# Patient Record
Sex: MALE | Race: BLACK OR AFRICAN AMERICAN | NOT HISPANIC OR LATINO | Employment: FULL TIME | URBAN - METROPOLITAN AREA
[De-identification: names, ages, dates, MRNs, and addresses within clinical notes are randomized per-mention and may not be internally consistent; named-entity substitution may affect disease eponyms.]

---

## 2018-08-29 ENCOUNTER — APPOINTMENT (OUTPATIENT)
Dept: LAB | Facility: HOSPITAL | Age: 53
End: 2018-08-29

## 2018-08-29 ENCOUNTER — OFFICE VISIT (OUTPATIENT)
Dept: INTERNAL MEDICINE | Facility: CLINIC | Age: 53
End: 2018-08-29

## 2018-08-29 VITALS
HEIGHT: 70 IN | BODY MASS INDEX: 38.45 KG/M2 | RESPIRATION RATE: 16 BRPM | WEIGHT: 268.6 LBS | SYSTOLIC BLOOD PRESSURE: 146 MMHG | OXYGEN SATURATION: 96 % | HEART RATE: 68 BPM | TEMPERATURE: 97.7 F | DIASTOLIC BLOOD PRESSURE: 92 MMHG

## 2018-08-29 DIAGNOSIS — E11.9 TYPE 2 DIABETES MELLITUS WITHOUT COMPLICATION, WITHOUT LONG-TERM CURRENT USE OF INSULIN (HCC): ICD-10-CM

## 2018-08-29 DIAGNOSIS — I10 ESSENTIAL HYPERTENSION: Primary | ICD-10-CM

## 2018-08-29 DIAGNOSIS — E78.5 HYPERLIPIDEMIA, UNSPECIFIED HYPERLIPIDEMIA TYPE: ICD-10-CM

## 2018-08-29 LAB
ALBUMIN SERPL-MCNC: 4.2 G/DL (ref 3.5–5.2)
ALBUMIN/GLOB SERPL: 1.3 G/DL
ALP SERPL-CCNC: 68 U/L (ref 39–117)
ALT SERPL W P-5'-P-CCNC: 31 U/L (ref 1–41)
ANION GAP SERPL CALCULATED.3IONS-SCNC: 10.7 MMOL/L
AST SERPL-CCNC: 30 U/L (ref 1–40)
BILIRUB SERPL-MCNC: 0.4 MG/DL (ref 0.1–1.2)
BUN BLD-MCNC: 11 MG/DL (ref 6–20)
BUN/CREAT SERPL: 10.7 (ref 7–25)
CALCIUM SPEC-SCNC: 9.3 MG/DL (ref 8.6–10.5)
CHLORIDE SERPL-SCNC: 102 MMOL/L (ref 98–107)
CHOLEST SERPL-MCNC: 145 MG/DL (ref 0–200)
CO2 SERPL-SCNC: 27.3 MMOL/L (ref 22–29)
CREAT BLD-MCNC: 1.03 MG/DL (ref 0.76–1.27)
GFR SERPL CREATININE-BSD FRML MDRD: 91 ML/MIN/1.73
GLOBULIN UR ELPH-MCNC: 3.3 GM/DL
GLUCOSE BLD-MCNC: 140 MG/DL (ref 65–99)
HBA1C MFR BLD: 7.58 % (ref 4.8–5.6)
HDLC SERPL-MCNC: 46 MG/DL (ref 40–60)
LDLC SERPL CALC-MCNC: 75 MG/DL (ref 0–100)
LDLC/HDLC SERPL: 1.64 {RATIO}
POTASSIUM BLD-SCNC: 4 MMOL/L (ref 3.5–5.2)
PROT SERPL-MCNC: 7.5 G/DL (ref 6–8.5)
SODIUM BLD-SCNC: 140 MMOL/L (ref 136–145)
TRIGL SERPL-MCNC: 118 MG/DL (ref 0–150)
VLDLC SERPL-MCNC: 23.6 MG/DL (ref 5–40)

## 2018-08-29 PROCEDURE — 99204 OFFICE O/P NEW MOD 45 MIN: CPT | Performed by: FAMILY MEDICINE

## 2018-08-29 PROCEDURE — 80053 COMPREHEN METABOLIC PANEL: CPT | Performed by: FAMILY MEDICINE

## 2018-08-29 PROCEDURE — 36415 COLL VENOUS BLD VENIPUNCTURE: CPT | Performed by: FAMILY MEDICINE

## 2018-08-29 PROCEDURE — 83036 HEMOGLOBIN GLYCOSYLATED A1C: CPT | Performed by: FAMILY MEDICINE

## 2018-08-29 PROCEDURE — 80061 LIPID PANEL: CPT | Performed by: FAMILY MEDICINE

## 2018-08-29 RX ORDER — IRBESARTAN 300 MG/1
300 TABLET ORAL NIGHTLY
Qty: 30 TABLET | Refills: 6 | Status: SHIPPED | OUTPATIENT
Start: 2018-08-29 | End: 2019-01-24 | Stop reason: SDUPTHER

## 2018-08-29 RX ORDER — AMLODIPINE BESYLATE 10 MG/1
10 TABLET ORAL DAILY
Refills: 0 | COMMUNITY
Start: 2018-06-10 | End: 2018-08-29 | Stop reason: SDUPTHER

## 2018-08-29 RX ORDER — HYDROCHLOROTHIAZIDE 12.5 MG/1
12.5 TABLET ORAL DAILY
Qty: 30 TABLET | Refills: 6 | Status: SHIPPED | OUTPATIENT
Start: 2018-08-29 | End: 2019-03-16 | Stop reason: SDUPTHER

## 2018-08-29 RX ORDER — METFORMIN HYDROCHLORIDE EXTENDED-RELEASE TABLETS 500 MG/1
1000 TABLET, FILM COATED, EXTENDED RELEASE ORAL NIGHTLY
Qty: 60 TABLET | Refills: 6 | Status: SHIPPED | OUTPATIENT
Start: 2018-08-29 | End: 2018-08-31 | Stop reason: RX

## 2018-08-29 RX ORDER — ESOMEPRAZOLE MAGNESIUM 40 MG/1
40 CAPSULE, DELAYED RELEASE ORAL AS NEEDED
COMMUNITY
End: 2020-05-05 | Stop reason: SDUPTHER

## 2018-08-29 RX ORDER — AMLODIPINE BESYLATE 10 MG/1
10 TABLET ORAL DAILY
Qty: 30 TABLET | Refills: 6 | Status: SHIPPED | OUTPATIENT
Start: 2018-08-29 | End: 2018-12-13 | Stop reason: SDUPTHER

## 2018-08-29 RX ORDER — ATORVASTATIN CALCIUM 20 MG/1
20 TABLET, FILM COATED ORAL DAILY
COMMUNITY
End: 2018-08-29 | Stop reason: SDUPTHER

## 2018-08-29 RX ORDER — METFORMIN HYDROCHLORIDE EXTENDED-RELEASE TABLETS 500 MG/1
500 TABLET, FILM COATED, EXTENDED RELEASE ORAL 2 TIMES DAILY WITH MEALS
COMMUNITY
End: 2018-08-29 | Stop reason: SDUPTHER

## 2018-08-29 RX ORDER — ATORVASTATIN CALCIUM 20 MG/1
20 TABLET, FILM COATED ORAL DAILY
Qty: 30 TABLET | Refills: 6 | Status: SHIPPED | OUTPATIENT
Start: 2018-08-29 | End: 2019-05-26 | Stop reason: SDUPTHER

## 2018-08-29 RX ORDER — VALSARTAN AND HYDROCHLOROTHIAZIDE 320; 12.5 MG/1; MG/1
1 TABLET, FILM COATED ORAL DAILY
Refills: 1 | COMMUNITY
Start: 2018-06-10 | End: 2018-08-29

## 2018-08-31 ENCOUNTER — TELEPHONE (OUTPATIENT)
Dept: INTERNAL MEDICINE | Facility: CLINIC | Age: 53
End: 2018-08-31

## 2018-12-13 DIAGNOSIS — I10 ESSENTIAL HYPERTENSION: ICD-10-CM

## 2018-12-13 RX ORDER — AMLODIPINE BESYLATE 10 MG/1
10 TABLET ORAL DAILY
Qty: 90 TABLET | Refills: 1 | Status: SHIPPED | OUTPATIENT
Start: 2018-12-13 | End: 2019-07-03 | Stop reason: SDUPTHER

## 2018-12-13 NOTE — TELEPHONE ENCOUNTER
[facility] faxed our office requesting a prescription for a 90 day supply of amlodipine. Prescription sent to pharmacy.

## 2018-12-31 ENCOUNTER — OFFICE VISIT (OUTPATIENT)
Dept: INTERNAL MEDICINE | Facility: CLINIC | Age: 53
End: 2018-12-31

## 2018-12-31 ENCOUNTER — APPOINTMENT (OUTPATIENT)
Dept: LAB | Facility: HOSPITAL | Age: 53
End: 2018-12-31

## 2018-12-31 VITALS
BODY MASS INDEX: 36.58 KG/M2 | HEIGHT: 70 IN | WEIGHT: 255.5 LBS | HEART RATE: 81 BPM | OXYGEN SATURATION: 97 % | DIASTOLIC BLOOD PRESSURE: 82 MMHG | SYSTOLIC BLOOD PRESSURE: 130 MMHG

## 2018-12-31 DIAGNOSIS — E78.5 HYPERLIPIDEMIA, UNSPECIFIED HYPERLIPIDEMIA TYPE: ICD-10-CM

## 2018-12-31 DIAGNOSIS — E11.9 TYPE 2 DIABETES MELLITUS WITHOUT COMPLICATION, WITHOUT LONG-TERM CURRENT USE OF INSULIN (HCC): Primary | ICD-10-CM

## 2018-12-31 DIAGNOSIS — R68.82 LOW LIBIDO: ICD-10-CM

## 2018-12-31 LAB
ALBUMIN SERPL-MCNC: 4.3 G/DL (ref 3.5–5.2)
ALBUMIN UR-MCNC: 15.3 MG/L
ALBUMIN/GLOB SERPL: 1.3 G/DL
ALP SERPL-CCNC: 57 U/L (ref 39–117)
ALT SERPL W P-5'-P-CCNC: 29 U/L (ref 1–41)
ANION GAP SERPL CALCULATED.3IONS-SCNC: 12.1 MMOL/L
AST SERPL-CCNC: 30 U/L (ref 1–40)
BILIRUB SERPL-MCNC: 0.4 MG/DL (ref 0.1–1.2)
BUN BLD-MCNC: 15 MG/DL (ref 6–20)
BUN/CREAT SERPL: 13.3 (ref 7–25)
CALCIUM SPEC-SCNC: 9.4 MG/DL (ref 8.6–10.5)
CHLORIDE SERPL-SCNC: 100 MMOL/L (ref 98–107)
CHOLEST SERPL-MCNC: 127 MG/DL (ref 0–200)
CO2 SERPL-SCNC: 26.9 MMOL/L (ref 22–29)
CREAT BLD-MCNC: 1.13 MG/DL (ref 0.76–1.27)
CREAT UR-MCNC: 128.7 MG/DL
GFR SERPL CREATININE-BSD FRML MDRD: 82 ML/MIN/1.73
GLOBULIN UR ELPH-MCNC: 3.3 GM/DL
GLUCOSE BLD-MCNC: 101 MG/DL (ref 65–99)
HBA1C MFR BLD: 5.9 % (ref 4.8–5.6)
HDLC SERPL-MCNC: 61 MG/DL (ref 40–60)
LDLC SERPL CALC-MCNC: 50 MG/DL (ref 0–100)
LDLC/HDLC SERPL: 0.81 {RATIO}
MICROALBUMIN/CREAT UR: 118.9 MG/G
POTASSIUM BLD-SCNC: 4 MMOL/L (ref 3.5–5.2)
PROT SERPL-MCNC: 7.6 G/DL (ref 6–8.5)
SODIUM BLD-SCNC: 139 MMOL/L (ref 136–145)
TRIGL SERPL-MCNC: 82 MG/DL (ref 0–150)
VLDLC SERPL-MCNC: 16.4 MG/DL (ref 5–40)

## 2018-12-31 PROCEDURE — 83036 HEMOGLOBIN GLYCOSYLATED A1C: CPT | Performed by: FAMILY MEDICINE

## 2018-12-31 PROCEDURE — 82043 UR ALBUMIN QUANTITATIVE: CPT | Performed by: FAMILY MEDICINE

## 2018-12-31 PROCEDURE — 99214 OFFICE O/P EST MOD 30 MIN: CPT | Performed by: FAMILY MEDICINE

## 2018-12-31 PROCEDURE — 80061 LIPID PANEL: CPT | Performed by: FAMILY MEDICINE

## 2018-12-31 PROCEDURE — 82570 ASSAY OF URINE CREATININE: CPT | Performed by: FAMILY MEDICINE

## 2018-12-31 PROCEDURE — 36415 COLL VENOUS BLD VENIPUNCTURE: CPT | Performed by: FAMILY MEDICINE

## 2018-12-31 PROCEDURE — 84403 ASSAY OF TOTAL TESTOSTERONE: CPT | Performed by: FAMILY MEDICINE

## 2018-12-31 PROCEDURE — 84402 ASSAY OF FREE TESTOSTERONE: CPT | Performed by: FAMILY MEDICINE

## 2018-12-31 PROCEDURE — 80053 COMPREHEN METABOLIC PANEL: CPT | Performed by: FAMILY MEDICINE

## 2019-01-03 LAB
TESTOST FREE SERPL-MCNC: 12.3 PG/ML (ref 7.2–24)
TESTOST SERPL-MCNC: 336 NG/DL (ref 264–916)

## 2019-01-07 ENCOUNTER — TELEPHONE (OUTPATIENT)
Dept: INTERNAL MEDICINE | Facility: CLINIC | Age: 54
End: 2019-01-07

## 2019-01-07 NOTE — TELEPHONE ENCOUNTER
----- Message from Oneil Decker MD sent at 1/4/2019 12:57 PM EST -----  The labs were reviewed. Please inform patient that labs were normal.

## 2019-01-23 ENCOUNTER — TELEPHONE (OUTPATIENT)
Dept: INTERNAL MEDICINE | Facility: CLINIC | Age: 54
End: 2019-01-23

## 2019-01-23 DIAGNOSIS — Z12.11 SCREENING FOR COLON CANCER: Primary | ICD-10-CM

## 2019-01-23 DIAGNOSIS — G47.33 OBSTRUCTIVE SLEEP APNEA SYNDROME: ICD-10-CM

## 2019-01-23 NOTE — TELEPHONE ENCOUNTER
Patient's wife called stating that patient's previous physician in [place] called the patient to let him know that he is due for a colonoscopy. Patient's wife is requesting an order for a screening colonoscopy for patient.     Patient's wife also stated that the patient would like to get up updated sleep study done because his last sleep study for his sleep apnea was done over 10 years ago. Please advise.

## 2019-01-24 DIAGNOSIS — I10 ESSENTIAL HYPERTENSION: ICD-10-CM

## 2019-01-24 RX ORDER — IRBESARTAN 300 MG/1
TABLET ORAL
Qty: 90 TABLET | Refills: 1 | Status: SHIPPED | OUTPATIENT
Start: 2019-01-24 | End: 2019-08-05 | Stop reason: SDUPTHER

## 2019-03-16 DIAGNOSIS — I10 ESSENTIAL HYPERTENSION: ICD-10-CM

## 2019-03-18 RX ORDER — HYDROCHLOROTHIAZIDE 12.5 MG/1
TABLET ORAL
Qty: 90 TABLET | Refills: 1 | Status: SHIPPED | OUTPATIENT
Start: 2019-03-18 | End: 2019-10-03 | Stop reason: SDUPTHER

## 2019-05-08 ENCOUNTER — TELEPHONE (OUTPATIENT)
Dept: GASTROENTEROLOGY | Facility: CLINIC | Age: 54
End: 2019-05-08

## 2019-05-08 NOTE — TELEPHONE ENCOUNTER
Spoke with patient and he said that his last colonoscopy was done by Dr Sneed of [facility] [place].  [phone]

## 2019-05-08 NOTE — TELEPHONE ENCOUNTER
Left voice message for patient to call office regarding where his last colonoscopy was performed so that the office can obtain a copy of that report.

## 2019-05-10 ENCOUNTER — PREP FOR SURGERY (OUTPATIENT)
Dept: OTHER | Facility: HOSPITAL | Age: 54
End: 2019-05-10

## 2019-05-10 DIAGNOSIS — D12.3 ADENOMATOUS POLYP OF TRANSVERSE COLON: Primary | ICD-10-CM

## 2019-05-14 ENCOUNTER — APPOINTMENT (OUTPATIENT)
Dept: LAB | Facility: HOSPITAL | Age: 54
End: 2019-05-14

## 2019-05-14 ENCOUNTER — OFFICE VISIT (OUTPATIENT)
Dept: INTERNAL MEDICINE | Facility: CLINIC | Age: 54
End: 2019-05-14

## 2019-05-14 VITALS
SYSTOLIC BLOOD PRESSURE: 124 MMHG | HEIGHT: 70 IN | OXYGEN SATURATION: 97 % | BODY MASS INDEX: 36.51 KG/M2 | WEIGHT: 255 LBS | DIASTOLIC BLOOD PRESSURE: 80 MMHG | HEART RATE: 96 BPM

## 2019-05-14 DIAGNOSIS — E78.5 HYPERLIPIDEMIA, UNSPECIFIED HYPERLIPIDEMIA TYPE: Primary | ICD-10-CM

## 2019-05-14 DIAGNOSIS — E11.9 TYPE 2 DIABETES MELLITUS WITHOUT COMPLICATION, WITHOUT LONG-TERM CURRENT USE OF INSULIN (HCC): ICD-10-CM

## 2019-05-14 DIAGNOSIS — I10 ESSENTIAL HYPERTENSION: ICD-10-CM

## 2019-05-14 LAB
ALBUMIN SERPL-MCNC: 4.5 G/DL (ref 3.5–5.2)
ALBUMIN/GLOB SERPL: 1.6 G/DL
ALP SERPL-CCNC: 54 U/L (ref 39–117)
ALT SERPL W P-5'-P-CCNC: 27 U/L (ref 1–41)
ANION GAP SERPL CALCULATED.3IONS-SCNC: 11.1 MMOL/L
AST SERPL-CCNC: 31 U/L (ref 1–40)
BILIRUB SERPL-MCNC: 0.7 MG/DL (ref 0.2–1.2)
BUN BLD-MCNC: 9 MG/DL (ref 6–20)
BUN/CREAT SERPL: 7.6 (ref 7–25)
CALCIUM SPEC-SCNC: 9 MG/DL (ref 8.6–10.5)
CHLORIDE SERPL-SCNC: 103 MMOL/L (ref 98–107)
CHOLEST SERPL-MCNC: 131 MG/DL (ref 0–200)
CO2 SERPL-SCNC: 25.9 MMOL/L (ref 22–29)
CREAT BLD-MCNC: 1.19 MG/DL (ref 0.76–1.27)
GFR SERPL CREATININE-BSD FRML MDRD: 77 ML/MIN/1.73
GLOBULIN UR ELPH-MCNC: 2.9 GM/DL
GLUCOSE BLD-MCNC: 93 MG/DL (ref 65–99)
HBA1C MFR BLD: 6.21 % (ref 4.8–5.6)
HDLC SERPL-MCNC: 62 MG/DL (ref 40–60)
LDLC SERPL CALC-MCNC: 50 MG/DL (ref 0–100)
LDLC/HDLC SERPL: 0.81 {RATIO}
POTASSIUM BLD-SCNC: 4.1 MMOL/L (ref 3.5–5.2)
PROT SERPL-MCNC: 7.4 G/DL (ref 6–8.5)
SODIUM BLD-SCNC: 140 MMOL/L (ref 136–145)
TRIGL SERPL-MCNC: 95 MG/DL (ref 0–150)
VLDLC SERPL-MCNC: 19 MG/DL (ref 5–40)

## 2019-05-14 PROCEDURE — 36415 COLL VENOUS BLD VENIPUNCTURE: CPT | Performed by: FAMILY MEDICINE

## 2019-05-14 PROCEDURE — 99214 OFFICE O/P EST MOD 30 MIN: CPT | Performed by: FAMILY MEDICINE

## 2019-05-14 PROCEDURE — 80053 COMPREHEN METABOLIC PANEL: CPT | Performed by: FAMILY MEDICINE

## 2019-05-14 PROCEDURE — 83036 HEMOGLOBIN GLYCOSYLATED A1C: CPT | Performed by: FAMILY MEDICINE

## 2019-05-14 PROCEDURE — 80061 LIPID PANEL: CPT | Performed by: FAMILY MEDICINE

## 2019-05-14 NOTE — PROGRESS NOTES
Brandi Munoz is a 54 y.o. male.     Chief Complaint   Patient presents with   • Diabetes   • Hypertension   • Hyperlipidemia         History of Present Illness       Patient is a history of essential hypertension.  Patient blood pressure today's office visit 124/80.  He is currently taking hydrochlorothiazide 12.5 mg daily, irbesartan 300 mg daily, and amlodipine 10 mg daily.  Patient denies any side effects of the medication.    Has hyperlipidemia.  Patient is currently taking Lipitor 20 mg daily.  Patient denies any side effects of the medication.    Patient also has type 2 diabetes.  Patient states that his fasting blood glucose levels are in average of 96.  Patient is currently taking Janumet extended release  mg daily and metformin 1000 mg daily.  Patient denies any side effects of the medications.    The following portions of the patient's history were reviewed and updated as appropriate: allergies, current medications, past family history, past medical history, past social history, past surgical history and problem list.    Review of Systems   Constitutional: Negative for chills and fever.   HENT: Negative for congestion, rhinorrhea, sinus pain and sore throat.    Eyes: Negative for photophobia and visual disturbance.   Respiratory: Negative for cough, chest tightness and shortness of breath.    Cardiovascular: Negative for chest pain and palpitations.   Gastrointestinal: Negative for diarrhea, nausea and vomiting.   Genitourinary: Negative for dysuria, frequency and urgency.   Skin: Negative for rash and wound.   Neurological: Negative for dizziness and syncope.   Psychiatric/Behavioral: Negative for behavioral problems and confusion.       Objective   Physical Exam   Constitutional: He is oriented to person, place, and time. He appears well-developed and well-nourished.   HENT:   Head: Normocephalic and atraumatic.   Right Ear: External ear normal.   Left Ear: External ear normal.    Eyes: EOM are normal.   Neck: Normal range of motion. Neck supple.   Cardiovascular: Normal rate, regular rhythm and normal heart sounds.   Pulmonary/Chest: Effort normal and breath sounds normal. No respiratory distress.   Musculoskeletal: Normal range of motion.   Lymphadenopathy:     He has no cervical adenopathy.   Neurological: He is alert and oriented to person, place, and time.   Skin: Skin is warm.   Psychiatric: He has a normal mood and affect. His behavior is normal.   Nursing note and vitals reviewed.      Assessment/Plan   Jhon was seen today for diabetes, hypertension and hyperlipidemia.    Diagnoses and all orders for this visit:    Hyperlipidemia, unspecified hyperlipidemia type  -     Lipid Panel With LDL / HDL Ratio  -     Continue janumet XR.    Essential hypertension  -     Comprehensive Metabolic Panel  -     Continue current medication.    Type 2 diabetes mellitus without complication, without long-term current use of insulin (CMS/[facility])  -     Hemoglobin A1c  -     Continue janumet xr and metformin.          No Follow-up on file.    Dictated utilizing Dragon Voice Recognition Software

## 2019-05-26 DIAGNOSIS — E78.5 HYPERLIPIDEMIA, UNSPECIFIED HYPERLIPIDEMIA TYPE: ICD-10-CM

## 2019-05-26 NOTE — PROGRESS NOTES
Please inform the patient of the following abnormal results.  hba1c is slowly rising, but will keep patient on current medication.

## 2019-05-28 RX ORDER — ATORVASTATIN CALCIUM 20 MG/1
TABLET, FILM COATED ORAL
Qty: 90 TABLET | Refills: 1 | Status: SHIPPED | OUTPATIENT
Start: 2019-05-28 | End: 2020-01-08

## 2019-06-03 ENCOUNTER — TELEPHONE (OUTPATIENT)
Dept: INTERNAL MEDICINE | Facility: CLINIC | Age: 54
End: 2019-06-03

## 2019-06-03 NOTE — TELEPHONE ENCOUNTER
----- Message from Oneil Decker MD sent at 5/26/2019  8:55 AM EDT -----  Please inform the patient of the following abnormal results.  hba1c is slowly rising, but will keep patient on current medication.

## 2019-06-19 ENCOUNTER — OUTSIDE FACILITY SERVICE (OUTPATIENT)
Dept: GASTROENTEROLOGY | Facility: CLINIC | Age: 54
End: 2019-06-19

## 2019-06-19 PROCEDURE — 45380 COLONOSCOPY AND BIOPSY: CPT | Performed by: INTERNAL MEDICINE

## 2019-06-19 PROCEDURE — 45385 COLONOSCOPY W/LESION REMOVAL: CPT | Performed by: INTERNAL MEDICINE

## 2019-06-20 ENCOUNTER — TELEPHONE (OUTPATIENT)
Dept: INTERNAL MEDICINE | Facility: CLINIC | Age: 54
End: 2019-06-20

## 2019-06-20 NOTE — TELEPHONE ENCOUNTER
Pt advised of results and to call GI for pathology results.  Advised to call our office if he does not hear anything.  Pt expressed understanding and will repeat colonoscopy in three years.

## 2019-06-20 NOTE — TELEPHONE ENCOUNTER
----- Message from Oneil Decker MD sent at 6/20/2019 11:24 AM EDT -----  Please inform the patient of the following abnormal results.  Polyps were found on colonoscopy.  Patient is to call his GI to find out the pathology reports for this.  He will need a follow-up colonoscopy in 3 years.

## 2019-06-27 ENCOUNTER — TELEPHONE (OUTPATIENT)
Dept: INTERNAL MEDICINE | Facility: CLINIC | Age: 54
End: 2019-06-27

## 2019-06-27 ENCOUNTER — TELEPHONE (OUTPATIENT)
Dept: GASTROENTEROLOGY | Facility: CLINIC | Age: 54
End: 2019-06-27

## 2019-06-27 NOTE — TELEPHONE ENCOUNTER
Robert Evans MD  [facility] Clinical 2 Pool             Tubular adenoma colon polyp, colonoscopy recall 3 yrs

## 2019-06-27 NOTE — TELEPHONE ENCOUNTER
----- Message from Meghann Catalan sent at 6/27/2019  1:44 PM EDT -----  Regarding: Question  Contact: [phone]  Pt wants to speak with a nurse regarding a message he has in [employer]

## 2019-07-03 DIAGNOSIS — I10 ESSENTIAL HYPERTENSION: ICD-10-CM

## 2019-07-05 RX ORDER — AMLODIPINE BESYLATE 10 MG/1
TABLET ORAL
Qty: 90 TABLET | Refills: 1 | Status: SHIPPED | OUTPATIENT
Start: 2019-07-05 | End: 2020-01-08

## 2019-08-05 DIAGNOSIS — I10 ESSENTIAL HYPERTENSION: ICD-10-CM

## 2019-08-05 RX ORDER — IRBESARTAN 300 MG/1
TABLET ORAL
Qty: 90 TABLET | Refills: 1 | Status: SHIPPED | OUTPATIENT
Start: 2019-08-05 | End: 2019-08-08 | Stop reason: ALTCHOICE

## 2019-08-08 ENCOUNTER — TELEPHONE (OUTPATIENT)
Dept: INTERNAL MEDICINE | Facility: CLINIC | Age: 54
End: 2019-08-08

## 2019-08-08 DIAGNOSIS — I10 ESSENTIAL HYPERTENSION: Primary | ICD-10-CM

## 2019-08-08 RX ORDER — TELMISARTAN 80 MG/1
80 TABLET ORAL DAILY
Qty: 30 TABLET | Refills: 2 | Status: SHIPPED | OUTPATIENT
Start: 2019-08-08 | End: 2019-11-01 | Stop reason: SDUPTHER

## 2019-08-08 NOTE — TELEPHONE ENCOUNTER
Called pt and let him know       ----- Message from Oneil Decker MD sent at 8/8/2019  7:52 AM EDT -----  Switch to telmesartan 80mg daily.     ----- Message -----  From: Jeovanny Soriano MA  Sent: 8/7/2019   4:39 PM  To: Oneil Decker MD    Pt stated that irbesartan is on back order and wants you to substitute the medication please advise thank you

## 2019-09-10 ENCOUNTER — APPOINTMENT (OUTPATIENT)
Dept: LAB | Facility: HOSPITAL | Age: 54
End: 2019-09-10

## 2019-09-10 ENCOUNTER — OFFICE VISIT (OUTPATIENT)
Dept: INTERNAL MEDICINE | Facility: CLINIC | Age: 54
End: 2019-09-10

## 2019-09-10 VITALS
HEIGHT: 70 IN | WEIGHT: 252 LBS | RESPIRATION RATE: 16 BRPM | HEART RATE: 76 BPM | TEMPERATURE: 98.8 F | BODY MASS INDEX: 36.08 KG/M2 | SYSTOLIC BLOOD PRESSURE: 124 MMHG | DIASTOLIC BLOOD PRESSURE: 64 MMHG | OXYGEN SATURATION: 98 %

## 2019-09-10 DIAGNOSIS — Z00.00 VISIT FOR WELL MAN HEALTH CHECK: Primary | ICD-10-CM

## 2019-09-10 DIAGNOSIS — Z11.59 ENCOUNTER FOR HEPATITIS C SCREENING TEST FOR LOW RISK PATIENT: ICD-10-CM

## 2019-09-10 DIAGNOSIS — Z13.29 SCREENING FOR THYROID DISORDER: ICD-10-CM

## 2019-09-10 DIAGNOSIS — Z13.220 SCREENING FOR LIPID DISORDERS: ICD-10-CM

## 2019-09-10 DIAGNOSIS — I10 ESSENTIAL HYPERTENSION: ICD-10-CM

## 2019-09-10 DIAGNOSIS — E78.5 HYPERLIPIDEMIA, UNSPECIFIED HYPERLIPIDEMIA TYPE: ICD-10-CM

## 2019-09-10 DIAGNOSIS — Z13.1 SCREENING FOR DIABETES MELLITUS: ICD-10-CM

## 2019-09-10 DIAGNOSIS — Z23 NEED FOR PNEUMOCOCCAL VACCINATION: ICD-10-CM

## 2019-09-10 DIAGNOSIS — Z23 NEED FOR TDAP VACCINATION: ICD-10-CM

## 2019-09-10 DIAGNOSIS — E11.9 TYPE 2 DIABETES MELLITUS WITHOUT COMPLICATION, WITHOUT LONG-TERM CURRENT USE OF INSULIN (HCC): ICD-10-CM

## 2019-09-10 LAB
ALBUMIN SERPL-MCNC: 4.4 G/DL (ref 3.5–5.2)
ALBUMIN/GLOB SERPL: 1.5 G/DL
ALP SERPL-CCNC: 55 U/L (ref 39–117)
ALT SERPL W P-5'-P-CCNC: 23 U/L (ref 1–41)
ANION GAP SERPL CALCULATED.3IONS-SCNC: 15 MMOL/L (ref 5–15)
AST SERPL-CCNC: 24 U/L (ref 1–40)
BASOPHILS # BLD AUTO: 0.02 10*3/MM3 (ref 0–0.2)
BASOPHILS NFR BLD AUTO: 0.4 % (ref 0–1.5)
BILIRUB SERPL-MCNC: 0.6 MG/DL (ref 0.2–1.2)
BUN BLD-MCNC: 11 MG/DL (ref 6–20)
BUN/CREAT SERPL: 10.9 (ref 7–25)
CALCIUM SPEC-SCNC: 9.7 MG/DL (ref 8.6–10.5)
CHLORIDE SERPL-SCNC: 103 MMOL/L (ref 98–107)
CHOLEST SERPL-MCNC: 149 MG/DL (ref 0–200)
CO2 SERPL-SCNC: 24 MMOL/L (ref 22–29)
CREAT BLD-MCNC: 1.01 MG/DL (ref 0.76–1.27)
DEPRECATED RDW RBC AUTO: 47.8 FL (ref 37–54)
EOSINOPHIL # BLD AUTO: 0.2 10*3/MM3 (ref 0–0.4)
EOSINOPHIL NFR BLD AUTO: 3.9 % (ref 0.3–6.2)
ERYTHROCYTE [DISTWIDTH] IN BLOOD BY AUTOMATED COUNT: 13.9 % (ref 12.3–15.4)
GFR SERPL CREATININE-BSD FRML MDRD: 93 ML/MIN/1.73
GLOBULIN UR ELPH-MCNC: 2.9 GM/DL
GLUCOSE BLD-MCNC: 83 MG/DL (ref 65–99)
HBA1C MFR BLD: 6.31 % (ref 4.8–5.6)
HCT VFR BLD AUTO: 44.7 % (ref 37.5–51)
HCV AB SER DONR QL: NORMAL
HDLC SERPL-MCNC: 59 MG/DL (ref 40–60)
HGB BLD-MCNC: 13.6 G/DL (ref 13–17.7)
IMM GRANULOCYTES # BLD AUTO: 0.03 10*3/MM3 (ref 0–0.05)
IMM GRANULOCYTES NFR BLD AUTO: 0.6 % (ref 0–0.5)
LDLC SERPL CALC-MCNC: 72 MG/DL (ref 0–100)
LDLC/HDLC SERPL: 1.21 {RATIO}
LYMPHOCYTES # BLD AUTO: 1.87 10*3/MM3 (ref 0.7–3.1)
LYMPHOCYTES NFR BLD AUTO: 36 % (ref 19.6–45.3)
MCH RBC QN AUTO: 28.8 PG (ref 26.6–33)
MCHC RBC AUTO-ENTMCNC: 30.4 G/DL (ref 31.5–35.7)
MCV RBC AUTO: 94.5 FL (ref 79–97)
MONOCYTES # BLD AUTO: 0.54 10*3/MM3 (ref 0.1–0.9)
MONOCYTES NFR BLD AUTO: 10.4 % (ref 5–12)
NEUTROPHILS # BLD AUTO: 2.53 10*3/MM3 (ref 1.7–7)
NEUTROPHILS NFR BLD AUTO: 48.7 % (ref 42.7–76)
NRBC BLD AUTO-RTO: 0 /100 WBC (ref 0–0.2)
PLATELET # BLD AUTO: 215 10*3/MM3 (ref 140–450)
PMV BLD AUTO: 12 FL (ref 6–12)
POTASSIUM BLD-SCNC: 4.4 MMOL/L (ref 3.5–5.2)
PROT SERPL-MCNC: 7.3 G/DL (ref 6–8.5)
RBC # BLD AUTO: 4.73 10*6/MM3 (ref 4.14–5.8)
SODIUM BLD-SCNC: 142 MMOL/L (ref 136–145)
T-UPTAKE NFR SERPL: 1.03 TBI (ref 0.8–1.3)
T4 SERPL-MCNC: 4.93 MCG/DL (ref 4.5–11.7)
TRIGL SERPL-MCNC: 92 MG/DL (ref 0–150)
TSH SERPL DL<=0.05 MIU/L-ACNC: 1.87 UIU/ML (ref 0.27–4.2)
VLDLC SERPL-MCNC: 18.4 MG/DL (ref 5–40)
WBC NRBC COR # BLD: 5.19 10*3/MM3 (ref 3.4–10.8)

## 2019-09-10 PROCEDURE — 90471 IMMUNIZATION ADMIN: CPT | Performed by: FAMILY MEDICINE

## 2019-09-10 PROCEDURE — 86803 HEPATITIS C AB TEST: CPT | Performed by: FAMILY MEDICINE

## 2019-09-10 PROCEDURE — 84479 ASSAY OF THYROID (T3 OR T4): CPT | Performed by: FAMILY MEDICINE

## 2019-09-10 PROCEDURE — 90732 PPSV23 VACC 2 YRS+ SUBQ/IM: CPT | Performed by: FAMILY MEDICINE

## 2019-09-10 PROCEDURE — 84443 ASSAY THYROID STIM HORMONE: CPT | Performed by: FAMILY MEDICINE

## 2019-09-10 PROCEDURE — 84436 ASSAY OF TOTAL THYROXINE: CPT | Performed by: FAMILY MEDICINE

## 2019-09-10 PROCEDURE — 80053 COMPREHEN METABOLIC PANEL: CPT | Performed by: FAMILY MEDICINE

## 2019-09-10 PROCEDURE — 36415 COLL VENOUS BLD VENIPUNCTURE: CPT | Performed by: FAMILY MEDICINE

## 2019-09-10 PROCEDURE — 99214 OFFICE O/P EST MOD 30 MIN: CPT | Performed by: FAMILY MEDICINE

## 2019-09-10 PROCEDURE — 85025 COMPLETE CBC W/AUTO DIFF WBC: CPT | Performed by: FAMILY MEDICINE

## 2019-09-10 PROCEDURE — 99396 PREV VISIT EST AGE 40-64: CPT | Performed by: FAMILY MEDICINE

## 2019-09-10 PROCEDURE — 80061 LIPID PANEL: CPT | Performed by: FAMILY MEDICINE

## 2019-09-10 PROCEDURE — 90472 IMMUNIZATION ADMIN EACH ADD: CPT | Performed by: FAMILY MEDICINE

## 2019-09-10 PROCEDURE — 90715 TDAP VACCINE 7 YRS/> IM: CPT | Performed by: FAMILY MEDICINE

## 2019-09-10 PROCEDURE — 83036 HEMOGLOBIN GLYCOSYLATED A1C: CPT | Performed by: FAMILY MEDICINE

## 2019-09-10 NOTE — PROGRESS NOTES
Brandi Munoz is a 54 y.o. male and is here for a comprehensive physical exam. The patient reports no problems.    At today's office visit patient has a past medical history of hyperlipidemia.  Is currently taking atorvastatin 20 mg daily.  He denies any side effects of the medication.  He is also try to monitor his cholesterol with diet and exercise.    Patient has a past medical history of type 2 diabetes.  He is currently doing 1000 mg of metformin at nighttime, and 1000-50 of Janumet extended release in the morning. He states that he checks his fasting blood glucose levels at home, and he notes that his sugars are ranging between 100-105.  Overall patient states that he is doing well on the medications, denies any side effects of the medicines    Patient has a past medical history for essential hypertension.  His blood pressure today's office visit 124/64.  Patient is currently taking amlodipine 10 mg daily, hydrochlorothiazide 12.5 mg daily, and telmisartan 80 mg daily.  He denies any side effects of these medications.    Social History:   Social History     Socioeconomic History   • Marital status:      Spouse name: Not on file   • Number of children: Not on file   • Years of education: Not on file   • Highest education level: Not on file   Tobacco Use   • Smoking status: Never Smoker   • Smokeless tobacco: Never Used   Substance and Sexual Activity   • Alcohol use: Yes     Comment: 3 per week        Family History: No family history on file.    Past Medical History:   Past Medical History:   Diagnosis Date   • Colon polyp    • Diabetes mellitus (CMS/HCC)     type 2   • Hyperlipidemia    • Hypertension    • Sleep apnea        The following portions of the patient's history were reviewed and updated as appropriate: allergies, current medications, past family history, past medical history, past social history, past surgical history and problem list.    Review of Systems    Review of Systems    Constitutional: Negative for chills and fever.   HENT: Negative for congestion, rhinorrhea, sinus pain and sore throat.    Eyes: Negative for photophobia and visual disturbance.   Respiratory: Negative for cough, chest tightness and shortness of breath.    Cardiovascular: Negative for chest pain and palpitations.   Gastrointestinal: Negative for diarrhea, nausea and vomiting.   Genitourinary: Negative for dysuria, frequency and urgency.   Skin: Negative for rash and wound.   Neurological: Negative for dizziness and syncope.   Psychiatric/Behavioral: Negative for behavioral problems and confusion.       Objective   Physical Exam   Constitutional: He is oriented to person, place, and time. He appears well-developed and well-nourished.   HENT:   Head: Normocephalic and atraumatic.   Right Ear: External ear normal.   Left Ear: External ear normal.   Mouth/Throat: Oropharynx is clear and moist.   Eyes: EOM are normal.   Neck: Normal range of motion. Neck supple.   Cardiovascular: Normal rate, regular rhythm and normal heart sounds.   Pulmonary/Chest: Effort normal and breath sounds normal. No respiratory distress.   Abdominal: Soft. There is no tenderness. There is no guarding.   Musculoskeletal: Normal range of motion.   Lymphadenopathy:     He has no cervical adenopathy.   Neurological: He is alert and oriented to person, place, and time.   Skin: Skin is warm.   Psychiatric: He has a normal mood and affect. His behavior is normal.   Nursing note and vitals reviewed.      Medications:   Current Outpatient Medications:   •  amLODIPine (NORVASC) 10 MG tablet, TAKE 1 TABLET BY MOUTH EVERY DAY, Disp: 90 tablet, Rfl: 1  •  atorvastatin (LIPITOR) 20 MG tablet, TAKE 1 TABLET BY MOUTH EVERY DAY, Disp: 90 tablet, Rfl: 1  •  esomeprazole (nexIUM) 40 MG capsule, Take 40 mg by mouth As Needed., Disp: , Rfl:   •  hydrochlorothiazide (HYDRODIURIL) 12.5 MG tablet, TAKE 1 TABLET BY MOUTH EVERY DAY, Disp: 90 tablet, Rfl: 1  •   metFORMIN (GLUCOPHAGE) 500 MG tablet, Take 2 tablets by mouth every night at bedtime., Disp: 180 tablet, Rfl: 1  •  SITagliptin-MetFORMIN HCl ER (JANUMET XR)  MG tablet, Take 1 tablet by mouth Daily., Disp: 30 tablet, Rfl: 6  •  telmisartan (MICARDIS) 80 MG tablet, Take 1 tablet by mouth Daily., Disp: 30 tablet, Rfl: 2       Assessment/Plan   Healthy male exam.      1. Healthcare Maintenance:  2. Patient Counseling:  --Nutrition: Stressed importance of moderation in sodium/caffeine intake, saturated fat and cholesterol, caloric balance, sufficient intake of fresh fruits, vegetables, fiber, calcium and vit D  --Exercise: Recommended 30 minutes of exercise daily.   --Immunizations reviewed.      Diagnoses and all orders for this visit:    Visit for [facility] health check  -     CBC & Differential  -     Comprehensive Metabolic Panel  -     CBC & Differential; Future  -     Comprehensive Metabolic Panel; Future    Screening for diabetes mellitus  -     Hemoglobin A1c  -     Hemoglobin A1c; Future    Screening for thyroid disorder  -     Thyroid Panel With TSH  -     Thyroid Panel With TSH; Future    Screening for lipid disorders  -     Lipid Panel With LDL / HDL Ratio  -     Lipid Panel With LDL / HDL Ratio; Future    Type 2 diabetes mellitus without complication, without long-term current use of insulin (CMS/[facility])  -     Hemoglobin A1c; Future  -     Comprehensive Metabolic Panel; Future  -     Stable, continue current Janumet extended release  mg and metformin 1000 mg daily.    Essential hypertension  -     Comprehensive Metabolic Panel; Future  -     Stable, continue amlodipine 10 mg daily, HIDA chlorothiazide 12.5 mg daily, and Micardis 80 mg daily.    Hyperlipidemia, unspecified hyperlipidemia type  -     Comprehensive Metabolic Panel; Future  -     Lipid Panel With LDL / HDL Ratio; Future  -     Continue Lipitor 10 mg daily.    Encounter for hepatitis C screening test for low risk patient  -      Hepatitis C Antibody    Need for pneumococcal vaccination  -     Pneumococcal Polysaccharide Vaccine 23-Valent Greater Than or Equal To 1yo Subcutaneous / IM    Need for Tdap vaccination  -     Tdap Vaccine Greater Than or Equal To 6yo IM        No Follow-up on file.           Dictated utilizing Dragon Voice Recognition Software

## 2019-09-11 ENCOUNTER — RESULTS ENCOUNTER (OUTPATIENT)
Dept: INTERNAL MEDICINE | Facility: CLINIC | Age: 54
End: 2019-09-11

## 2019-09-11 DIAGNOSIS — E11.9 TYPE 2 DIABETES MELLITUS WITHOUT COMPLICATION, WITHOUT LONG-TERM CURRENT USE OF INSULIN (HCC): ICD-10-CM

## 2019-09-11 DIAGNOSIS — I10 ESSENTIAL HYPERTENSION: ICD-10-CM

## 2019-09-11 DIAGNOSIS — E78.5 HYPERLIPIDEMIA, UNSPECIFIED HYPERLIPIDEMIA TYPE: ICD-10-CM

## 2019-09-11 NOTE — PROGRESS NOTES
Please inform the patient of the following abnormal results.  Hemoglobin A1c is steadily rising to 6.31.  He needs to continue the medications that he is on in moderate to better with diet and exercise.

## 2019-10-03 DIAGNOSIS — I10 ESSENTIAL HYPERTENSION: ICD-10-CM

## 2019-10-03 RX ORDER — HYDROCHLOROTHIAZIDE 12.5 MG/1
TABLET ORAL
Qty: 90 TABLET | Refills: 1 | Status: SHIPPED | OUTPATIENT
Start: 2019-10-03 | End: 2020-03-17 | Stop reason: SDUPTHER

## 2019-11-01 DIAGNOSIS — I10 ESSENTIAL HYPERTENSION: ICD-10-CM

## 2019-11-05 RX ORDER — TELMISARTAN 80 MG/1
TABLET ORAL
Qty: 90 TABLET | Refills: 0 | Status: SHIPPED | OUTPATIENT
Start: 2019-11-05 | End: 2019-12-30

## 2019-12-30 DIAGNOSIS — I10 ESSENTIAL HYPERTENSION: ICD-10-CM

## 2019-12-30 RX ORDER — TELMISARTAN 80 MG/1
TABLET ORAL
Qty: 90 TABLET | Refills: 0 | Status: SHIPPED | OUTPATIENT
Start: 2019-12-30 | End: 2020-03-17 | Stop reason: SDUPTHER

## 2020-01-08 DIAGNOSIS — I10 ESSENTIAL HYPERTENSION: ICD-10-CM

## 2020-01-08 DIAGNOSIS — E78.5 HYPERLIPIDEMIA, UNSPECIFIED HYPERLIPIDEMIA TYPE: ICD-10-CM

## 2020-01-08 RX ORDER — ATORVASTATIN CALCIUM 20 MG/1
TABLET, FILM COATED ORAL
Qty: 90 TABLET | Refills: 1 | Status: SHIPPED | OUTPATIENT
Start: 2020-01-08 | End: 2020-03-17 | Stop reason: SDUPTHER

## 2020-01-08 RX ORDER — AMLODIPINE BESYLATE 10 MG/1
TABLET ORAL
Qty: 90 TABLET | Refills: 1 | Status: SHIPPED | OUTPATIENT
Start: 2020-01-08 | End: 2020-03-17 | Stop reason: SDUPTHER

## 2020-01-09 ENCOUNTER — OFFICE VISIT (OUTPATIENT)
Dept: INTERNAL MEDICINE | Facility: CLINIC | Age: 55
End: 2020-01-09

## 2020-01-09 VITALS
OXYGEN SATURATION: 99 % | HEIGHT: 70 IN | DIASTOLIC BLOOD PRESSURE: 74 MMHG | WEIGHT: 257 LBS | RESPIRATION RATE: 16 BRPM | BODY MASS INDEX: 36.79 KG/M2 | HEART RATE: 71 BPM | SYSTOLIC BLOOD PRESSURE: 124 MMHG

## 2020-01-09 DIAGNOSIS — M70.62 TROCHANTERIC BURSITIS OF LEFT HIP: Primary | ICD-10-CM

## 2020-01-09 PROCEDURE — 99213 OFFICE O/P EST LOW 20 MIN: CPT | Performed by: FAMILY MEDICINE

## 2020-01-09 RX ORDER — IBUPROFEN AND FAMOTIDINE 26.6; 8 MG/1; MG/1
1 TABLET, FILM COATED ORAL 3 TIMES DAILY PRN
Qty: 90 TABLET | Refills: 2 | Status: SHIPPED | OUTPATIENT
Start: 2020-01-09 | End: 2020-01-09 | Stop reason: SDUPTHER

## 2020-01-09 RX ORDER — IBUPROFEN AND FAMOTIDINE 26.6; 8 MG/1; MG/1
1 TABLET, FILM COATED ORAL 3 TIMES DAILY PRN
Qty: 90 TABLET | Refills: 2 | Status: SHIPPED | OUTPATIENT
Start: 2020-01-09 | End: 2020-01-14 | Stop reason: SDUPTHER

## 2020-01-09 NOTE — PROGRESS NOTES
Subjective   Jhon Munoz is a 54 y.o. male.     Chief Complaint   Patient presents with   • Hip Pain         History of Present Illness     Patient notes that for the last couple weeks, he notes he has pain in the left hip.  He states that it is on the lateral side.  Patient states it is worse with movement.  Patient states that when he tries to bend down to lift anything seems to hurt more.  He states that is sharp.  Patient states that when he takes ibuprofen he does experience some alleviation of the pain.  He denies any trauma to the area.  Patient states that a few years ago, he started doing physical therapy which seem to have helped him with his pain.    The following portions of the patient's history were reviewed and updated as appropriate: allergies, current medications, past family history, past medical history, past social history, past surgical history and problem list.    Review of Systems   Constitutional: Negative.    HENT: Negative.    Respiratory: Negative.    Cardiovascular: Negative.    Gastrointestinal: Negative.    Musculoskeletal: Positive for joint swelling.   Hematological: Negative.        Objective   Physical Exam   Constitutional: He is oriented to person, place, and time. He appears well-developed and well-nourished.   HENT:   Head: Normocephalic and atraumatic.   Eyes: EOM are normal.   Neck: Normal range of motion. Neck supple.   Musculoskeletal: He exhibits tenderness.        Left hip: He exhibits tenderness. He exhibits normal range of motion and no swelling.        Legs:  Neurological: He is alert and oriented to person, place, and time.   Psychiatric: He has a normal mood and affect. His behavior is normal.   Nursing note and vitals reviewed.      Vitals:    01/09/20 0807   BP: 124/74   Pulse: 71   Resp: 16   SpO2: 99%     Body mass index is 36.88 kg/m².      Assessment/Plan   Jhon was seen today for hip pain.    Diagnoses and all orders for this visit:    Trochanteric bursitis  of left hip  -     Ambulatory Referral to Sports Medicine  -     Ambulatory Referral to Physical Therapy Evaluate and treat  -     I have discussed with the patient he may benefit from physical therapy.  We will also start him on Duexis for his pain.  Will refer him to sports medicine.  -     Ibuprofen-Famotidine (DUEXIS) 800-26.6 MG tablet; Take 1 tablet by mouth 3 (Three) Times a Day As Needed (PAIN/SWELLING).          No follow-ups on file.    Dictated utilizing Dragon Voice Recognition Software

## 2020-01-15 ENCOUNTER — OFFICE VISIT (OUTPATIENT)
Dept: SPORTS MEDICINE | Facility: CLINIC | Age: 55
End: 2020-01-15

## 2020-01-15 VITALS
HEIGHT: 70 IN | BODY MASS INDEX: 35.5 KG/M2 | SYSTOLIC BLOOD PRESSURE: 132 MMHG | OXYGEN SATURATION: 98 % | HEART RATE: 82 BPM | WEIGHT: 248 LBS | DIASTOLIC BLOOD PRESSURE: 72 MMHG

## 2020-01-15 DIAGNOSIS — M25.552 PAIN OF LEFT HIP JOINT: Primary | ICD-10-CM

## 2020-01-15 DIAGNOSIS — M67.952 TENDINOPATHY OF LEFT GLUTEUS MEDIUS: ICD-10-CM

## 2020-01-15 DIAGNOSIS — M25.552 GREATER TROCHANTERIC PAIN SYNDROME OF LEFT LOWER EXTREMITY: ICD-10-CM

## 2020-01-15 PROCEDURE — 73502 X-RAY EXAM HIP UNI 2-3 VIEWS: CPT | Performed by: FAMILY MEDICINE

## 2020-01-15 PROCEDURE — 99244 OFF/OP CNSLTJ NEW/EST MOD 40: CPT | Performed by: FAMILY MEDICINE

## 2020-01-15 PROCEDURE — 20610 DRAIN/INJ JOINT/BURSA W/O US: CPT | Performed by: FAMILY MEDICINE

## 2020-01-15 RX ORDER — TRIAMCINOLONE ACETONIDE 40 MG/ML
40 INJECTION, SUSPENSION INTRA-ARTICULAR; INTRAMUSCULAR ONCE
Status: COMPLETED | OUTPATIENT
Start: 2020-01-15 | End: 2020-01-15

## 2020-01-15 RX ADMIN — TRIAMCINOLONE ACETONIDE 40 MG: 40 INJECTION, SUSPENSION INTRA-ARTICULAR; INTRAMUSCULAR at 17:13

## 2020-01-15 NOTE — PROGRESS NOTES
"Chief Complaint   Patient presents with   • Left Hip - Pain, Bursitis   • Hip Pain     LT hip pain recently seen by PCP - DX with trochanteric bursitis - no injury had occurred - pain worse with walking and movement - pain with ROM - limp in gait    • Bursitis     LT trochanteric bursitis          History of Present Illness  Referred here by Dr. Decker regarding his left lateral hip pain.  No known injury.  Acute on chronic.  Had problems with this same location approximately 3 years ago when he was living in [place] and went to a few visits with chiropractor.  His symptoms abated relatively up until 3 months ago when they recurred.  He associates pain when lifting heavy objects or when getting up from seated position.  Denies radiating pain down the leg.  Occasionally has taken over-the-counter and prescription NSAID which help.  Is walking with a limp.  Requests injection.    I have reviewed the patient's medical, family, and social history in detail and updated the computerized patient record.    Review of Systems  Constitutional: Negative for fever.   Musculoskeletal:        Per HPI   Skin: Negative for rash.   Neurological: Negative for weakness and numbness.   Psychiatric/Behavioral: Negative for sleep disturbance.   All other systems reviewed and are negative.    /72 (BP Location: Left arm, Patient Position: Sitting, Cuff Size: Adult)   Pulse 82   Ht 177.8 cm (70\")   Wt 112 kg (248 lb)   SpO2 98%   BMI 35.58 kg/m²       Physical Exam    Vital signs reviewed.   General: No acute distress.  Eyes: conjunctiva clear; pupils equally round and reactive  ENT: external ears and nose atraumatic; oropharynx clear  CV: no peripheral edema, 2+ distal pulses  Resp: normal respiratory effort, no use of accessory muscles  Skin: no rashes or wounds; normal turgor  Psych: mood and affect appropriate; recent and remote memory intact  Neuro: sensation to light touch intact    MSK Exam:  Left hip demonstrates full " "range of motion.  Negative logroll.  Negative Juliet.  Negative Stinchfield.  There is tenderness along the greater trochanter, gluteus medius tendon.    Left Hip X-Ray  Indication: Pain  AP and Frog Leg views    Findings:  No fracture  No bony lesion  Normal soft tissues  Normal joint spaces    No prior studies were available for comparison.    Trochanteric Bursa Injection Procedure Note    Left trochanteric bursa/gluteal tendon insertion injection was discussed with the patient in detail, including indication, risks, benefits, and alternatives. Verbal consent was given for the procedure. Injection was performed by physician.  Injection site was identified by physical examination and cleaned with Betadine and alcohol swabs. Prior to needle insertion, ethyl chloride spray was used for surface anesthesia. Sterile technique was used.  A 25-gauge, 1.5\" needle was directed to the bursa space by direct approach. Injectate was passed without difficulty. The needle was removed and a simple bandage was applied. The procedure was tolerated well without difficulty.    Injection mixture:  1% lidocaine without epinephrine: 1 mL  40 mg/mL triamcinolone acetonide: 1 mL    Jhon was seen today for hip pain, bursitis, pain and bursitis.    Diagnoses and all orders for this visit:    Pain of left hip joint  -     XR Hip With or Without Pelvis 2 - 3 View Left    Greater trochanteric pain syndrome of left lower extremity  -     triamcinolone acetonide (KENALOG-40) injection 40 mg    Tendinopathy of left gluteus medius  -     triamcinolone acetonide (KENALOG-40) injection 40 mg        Symptoms more consistent with gluteus medius tendinopathy, greater trochanter pain syndrome.  Injection done today to help with acute pain.  Has pending physical therapy next week.  Follow-up in 6 weeks if persist.  Consider PRP or advanced imaging.    EMR Dragon/Transcription disclaimer:    Much of this encounter note is an electronic " transcription/translation of spoken language to printed text.  The electronic translation of spoken language may permit erroneous, or at times, nonsensical words or phrases to be inadvertently transcribed.  Although I have reviewed the note for such errors some may still exist.

## 2020-01-21 ENCOUNTER — TREATMENT (OUTPATIENT)
Dept: PHYSICAL THERAPY | Facility: CLINIC | Age: 55
End: 2020-01-21

## 2020-01-21 DIAGNOSIS — M25.552 GREATER TROCHANTERIC PAIN SYNDROME OF LEFT LOWER EXTREMITY: ICD-10-CM

## 2020-01-21 DIAGNOSIS — M67.952 TENDINOPATHY OF LEFT GLUTEUS MEDIUS: ICD-10-CM

## 2020-01-21 DIAGNOSIS — M25.552 PAIN OF LEFT HIP JOINT: Primary | ICD-10-CM

## 2020-01-21 PROCEDURE — 97161 PT EVAL LOW COMPLEX 20 MIN: CPT | Performed by: PHYSICAL THERAPIST

## 2020-01-21 PROCEDURE — 97110 THERAPEUTIC EXERCISES: CPT | Performed by: PHYSICAL THERAPIST

## 2020-01-21 NOTE — PROGRESS NOTES
Physical Therapy Initial Evaluation and Plan of Care    Patient: Jhon Munoz   : 1965  Diagnosis/ICD-10 Code:  Pain of left hip joint [M25.552]  Referring practitioner: Oneil Decker MD    Subjective Evaluation    History of Present Illness  Date of onset: 2019  Mechanism of injury: Pt reports L hip pain since about early 2019 of unknown etiology. Pt diagnosed with L hip gluteus medius tendinopathy and greater trochanteric pain . Received an injection and reports pain is a little better since. Reports pain with lifting objects and pushing his mother in a wheel chair whereas had no pain before. Reports he hasn't been able to exercise due to the hip pain. Reports difficulty walking after prolonged sitting.     PMH Reviewd this date: pt has history of R knee meniscus surgery       Patient Occupation: [employer]  Quality of life: excellent    Pain  Current pain ratin  At best pain ratin  At worst pain rating: 3  Location: L hip   Quality: burning, dull ache and discomfort  Relieving factors: change in position, rest and medications  Aggravating factors: sleeping, prolonged positioning and stairs  Progression: improved    Social Support  Lives in: multiple-level home  Lives with: spouse    Hand dominance: right    Treatments  Previous treatment: injection treatment  Current treatment: medication and physical therapy  Patient Goals  Patient goals for therapy: decreased pain, increased strength and return to sport/leisure activities             Objective       Palpation   Left   Tenderness of the gluteus ashley, gluteus medius, lumbar paraspinals and piriformis.     Tenderness     Left Hip   Tenderness in the PSIS, greater trochanter and sacroiliac joint.     Active Range of Motion   Left Hip   Flexion: 110 degrees   External rotation (90/90): 35 degrees   Internal rotation (90/90): 25 degrees     Right Hip   Flexion: 115 degrees   External rotation (90/90): 40 degrees   Internal rotation  (90/90): 30 degrees     Strength/Myotome Testing     Left Hip   Planes of Motion   Flexion: 5  Abduction: 5  External rotation: 5  Internal rotation: 5    Right Hip   Planes of Motion   Flexion: 5  Abduction: 5  External rotation: 5  Internal rotation: 5    Left Knee   Flexion: 5  Extension: 5    Right Knee   Flexion: 5  Extension: 5    Tests     Left Hip   Positive TRINIDAD and piriformis.     Right Hip   Positive TRINIDAD.     Ambulation     Observational Gait   Gait: antalgic     Functional Assessment     Comments  LEFS: 68/80         Assessment & Plan     Assessment  Impairments: abnormal gait, abnormal or restricted ROM, activity intolerance, lacks appropriate home exercise program, pain with function and weight-bearing intolerance  Assessment details: Pt presents to PT with symptoms consistent with L hip pain, decreased flexibility, decreased ROM.  Pt would benefit from skilled PT intervention to address the deficits noted.   Prognosis: good  Prognosis details:       Functional Limitations: carrying objects, lifting, sleeping, walking, uncomfortable because of pain, sitting, standing, stooping and unable to perform repetitive tasks  Goals  Plan Goals: SHORT TERM GOALS: Time for Goal Achievement: 4 visits    1.  Patient to be compliant with HEP.   2.  Pt able to ascend/descend steps, lift objects and sit  with less hip  pain < 3/10  3.  Increased hip joint mobility to allow for decreased stress on hip    LONG TERM GOALS: Time for Goal Achievement: 12 visits  1.  Pt to score 70 or greater on LEFS  2.  Patient able to ascend/descend steps and prolonged standing & sitting with pain < 2/10  3.  Pt to exhibit full knee AROM to allow for kneeling, bending squatting as is necessary for ADL's, IADL's and household activities.   4.  Pt to demonstrate increased stability of the hip to balance on foam as needed to traverse uneven terrain .          Plan  Therapy options: will be seen for skilled physical therapy  services  Planned modality interventions: ultrasound, electrical stimulation/Russian stimulation, thermotherapy (hydrocollator packs) and cryotherapy  Other planned modality interventions: Dry Needling  Planned therapy interventions: balance/weight-bearing training, body mechanics training, functional ROM exercises, flexibility, home exercise program, joint mobilization, stretching, strengthening, soft tissue mobilization, neuromuscular re-education, manual therapy and therapeutic activities  Frequency: 2x week  Duration in visits: 12  Treatment plan discussed with: patient        Manual Therapy:          mins  82303;  Therapeutic Exercise:      8    mins  57606;     Neuromuscular Roberto:         mins  27527;    Therapeutic Activity:           mins  74939;     Gait Training:            mins  03844;     Ultrasound:           mins  50416;    Electrical Stimulation:          mins  88180 ( );  Dry Needling           mins self-pay  Traction           mins 97611  Canalith Repositioning         mins 18492      Timed Treatment:   8   mins   Total Treatment:   45     mins    PT SIGNATURE: Iza Patel PT   [place] Lic #[id number]    DATE TREATMENT INITIATED: 1/21/2020    Initial Certification  Certification Period: 4/20/2020  I certify that the therapy services are furnished while this patient is under my care.  The services outlined above are required by this patient, and will be reviewed every 90 days.     PHYSICIAN: Oneil Decker MD      DATE:     Please sign and return via fax to [phone].. Thank you, [facility].

## 2020-01-21 NOTE — PATIENT INSTRUCTIONS
Access Code: [email or web address]   URL: [email or web address]   Date: 01/21/2020   Prepared by: Iza Carmen Figure 4 Piriformis Stretch - 5 reps - 3 sets - 20 hold - 1x daily - 7x weekly   Seated Piriformis Stretch - 5 reps - 3 sets - 20 hold - 1x daily - 7x weekly   Seated Hamstring Stretch - 5 reps - 3 sets - 20 hold - 1x daily - 7x weekly     Pt was educated on findings of evaluation, purpose of treatment and goals for therapy. Treatment options discussed and questions answered. Pt was educated on exercises, self treatment and pain relief techniques.

## 2020-02-03 ENCOUNTER — TELEPHONE (OUTPATIENT)
Dept: INTERNAL MEDICINE | Facility: CLINIC | Age: 55
End: 2020-02-03

## 2020-02-04 ENCOUNTER — TREATMENT (OUTPATIENT)
Dept: PHYSICAL THERAPY | Facility: CLINIC | Age: 55
End: 2020-02-04

## 2020-02-04 DIAGNOSIS — M25.552 PAIN OF LEFT HIP JOINT: Primary | ICD-10-CM

## 2020-02-04 DIAGNOSIS — M25.552 GREATER TROCHANTERIC PAIN SYNDROME OF LEFT LOWER EXTREMITY: ICD-10-CM

## 2020-02-04 DIAGNOSIS — M67.952 TENDINOPATHY OF LEFT GLUTEUS MEDIUS: ICD-10-CM

## 2020-02-04 PROCEDURE — 97140 MANUAL THERAPY 1/> REGIONS: CPT | Performed by: PHYSICAL THERAPIST

## 2020-02-04 PROCEDURE — 97110 THERAPEUTIC EXERCISES: CPT | Performed by: PHYSICAL THERAPIST

## 2020-02-04 NOTE — PROGRESS NOTES
Physical Therapy Daily Progress Note  Visit: 2    Subjective Jhon Alexander reports: his hip is feeling a little better. Reports compliance with HEP.     Objective   See Exercise, Manual, and Modality Logs for complete treatment.     Assessment/Plan: good tolerance to new exercises. Compliant/cooperative with current rehab efforts.  Benefits from verbal/tactile cues to ensure correct exercise performance/technique, hold time and position. Plan details: Progress ROM / strengthening / stabilization / functional activity as tolerated     Manual Therapy:     15     mins  93398;  Therapeutic Exercise:     23    mins  73259;     Neuromuscular Roberto:         mins  08747;    Therapeutic Activity:           mins  46261;     Gait Training:            mins  90678;     Ultrasound:           mins  13000;    Electrical Stimulation:          mins  43643 ( );  Dry Needling           mins self-pay  Traction           mins 62387  Canalith Repositioning         mins 73039      Timed Treatment:   38  mins   Total Treatment:    38   mins    RUBÉN Valdez License #: [id number]    Physical Therapist

## 2020-02-11 ENCOUNTER — TREATMENT (OUTPATIENT)
Dept: PHYSICAL THERAPY | Facility: CLINIC | Age: 55
End: 2020-02-11

## 2020-02-11 DIAGNOSIS — M25.552 GREATER TROCHANTERIC PAIN SYNDROME OF LEFT LOWER EXTREMITY: ICD-10-CM

## 2020-02-11 DIAGNOSIS — M67.952 TENDINOPATHY OF LEFT GLUTEUS MEDIUS: ICD-10-CM

## 2020-02-11 DIAGNOSIS — M25.552 PAIN OF LEFT HIP JOINT: Primary | ICD-10-CM

## 2020-02-11 PROCEDURE — 97110 THERAPEUTIC EXERCISES: CPT | Performed by: PHYSICAL THERAPIST

## 2020-02-11 PROCEDURE — 97140 MANUAL THERAPY 1/> REGIONS: CPT | Performed by: PHYSICAL THERAPIST

## 2020-02-11 NOTE — PROGRESS NOTES
Physical Therapy Daily Progress Note  Visit: 3    Subjective Jhon Alexander reports: his hip is doing a little better     Objective   See Exercise, Manual, and Modality Logs for complete treatment. Provided instruction in exercises and proper technique and purpose of exercises.       Assessment/Plan: Compliant/cooperative with current rehab efforts.  Benefits from verbal/tactile cues to ensure correct exercise performance/technique, hold time and position. Plan details: Progress ROM / strengthening / stabilization / functional activity as tolerated     Manual Therapy:    15      mins  62416;  Therapeutic Exercise:     30     mins  67155;     Neuromuscular Roberto:         mins  60090;    Therapeutic Activity:           mins  05760;     Gait Training:            mins  70817;     Ultrasound:           mins  64858;    Electrical Stimulation:          mins  15220 ( );  Dry Needling           mins self-pay  Traction           mins 69903  Canalith Repositioning         mins 77074      Timed Treatment:   45   mins   Total Treatment:     45   mins    RUBÉN Valdez License #: [id number]    Physical Therapist

## 2020-02-13 ENCOUNTER — TREATMENT (OUTPATIENT)
Dept: PHYSICAL THERAPY | Facility: CLINIC | Age: 55
End: 2020-02-13

## 2020-02-13 DIAGNOSIS — M25.552 PAIN OF LEFT HIP JOINT: Primary | ICD-10-CM

## 2020-02-13 DIAGNOSIS — M25.552 GREATER TROCHANTERIC PAIN SYNDROME OF LEFT LOWER EXTREMITY: ICD-10-CM

## 2020-02-13 DIAGNOSIS — M67.952 TENDINOPATHY OF LEFT GLUTEUS MEDIUS: ICD-10-CM

## 2020-02-13 PROCEDURE — 97140 MANUAL THERAPY 1/> REGIONS: CPT | Performed by: PHYSICAL THERAPIST

## 2020-02-13 PROCEDURE — 97110 THERAPEUTIC EXERCISES: CPT | Performed by: PHYSICAL THERAPIST

## 2020-03-10 ENCOUNTER — RESULTS ENCOUNTER (OUTPATIENT)
Dept: INTERNAL MEDICINE | Facility: CLINIC | Age: 55
End: 2020-03-10

## 2020-03-10 ENCOUNTER — APPOINTMENT (OUTPATIENT)
Dept: LAB | Facility: HOSPITAL | Age: 55
End: 2020-03-10

## 2020-03-10 DIAGNOSIS — Z13.1 SCREENING FOR DIABETES MELLITUS: ICD-10-CM

## 2020-03-10 DIAGNOSIS — Z00.00 VISIT FOR WELL MAN HEALTH CHECK: ICD-10-CM

## 2020-03-10 DIAGNOSIS — Z13.220 SCREENING FOR LIPID DISORDERS: ICD-10-CM

## 2020-03-10 DIAGNOSIS — Z13.29 SCREENING FOR THYROID DISORDER: ICD-10-CM

## 2020-03-10 LAB
ALBUMIN SERPL-MCNC: 4 G/DL (ref 3.5–5.2)
ALBUMIN/GLOB SERPL: 1.5 G/DL
ALP SERPL-CCNC: 54 U/L (ref 39–117)
ALT SERPL W P-5'-P-CCNC: 26 U/L (ref 1–41)
ANION GAP SERPL CALCULATED.3IONS-SCNC: 13 MMOL/L (ref 5–15)
AST SERPL-CCNC: 26 U/L (ref 1–40)
BASOPHILS # BLD AUTO: 0.03 10*3/MM3 (ref 0–0.2)
BASOPHILS NFR BLD AUTO: 0.5 % (ref 0–1.5)
BILIRUB SERPL-MCNC: 0.5 MG/DL (ref 0.2–1.2)
BUN BLD-MCNC: 14 MG/DL (ref 6–20)
BUN/CREAT SERPL: 11.8 (ref 7–25)
CALCIUM SPEC-SCNC: 9 MG/DL (ref 8.6–10.5)
CHLORIDE SERPL-SCNC: 103 MMOL/L (ref 98–107)
CHOLEST SERPL-MCNC: 138 MG/DL (ref 0–200)
CO2 SERPL-SCNC: 26 MMOL/L (ref 22–29)
CREAT BLD-MCNC: 1.19 MG/DL (ref 0.76–1.27)
DEPRECATED RDW RBC AUTO: 42.7 FL (ref 37–54)
EOSINOPHIL # BLD AUTO: 0.18 10*3/MM3 (ref 0–0.4)
EOSINOPHIL NFR BLD AUTO: 2.9 % (ref 0.3–6.2)
ERYTHROCYTE [DISTWIDTH] IN BLOOD BY AUTOMATED COUNT: 13.3 % (ref 12.3–15.4)
GFR SERPL CREATININE-BSD FRML MDRD: 76 ML/MIN/1.73
GLOBULIN UR ELPH-MCNC: 2.7 GM/DL
GLUCOSE BLD-MCNC: 88 MG/DL (ref 65–99)
HBA1C MFR BLD: 6.1 % (ref 4.8–5.6)
HCT VFR BLD AUTO: 38.1 % (ref 37.5–51)
HDLC SERPL-MCNC: 63 MG/DL (ref 40–60)
HGB BLD-MCNC: 12.8 G/DL (ref 13–17.7)
IMM GRANULOCYTES # BLD AUTO: 0.02 10*3/MM3 (ref 0–0.05)
IMM GRANULOCYTES NFR BLD AUTO: 0.3 % (ref 0–0.5)
LDLC SERPL CALC-MCNC: 58 MG/DL (ref 0–100)
LDLC/HDLC SERPL: 0.92 {RATIO}
LYMPHOCYTES # BLD AUTO: 2.47 10*3/MM3 (ref 0.7–3.1)
LYMPHOCYTES NFR BLD AUTO: 39.8 % (ref 19.6–45.3)
MCH RBC QN AUTO: 29.8 PG (ref 26.6–33)
MCHC RBC AUTO-ENTMCNC: 33.6 G/DL (ref 31.5–35.7)
MCV RBC AUTO: 88.8 FL (ref 79–97)
MONOCYTES # BLD AUTO: 0.58 10*3/MM3 (ref 0.1–0.9)
MONOCYTES NFR BLD AUTO: 9.4 % (ref 5–12)
NEUTROPHILS # BLD AUTO: 2.92 10*3/MM3 (ref 1.7–7)
NEUTROPHILS NFR BLD AUTO: 47.1 % (ref 42.7–76)
NRBC BLD AUTO-RTO: 0 /100 WBC (ref 0–0.2)
PLATELET # BLD AUTO: 169 10*3/MM3 (ref 140–450)
PMV BLD AUTO: 10.9 FL (ref 6–12)
POTASSIUM BLD-SCNC: 4.4 MMOL/L (ref 3.5–5.2)
PROT SERPL-MCNC: 6.7 G/DL (ref 6–8.5)
RBC # BLD AUTO: 4.29 10*6/MM3 (ref 4.14–5.8)
SODIUM BLD-SCNC: 142 MMOL/L (ref 136–145)
T-UPTAKE NFR SERPL: 0.97 TBI (ref 0.8–1.3)
T4 SERPL-MCNC: 4.66 MCG/DL (ref 4.5–11.7)
TRIGL SERPL-MCNC: 85 MG/DL (ref 0–150)
TSH SERPL DL<=0.05 MIU/L-ACNC: 3.18 UIU/ML (ref 0.27–4.2)
VLDLC SERPL-MCNC: 17 MG/DL (ref 5–40)
WBC NRBC COR # BLD: 6.2 10*3/MM3 (ref 3.4–10.8)

## 2020-03-10 PROCEDURE — 80053 COMPREHEN METABOLIC PANEL: CPT | Performed by: FAMILY MEDICINE

## 2020-03-10 PROCEDURE — 84436 ASSAY OF TOTAL THYROXINE: CPT | Performed by: FAMILY MEDICINE

## 2020-03-10 PROCEDURE — 84443 ASSAY THYROID STIM HORMONE: CPT | Performed by: FAMILY MEDICINE

## 2020-03-10 PROCEDURE — 80061 LIPID PANEL: CPT | Performed by: FAMILY MEDICINE

## 2020-03-10 PROCEDURE — 85025 COMPLETE CBC W/AUTO DIFF WBC: CPT | Performed by: FAMILY MEDICINE

## 2020-03-10 PROCEDURE — 36415 COLL VENOUS BLD VENIPUNCTURE: CPT | Performed by: FAMILY MEDICINE

## 2020-03-10 PROCEDURE — 84479 ASSAY OF THYROID (T3 OR T4): CPT | Performed by: FAMILY MEDICINE

## 2020-03-10 PROCEDURE — 83036 HEMOGLOBIN GLYCOSYLATED A1C: CPT | Performed by: FAMILY MEDICINE

## 2020-03-17 ENCOUNTER — OFFICE VISIT (OUTPATIENT)
Dept: INTERNAL MEDICINE | Facility: CLINIC | Age: 55
End: 2020-03-17

## 2020-03-17 VITALS
HEART RATE: 86 BPM | DIASTOLIC BLOOD PRESSURE: 78 MMHG | WEIGHT: 256.4 LBS | OXYGEN SATURATION: 98 % | SYSTOLIC BLOOD PRESSURE: 130 MMHG | HEIGHT: 70 IN | BODY MASS INDEX: 36.71 KG/M2

## 2020-03-17 DIAGNOSIS — I10 ESSENTIAL HYPERTENSION: ICD-10-CM

## 2020-03-17 DIAGNOSIS — E11.9 TYPE 2 DIABETES MELLITUS WITHOUT COMPLICATION, WITHOUT LONG-TERM CURRENT USE OF INSULIN (HCC): ICD-10-CM

## 2020-03-17 DIAGNOSIS — R68.82 LOW LIBIDO: ICD-10-CM

## 2020-03-17 DIAGNOSIS — E78.5 HYPERLIPIDEMIA, UNSPECIFIED HYPERLIPIDEMIA TYPE: Primary | ICD-10-CM

## 2020-03-17 DIAGNOSIS — Z00.00 HEALTHCARE MAINTENANCE: ICD-10-CM

## 2020-03-17 PROCEDURE — 99214 OFFICE O/P EST MOD 30 MIN: CPT | Performed by: FAMILY MEDICINE

## 2020-03-17 RX ORDER — VARDENAFIL HYDROCHLORIDE 10 MG/1
10 TABLET ORAL AS NEEDED
Qty: 7 TABLET | Refills: 3 | Status: SHIPPED | OUTPATIENT
Start: 2020-03-17 | End: 2020-03-17 | Stop reason: SDUPTHER

## 2020-03-17 RX ORDER — VARDENAFIL HYDROCHLORIDE 10 MG/1
10 TABLET ORAL AS NEEDED
Qty: 7 TABLET | Refills: 3 | Status: SHIPPED | OUTPATIENT
Start: 2020-03-17 | End: 2020-08-31

## 2020-03-17 RX ORDER — TELMISARTAN 80 MG/1
80 TABLET ORAL DAILY
Qty: 90 TABLET | Refills: 3 | Status: SHIPPED | OUTPATIENT
Start: 2020-03-17 | End: 2020-07-27

## 2020-03-17 RX ORDER — HYDROCHLOROTHIAZIDE 12.5 MG/1
12.5 TABLET ORAL DAILY
Qty: 90 TABLET | Refills: 3 | Status: SHIPPED | OUTPATIENT
Start: 2020-03-17 | End: 2020-08-10 | Stop reason: SDUPTHER

## 2020-03-17 RX ORDER — AMLODIPINE BESYLATE 10 MG/1
10 TABLET ORAL DAILY
Qty: 90 TABLET | Refills: 3 | Status: SHIPPED | OUTPATIENT
Start: 2020-03-17 | End: 2020-07-16

## 2020-03-17 RX ORDER — ATORVASTATIN CALCIUM 20 MG/1
20 TABLET, FILM COATED ORAL DAILY
Qty: 90 TABLET | Refills: 3 | Status: SHIPPED | OUTPATIENT
Start: 2020-03-17 | End: 2020-09-11 | Stop reason: SDUPTHER

## 2020-03-17 NOTE — PROGRESS NOTES
Brandi Munoz is a 55 y.o. male.     Chief Complaint   Patient presents with   • 6 Month Follow Up         History of Present Illness     Patient presents at today's office visit with past medical history for hyperlipidemia.  Is currently on Lipitor 20 mg daily.  His most recent lipid panel done last week was within normal limits.  Denies any side effects of the medication.    Patient also has essential hypertension.  His blood pressure at today's office is 130/78.  He is currently amlodipine 10 mg daily, hydrochlorothiazide 12.5 mg daily, and Micardis 80 mg daily.  Patient denies any side effects of the medication.    Patient also has type 2 diabetes.  His hemoglobin A1c is down to 6.1.  He is currently on Janumet extended release  mg once a day, metformin 1000 mg at nighttime.  Patient also notes that he is eating healthy and exercising.    Patient also notes that he continues to have low libido.  Patient states that he would like to try something to help him with his erectile dysfunction.         The following portions of the patient's history were reviewed and updated as appropriate: allergies, current medications, past family history, past medical history, past social history, past surgical history and problem list.    Review of Systems   Constitutional: Negative for chills and fever.   HENT: Negative for congestion, rhinorrhea, sinus pain and sore throat.    Eyes: Negative for photophobia and visual disturbance.   Respiratory: Negative for cough, chest tightness and shortness of breath.    Cardiovascular: Negative for chest pain and palpitations.   Gastrointestinal: Negative for diarrhea, nausea and vomiting.   Genitourinary: Negative for dysuria, frequency and urgency.   Skin: Negative for rash and wound.   Neurological: Negative for dizziness and syncope.   Psychiatric/Behavioral: Negative for behavioral problems and confusion.       Objective   Physical Exam   Constitutional: He is  oriented to person, place, and time. He appears well-developed and well-nourished.   HENT:   Head: Normocephalic and atraumatic.   Right Ear: External ear normal.   Left Ear: External ear normal.   Eyes: EOM are normal.   Neck: Normal range of motion. Neck supple.   Cardiovascular: Normal rate, regular rhythm and normal heart sounds.   Pulmonary/Chest: Effort normal and breath sounds normal. No respiratory distress.   Musculoskeletal: Normal range of motion.   Lymphadenopathy:     He has no cervical adenopathy.   Neurological: He is alert and oriented to person, place, and time.   Skin: Skin is warm.   Psychiatric: He has a normal mood and affect. His behavior is normal.   Nursing note and vitals reviewed.      Vitals:    03/17/20 0819   BP: 130/78   Pulse: 86   SpO2: 98%     Body mass index is 36.79 kg/m².      Assessment/Plan   Jhon was seen today for 6 month follow up.    Diagnoses and all orders for this visit:    Hyperlipidemia, unspecified hyperlipidemia type  -     atorvastatin (LIPITOR) 20 MG tablet; Take 1 tablet by mouth Daily.  -     Lipid Panel With LDL / HDL Ratio; Future  -     Stable, continue Lipitor 20 mg daily.    Essential hypertension  -     amLODIPine (NORVASC) 10 MG tablet; Take 1 tablet by mouth Daily.  -     hydroCHLOROthiazide (HYDRODIURIL) 12.5 MG tablet; Take 1 tablet by mouth Daily.  -     telmisartan (MICARDIS) 80 MG tablet; Take 1 tablet by mouth Daily.  -     Comprehensive Metabolic Panel; Future  -     Stable, continue current medications.    Type 2 diabetes mellitus without complication, without long-term current use of insulin (CMS/[facility])  -     SITagliptin-metFORMIN HCl ER (JANUMET XR)  MG tablet; Take 1 tablet by mouth Daily.  -     metFORMIN (GLUCOPHAGE) 500 MG tablet; Take 2 tablets by mouth every night at bedtime.  -     Comprehensive Metabolic Panel; Future  -     Hemoglobin A1c; Future  -     We will continue Janumet extended release as well as metformin.    Low  libido  -     Most likely has erectile dysfunction.  Will start patient on Levitra.  -     vardenafil (Levitra) 10 MG tablet; Take 1 tablet by mouth As Needed for Erectile Dysfunction.          No follow-ups on file.    Dictated utilizing Dragon Voice Recognition Software

## 2020-03-18 ENCOUNTER — RESULTS ENCOUNTER (OUTPATIENT)
Dept: INTERNAL MEDICINE | Facility: CLINIC | Age: 55
End: 2020-03-18

## 2020-03-18 DIAGNOSIS — I10 ESSENTIAL HYPERTENSION: ICD-10-CM

## 2020-03-18 DIAGNOSIS — E11.9 TYPE 2 DIABETES MELLITUS WITHOUT COMPLICATION, WITHOUT LONG-TERM CURRENT USE OF INSULIN (HCC): ICD-10-CM

## 2020-03-18 DIAGNOSIS — E78.5 HYPERLIPIDEMIA, UNSPECIFIED HYPERLIPIDEMIA TYPE: ICD-10-CM

## 2020-03-26 ENCOUNTER — DOCUMENTATION (OUTPATIENT)
Dept: PHYSICAL THERAPY | Facility: CLINIC | Age: 55
End: 2020-03-26

## 2020-03-26 NOTE — PROGRESS NOTES
Discharge Summary  Discharge Summary from Physical Therapy Report      Dates  PT visit: 1/21/20  Number of Visits: 4     Discharge Status of Patient: See MD Note dated 2/13/20    Goals: Not Met    Discharge Plan: Continue with current home exercise program as instructed    Comments pt had to cancel last scheduled visits due to work duties and did not reschedule  Date of Discharge         Iza Patel, PT  Physical Therapist

## 2020-05-05 RX ORDER — ESOMEPRAZOLE MAGNESIUM 40 MG/1
40 CAPSULE, DELAYED RELEASE ORAL
Qty: 30 CAPSULE | Refills: 6 | Status: SHIPPED | OUTPATIENT
Start: 2020-05-05 | End: 2020-11-03

## 2020-05-05 NOTE — TELEPHONE ENCOUNTER
Patient called requesting refills for esomeprazole (nexIUM) 40 MG capsule    Patient callback [phone]      Pharmacy confirmed

## 2020-05-18 ENCOUNTER — TELEPHONE (OUTPATIENT)
Dept: INTERNAL MEDICINE | Facility: CLINIC | Age: 55
End: 2020-05-18

## 2020-05-18 NOTE — TELEPHONE ENCOUNTER
Patient's wife states that patient is really constipated.  She states he has used, prune juice, green vegetables, suppository and laxative.  She states he had a tiny bm this morning.  She is wanting to know what Dr. Decker suggests.      [facility] [id number] [facility] confirmed    Patient call back [phone]

## 2020-05-18 NOTE — TELEPHONE ENCOUNTER
The laxative should be senna S.  Patient can also try doing MiraLAX.  If this does not work, he may then need to try doing an enema for himself.  He is unable to do that then will need to go to the emergency room with a complete appointment.  I do feel that the MiraLAX would work.

## 2020-05-19 ENCOUNTER — OFFICE VISIT (OUTPATIENT)
Dept: INTERNAL MEDICINE | Facility: CLINIC | Age: 55
End: 2020-05-19

## 2020-05-19 DIAGNOSIS — K59.00 CONSTIPATION, UNSPECIFIED CONSTIPATION TYPE: Primary | ICD-10-CM

## 2020-05-19 PROCEDURE — 99443 PR PHYS/QHP TELEPHONE EVALUATION 21-30 MIN: CPT | Performed by: FAMILY MEDICINE

## 2020-05-19 RX ORDER — LUBIPROSTONE 24 UG/1
24 CAPSULE ORAL 2 TIMES DAILY WITH MEALS
Qty: 60 CAPSULE | Refills: 5 | Status: ON HOLD | OUTPATIENT
Start: 2020-05-19 | End: 2020-07-24

## 2020-05-19 NOTE — PROGRESS NOTES
Brandi Munoz is a 55 y.o. male.     No chief complaint on file.  Chief complaint constipation    This visit has been rescheduled as a phone visit to comply with patient safety concerns in accordance with CDC recommendations. Total time of discussion was 25 minutes.    You have chosen to receive care through a telephone visit. Do you consent to use a telephone visit for your medical care today? Yes        History of Present Illness     Patient notes to have constipation.  Patient states that he has been constipated the last few days.  He is tried multiple different stool softeners, including even trying MiraLAX.  Patient states that he has not feeling any better.  Patient states that he took the enema, and notes that he is not still having bowel movement.  Occasionally he feels some pain in right lower quadrant.  He states that the pain is intermittent, but not sharp.  Just more of a discomfort.  He does state that his bowel movements are just pellets if he does have some.  Patient has tried even other natural remedies, which have not been successful.    The following portions of the patient's history were reviewed and updated as appropriate: allergies, current medications, past family history, past medical history, past social history, past surgical history and problem list.    Review of Systems   Constitutional: Negative for chills and fever.   HENT: Negative for congestion, rhinorrhea, sinus pain and sore throat.    Eyes: Negative for photophobia and visual disturbance.   Respiratory: Negative for cough, chest tightness and shortness of breath.    Cardiovascular: Negative for chest pain and palpitations.   Gastrointestinal: Positive for constipation. Negative for diarrhea, nausea and vomiting.   Genitourinary: Negative for dysuria, frequency and urgency.   Skin: Negative for rash and wound.   Neurological: Negative for dizziness and syncope.   Psychiatric/Behavioral: Negative for behavioral problems  and confusion.       Objective   Physical Exam   Constitutional: He is oriented to person, place, and time. He appears well-developed and well-nourished.   HENT:   Head: Normocephalic and atraumatic.   Abdominal: There is tenderness.   Per patient tender on the right lower quadrant very mildly.   Neurological: He is alert and oriented to person, place, and time.   Psychiatric: He has a normal mood and affect. His behavior is normal.   Nursing note and vitals reviewed.      There were no vitals filed for this visit.  There is no height or weight on file to calculate BMI.      Assessment/Plan   Diagnoses and all orders for this visit:    Constipation, unspecified constipation type  -     lubiprostone (Amitiza) 24 MCG capsule; Take 1 capsule by mouth 2 (Two) Times a Day With Meals.  -      I discussed with patient at today's office visit everything he has tried multiple different laxatives, plus MiraLAX, and an enema that if he still unable to have a proper bowel movement he may need to go to the emergency room where he can receive perhaps a smog enema.  I will prescribe him Amitiza to see if this helps.  I have also told patient that if he continues to have sharp pain or worsening sharp pain, or any sees any blood in the stool, he should go to the emergency room.      I spent greater than 25 minutes with the patient today's office visit with more than half the time spent in counseling patient about disease pathology as well as a pharmacology used to treat the disease.    No follow-ups on file.    Dictated utilizing Dragon Voice Recognition Software

## 2020-07-15 ENCOUNTER — OFFICE VISIT (OUTPATIENT)
Dept: INTERNAL MEDICINE | Facility: CLINIC | Age: 55
End: 2020-07-15

## 2020-07-15 VITALS
SYSTOLIC BLOOD PRESSURE: 154 MMHG | BODY MASS INDEX: 36.65 KG/M2 | HEART RATE: 93 BPM | HEIGHT: 70 IN | DIASTOLIC BLOOD PRESSURE: 98 MMHG | RESPIRATION RATE: 16 BRPM | TEMPERATURE: 97.8 F | OXYGEN SATURATION: 99 % | WEIGHT: 256 LBS

## 2020-07-15 DIAGNOSIS — I10 ESSENTIAL HYPERTENSION: ICD-10-CM

## 2020-07-15 DIAGNOSIS — R10.32 LEFT LOWER QUADRANT ABDOMINAL PAIN: Primary | ICD-10-CM

## 2020-07-15 PROCEDURE — 99214 OFFICE O/P EST MOD 30 MIN: CPT | Performed by: FAMILY MEDICINE

## 2020-07-15 RX ORDER — CIPROFLOXACIN 500 MG/1
500 TABLET, FILM COATED ORAL 2 TIMES DAILY
Qty: 14 TABLET | Refills: 0 | Status: SHIPPED | OUTPATIENT
Start: 2020-07-15 | End: 2020-07-17

## 2020-07-15 RX ORDER — METRONIDAZOLE 500 MG/1
500 TABLET ORAL 3 TIMES DAILY
Qty: 21 TABLET | Refills: 0 | Status: SHIPPED | OUTPATIENT
Start: 2020-07-15 | End: 2020-07-17

## 2020-07-15 NOTE — PROGRESS NOTES
Brandi Munoz is a 55 y.o. male.     Chief Complaint   Patient presents with   • Abdominal Pain     LLQ pain began last night around bedtime and has not stopped         History of Present Illness     Patient presented today's office visit for lower left quadrant pain.  Patient states that the pain started around bedtime and has not stopped.  Discussed with patient that these can be signs symptoms of diverticulitis.  However patient does not have any bleeding from the stool at the current time.  Looking back on previous colonoscopy is done about a year ago, shows that patient did have several diverticulosis located in his sigmoid colon.  Patient does note on last few days he did eat quite a bit of popcorn.    Also notes that he is been expensing swelling in both of his feet.  However he notes that he has been eating quite a bit of salty foods here lately.  Notes his mother passed away a little bit over a month ago, and has not really been eating much of the home-cooked food that he typically accustomed to.  Patient does have essential hypertension and is currently being treated.  However his blood pressure today's office is elevated 154/98.  He states this is abnormal for him.  Patient does take morphine 10 mg daily as well as hydrochlorothiazide 12.5 mg daily.    The following portions of the patient's history were reviewed and updated as appropriate: allergies, current medications, past family history, past medical history, past social history, past surgical history and problem list.    Review of Systems   Constitutional: Negative for chills and fever.   HENT: Negative for congestion, rhinorrhea, sinus pain and sore throat.    Eyes: Negative for photophobia and visual disturbance.   Respiratory: Negative for cough, chest tightness and shortness of breath.    Cardiovascular: Positive for leg swelling. Negative for chest pain and palpitations.   Gastrointestinal: Positive for abdominal pain. Negative for  diarrhea, nausea and vomiting.   Genitourinary: Negative for dysuria, frequency and urgency.   Skin: Negative for rash and wound.   Neurological: Negative for dizziness and syncope.   Psychiatric/Behavioral: Negative for behavioral problems and confusion.       Objective   Physical Exam   Constitutional: He is oriented to person, place, and time. He appears well-developed and well-nourished.   HENT:   Head: Normocephalic and atraumatic.   Right Ear: External ear normal.   Left Ear: External ear normal.   Eyes: EOM are normal.   Neck: Normal range of motion. Neck supple.   Cardiovascular: Normal rate, regular rhythm and normal heart sounds.   Pulmonary/Chest: Effort normal and breath sounds normal. No respiratory distress.   Abdominal: Soft. There is tenderness. There is no guarding.   Left lower quadrant tenderness.   Musculoskeletal: Normal range of motion.   Lymphadenopathy:     He has no cervical adenopathy.   Neurological: He is alert and oriented to person, place, and time.   Skin: Skin is warm.   Psychiatric: He has a normal mood and affect. His behavior is normal.   Nursing note and vitals reviewed.      Vitals:    07/15/20 1443   BP: 154/98   Pulse: 93   Resp: 16   Temp: 97.8 °F (36.6 °C)   SpO2: 99%     Body mass index is 36.73 kg/m².      Assessment/Plan   Jhon was seen today for abdominal pain.    Diagnoses and all orders for this visit:    Left lower quadrant abdominal pain  -     CT Abdomen Pelvis With Contrast  -     ciprofloxacin (Cipro) 500 MG tablet; Take 1 tablet by mouth 2 (Two) Times a Day for 7 days.  -     metroNIDAZOLE (Flagyl) 500 MG tablet; Take 1 tablet by mouth 3 (Three) Times a Day for 7 days.  -     Discussed the patient with his family concerning for diverticulitis.  We will treat him with ciprofloxacin and Flagyl.  I will also CT scan of the abdomen.    Essential hypertension        -     We will hold off on any current medication changes now.  I discussed with him that I would like  him to monitor and limit salt intake.      I spent greater than 25 minutes with the patient today's office visit, with more than half the time spent counseling patient about disease pathology as well as the pharmacology used to treat the disease.    No follow-ups on file.    Dictated utilizing Dragon Voice Recognition Software

## 2020-07-16 ENCOUNTER — TELEPHONE (OUTPATIENT)
Dept: INTERNAL MEDICINE | Facility: CLINIC | Age: 55
End: 2020-07-16

## 2020-07-16 ENCOUNTER — HOSPITAL ENCOUNTER (OUTPATIENT)
Dept: CT IMAGING | Facility: HOSPITAL | Age: 55
Discharge: HOME | End: 2020-07-16
Admitting: FAMILY MEDICINE

## 2020-07-16 ENCOUNTER — HOSPITAL ENCOUNTER (OUTPATIENT)
Dept: CARDIOLOGY | Facility: HOSPITAL | Age: 55
Discharge: HOME | End: 2020-07-16

## 2020-07-16 ENCOUNTER — LAB (OUTPATIENT)
Dept: LAB | Facility: HOSPITAL | Age: 55
End: 2020-07-16

## 2020-07-16 ENCOUNTER — TELEPHONE (OUTPATIENT)
Dept: CARDIOLOGY | Facility: CLINIC | Age: 55
End: 2020-07-16

## 2020-07-16 VITALS
WEIGHT: 256 LBS | HEIGHT: 70 IN | BODY MASS INDEX: 36.65 KG/M2 | DIASTOLIC BLOOD PRESSURE: 98 MMHG | HEART RATE: 68 BPM | SYSTOLIC BLOOD PRESSURE: 154 MMHG

## 2020-07-16 DIAGNOSIS — R10.32 LEFT LOWER QUADRANT ABDOMINAL PAIN: Primary | ICD-10-CM

## 2020-07-16 DIAGNOSIS — I10 ESSENTIAL HYPERTENSION: ICD-10-CM

## 2020-07-16 DIAGNOSIS — R10.32 LEFT LOWER QUADRANT ABDOMINAL PAIN: ICD-10-CM

## 2020-07-16 DIAGNOSIS — N28.0 KIDNEY INFARCTION (HCC): ICD-10-CM

## 2020-07-16 LAB
ALBUMIN SERPL-MCNC: 4.3 G/DL (ref 3.5–5.2)
ALBUMIN/GLOB SERPL: 1.4 G/DL
ALP SERPL-CCNC: 62 U/L (ref 39–117)
ALT SERPL W P-5'-P-CCNC: 76 U/L (ref 1–41)
ANION GAP SERPL CALCULATED.3IONS-SCNC: 13.2 MMOL/L (ref 5–15)
APTT PPP: 31.9 SECONDS (ref 22.7–35.4)
AST SERPL-CCNC: 100 U/L (ref 1–40)
BASOPHILS # BLD AUTO: 0.04 10*3/MM3 (ref 0–0.2)
BASOPHILS NFR BLD AUTO: 0.3 % (ref 0–1.5)
BILIRUB SERPL-MCNC: 0.9 MG/DL (ref 0–1.2)
BUN SERPL-MCNC: 14 MG/DL (ref 6–20)
BUN/CREAT SERPL: 9.4 (ref 7–25)
CALCIUM SPEC-SCNC: 9.4 MG/DL (ref 8.6–10.5)
CHLORIDE SERPL-SCNC: 100 MMOL/L (ref 98–107)
CO2 SERPL-SCNC: 24.8 MMOL/L (ref 22–29)
CREAT BLDA-MCNC: 1.5 MG/DL (ref 0.6–1.3)
CREAT SERPL-MCNC: 1.49 MG/DL (ref 0.76–1.27)
CRP SERPL-MCNC: 4.83 MG/DL (ref 0–0.5)
D DIMER PPP FEU-MCNC: 1.54 MCGFEU/ML (ref 0–0.49)
DEPRECATED RDW RBC AUTO: 42.7 FL (ref 37–54)
EOSINOPHIL # BLD AUTO: 0.07 10*3/MM3 (ref 0–0.4)
EOSINOPHIL NFR BLD AUTO: 0.6 % (ref 0.3–6.2)
ERYTHROCYTE [DISTWIDTH] IN BLOOD BY AUTOMATED COUNT: 13.2 % (ref 12.3–15.4)
ERYTHROCYTE [SEDIMENTATION RATE] IN BLOOD: 22 MM/HR (ref 0–20)
GFR SERPL CREATININE-BSD FRML MDRD: 59 ML/MIN/1.73
GLOBULIN UR ELPH-MCNC: 3.1 GM/DL
GLUCOSE SERPL-MCNC: 113 MG/DL (ref 65–99)
HCT VFR BLD AUTO: 42.3 % (ref 37.5–51)
HGB BLD-MCNC: 14.2 G/DL (ref 13–17.7)
IMM GRANULOCYTES # BLD AUTO: 0.06 10*3/MM3 (ref 0–0.05)
IMM GRANULOCYTES NFR BLD AUTO: 0.5 % (ref 0–0.5)
INR PPP: 1.04 (ref 0.9–1.1)
LYMPHOCYTES # BLD AUTO: 1.81 10*3/MM3 (ref 0.7–3.1)
LYMPHOCYTES NFR BLD AUTO: 15.4 % (ref 19.6–45.3)
MAGNESIUM SERPL-MCNC: 1.5 MG/DL (ref 1.6–2.6)
MCH RBC QN AUTO: 29.6 PG (ref 26.6–33)
MCHC RBC AUTO-ENTMCNC: 33.6 G/DL (ref 31.5–35.7)
MCV RBC AUTO: 88.1 FL (ref 79–97)
MONOCYTES # BLD AUTO: 1.11 10*3/MM3 (ref 0.1–0.9)
MONOCYTES NFR BLD AUTO: 9.4 % (ref 5–12)
NEUTROPHILS NFR BLD AUTO: 73.8 % (ref 42.7–76)
NEUTROPHILS NFR BLD AUTO: 8.67 10*3/MM3 (ref 1.7–7)
NRBC BLD AUTO-RTO: 0 /100 WBC (ref 0–0.2)
PLATELET # BLD AUTO: 171 10*3/MM3 (ref 140–450)
PMV BLD AUTO: 12.1 FL (ref 6–12)
POTASSIUM SERPL-SCNC: 4.2 MMOL/L (ref 3.5–5.2)
PROT SERPL-MCNC: 7.4 G/DL (ref 6–8.5)
PROTHROMBIN TIME: 13.5 SECONDS (ref 11.7–14.2)
RBC # BLD AUTO: 4.8 10*6/MM3 (ref 4.14–5.8)
SODIUM SERPL-SCNC: 138 MMOL/L (ref 136–145)
WBC # BLD AUTO: 11.76 10*3/MM3 (ref 3.4–10.8)

## 2020-07-16 PROCEDURE — 85610 PROTHROMBIN TIME: CPT | Performed by: FAMILY MEDICINE

## 2020-07-16 PROCEDURE — 0 DIATRIZOATE MEGLUMINE & SODIUM PER 1 ML: Performed by: FAMILY MEDICINE

## 2020-07-16 PROCEDURE — 36415 COLL VENOUS BLD VENIPUNCTURE: CPT

## 2020-07-16 PROCEDURE — 93356 MYOCRD STRAIN IMG SPCKL TRCK: CPT

## 2020-07-16 PROCEDURE — 82565 ASSAY OF CREATININE: CPT

## 2020-07-16 PROCEDURE — 74177 CT ABD & PELVIS W/CONTRAST: CPT

## 2020-07-16 PROCEDURE — 85025 COMPLETE CBC W/AUTO DIFF WBC: CPT | Performed by: FAMILY MEDICINE

## 2020-07-16 PROCEDURE — 93306 TTE W/DOPPLER COMPLETE: CPT

## 2020-07-16 PROCEDURE — 85730 THROMBOPLASTIN TIME PARTIAL: CPT | Performed by: FAMILY MEDICINE

## 2020-07-16 PROCEDURE — 25010000002 PERFLUTREN (DEFINITY) 8.476 MG IN SODIUM CHLORIDE 0.9 % 10 ML INJECTION: Performed by: FAMILY MEDICINE

## 2020-07-16 PROCEDURE — 85652 RBC SED RATE AUTOMATED: CPT | Performed by: FAMILY MEDICINE

## 2020-07-16 PROCEDURE — 93306 TTE W/DOPPLER COMPLETE: CPT | Performed by: INTERNAL MEDICINE

## 2020-07-16 PROCEDURE — 93356 MYOCRD STRAIN IMG SPCKL TRCK: CPT | Performed by: INTERNAL MEDICINE

## 2020-07-16 PROCEDURE — 85379 FIBRIN DEGRADATION QUANT: CPT | Performed by: FAMILY MEDICINE

## 2020-07-16 PROCEDURE — 25010000002 IOPAMIDOL 61 % SOLUTION: Performed by: FAMILY MEDICINE

## 2020-07-16 PROCEDURE — 81241 F5 GENE: CPT | Performed by: FAMILY MEDICINE

## 2020-07-16 PROCEDURE — 83735 ASSAY OF MAGNESIUM: CPT | Performed by: FAMILY MEDICINE

## 2020-07-16 PROCEDURE — 86140 C-REACTIVE PROTEIN: CPT | Performed by: FAMILY MEDICINE

## 2020-07-16 PROCEDURE — 80053 COMPREHEN METABOLIC PANEL: CPT | Performed by: FAMILY MEDICINE

## 2020-07-16 RX ORDER — AMLODIPINE BESYLATE 10 MG/1
5 TABLET ORAL DAILY
Qty: 90 TABLET | Refills: 3 | Status: SHIPPED | OUTPATIENT
Start: 2020-07-16 | End: 2020-07-21

## 2020-07-16 RX ORDER — CARVEDILOL 6.25 MG/1
6.25 TABLET ORAL 2 TIMES DAILY
Qty: 180 TABLET | Refills: 3 | Status: SHIPPED | OUTPATIENT
Start: 2020-07-16 | End: 2020-07-21

## 2020-07-16 RX ORDER — HYDROCODONE BITARTRATE AND ACETAMINOPHEN 5; 325 MG/1; MG/1
1 TABLET ORAL EVERY 6 HOURS PRN
Qty: 20 TABLET | Refills: 0 | Status: SHIPPED | OUTPATIENT
Start: 2020-07-16 | End: 2020-09-11

## 2020-07-16 RX ADMIN — DIATRIZOATE MEGLUMINE AND DIATRIZOATE SODIUM 30 ML: 600; 100 SOLUTION ORAL; RECTAL at 12:51

## 2020-07-16 RX ADMIN — PERFLUTREN 2 ML: 6.52 INJECTION, SUSPENSION INTRAVENOUS at 16:00

## 2020-07-16 RX ADMIN — IOPAMIDOL 85 ML: 612 INJECTION, SOLUTION INTRAVENOUS at 13:36

## 2020-07-16 NOTE — NURSING NOTE
Dr. Deng called and stated the patient may go home and the office will call him on his cell this afternoon.  Patient left via ambulation to the main entrance.

## 2020-07-16 NOTE — TELEPHONE ENCOUNTER
[facility] CALLED WANTING CLARIFICATION IF DR REY SIGNED OFF ON THE OK FOR PT TO RECEIVE THE CT SCAN TOMORROW     PLEASE ADVISE     INSURANCE IS REQUESTING  TO CALL BACK THE PT     PT CALL BACK   [phone]    CALL BACK FOR SPARKLE   [phone]

## 2020-07-16 NOTE — TELEPHONE ENCOUNTER
I have absolutely no idea what is going on.  The pt and his wife contacted the insurance co themselves.  I cannot reach an actual person at the ins co.  I do not know what info is required, but pt is having the CT scan today.

## 2020-07-16 NOTE — TELEPHONE ENCOUNTER
This pt and his wife are not happy with the process of why they have to wait for the CT scan.  I told them it had to be prior authorized by their insurance and will be scheduled and he also wants to know why you didn't give him anything for pain.

## 2020-07-16 NOTE — TELEPHONE ENCOUNTER
All orders placed per Dr. Decker     STAT referral to urology, schedule at [facility] 7/20/20 11am Dr. Carreon at the [place] office.  Records faxed.    Routine nephrology order put in per Dr. Decker.      2D echo ordered and scheduled today [address] [facility] [address]

## 2020-07-16 NOTE — TELEPHONE ENCOUNTER
----- Message from Bernadine Graff MD sent at 7/16/2020  4:47 PM EDT -----    Cut amlodipine to 5mg daily - 1/2 of 10 and start coreg 6.25mg bid.     pls let him know.     RM

## 2020-07-16 NOTE — TELEPHONE ENCOUNTER
Mary Annna called back at the request of Jyoti Munoz, pt's wife.  This has not been authorized because it deviated from the protocol since pt did not wait for scheduling to obtain authorization and scheduled it themselves.  Mary Annna states they have not authorized the CT scan and he may end up with out of pocket charges and that she would explain to pt's wife.

## 2020-07-16 NOTE — TELEPHONE ENCOUNTER
PATIENT FERNANDORAPHAEL PEREZ WAS SEEN YESTERDAY AND DR. REY WANTED THE PATIENT TO HAVE AN ULTRASOUND DONE.    PATIENT WIFE TARA PEREZ IS CALLING TODAY WANTING TO KNOW WHO AND WERE THE ULTRASOUND IS GOING TO BE DONE.       PLEASE CONTACT PATIENT  @[phone]

## 2020-07-16 NOTE — TELEPHONE ENCOUNTER
I am not sure why the process is taking longer than usual. We can ask Stacy to take a look into this. If for some reason is pain is too much, and unbearable, he can always go to to the ER for further evaluation and treatment and the CT scan can be done through the ER. I believe the insurance is holding up the CT at this time.   He was started on antibiotics and he didn't seem to be in severe pain, however I did send in some norco for his pain to his pharmacy.

## 2020-07-17 ENCOUNTER — TELEPHONE (OUTPATIENT)
Dept: INTERNAL MEDICINE | Facility: CLINIC | Age: 55
End: 2020-07-17

## 2020-07-17 DIAGNOSIS — R79.89 ELEVATED D-DIMER: ICD-10-CM

## 2020-07-17 DIAGNOSIS — R74.8 ELEVATED LIVER ENZYMES: Primary | ICD-10-CM

## 2020-07-17 DIAGNOSIS — N28.0 KIDNEY INFARCTION (HCC): Primary | ICD-10-CM

## 2020-07-17 LAB
BH CV ECHO MEAS - ACS: 2.2 CM
BH CV ECHO MEAS - AO MAX PG (FULL): 6.1 MMHG
BH CV ECHO MEAS - AO MAX PG: 8.8 MMHG
BH CV ECHO MEAS - AO MEAN PG (FULL): 2.6 MMHG
BH CV ECHO MEAS - AO MEAN PG: 4.1 MMHG
BH CV ECHO MEAS - AO V2 MAX: 148.7 CM/SEC
BH CV ECHO MEAS - AO V2 MEAN: 89.6 CM/SEC
BH CV ECHO MEAS - AO V2 VTI: 27.8 CM
BH CV ECHO MEAS - AVA(I,A): 1.9 CM^2
BH CV ECHO MEAS - AVA(I,D): 1.9 CM^2
BH CV ECHO MEAS - AVA(V,A): 1.8 CM^2
BH CV ECHO MEAS - AVA(V,D): 1.8 CM^2
BH CV ECHO MEAS - BSA(HAYCOCK): 2.4 M^2
BH CV ECHO MEAS - BSA: 2.3 M^2
BH CV ECHO MEAS - BZI_BMI: 36.7 KILOGRAMS/M^2
BH CV ECHO MEAS - BZI_METRIC_HEIGHT: 177.8 CM
BH CV ECHO MEAS - BZI_METRIC_WEIGHT: 116.1 KG
BH CV ECHO MEAS - EDV(CUBED): 318.7 ML
BH CV ECHO MEAS - EDV(MOD-SP2): 186 ML
BH CV ECHO MEAS - EDV(MOD-SP4): 184 ML
BH CV ECHO MEAS - EDV(TEICH): 241.7 ML
BH CV ECHO MEAS - EF(CUBED): 28 %
BH CV ECHO MEAS - EF(MOD-BP): 27.5 %
BH CV ECHO MEAS - EF(MOD-SP2): 28 %
BH CV ECHO MEAS - EF(MOD-SP4): 24.5 %
BH CV ECHO MEAS - EF(TEICH): 22 %
BH CV ECHO MEAS - ESV(MOD-SP2): 134 ML
BH CV ECHO MEAS - ESV(MOD-SP4): 139 ML
BH CV ECHO MEAS - ESV(TEICH): 188.5 ML
BH CV ECHO MEAS - FS: 10.4 %
BH CV ECHO MEAS - IVS/LVPW: 1.1
BH CV ECHO MEAS - IVSD: 0.96 CM
BH CV ECHO MEAS - LAT PEAK E' VEL: 10.2 CM/SEC
BH CV ECHO MEAS - LV DIASTOLIC VOL/BSA (35-75): 79.4 ML/M^2
BH CV ECHO MEAS - LV MASS(C)D: 281.9 GRAMS
BH CV ECHO MEAS - LV MASS(C)DI: 121.6 GRAMS/M^2
BH CV ECHO MEAS - LV MAX PG: 2.8 MMHG
BH CV ECHO MEAS - LV MEAN PG: 1.5 MMHG
BH CV ECHO MEAS - LV SYSTOLIC VOL/BSA (12-30): 60 ML/M^2
BH CV ECHO MEAS - LV V1 MAX: 83.4 CM/SEC
BH CV ECHO MEAS - LV V1 MEAN: 56.6 CM/SEC
BH CV ECHO MEAS - LV V1 VTI: 16.7 CM
BH CV ECHO MEAS - LVIDD: 6.8 CM
BH CV ECHO MEAS - LVIDS: 6.1 CM
BH CV ECHO MEAS - LVLD AP2: 9.1 CM
BH CV ECHO MEAS - LVLD AP4: 8.7 CM
BH CV ECHO MEAS - LVLS AP2: 8.2 CM
BH CV ECHO MEAS - LVLS AP4: 8.4 CM
BH CV ECHO MEAS - LVOT AREA (M): 3.1 CM^2
BH CV ECHO MEAS - LVOT AREA: 3.2 CM^2
BH CV ECHO MEAS - LVOT DIAM: 2 CM
BH CV ECHO MEAS - LVPWD: 0.91 CM
BH CV ECHO MEAS - MED PEAK E' VEL: 6.2 CM/SEC
BH CV ECHO MEAS - MR MAX PG: 51.1 MMHG
BH CV ECHO MEAS - MR MAX VEL: 357.5 CM/SEC
BH CV ECHO MEAS - MV A DUR: 0.11 SEC
BH CV ECHO MEAS - MV A MAX VEL: 83.2 CM/SEC
BH CV ECHO MEAS - MV DEC SLOPE: 264.1 CM/SEC^2
BH CV ECHO MEAS - MV DEC TIME: 0.19 SEC
BH CV ECHO MEAS - MV E MAX VEL: 43.5 CM/SEC
BH CV ECHO MEAS - MV E/A: 0.52
BH CV ECHO MEAS - MV MAX PG: 3.5 MMHG
BH CV ECHO MEAS - MV MEAN PG: 1.4 MMHG
BH CV ECHO MEAS - MV P1/2T MAX VEL: 47.7 CM/SEC
BH CV ECHO MEAS - MV P1/2T: 52.9 MSEC
BH CV ECHO MEAS - MV V2 MAX: 93.7 CM/SEC
BH CV ECHO MEAS - MV V2 MEAN: 55 CM/SEC
BH CV ECHO MEAS - MV V2 VTI: 15.7 CM
BH CV ECHO MEAS - MVA P1/2T LCG: 4.6 CM^2
BH CV ECHO MEAS - MVA(P1/2T): 4.2 CM^2
BH CV ECHO MEAS - MVA(VTI): 3.4 CM^2
BH CV ECHO MEAS - PA MAX PG (FULL): 0.87 MMHG
BH CV ECHO MEAS - PA MAX PG: 4.7 MMHG
BH CV ECHO MEAS - PA V2 MAX: 108.6 CM/SEC
BH CV ECHO MEAS - PULM A REVS DUR: 0.11 SEC
BH CV ECHO MEAS - PULM A REVS VEL: 45.2 CM/SEC
BH CV ECHO MEAS - PULM DIAS VEL: 33.2 CM/SEC
BH CV ECHO MEAS - PULM S/D: 1.7
BH CV ECHO MEAS - PULM SYS VEL: 57.2 CM/SEC
BH CV ECHO MEAS - PVA(V,A): 3.6 CM^2
BH CV ECHO MEAS - PVA(V,D): 3.6 CM^2
BH CV ECHO MEAS - QP/QS: 1.3
BH CV ECHO MEAS - RV MAX PG: 3.8 MMHG
BH CV ECHO MEAS - RV MEAN PG: 2.4 MMHG
BH CV ECHO MEAS - RV V1 MAX: 98.1 CM/SEC
BH CV ECHO MEAS - RV V1 MEAN: 73.3 CM/SEC
BH CV ECHO MEAS - RV V1 VTI: 17 CM
BH CV ECHO MEAS - RVOT AREA: 4 CM^2
BH CV ECHO MEAS - RVOT DIAM: 2.3 CM
BH CV ECHO MEAS - SI(CUBED): 38.5 ML/M^2
BH CV ECHO MEAS - SI(LVOT): 23.1 ML/M^2
BH CV ECHO MEAS - SI(MOD-SP2): 22.4 ML/M^2
BH CV ECHO MEAS - SI(MOD-SP4): 19.4 ML/M^2
BH CV ECHO MEAS - SI(TEICH): 22.9 ML/M^2
BH CV ECHO MEAS - SV(CUBED): 89.2 ML
BH CV ECHO MEAS - SV(LVOT): 53.6 ML
BH CV ECHO MEAS - SV(MOD-SP2): 52 ML
BH CV ECHO MEAS - SV(MOD-SP4): 45 ML
BH CV ECHO MEAS - SV(RVOT): 68.7 ML
BH CV ECHO MEAS - SV(TEICH): 53.2 ML
BH CV ECHO MEAS - TAPSE (>1.6): 2.9 CM2
BH CV ECHO MEASUREMENTS AVERAGE E/E' RATIO: 5.3
BH CV XLRA - RV BASE: 3.2 CM
BH CV XLRA - RV LENGTH: 7.9 CM
BH CV XLRA - RV MID: 2.1 CM
BH CV XLRA - TDI S': 17 CM/SEC
F5 GENE MUT ANL BLD/T: NORMAL
LEFT ATRIUM VOLUME INDEX: 19 ML/M2
MAXIMAL PREDICTED HEART RATE: 163 BPM
SINUS: 3.6 CM
STJ: 3.2 CM
STRESS TARGET HR: 139 BPM

## 2020-07-17 NOTE — PROGRESS NOTES
Please inform the patient of the following abnormal results.  Discussed the findings with the patient regarding the renal infarct.  Since there is no mention of any diverticulitis, and his small bowels and large intestines appear to be within normal limits, patient can stop the Flagyl and ciprofloxacin..  Please let patient know to stop his antibiotics, and start baby aspirin.

## 2020-07-17 NOTE — TELEPHONE ENCOUNTER
Pt scheduled for US liver, renal doppler, and venous doppler 7/23/20 at 7am, pt instructed to be NPO 8hrs prior to liver US.  He verbalized understanding.

## 2020-07-17 NOTE — PROGRESS NOTES
Please inform the patient of the following abnormal results.  Patient's white blood cell count is elevated, patient sed rate and CRP is elevated indicating an inflammation or perhaps even an infection.  Patient was started on prophylactic antibiotics for diverticulitis.  His d-dimer came back elevated, and I would like to have him get a bilateral lower extremity Doppler.  I would also like patient to take baby aspirin daily.  Patient's liver enzymes are also elevated, and it is unclear for the cause.  As he is not taking any medications to cause this, nor does he drink any significant alcohol.  He also has new onset elevated serum creatinine levels, which may be from the renal infarct that was seen on his CT scan.  Nephrology referral has been placed.  We will also order an ultrasound of patient's liver.

## 2020-07-17 NOTE — PROGRESS NOTES
Please inform the patient of the following abnormal results.  Echo shows left ventricular cavity being dilated, and a decrease in ejection fraction.  I have discussed the results with the cardiologist.  Patient does have an appointment with her in a few days.

## 2020-07-20 ENCOUNTER — TRANSCRIBE ORDERS (OUTPATIENT)
Dept: ADMINISTRATIVE | Facility: HOSPITAL | Age: 55
End: 2020-07-20

## 2020-07-20 ENCOUNTER — APPOINTMENT (OUTPATIENT)
Dept: CARDIOLOGY | Facility: HOSPITAL | Age: 55
End: 2020-07-20

## 2020-07-20 DIAGNOSIS — N28.0 THROMBOEMBOLISM OF RENAL ARTERIES (HCC): Primary | ICD-10-CM

## 2020-07-20 NOTE — TELEPHONE ENCOUNTER
I called pt and his wife.  She states he saw the urologist this morning and addressed most of her concerns.

## 2020-07-21 ENCOUNTER — OFFICE VISIT (OUTPATIENT)
Dept: CARDIOLOGY | Facility: CLINIC | Age: 55
End: 2020-07-21

## 2020-07-21 ENCOUNTER — TRANSCRIBE ORDERS (OUTPATIENT)
Dept: SLEEP MEDICINE | Facility: HOSPITAL | Age: 55
End: 2020-07-21

## 2020-07-21 VITALS
HEIGHT: 70 IN | SYSTOLIC BLOOD PRESSURE: 128 MMHG | BODY MASS INDEX: 37.22 KG/M2 | WEIGHT: 260 LBS | DIASTOLIC BLOOD PRESSURE: 80 MMHG | HEART RATE: 72 BPM

## 2020-07-21 DIAGNOSIS — E78.2 MIXED HYPERLIPIDEMIA: ICD-10-CM

## 2020-07-21 DIAGNOSIS — I10 ESSENTIAL HYPERTENSION: ICD-10-CM

## 2020-07-21 DIAGNOSIS — E11.9 TYPE 2 DIABETES MELLITUS WITHOUT COMPLICATION, WITHOUT LONG-TERM CURRENT USE OF INSULIN (HCC): ICD-10-CM

## 2020-07-21 DIAGNOSIS — Z01.818 OTHER SPECIFIED PRE-OPERATIVE EXAMINATION: Primary | ICD-10-CM

## 2020-07-21 DIAGNOSIS — I42.9 CARDIOMYOPATHY, UNSPECIFIED TYPE (HCC): Primary | ICD-10-CM

## 2020-07-21 PROCEDURE — 99205 OFFICE O/P NEW HI 60 MIN: CPT | Performed by: INTERNAL MEDICINE

## 2020-07-21 PROCEDURE — 93000 ELECTROCARDIOGRAM COMPLETE: CPT | Performed by: INTERNAL MEDICINE

## 2020-07-21 RX ORDER — AMLODIPINE BESYLATE 5 MG/1
5 TABLET ORAL DAILY
Qty: 90 TABLET | Refills: 3 | Status: SHIPPED | OUTPATIENT
Start: 2020-07-21 | End: 2020-08-10 | Stop reason: SDUPTHER

## 2020-07-21 RX ORDER — CARVEDILOL 12.5 MG/1
12.5 TABLET ORAL 2 TIMES DAILY
Qty: 180 TABLET | Refills: 3 | Status: SHIPPED | OUTPATIENT
Start: 2020-07-21 | End: 2020-08-31 | Stop reason: SDUPTHER

## 2020-07-21 NOTE — PROGRESS NOTES
Date of Office Visit: 2020  Encounter Provider: Bernadine Graff MD  Place of Service: [facility]  Patient Name: Jhon Munoz  :1965      Patient ID:  Jhon Munoz is a 55 y.o. male is here for cardiomyopathy.           History of Present Illness    He is here for new cardiomyopathy.  He has a history of sleep apnea using CPAP, hyperlipidemia, hypertension, diabetes mellitus type 2 on oral hypoglycemics, GERD.      He was having left lower quadrant pain.  He saw Dr. Decker and he had labs done 2020 show glucose 113, creatinine 1.49, magnesium 1.5, C-reactive protein 4.83, normal CBC.  CT abdomen pelvis done 2024 left lower quadrant pain to her left renal infarct involving the lower pole and midpole of the anterior cortex and a nonobstructing left renal calculus.  He saw Dr. Carreon from urology yesterday who does not think that a further work-up really needs to be done at this time.    Echocardiogram done 2020 showed grade 1 diastolic dysfunction with moderate left ventricular dilation and ejection fraction of 27.5%.  Global longitudinal strain was -12.8%.  There was no significant valve disease.    He is , has 3 children works for [employer].  Uses no cigarettes and has alcohol, about 6-10 beers per week as well as 1 vodka.  His 1 cup of coffee per day.  He has not been regularly exercising.  His mother had hypertension  may 2020.    He has no orthopnea or PND but has noticed mild exertional dyspnea.  He really has no exertional chest tightness or pressure but about a year ago he had a significant episode of right-sided chest pressure.  This is never recurred.  He does not feel his heart racing in a sustained fashion.  He has occasional palpitations but nothing sustained.  He is had no syncope or dizziness.  He has noticed some increase in mild lower extremity edema but really no abdominal distention.  He has never had myocardial  infarction, stroke, blood clots.  He has never had cancer or aneurysm.    Past Medical History:   Diagnosis Date   • Colon polyp    • Diabetes mellitus (CMS/HCC)     type 2   • Hyperlipidemia    • Hypertension    • Sleep apnea          Past Surgical History:   Procedure Laterality Date   • KNEE SURGERY      meniscus        Current Outpatient Medications on File Prior to Visit   Medication Sig Dispense Refill   • atorvastatin (LIPITOR) 20 MG tablet Take 1 tablet by mouth Daily. 90 tablet 3   • esomeprazole (nexIUM) 40 MG capsule Take 1 capsule by mouth Every Morning Before Breakfast. (Patient taking differently: Take 40 mg by mouth As Needed.) 30 capsule 6   • hydroCHLOROthiazide (HYDRODIURIL) 12.5 MG tablet Take 1 tablet by mouth Daily. 90 tablet 3   • Ibuprofen-Famotidine (DUEXIS) 800-26.6 MG tablet Take 1 tablet by mouth 3 (Three) Times a Day As Needed (PAIN/SWELLING). 90 tablet 2   • metFORMIN (GLUCOPHAGE) 500 MG tablet Take 2 tablets by mouth every night at bedtime. 180 tablet 1   • SITagliptin-metFORMIN HCl ER (JANUMET XR)  MG tablet Take 1 tablet by mouth Daily. 30 tablet 6   • telmisartan (MICARDIS) 80 MG tablet Take 1 tablet by mouth Daily. 90 tablet 3   • vardenafil (Levitra) 10 MG tablet Take 1 tablet by mouth As Needed for Erectile Dysfunction. 7 tablet 3   • [DISCONTINUED] amLODIPine (NORVASC) 10 MG tablet Take 0.5 tablets by mouth Daily. 90 tablet 3   • [DISCONTINUED] carvedilol (COREG) 6.25 MG tablet Take 1 tablet by mouth 2 (Two) Times a Day. 180 tablet 3   • HYDROcodone-acetaminophen (Norco) 5-325 MG per tablet Take 1 tablet by mouth Every 6 (Six) Hours As Needed for Moderate Pain  or Severe Pain . 20 tablet 0   • lubiprostone (Amitiza) 24 MCG capsule Take 1 capsule by mouth 2 (Two) Times a Day With Meals. 60 capsule 5     No current facility-administered medications on file prior to visit.        Social History     Socioeconomic History   • Marital status:      Spouse name: Not on file  "  • Number of children: Not on file   • Years of education: Not on file   • Highest education level: Not on file   Tobacco Use   • Smoking status: Never Smoker   • Smokeless tobacco: Never Used   Substance and Sexual Activity   • Alcohol use: Yes     Comment: 3 per week    • Drug use: Never   • Sexual activity: Defer           Review of Systems   Constitution: Negative.   HENT: Negative for congestion.    Eyes: Negative for vision loss in left eye and vision loss in right eye.   Cardiovascular: Positive for leg swelling.   Respiratory: Negative.  Negative for cough, hemoptysis, shortness of breath, sleep disturbances due to breathing, snoring, sputum production and wheezing.    Endocrine: Negative.    Hematologic/Lymphatic: Negative.    Skin: Negative for poor wound healing and rash.   Musculoskeletal: Positive for joint pain. Negative for falls, gout, muscle cramps and myalgias.   Gastrointestinal: Positive for abdominal pain. Negative for diarrhea, dysphagia, hematemesis, melena, nausea and vomiting.   Neurological: Negative for excessive daytime sleepiness, dizziness, headaches, light-headedness, loss of balance, seizures and vertigo.   Psychiatric/Behavioral: Negative for depression and substance abuse. The patient is not nervous/anxious.        Procedures    ECG 12 Lead  Date/Time: 7/21/2020 7:40 AM  Performed by: Bernadine Graff MD  Authorized by: Bernadine Graff MD   Comparison: not compared with previous ECG   Previous ECG: no previous ECG available  Rhythm: sinus rhythm  T inversion: I, aVL, V4, V5 and V6    Clinical impression: abnormal EKG                Objective:      Vitals:    07/21/20 0731 07/21/20 0732   BP: 128/78 128/80   BP Location: Right arm Left arm   Patient Position: Sitting Sitting   Pulse: 72    Weight: 118 kg (260 lb)    Height: 177.8 cm (70\")      Body mass index is 37.31 kg/m².    Physical Exam   Constitutional: He is oriented to person, place, and time. He appears " well-developed and well-nourished. No distress.   HENT:   Head: Normocephalic and atraumatic.   Eyes: Conjunctivae are normal. No scleral icterus.   Neck: Neck supple. No JVD present. Carotid bruit is not present. No thyromegaly present.   Cardiovascular: Normal rate, regular rhythm, S1 normal, S2 normal and intact distal pulses.  No extrasystoles are present. PMI is not displaced. Exam reveals no gallop.   No murmur heard.  Pulses:       Carotid pulses are 2+ on the right side, and 2+ on the left side.       Radial pulses are 2+ on the right side, and 2+ on the left side.        Dorsalis pedis pulses are 2+ on the right side, and 2+ on the left side.        Posterior tibial pulses are 2+ on the right side, and 2+ on the left side.   Pulmonary/Chest: Effort normal and breath sounds normal. No respiratory distress. He has no wheezes. He has no rhonchi. He has no rales. He exhibits no tenderness.   Abdominal: Soft. Bowel sounds are normal. He exhibits no distension, no abdominal bruit and no mass. There is no tenderness.   Musculoskeletal: He exhibits no edema or deformity.   Lymphadenopathy:     He has no cervical adenopathy.   Neurological: He is alert and oriented to person, place, and time. No cranial nerve deficit.   Skin: Skin is warm and dry. No rash noted. He is not diaphoretic. No cyanosis. No pallor. Nails show no clubbing.   Psychiatric: He has a normal mood and affect. Judgment normal.   Vitals reviewed.      Lab Review:       Assessment:      Diagnosis Plan   1. Cardiomyopathy, unspecified type (CMS/HCC)  Case Request Cath Lab: Coronary angiography   2. Essential hypertension     3. Mixed hyperlipidemia     4. Type 2 diabetes mellitus without complication, without long-term current use of insulin (CMS/HCC)  Case Request Cath Lab: Coronary angiography     1. Cardiomyopathy, etiology unknown.  Set cardiac catheterization.  Would like to eventually get him on Entresto and discontinue  amlodipine.  2. Hypertension, goal less than 110/80.  Increase carvedilol to 12.5 twice daily.  3. Hyperlipidemia, on atorvastatin  4. Diabetes mellitus type 2  5. Obesity  6. Left renal infarct, etiology unknown.  7. Renal insufficiency, likely due to left renal infarct.     Plan:       See Gena in 6 weeks, increase carvedilol, set cardiac catheterization.  Risks and benefits discussed with patient and his wife Franklin.

## 2020-07-21 NOTE — H&P (VIEW-ONLY)
Date of Office Visit: 2020  Encounter Provider: Bernadine Graff MD  Place of Service: [facility]  Patient Name: Jhon Munoz  :1965      Patient ID:  Jhon Munoz is a 55 y.o. male is here for cardiomyopathy.           History of Present Illness    He is here for new cardiomyopathy.  He has a history of sleep apnea using CPAP, hyperlipidemia, hypertension, diabetes mellitus type 2 on oral hypoglycemics, GERD.      He was having left lower quadrant pain.  He saw Dr. Decker and he had labs done 2020 show glucose 113, creatinine 1.49, magnesium 1.5, C-reactive protein 4.83, normal CBC.  CT abdomen pelvis done 2024 left lower quadrant pain to her left renal infarct involving the lower pole and midpole of the anterior cortex and a nonobstructing left renal calculus.  He saw Dr. Carreon from urology yesterday who does not think that a further work-up really needs to be done at this time.    Echocardiogram done 2020 showed grade 1 diastolic dysfunction with moderate left ventricular dilation and ejection fraction of 27.5%.  Global longitudinal strain was -12.8%.  There was no significant valve disease.    He is , has 3 children works for [employer].  Uses no cigarettes and has alcohol, about 6-10 beers per week as well as 1 vodka.  His 1 cup of coffee per day.  He has not been regularly exercising.  His mother had hypertension  may 2020.    He has no orthopnea or PND but has noticed mild exertional dyspnea.  He really has no exertional chest tightness or pressure but about a year ago he had a significant episode of right-sided chest pressure.  This is never recurred.  He does not feel his heart racing in a sustained fashion.  He has occasional palpitations but nothing sustained.  He is had no syncope or dizziness.  He has noticed some increase in mild lower extremity edema but really no abdominal distention.  He has never had myocardial  infarction, stroke, blood clots.  He has never had cancer or aneurysm.    Past Medical History:   Diagnosis Date   • Colon polyp    • Diabetes mellitus (CMS/HCC)     type 2   • Hyperlipidemia    • Hypertension    • Sleep apnea          Past Surgical History:   Procedure Laterality Date   • KNEE SURGERY      meniscus        Current Outpatient Medications on File Prior to Visit   Medication Sig Dispense Refill   • atorvastatin (LIPITOR) 20 MG tablet Take 1 tablet by mouth Daily. 90 tablet 3   • esomeprazole (nexIUM) 40 MG capsule Take 1 capsule by mouth Every Morning Before Breakfast. (Patient taking differently: Take 40 mg by mouth As Needed.) 30 capsule 6   • hydroCHLOROthiazide (HYDRODIURIL) 12.5 MG tablet Take 1 tablet by mouth Daily. 90 tablet 3   • Ibuprofen-Famotidine (DUEXIS) 800-26.6 MG tablet Take 1 tablet by mouth 3 (Three) Times a Day As Needed (PAIN/SWELLING). 90 tablet 2   • metFORMIN (GLUCOPHAGE) 500 MG tablet Take 2 tablets by mouth every night at bedtime. 180 tablet 1   • SITagliptin-metFORMIN HCl ER (JANUMET XR)  MG tablet Take 1 tablet by mouth Daily. 30 tablet 6   • telmisartan (MICARDIS) 80 MG tablet Take 1 tablet by mouth Daily. 90 tablet 3   • vardenafil (Levitra) 10 MG tablet Take 1 tablet by mouth As Needed for Erectile Dysfunction. 7 tablet 3   • [DISCONTINUED] amLODIPine (NORVASC) 10 MG tablet Take 0.5 tablets by mouth Daily. 90 tablet 3   • [DISCONTINUED] carvedilol (COREG) 6.25 MG tablet Take 1 tablet by mouth 2 (Two) Times a Day. 180 tablet 3   • HYDROcodone-acetaminophen (Norco) 5-325 MG per tablet Take 1 tablet by mouth Every 6 (Six) Hours As Needed for Moderate Pain  or Severe Pain . 20 tablet 0   • lubiprostone (Amitiza) 24 MCG capsule Take 1 capsule by mouth 2 (Two) Times a Day With Meals. 60 capsule 5     No current facility-administered medications on file prior to visit.        Social History     Socioeconomic History   • Marital status:      Spouse name: Not on file  "  • Number of children: Not on file   • Years of education: Not on file   • Highest education level: Not on file   Tobacco Use   • Smoking status: Never Smoker   • Smokeless tobacco: Never Used   Substance and Sexual Activity   • Alcohol use: Yes     Comment: 3 per week    • Drug use: Never   • Sexual activity: Defer           Review of Systems   Constitution: Negative.   HENT: Negative for congestion.    Eyes: Negative for vision loss in left eye and vision loss in right eye.   Cardiovascular: Positive for leg swelling.   Respiratory: Negative.  Negative for cough, hemoptysis, shortness of breath, sleep disturbances due to breathing, snoring, sputum production and wheezing.    Endocrine: Negative.    Hematologic/Lymphatic: Negative.    Skin: Negative for poor wound healing and rash.   Musculoskeletal: Positive for joint pain. Negative for falls, gout, muscle cramps and myalgias.   Gastrointestinal: Positive for abdominal pain. Negative for diarrhea, dysphagia, hematemesis, melena, nausea and vomiting.   Neurological: Negative for excessive daytime sleepiness, dizziness, headaches, light-headedness, loss of balance, seizures and vertigo.   Psychiatric/Behavioral: Negative for depression and substance abuse. The patient is not nervous/anxious.        Procedures    ECG 12 Lead  Date/Time: 7/21/2020 7:40 AM  Performed by: Bernadine Graff MD  Authorized by: Bernadine Graff MD   Comparison: not compared with previous ECG   Previous ECG: no previous ECG available  Rhythm: sinus rhythm  T inversion: I, aVL, V4, V5 and V6    Clinical impression: abnormal EKG                Objective:      Vitals:    07/21/20 0731 07/21/20 0732   BP: 128/78 128/80   BP Location: Right arm Left arm   Patient Position: Sitting Sitting   Pulse: 72    Weight: 118 kg (260 lb)    Height: 177.8 cm (70\")      Body mass index is 37.31 kg/m².    Physical Exam   Constitutional: He is oriented to person, place, and time. He appears " well-developed and well-nourished. No distress.   HENT:   Head: Normocephalic and atraumatic.   Eyes: Conjunctivae are normal. No scleral icterus.   Neck: Neck supple. No JVD present. Carotid bruit is not present. No thyromegaly present.   Cardiovascular: Normal rate, regular rhythm, S1 normal, S2 normal and intact distal pulses.  No extrasystoles are present. PMI is not displaced. Exam reveals no gallop.   No murmur heard.  Pulses:       Carotid pulses are 2+ on the right side, and 2+ on the left side.       Radial pulses are 2+ on the right side, and 2+ on the left side.        Dorsalis pedis pulses are 2+ on the right side, and 2+ on the left side.        Posterior tibial pulses are 2+ on the right side, and 2+ on the left side.   Pulmonary/Chest: Effort normal and breath sounds normal. No respiratory distress. He has no wheezes. He has no rhonchi. He has no rales. He exhibits no tenderness.   Abdominal: Soft. Bowel sounds are normal. He exhibits no distension, no abdominal bruit and no mass. There is no tenderness.   Musculoskeletal: He exhibits no edema or deformity.   Lymphadenopathy:     He has no cervical adenopathy.   Neurological: He is alert and oriented to person, place, and time. No cranial nerve deficit.   Skin: Skin is warm and dry. No rash noted. He is not diaphoretic. No cyanosis. No pallor. Nails show no clubbing.   Psychiatric: He has a normal mood and affect. Judgment normal.   Vitals reviewed.      Lab Review:       Assessment:      Diagnosis Plan   1. Cardiomyopathy, unspecified type (CMS/HCC)  Case Request Cath Lab: Coronary angiography   2. Essential hypertension     3. Mixed hyperlipidemia     4. Type 2 diabetes mellitus without complication, without long-term current use of insulin (CMS/HCC)  Case Request Cath Lab: Coronary angiography     1. Cardiomyopathy, etiology unknown.  Set cardiac catheterization.  Would like to eventually get him on Entresto and discontinue  amlodipine.  2. Hypertension, goal less than 110/80.  Increase carvedilol to 12.5 twice daily.  3. Hyperlipidemia, on atorvastatin  4. Diabetes mellitus type 2  5. Obesity  6. Left renal infarct, etiology unknown.  7. Renal insufficiency, likely due to left renal infarct.     Plan:       See Gena in 6 weeks, increase carvedilol, set cardiac catheterization.  Risks and benefits discussed with patient and his wife Franklin.

## 2020-07-22 ENCOUNTER — LAB (OUTPATIENT)
Dept: LAB | Facility: HOSPITAL | Age: 55
End: 2020-07-22

## 2020-07-22 ENCOUNTER — APPOINTMENT (OUTPATIENT)
Dept: CT IMAGING | Facility: HOSPITAL | Age: 55
End: 2020-07-22

## 2020-07-22 DIAGNOSIS — Z01.818 OTHER SPECIFIED PRE-OPERATIVE EXAMINATION: ICD-10-CM

## 2020-07-22 PROCEDURE — U0004 COV-19 TEST NON-CDC HGH THRU: HCPCS

## 2020-07-22 PROCEDURE — C9803 HOPD COVID-19 SPEC COLLECT: HCPCS

## 2020-07-23 ENCOUNTER — HOSPITAL ENCOUNTER (OUTPATIENT)
Dept: ULTRASOUND IMAGING | Facility: HOSPITAL | Age: 55
Discharge: HOME | End: 2020-07-23
Admitting: FAMILY MEDICINE

## 2020-07-23 ENCOUNTER — HOSPITAL ENCOUNTER (OUTPATIENT)
Dept: CARDIOLOGY | Facility: HOSPITAL | Age: 55
Discharge: HOME | End: 2020-07-23

## 2020-07-23 DIAGNOSIS — N28.0 KIDNEY INFARCTION (HCC): ICD-10-CM

## 2020-07-23 LAB
BH CV ECHO MEAS - DIST REN A EDV LEFT: 17.4 CM/SEC
BH CV ECHO MEAS - DIST REN A PSV LEFT: 54.6 CM/SEC
BH CV ECHO MEAS - DIST REN A RI LEFT: 0.68
BH CV ECHO MEAS - MID REN A EDV LEFT: 24 CM/SEC
BH CV ECHO MEAS - MID REN A PSV LEFT: 82.6 CM/SEC
BH CV ECHO MEAS - MID REN A RI LEFT: 0.71
BH CV ECHO MEAS - PROX REN A EDV LEFT: 20.7 CM/SEC
BH CV ECHO MEAS - PROX REN A PSV LEFT: 75.8 CM/SEC
BH CV ECHO MEAS - PROX REN A RI LEFT: 0.73
BH CV LOWER VASCULAR LEFT COMMON FEMORAL AUGMENT: NORMAL
BH CV LOWER VASCULAR LEFT COMMON FEMORAL COMPETENT: NORMAL
BH CV LOWER VASCULAR LEFT COMMON FEMORAL COMPRESS: NORMAL
BH CV LOWER VASCULAR LEFT COMMON FEMORAL PHASIC: NORMAL
BH CV LOWER VASCULAR LEFT COMMON FEMORAL SPONT: NORMAL
BH CV LOWER VASCULAR LEFT DISTAL FEMORAL COMPRESS: NORMAL
BH CV LOWER VASCULAR LEFT GASTRONEMIUS COMPRESS: NORMAL
BH CV LOWER VASCULAR LEFT GREATER SAPH AK COMPRESS: NORMAL
BH CV LOWER VASCULAR LEFT GREATER SAPH BK COMPRESS: NORMAL
BH CV LOWER VASCULAR LEFT MID FEMORAL AUGMENT: NORMAL
BH CV LOWER VASCULAR LEFT MID FEMORAL COMPETENT: NORMAL
BH CV LOWER VASCULAR LEFT MID FEMORAL COMPRESS: NORMAL
BH CV LOWER VASCULAR LEFT MID FEMORAL PHASIC: NORMAL
BH CV LOWER VASCULAR LEFT MID FEMORAL SPONT: NORMAL
BH CV LOWER VASCULAR LEFT PERONEAL COMPRESS: NORMAL
BH CV LOWER VASCULAR LEFT POPLITEAL AUGMENT: NORMAL
BH CV LOWER VASCULAR LEFT POPLITEAL COMPETENT: NORMAL
BH CV LOWER VASCULAR LEFT POPLITEAL COMPRESS: NORMAL
BH CV LOWER VASCULAR LEFT POPLITEAL PHASIC: NORMAL
BH CV LOWER VASCULAR LEFT POPLITEAL SPONT: NORMAL
BH CV LOWER VASCULAR LEFT POSTERIOR TIBIAL COMPRESS: NORMAL
BH CV LOWER VASCULAR LEFT PROFUNDA FEMORAL COMPRESS: NORMAL
BH CV LOWER VASCULAR LEFT PROXIMAL FEMORAL COMPRESS: NORMAL
BH CV LOWER VASCULAR LEFT SAPHENOFEMORAL JUNCTION COMPRESS: NORMAL
BH CV LOWER VASCULAR RIGHT COMMON FEMORAL AUGMENT: NORMAL
BH CV LOWER VASCULAR RIGHT COMMON FEMORAL COMPETENT: NORMAL
BH CV LOWER VASCULAR RIGHT COMMON FEMORAL COMPRESS: NORMAL
BH CV LOWER VASCULAR RIGHT COMMON FEMORAL PHASIC: NORMAL
BH CV LOWER VASCULAR RIGHT COMMON FEMORAL SPONT: NORMAL
BH CV LOWER VASCULAR RIGHT DISTAL FEMORAL COMPRESS: NORMAL
BH CV LOWER VASCULAR RIGHT GASTRONEMIUS COMPRESS: NORMAL
BH CV LOWER VASCULAR RIGHT GREATER SAPH AK COMPRESS: NORMAL
BH CV LOWER VASCULAR RIGHT GREATER SAPH BK COMPRESS: NORMAL
BH CV LOWER VASCULAR RIGHT MID FEMORAL AUGMENT: NORMAL
BH CV LOWER VASCULAR RIGHT MID FEMORAL COMPETENT: NORMAL
BH CV LOWER VASCULAR RIGHT MID FEMORAL COMPRESS: NORMAL
BH CV LOWER VASCULAR RIGHT MID FEMORAL PHASIC: NORMAL
BH CV LOWER VASCULAR RIGHT MID FEMORAL SPONT: NORMAL
BH CV LOWER VASCULAR RIGHT PERONEAL COMPRESS: NORMAL
BH CV LOWER VASCULAR RIGHT POPLITEAL AUGMENT: NORMAL
BH CV LOWER VASCULAR RIGHT POPLITEAL COMPETENT: NORMAL
BH CV LOWER VASCULAR RIGHT POPLITEAL COMPRESS: NORMAL
BH CV LOWER VASCULAR RIGHT POPLITEAL PHASIC: NORMAL
BH CV LOWER VASCULAR RIGHT POPLITEAL SPONT: NORMAL
BH CV LOWER VASCULAR RIGHT POSTERIOR TIBIAL COMPRESS: NORMAL
BH CV LOWER VASCULAR RIGHT PROFUNDA FEMORAL COMPRESS: NORMAL
BH CV LOWER VASCULAR RIGHT PROXIMAL FEMORAL COMPRESS: NORMAL
BH CV LOWER VASCULAR RIGHT SAPHENOFEMORAL JUNCTION COMPRESS: NORMAL
BH CV VAS BP LEFT ARM: NORMAL MMHG
BH CV VAS BP RIGHT ARM: NORMAL MMHG
BH CV VAS KIDNEY HEIGHT LEFT: 6.8 CM
BH CV VAS RENAL AORTIC MID PSV: 72.5 CM/S
BH CV VAS RENAL HILUM LEFT EDV: 14.7 CM/S
BH CV VAS RENAL HILUM LEFT PSV: 41.8 CM/S
BH CV VAS RENAL HILUM RIGHT EDV: 9.03 CM/S
BH CV VAS RENAL HILUM RIGHT PSV: 33.5 CM/S
BH CV XLRA MEAS - KID L LEFT: 12.1 CM
BH CV XLRA MEAS DIST REN A EDV RIGHT: 12.7 CM/SEC
BH CV XLRA MEAS DIST REN A PSV RIGHT: 45.3 CM/SEC
BH CV XLRA MEAS DIST REN A RI RIGHT: 0.72
BH CV XLRA MEAS KID H RIGHT: 7 CM
BH CV XLRA MEAS KID L RIGHT: 12.8 CM
BH CV XLRA MEAS MID REN A EDV RIGHT: 26.9 CM/SEC
BH CV XLRA MEAS MID REN A PSV RIGHT: 84.8 CM/SEC
BH CV XLRA MEAS MID REN A RI RIGHT: 0.68
BH CV XLRA MEAS PROX REN A EDV RIGHT: 30.3 CM/SEC
BH CV XLRA MEAS PROX REN A PSV RIGHT: 103.2 CM/SEC
BH CV XLRA MEAS PROX REN A RI RIGHT: 0.71
BH CV XLRA MEAS RAR LEFT: 1.14
BH CV XLRA MEAS RAR RIGHT: 1.42
BH CV XLRA MEAS RENAL A ORG EDV LEFT: 19.5 CM/S
BH CV XLRA MEAS RENAL A ORG EDV RIGHT: 20.3 CM/S
BH CV XLRA MEAS RENAL A ORG PSV LEFT: 71.5 CM/S
BH CV XLRA MEAS RENAL A ORG PSV RIGHT: 82.5 CM/S
LEFT RENAL UPPER PARENCHYMA MAX: 20 CM/S
LEFT RENAL UPPER PARENCHYMA MIN: 5.39 CM/S
LEFT RENAL UPPER PARENCHYMA RI: 0.73
REF LAB TEST METHOD: NORMAL
RIGHT RENAL UPPER PARENCHYMA MAX: 12.9 CM/S
RIGHT RENAL UPPER PARENCHYMA MIN: 5.84 CM/S
RIGHT RENAL UPPER PARENCHYMA RI: 0.55
SARS-COV-2 RNA RESP QL NAA+PROBE: NOT DETECTED

## 2020-07-23 PROCEDURE — 93970 EXTREMITY STUDY: CPT

## 2020-07-23 PROCEDURE — 93976 VASCULAR STUDY: CPT

## 2020-07-23 PROCEDURE — 76705 ECHO EXAM OF ABDOMEN: CPT

## 2020-07-24 ENCOUNTER — HOSPITAL ENCOUNTER (OUTPATIENT)
Facility: HOSPITAL | Age: 55
Setting detail: HOSPITAL OUTPATIENT SURGERY
Discharge: HOME | End: 2020-07-24
Attending: INTERNAL MEDICINE | Admitting: INTERNAL MEDICINE

## 2020-07-24 VITALS
DIASTOLIC BLOOD PRESSURE: 61 MMHG | HEIGHT: 70 IN | HEART RATE: 64 BPM | TEMPERATURE: 96.8 F | RESPIRATION RATE: 18 BRPM | SYSTOLIC BLOOD PRESSURE: 97 MMHG | BODY MASS INDEX: 36.36 KG/M2 | WEIGHT: 254 LBS | OXYGEN SATURATION: 99 %

## 2020-07-24 DIAGNOSIS — I42.9 CARDIOMYOPATHY, UNSPECIFIED TYPE (HCC): ICD-10-CM

## 2020-07-24 DIAGNOSIS — E11.9 TYPE 2 DIABETES MELLITUS WITHOUT COMPLICATION, WITHOUT LONG-TERM CURRENT USE OF INSULIN (HCC): ICD-10-CM

## 2020-07-24 LAB
GLUCOSE BLDC GLUCOMTR-MCNC: 97 MG/DL (ref 70–130)
GLUCOSE BLDC GLUCOMTR-MCNC: 97 MG/DL (ref 70–130)

## 2020-07-24 PROCEDURE — 93458 L HRT ARTERY/VENTRICLE ANGIO: CPT | Performed by: INTERNAL MEDICINE

## 2020-07-24 PROCEDURE — C1769 GUIDE WIRE: HCPCS | Performed by: INTERNAL MEDICINE

## 2020-07-24 PROCEDURE — 25010000002 MIDAZOLAM PER 1 MG: Performed by: INTERNAL MEDICINE

## 2020-07-24 PROCEDURE — 25010000002 FENTANYL CITRATE (PF) 100 MCG/2ML SOLUTION: Performed by: INTERNAL MEDICINE

## 2020-07-24 PROCEDURE — 25010000002 HEPARIN (PORCINE) PER 1000 UNITS: Performed by: INTERNAL MEDICINE

## 2020-07-24 PROCEDURE — 0 IOPAMIDOL PER 1 ML: Performed by: INTERNAL MEDICINE

## 2020-07-24 PROCEDURE — C1894 INTRO/SHEATH, NON-LASER: HCPCS | Performed by: INTERNAL MEDICINE

## 2020-07-24 PROCEDURE — 82962 GLUCOSE BLOOD TEST: CPT

## 2020-07-24 RX ORDER — SODIUM CHLORIDE 9 MG/ML
100 INJECTION, SOLUTION INTRAVENOUS CONTINUOUS
Status: ACTIVE | OUTPATIENT
Start: 2020-07-24 | End: 2020-07-24

## 2020-07-24 RX ORDER — SODIUM CHLORIDE 0.9 % (FLUSH) 0.9 %
3 SYRINGE (ML) INJECTION EVERY 12 HOURS SCHEDULED
Status: DISCONTINUED | OUTPATIENT
Start: 2020-07-24 | End: 2020-07-24 | Stop reason: HOSPADM

## 2020-07-24 RX ORDER — FENTANYL CITRATE 50 UG/ML
INJECTION, SOLUTION INTRAMUSCULAR; INTRAVENOUS AS NEEDED
Status: DISCONTINUED | OUTPATIENT
Start: 2020-07-24 | End: 2020-07-24 | Stop reason: HOSPADM

## 2020-07-24 RX ORDER — LIDOCAINE HYDROCHLORIDE 20 MG/ML
INJECTION, SOLUTION INFILTRATION; PERINEURAL AS NEEDED
Status: DISCONTINUED | OUTPATIENT
Start: 2020-07-24 | End: 2020-07-24 | Stop reason: HOSPADM

## 2020-07-24 RX ORDER — SODIUM CHLORIDE 0.9 % (FLUSH) 0.9 %
10 SYRINGE (ML) INJECTION AS NEEDED
Status: DISCONTINUED | OUTPATIENT
Start: 2020-07-24 | End: 2020-07-24 | Stop reason: HOSPADM

## 2020-07-24 RX ORDER — MIDAZOLAM HYDROCHLORIDE 1 MG/ML
INJECTION INTRAMUSCULAR; INTRAVENOUS AS NEEDED
Status: DISCONTINUED | OUTPATIENT
Start: 2020-07-24 | End: 2020-07-24 | Stop reason: HOSPADM

## 2020-07-24 RX ORDER — SODIUM CHLORIDE 9 MG/ML
75 INJECTION, SOLUTION INTRAVENOUS CONTINUOUS
Status: DISCONTINUED | OUTPATIENT
Start: 2020-07-24 | End: 2020-07-24 | Stop reason: HOSPADM

## 2020-07-24 RX ORDER — LIDOCAINE HYDROCHLORIDE 10 MG/ML
0.1 INJECTION, SOLUTION EPIDURAL; INFILTRATION; INTRACAUDAL; PERINEURAL ONCE AS NEEDED
Status: DISCONTINUED | OUTPATIENT
Start: 2020-07-24 | End: 2020-07-24 | Stop reason: HOSPADM

## 2020-07-24 RX ORDER — ACETAMINOPHEN 325 MG/1
650 TABLET ORAL EVERY 4 HOURS PRN
Status: DISCONTINUED | OUTPATIENT
Start: 2020-07-24 | End: 2020-07-24 | Stop reason: HOSPADM

## 2020-07-24 RX ADMIN — SODIUM CHLORIDE 75 ML/HR: 9 INJECTION, SOLUTION INTRAVENOUS at 08:25

## 2020-07-24 NOTE — DISCHARGE INSTRUCTIONS
Resume your Metformin and Janumet in 48 hours    [facility]  [address] [place] [address]    Coronary Angiogram (Radial/Ulnar Approach) After Care    Refer to this sheet in the next few weeks. These instructions provide you with information on caring for yourself after your procedure. Your caregiver may also give you more specific instructions. Your treatment has been planned according to current medical practices, but problems sometimes occur. Call your caregiver if you have any problems or questions after your procedure.    Home Care Instructions:  · You may shower the day after the procedure. Remove the bandage (dressing) and gently wash the site with plain soap and water. Gently pat the site dry. You may apply a band aid daily for 2 days if desired.    · Do not apply powder or lotion to the site.  · Do not submerge the affected site in water for 3 to 5 days or until the site is completely healed.   · Do not lift, push or pull anything over 5 pounds for 5 days after your procedure. As a reference, a gallon of milk weighs 8 pounds.   · Inspect the site at least twice daily. You may notice some bruising at the site and it may be tender for 1 to 2 weeks.     · Increase your fluid intake for the next 2 days.    · Keep arm elevated for 24 hours. For the remainder of the day, keep your arm in “Pledge of Allegiance” position when up and about.     · You may drive 24 hours after the procedure unless otherwise instructed by your caregiver.  · Do not operate machinery or power tools for 24 hours.  · A responsible adult should be with you for the first 24 hours after you arrive home. Do not make any important legal decisions or sign legal papers for 24 hours.  Do not drink alcohol for 24 hours.    · Metformin or any medications containing Metformin should not be taken for 48 hours after your procedure.      Call Your Doctor if:   · You have unusual pain at the radial/ulnar (wrist) site.  · You  have redness, warmth, swelling, or pain at the radial/ulnar (wrist) site.  · You have drainage (other than a small amount of blood on the dressing).  · You have chills or a fever > 101.  · Your arm becomes pale or dark, cool, tingly, or numb.  · You have heavy bleeding from the site, hold pressure on the site for 20 minutes.  If the bleeding stops, apply a fresh bandage and call your cardiologist.  However, if you continue to have bleeding, call 911.

## 2020-07-27 ENCOUNTER — TELEPHONE (OUTPATIENT)
Dept: CARDIOLOGY | Facility: CLINIC | Age: 55
End: 2020-07-27

## 2020-07-27 ENCOUNTER — TELEPHONE (OUTPATIENT)
Dept: INTERNAL MEDICINE | Facility: CLINIC | Age: 55
End: 2020-07-27

## 2020-07-27 DIAGNOSIS — I42.9 CARDIOMYOPATHY, UNSPECIFIED TYPE (HCC): Primary | ICD-10-CM

## 2020-07-27 NOTE — TELEPHONE ENCOUNTER
PATIENTS WIFE IS CALLING IN TO LEAVE MESSAGE FOR DR. FIGUEREDO NURSE.      THEY NEED TO KNOW WHAT THE NEXT STEP IS FOR THEM. THEY HAVE NOT HEARD ANYTHING FROM ANYONE SINCE HE GOT BACK ALL HIS TEST RESULTS.    WOULD LIKE A CALLBACK TO ADVISE.    CALLBACK NUMBER IS:  [phone]

## 2020-07-27 NOTE — TELEPHONE ENCOUNTER
----- Message from Bernadine Graff MD sent at 7/27/2020 10:13 AM EDT -----  Contact: [phone]    Stop micardis - start entresto 49/51 bid.  pls provide samples - keep scheduled f/u.      I am ordering a cardiac mri - let them know.         ----- Message -----  From: Zulema Magaña RegSched Rep  Sent: 7/27/2020  10:02 AM EDT  To: MD Dr. Prerna Pires,   Pt's wife called. She is wondering what the next step after the cath (7/24/20) is. I informed her of the 6 week follow-up (8/31/20). She is curious if this date is still appropriate or if the pt needs to be seen sooner.   She is also needing someone to contact her in regards to meds and follow up directions from the cath.   Please advise.   Thanks,  Brady

## 2020-07-29 NOTE — PROGRESS NOTES
Right Kidney Findings:  Imaging of the right kidney was performed. No right renal cysts were noted. The right renal vein is patent.     Left Kidney Findings:  Imaging of the left kidney was performed. No left renal cysts were noted. The left renal vein is patent.    Normal right renal artery.  Normal left renal artery.

## 2020-07-29 NOTE — TELEPHONE ENCOUNTER
I believe he had a cardiac cath.    Can I see him next week before I go on leave? Just to follow up with him. It can be virtual if needed.

## 2020-07-29 NOTE — PROGRESS NOTES
Please inform the patient of the following abnormal results.    The ultrasound of the liver did show that there was fatty liver.  We will continue to monitor at this time.  However it also showed that there was a 1.4 cm right renal cyst.  However he did get an ultrasound of his kidneys, and there were no renal cysts that were noted, per report.

## 2020-08-03 ENCOUNTER — OFFICE VISIT (OUTPATIENT)
Dept: INTERNAL MEDICINE | Facility: CLINIC | Age: 55
End: 2020-08-03

## 2020-08-03 ENCOUNTER — LAB (OUTPATIENT)
Dept: LAB | Facility: HOSPITAL | Age: 55
End: 2020-08-03

## 2020-08-03 VITALS
WEIGHT: 261 LBS | TEMPERATURE: 98.3 F | RESPIRATION RATE: 16 BRPM | SYSTOLIC BLOOD PRESSURE: 124 MMHG | BODY MASS INDEX: 37.37 KG/M2 | HEIGHT: 70 IN | HEART RATE: 76 BPM | OXYGEN SATURATION: 99 % | DIASTOLIC BLOOD PRESSURE: 82 MMHG

## 2020-08-03 DIAGNOSIS — N28.0 RENAL INFARCT (HCC): Primary | ICD-10-CM

## 2020-08-03 DIAGNOSIS — I10 ESSENTIAL HYPERTENSION: ICD-10-CM

## 2020-08-03 DIAGNOSIS — I42.9 CARDIOMYOPATHY, UNSPECIFIED TYPE (HCC): ICD-10-CM

## 2020-08-03 LAB
ALBUMIN SERPL-MCNC: 4.3 G/DL (ref 3.5–5.2)
ALBUMIN/GLOB SERPL: 1.4 G/DL
ALP SERPL-CCNC: 58 U/L (ref 39–117)
ALT SERPL W P-5'-P-CCNC: 25 U/L (ref 1–41)
ANION GAP SERPL CALCULATED.3IONS-SCNC: 12.5 MMOL/L (ref 5–15)
AST SERPL-CCNC: 23 U/L (ref 1–40)
BILIRUB SERPL-MCNC: 0.3 MG/DL (ref 0–1.2)
BUN SERPL-MCNC: 16 MG/DL (ref 6–20)
BUN/CREAT SERPL: 13.4 (ref 7–25)
CALCIUM SPEC-SCNC: 9.6 MG/DL (ref 8.6–10.5)
CHLORIDE SERPL-SCNC: 100 MMOL/L (ref 98–107)
CO2 SERPL-SCNC: 24.5 MMOL/L (ref 22–29)
CREAT SERPL-MCNC: 1.19 MG/DL (ref 0.76–1.27)
GFR SERPL CREATININE-BSD FRML MDRD: 76 ML/MIN/1.73
GLOBULIN UR ELPH-MCNC: 3.1 GM/DL
GLUCOSE SERPL-MCNC: 93 MG/DL (ref 65–99)
POTASSIUM SERPL-SCNC: 4.3 MMOL/L (ref 3.5–5.2)
PROT SERPL-MCNC: 7.4 G/DL (ref 6–8.5)
SODIUM SERPL-SCNC: 137 MMOL/L (ref 136–145)

## 2020-08-03 PROCEDURE — 99214 OFFICE O/P EST MOD 30 MIN: CPT | Performed by: FAMILY MEDICINE

## 2020-08-03 PROCEDURE — 80053 COMPREHEN METABOLIC PANEL: CPT | Performed by: FAMILY MEDICINE

## 2020-08-03 PROCEDURE — 36415 COLL VENOUS BLD VENIPUNCTURE: CPT | Performed by: FAMILY MEDICINE

## 2020-08-05 NOTE — PROGRESS NOTES
The labs were reviewed. Please inform patient that labs were normal.  Serum creatnine level has come back down to normal. We will continue to monitor this.

## 2020-08-09 NOTE — PROGRESS NOTES
Brandi   Jhon Munoz is a 55 y.o. male.     Chief Complaint   Patient presents with   • Follow-up     cardiac cath         History of Present Illness     Patient was recently found of to have a left renal infarct. Patient had followed up with cardiology and had an echo done, as well as a cardiac catherization. Cardiac catherization does show an EF of 23%. Patient had been put on entresto. Patient notes that some of his other bp medications has also changed including amlodipine cut back down to norvasc 5mg daily, and taking coreg 12.5mg bid as well has hctz 12.5mg daily. Patient recently had dopplers done of his renal artery as well as lower extremety which showed it to be normal. Patient is scheduled to see nephrology as he did under go ERUM with an elevated serum creatnine level. Patient did see urology, and was told to follow back up after further tests. He is still taking the baby aspirin as I have instructed him to take.     Currently patient does note that since starting the entresto he does feel as he can breathe bit better. He does note that the kidney pain has improved as well.     The following portions of the patient's history were reviewed and updated as appropriate: allergies, current medications, past family history, past medical history, past social history, past surgical history and problem list.    Review of Systems   Constitutional: Negative for chills, fatigue and fever.   HENT: Negative for congestion, rhinorrhea, sinus pain and sore throat.    Eyes: Negative for photophobia and visual disturbance.   Respiratory: Negative for cough, chest tightness and shortness of breath.    Cardiovascular: Negative for chest pain, palpitations and leg swelling.   Gastrointestinal: Negative for diarrhea, nausea and vomiting.   Genitourinary: Negative for dysuria, frequency and urgency.   Skin: Negative for rash and wound.   Neurological: Negative for dizziness and syncope.   Psychiatric/Behavioral:  Negative for behavioral problems and confusion.       Objective   Physical Exam   Constitutional: He is oriented to person, place, and time. He appears well-developed and well-nourished.   HENT:   Head: Normocephalic and atraumatic.   Right Ear: External ear normal.   Left Ear: External ear normal.   Eyes: EOM are normal.   Neck: Normal range of motion. Neck supple.   Cardiovascular: Normal rate, regular rhythm and normal heart sounds.   Pulmonary/Chest: Effort normal and breath sounds normal. No respiratory distress.   Musculoskeletal: Normal range of motion.   Lymphadenopathy:     He has no cervical adenopathy.   Neurological: He is alert and oriented to person, place, and time.   Skin: Skin is warm.   Psychiatric: He has a normal mood and affect. His behavior is normal.   Nursing note and vitals reviewed.      Vitals:    08/03/20 1454   BP: 124/82   Pulse: 76   Resp: 16   Temp: 98.3 °F (36.8 °C)   SpO2: 99%     Body mass index is 37.45 kg/m².      Assessment/Plan   Jhon was seen today for follow-up.    Diagnoses and all orders for this visit:    Renal infarct (CMS/HCC)  -     Comprehensive Metabolic Panel    Essential hypertension    Cardiomyopathy, unspecified type (CMS/HCC)      I have discussed with patient that the renal infarct that he had did cause his serum creatnine levels to be elevated and that we should continue to follow up on that as well as see nephrology. I have also discussed with him to avoid all nsaids. I have discussed with patient at todays visit, that its unclear why he has the cardiac myopathy the way he does. He did not prior have any risk factors, as he does not engage in drugs or excessive alcohol usuage. And no significant family hx either. Questionable if viral etiology, but I have discussed with patient that cardiology may have more answers for him. However the medication he is on now is providing him some relief and he should continue with that, including the change in his bp  medications which have helped with swelling and keeping his bp normal. I have discussed with patient that I would like him to continue to take baby aspirin. And that he may need other anticoagulation in the future, which he should discuss with cardiology.    No follow-ups on file.    Dictated utilizing Dragon Voice Recognition Software

## 2020-08-10 ENCOUNTER — HOSPITAL ENCOUNTER (OUTPATIENT)
Dept: MRI IMAGING | Facility: HOSPITAL | Age: 55
Discharge: HOME | End: 2020-08-10
Admitting: INTERNAL MEDICINE

## 2020-08-10 DIAGNOSIS — I42.9 CARDIOMYOPATHY, UNSPECIFIED TYPE (HCC): ICD-10-CM

## 2020-08-10 DIAGNOSIS — R10.32 LEFT LOWER QUADRANT ABDOMINAL PAIN: ICD-10-CM

## 2020-08-10 DIAGNOSIS — I10 ESSENTIAL HYPERTENSION: ICD-10-CM

## 2020-08-10 LAB — CREAT BLDA-MCNC: 1.6 MG/DL (ref 0.6–1.3)

## 2020-08-10 PROCEDURE — 82565 ASSAY OF CREATININE: CPT

## 2020-08-10 PROCEDURE — 75561 CARDIAC MRI FOR MORPH W/DYE: CPT

## 2020-08-10 PROCEDURE — 75561 CARDIAC MRI FOR MORPH W/DYE: CPT | Performed by: INTERNAL MEDICINE

## 2020-08-10 PROCEDURE — A9575 INJ GADOTERATE MEGLUMI 0.1ML: HCPCS | Performed by: INTERNAL MEDICINE

## 2020-08-10 PROCEDURE — 25010000002 GADOTERATE MEGLUMINE 10 MMOL/20ML SOLUTION: Performed by: INTERNAL MEDICINE

## 2020-08-10 RX ORDER — GADOTERATE MEGLUMINE 376.9 MG/ML
20 INJECTION INTRAVENOUS
Status: COMPLETED | OUTPATIENT
Start: 2020-08-10 | End: 2020-08-10

## 2020-08-10 RX ORDER — HYDROCHLOROTHIAZIDE 12.5 MG/1
12.5 TABLET ORAL DAILY
Qty: 90 TABLET | Refills: 3 | OUTPATIENT
Start: 2020-08-10 | End: 2020-08-31

## 2020-08-10 RX ORDER — AMLODIPINE BESYLATE 5 MG/1
5 TABLET ORAL DAILY
Qty: 90 TABLET | Refills: 3 | OUTPATIENT
Start: 2020-08-10 | End: 2021-10-04 | Stop reason: SDUPTHER

## 2020-08-10 RX ADMIN — GADOTERATE MEGLUMINE 20 ML: 376.9 INJECTION, SOLUTION INTRAVENOUS at 11:00

## 2020-08-11 ENCOUNTER — TELEPHONE (OUTPATIENT)
Dept: CARDIOLOGY | Facility: CLINIC | Age: 55
End: 2020-08-11

## 2020-08-11 DIAGNOSIS — I10 ESSENTIAL HYPERTENSION: Primary | ICD-10-CM

## 2020-08-11 NOTE — TELEPHONE ENCOUNTER
Patient notified of results and recomendations and verbalized understanding  Josephine Arguelles RN  Triage nurse

## 2020-08-13 ENCOUNTER — TELEPHONE (OUTPATIENT)
Dept: CARDIOLOGY | Facility: CLINIC | Age: 55
End: 2020-08-13

## 2020-08-13 NOTE — TELEPHONE ENCOUNTER
Called and gave MRI results - advised to decrease atorvastatin to 10mg daily due to muscle aching.

## 2020-08-17 ENCOUNTER — APPOINTMENT (OUTPATIENT)
Dept: MRI IMAGING | Facility: HOSPITAL | Age: 55
End: 2020-08-17

## 2020-08-17 ENCOUNTER — HOSPITAL ENCOUNTER (OUTPATIENT)
Dept: MRI IMAGING | Facility: HOSPITAL | Age: 55
Discharge: HOME | End: 2020-08-17
Admitting: UROLOGY

## 2020-08-17 ENCOUNTER — HOSPITAL ENCOUNTER (OUTPATIENT)
Dept: MRI IMAGING | Facility: HOSPITAL | Age: 55
End: 2020-08-17

## 2020-08-17 DIAGNOSIS — N28.0 THROMBOEMBOLISM OF RENAL ARTERIES (HCC): ICD-10-CM

## 2020-08-17 LAB — CREAT BLDA-MCNC: 1.1 MG/DL (ref 0.6–1.3)

## 2020-08-17 PROCEDURE — 25010000002 GADOTERATE MEGLUMINE 10 MMOL/20ML SOLUTION: Performed by: UROLOGY

## 2020-08-17 PROCEDURE — 74183 MRI ABD W/O CNTR FLWD CNTR: CPT

## 2020-08-17 PROCEDURE — 82565 ASSAY OF CREATININE: CPT

## 2020-08-17 PROCEDURE — A9575 INJ GADOTERATE MEGLUMI 0.1ML: HCPCS | Performed by: UROLOGY

## 2020-08-17 RX ORDER — GADOTERATE MEGLUMINE 376.9 MG/ML
20 INJECTION INTRAVENOUS
Status: COMPLETED | OUTPATIENT
Start: 2020-08-17 | End: 2020-08-17

## 2020-08-17 RX ADMIN — GADOTERATE MEGLUMINE 20 ML: 376.9 INJECTION, SOLUTION INTRAVENOUS at 10:22

## 2020-08-19 ENCOUNTER — RESULTS ENCOUNTER (OUTPATIENT)
Dept: INTERNAL MEDICINE | Facility: CLINIC | Age: 55
End: 2020-08-19

## 2020-08-19 DIAGNOSIS — Z00.00 HEALTHCARE MAINTENANCE: ICD-10-CM

## 2020-08-31 ENCOUNTER — OFFICE VISIT (OUTPATIENT)
Dept: CARDIOLOGY | Facility: CLINIC | Age: 55
End: 2020-08-31

## 2020-08-31 VITALS
DIASTOLIC BLOOD PRESSURE: 86 MMHG | HEART RATE: 60 BPM | BODY MASS INDEX: 37.8 KG/M2 | WEIGHT: 264 LBS | HEIGHT: 70 IN | OXYGEN SATURATION: 99 % | SYSTOLIC BLOOD PRESSURE: 130 MMHG

## 2020-08-31 DIAGNOSIS — N28.0 RENAL INFARCT (HCC): ICD-10-CM

## 2020-08-31 DIAGNOSIS — I10 ESSENTIAL HYPERTENSION: ICD-10-CM

## 2020-08-31 DIAGNOSIS — E66.01 CLASS 2 SEVERE OBESITY WITH SERIOUS COMORBIDITY AND BODY MASS INDEX (BMI) OF 37.0 TO 37.9 IN ADULT, UNSPECIFIED OBESITY TYPE (HCC): ICD-10-CM

## 2020-08-31 DIAGNOSIS — E11.9 TYPE 2 DIABETES MELLITUS WITHOUT COMPLICATION, WITHOUT LONG-TERM CURRENT USE OF INSULIN (HCC): ICD-10-CM

## 2020-08-31 DIAGNOSIS — G47.33 OBSTRUCTIVE SLEEP APNEA: ICD-10-CM

## 2020-08-31 DIAGNOSIS — R94.31 ABNORMAL EKG: ICD-10-CM

## 2020-08-31 DIAGNOSIS — I42.8 NONISCHEMIC CARDIOMYOPATHY (HCC): Primary | ICD-10-CM

## 2020-08-31 DIAGNOSIS — E78.2 MIXED HYPERLIPIDEMIA: ICD-10-CM

## 2020-08-31 PROBLEM — E66.812 CLASS 2 SEVERE OBESITY WITH SERIOUS COMORBIDITY AND BODY MASS INDEX (BMI) OF 37.0 TO 37.9 IN ADULT: Status: ACTIVE | Noted: 2020-08-31

## 2020-08-31 PROCEDURE — 93000 ELECTROCARDIOGRAM COMPLETE: CPT | Performed by: NURSE PRACTITIONER

## 2020-08-31 PROCEDURE — 99214 OFFICE O/P EST MOD 30 MIN: CPT | Performed by: NURSE PRACTITIONER

## 2020-08-31 RX ORDER — ASPIRIN 81 MG/1
81 TABLET, CHEWABLE ORAL DAILY
COMMUNITY

## 2020-08-31 RX ORDER — CARVEDILOL 25 MG/1
25 TABLET ORAL 2 TIMES DAILY
Qty: 180 TABLET | Refills: 1 | Status: SHIPPED | OUTPATIENT
Start: 2020-08-31 | End: 2020-10-07 | Stop reason: SDUPTHER

## 2020-08-31 NOTE — PROGRESS NOTES
Date of Office Visit: 2020  Encounter Provider: TERRI Ndiaye  Place of Service: [facility] CARDIOLOGY  Patient Name: Jhon Munoz  :1965  Primary Cardiologist: Dr. Bernadine Graff     Chief Complaint   Patient presents with   • Cardiomyopathy     6 week follow up   :     Dear Dr. Decker,     HPI: Jhon Munoz is a pleasant 55 y.o. male who presents today for cardiac follow up. He is a new patient to me and his previous records have been reviewed.    He has a known history of obstructive sleep apnea (compliant with CPAP), hypertension, hyperlipidemia, and type 2 diabetes mellitus.    He was having right upper quadrant discomfort and his PCP ordered an echocardiogram.  On 2020, an echocardiogram showed EF of 27.5%, moderate left ventricular dilation, global longitudinal strain -12.8%, grade 1 diastolic dysfunction, and no significant valve disease.  He followed up with Dr. Graff in the office and reported some mild dyspnea.  Dr. Graff recommended a cardiac catheterization.    On 2020 a cardiac catheterization was completed which showed the LAD, first diagonal, circumflex with luminal irregularities and RCA had 30% mid segment stenosis.  He was felt to have nonischemic cardiomyopathy and medical management was recommended.    On 2020, Dr. Graff stopped his Micardis and started him on Entresto 49/51 mg 1 tablet twice per day.  She ordered a cardiac MRI which showed left ventricle dilated, global hypokinesis, mild right ventricular hypokinesis, normal perfusion of the myocardium, a focal area of mid myocardial delayed enhancement of the inferior septum, and EF calculated at 30%.    On 2020, Dr. Graff recommended that he stop his hydrochlorothiazide because his creatinine was elevated.  She wanted him to have a repeat BMP 2 weeks later.    On 2020, Dr. Graff discussed with him the MRI results and advised him to  decrease atorvastatin to 10 mg daily due to muscle aching.    He presents today for follow-up and review of his medications and his wife is accompanying him.  He says his muscles feel stiff and it really has not changed with a decrease of the atorvastatin to 10 mg a day.  He recently saw his urologist, Dr. Carreon and had a BMP drawn last Friday.  We have requested those results.  He denies chest pain, shortness of breath, PND, orthopnea, edema, palpitations, dizziness, syncope, or bleeding.  He is concerned that he gets bruises and does not even know how.  He has sleep apnea and is compliant with his CPAP machine.  He has not had his settings checked on his machine for some time.  The diastolic portion of his blood pressure is elevated today.    ADDENDUM 9/1/2020:  · I received his note from [facility]-Dr. Chris Carreon dated 8/3/2020.  · In July 2020 patient had a CT scan that was concerning for left renal infarct involving the lower pole and mid pole anterior cortex.  · It was noted that a third of his left kidney is likely dead and not functioning.  He was recommended to follow-up with his PCP/cardiology for potential sources of emboli.  · He was also noted to have a left renal stone that was stable, but may need intervention in the future.  · Dr. Carreon did not comment on any blood work completed.    Past Medical History:   Diagnosis Date   • Cardiomyopathy (CMS/HCC)    • Colon polyp    • Diabetes mellitus (CMS/HCC)     type 2   • Hyperlipidemia    • Hypertension    • Renal calculi     Followed by Dr. Chris Carreon urologist   • Renal infarct (CMS/HCC) 07/2020    Left; noted on CT scan-followed by Dr. Chris Carreon urology   • Sleep apnea        Past Surgical History:   Procedure Laterality Date   • CARDIAC CATHETERIZATION N/A 7/24/2020    Procedure: Coronary angiography;  Surgeon: Robert Deluna MD;  Location: [facility] CATH INVASIVE LOCATION;  Service: Cardiovascular;  Laterality: N/A;   • CARDIAC  CATHETERIZATION N/A 7/24/2020    Procedure: Left Heart Cath;  Surgeon: Robert Deluna MD;  Location: [facility] CATH INVASIVE LOCATION;  Service: Cardiovascular;  Laterality: N/A;   • COLONOSCOPY     • KNEE SURGERY      meniscus        Social History     Socioeconomic History   • Marital status:      Spouse name: Not on file   • Number of children: Not on file   • Years of education: Not on file   • Highest education level: Not on file   Tobacco Use   • Smoking status: Never Smoker   • Smokeless tobacco: Never Used   Substance and Sexual Activity   • Alcohol use: Yes     Comment: occas.   • Drug use: Never   • Sexual activity: Defer       History reviewed. No pertinent family history.    The following portion of the patient's history were reviewed and updated as appropriate: past medical history, past surgical history, past social history, past family history, allergies, current medications, and problem list.    Review of Systems   Constitution: Negative for chills, diaphoresis, fever, malaise/fatigue, night sweats, weight gain and weight loss.   HENT: Negative for hearing loss, nosebleeds, sore throat and tinnitus.    Eyes: Negative for blurred vision, double vision, pain and visual disturbance.   Cardiovascular: Negative for chest pain, claudication, cyanosis, dyspnea on exertion, irregular heartbeat, leg swelling, near-syncope, orthopnea, palpitations, paroxysmal nocturnal dyspnea and syncope.   Respiratory: Negative for cough, hemoptysis, shortness of breath, snoring and wheezing.    Endocrine: Negative for cold intolerance, heat intolerance and polyuria.   Hematologic/Lymphatic: Negative for bleeding problem. Bruises/bleeds easily.   Skin: Negative for color change, dry skin, flushing and itching.   Musculoskeletal: Positive for stiffness. Negative for falls, joint pain, joint swelling, muscle cramps, muscle weakness and myalgias.   Gastrointestinal: Negative for abdominal pain, constipation, heartburn,  "melena, nausea and vomiting.   Genitourinary: Negative for dysuria and hematuria.   Neurological: Negative for excessive daytime sleepiness, dizziness, light-headedness, loss of balance, numbness, paresthesias, seizures and vertigo.   Psychiatric/Behavioral: Negative for altered mental status, depression, memory loss and substance abuse. The patient does not have insomnia and is not nervous/anxious.    Allergic/Immunologic: Negative for environmental allergies.       No Known Allergies      Current Outpatient Medications:   •  amLODIPine (NORVASC) 5 MG tablet, Take 1 tablet by mouth Daily., Disp: 90 tablet, Rfl: 3  •  aspirin 81 MG chewable tablet, Chew 81 mg Daily., Disp: , Rfl:   •  atorvastatin (LIPITOR) 20 MG tablet, Take 1 tablet by mouth Daily., Disp: 90 tablet, Rfl: 3  •  carvedilol (COREG) 25 MG tablet, Take 1 tablet by mouth 2 (Two) Times a Day., Disp: 180 tablet, Rfl: 1  •  esomeprazole (nexIUM) 40 MG capsule, Take 1 capsule by mouth Every Morning Before Breakfast. (Patient taking differently: Take 40 mg by mouth As Needed.), Disp: 30 capsule, Rfl: 6  •  HYDROcodone-acetaminophen (Norco) 5-325 MG per tablet, Take 1 tablet by mouth Every 6 (Six) Hours As Needed for Moderate Pain  or Severe Pain ., Disp: 20 tablet, Rfl: 0  •  sacubitril-valsartan (Entresto) 49-51 MG tablet, Take 1 tablet by mouth 2 (Two) Times a Day., Disp: 60 tablet, Rfl: 6  •  SITagliptin-metFORMIN HCl ER (JANUMET XR)  MG tablet, Take 1 tablet by mouth Daily., Disp: 30 tablet, Rfl: 6  •  metFORMIN (GLUCOPHAGE) 500 MG tablet, TAKE 2 TABLETS BY MOUTH EVERY NIGHT AT BEDTIME, Disp: 180 tablet, Rfl: 1        Objective:     Vitals:    08/31/20 0817   BP: 130/86   BP Location: Right arm   Pulse: 60   SpO2: 99%   Weight: 120 kg (264 lb)   Height: 177.8 cm (70\")     Body mass index is 37.88 kg/m².    PHYSICAL EXAM:    Vitals Reviewed.   General Appearance: No acute distress, well developed and well nourished. Obese.   Eyes: Conjunctiva and " lids: No erythema, swelling, or discharge. Sclera non-icteric.   HENT: Atraumatic, normocephalic. External eyes, ears, and nose normal. No hearing loss noted. Mucous membranes normal. Lips not cyanotic. Neck supple with no tenderness.  Respiratory: No signs of respiratory distress. Respiration rhythm and depth normal.   Clear to auscultation. No rales, crackles, rhonchi, or wheezing auscultated.   Cardiovascular:  Jugular Venous Pressure: Normal  Heart Rate and Rhythm: Normal, Heart Sounds: Normal S1 and S2. No S3 or S4 noted.  Murmurs: No murmurs noted. No rubs, thrills, or gallops.   Lower Extremities: No edema noted.  Gastrointestinal:  Abdomen soft, non-distended, non-tender. Normal bowel sounds.    Musculoskeletal: Normal movement of extremities  Skin and Nails: General appearance normal. No pallor, cyanosis, diaphoresis. Skin temperature normal. No clubbing of fingernails.   Psychiatric: Patient alert and oriented to person, place, and time. Speech and behavior appropriate. Normal mood and affect.       ECG 12 Lead  Date/Time: 8/31/2020 8:17 AM  Performed by: Gena Molina APRN  Authorized by: Gena Molina APRN   Comparison: compared with previous ECG from 7/21/2020  Similar to previous ECG  Rhythm: sinus rhythm  Rate: normal  BPM: 60  Conduction: conduction normal  ST Segments: ST segments normal  T inversion: I, aVL, V4, V5 and V6  QRS axis: normal  Other findings: non-specific ST-T wave changes and poor R wave progression    Clinical impression: abnormal EKG              Assessment:       Diagnosis Plan   1. Nonischemic cardiomyopathy (CMS/HCC)  ECG 12 Lead    Ambulatory Referral to Sleep Medicine   2. Abnormal EKG  ECG 12 Lead    Ambulatory Referral to Sleep Medicine   3. Essential hypertension     4. Mixed hyperlipidemia     5. Class 2 severe obesity with serious comorbidity and body mass index (BMI) of 37.0 to 37.9 in adult, unspecified obesity type (CMS/HCC)     6. Obstructive sleep apnea   Ambulatory Referral to Sleep Medicine   7. Renal infarct (CMS/[facility])            Plan:       1.  Nonischemic Cardiomyopathy: EF 27% per echocardiogram and 30% per cardiac MRI.  Had a long discussion with him regarding his diagnosis and treatment plan.  At this time we will continue with his current dose of sacubitril/valsartan and I will further titrate his carvedilol to 25 mg twice per day.  I will follow-up with him in a couple of weeks for reassessment of his blood pressure and heart rates at home.  At that time I plan to initiate spironolactone which will help with his heart failure, lower extremity edema, and blood pressure.  I may end up discontinuing his amlodipine.  We will plan to repeat an echocardiogram at the end of October.    2.  Abnormal EKG: T wave inversions noted.  EKG unchanged.    3.  Hypertension: Diastolic portion of his blood pressure is elevated today.  We will continue to monitor.    4.  Hyperlipidemia: Remains on atorvastatin 10 mg because of muscle stiffness.    5.  Obesity: BMI is 37.9.  He would benefit from exercise, weight loss, and heart healthy, low-sodium diet.    6.  Obstructive Sleep Apnea: His settings on his CPAP machine have not been checked in some time and have placed a referral to [facility].    7.  He recently saw his urologist, Dr. Carreon and we have requested his last office note and BMP results.    8.  I will follow-up with him via telephone in 2 weeks and he will see me again in the office in 4 weeks.    ADDENDUM:  · He needs another BMP and I will arrange this.  · I will also discuss with Dr. Graff that he had a renal infarction noted on CT scan and see if he needs any further cardiac testing.      As always, it has been a pleasure to participate in your patient's care. Thank you.       Sincerely,       TERRI Haji  [facility] Cardiology      · COVID-19 Precautions - Patient was compliant in wearing a mask. When I saw the  patient, I used appropriate personal protective equipment (PPE) including mask (standard procedure).  Additionally, I used gown, gloves, and eye shield if indicated.  Hand hygiene was completed before and after seeing the patient.  · Dictated utilizing Dragon Dictation

## 2020-09-01 PROBLEM — N28.0 RENAL INFARCT (HCC): Status: ACTIVE | Noted: 2020-09-01

## 2020-09-07 ENCOUNTER — TELEPHONE (OUTPATIENT)
Dept: CARDIOLOGY | Facility: CLINIC | Age: 55
End: 2020-09-07

## 2020-09-07 DIAGNOSIS — I42.8 NONISCHEMIC CARDIOMYOPATHY (HCC): ICD-10-CM

## 2020-09-07 DIAGNOSIS — N28.0 RENAL INFARCT (HCC): Primary | ICD-10-CM

## 2020-09-08 NOTE — TELEPHONE ENCOUNTER
I discussed with Dr. Graff patient's recent urology appointment.  Patient was noted to have a renal infarct.    Dr. Graff has recommended a 14-day Holter monitor to assess for atrial fibrillation.  If noted, will stop his aspirin and start him on anticoagulation with apixaban.      I left a message for patient to return my call.    She wanted to know his most recent lipid panel and it was drawn today at his PCP office.

## 2020-09-11 ENCOUNTER — LAB (OUTPATIENT)
Dept: LAB | Facility: HOSPITAL | Age: 55
End: 2020-09-11

## 2020-09-11 ENCOUNTER — OFFICE VISIT (OUTPATIENT)
Dept: INTERNAL MEDICINE | Facility: CLINIC | Age: 55
End: 2020-09-11

## 2020-09-11 VITALS
HEIGHT: 70 IN | OXYGEN SATURATION: 100 % | BODY MASS INDEX: 36.51 KG/M2 | HEART RATE: 57 BPM | SYSTOLIC BLOOD PRESSURE: 120 MMHG | WEIGHT: 255 LBS | DIASTOLIC BLOOD PRESSURE: 84 MMHG

## 2020-09-11 DIAGNOSIS — Z00.00 VISIT FOR WELL MAN HEALTH CHECK: Primary | ICD-10-CM

## 2020-09-11 DIAGNOSIS — Z00.00 VISIT FOR WELL MAN HEALTH CHECK: ICD-10-CM

## 2020-09-11 DIAGNOSIS — E78.5 HYPERLIPIDEMIA, UNSPECIFIED HYPERLIPIDEMIA TYPE: ICD-10-CM

## 2020-09-11 DIAGNOSIS — N28.0 RENAL INFARCT (HCC): ICD-10-CM

## 2020-09-11 DIAGNOSIS — Z13.220 SCREENING FOR LIPID DISORDERS: ICD-10-CM

## 2020-09-11 DIAGNOSIS — Z13.1 SCREENING FOR DIABETES MELLITUS: ICD-10-CM

## 2020-09-11 DIAGNOSIS — Z13.29 SCREENING FOR THYROID DISORDER: ICD-10-CM

## 2020-09-11 LAB
ALBUMIN SERPL-MCNC: 4 G/DL (ref 3.5–5.2)
ALBUMIN UR-MCNC: 24 MG/DL
ALBUMIN/GLOB SERPL: 1.3 G/DL
ALP SERPL-CCNC: 54 U/L (ref 39–117)
ALT SERPL W P-5'-P-CCNC: 18 U/L (ref 1–41)
ANION GAP SERPL CALCULATED.3IONS-SCNC: 9.5 MMOL/L (ref 5–15)
AST SERPL-CCNC: 20 U/L (ref 1–40)
BASOPHILS # BLD AUTO: 0.03 10*3/MM3 (ref 0–0.2)
BASOPHILS NFR BLD AUTO: 0.6 % (ref 0–1.5)
BILIRUB SERPL-MCNC: 0.5 MG/DL (ref 0–1.2)
BUN SERPL-MCNC: 14 MG/DL (ref 6–20)
BUN/CREAT SERPL: 11.1 (ref 7–25)
CALCIUM SPEC-SCNC: 9.3 MG/DL (ref 8.6–10.5)
CHLORIDE SERPL-SCNC: 105 MMOL/L (ref 98–107)
CHOLEST SERPL-MCNC: 126 MG/DL (ref 0–200)
CO2 SERPL-SCNC: 24.5 MMOL/L (ref 22–29)
CREAT SERPL-MCNC: 1.26 MG/DL (ref 0.76–1.27)
CREAT UR-MCNC: 252.6 MG/DL
DEPRECATED RDW RBC AUTO: 44 FL (ref 37–54)
EOSINOPHIL # BLD AUTO: 0.15 10*3/MM3 (ref 0–0.4)
EOSINOPHIL NFR BLD AUTO: 3.1 % (ref 0.3–6.2)
ERYTHROCYTE [DISTWIDTH] IN BLOOD BY AUTOMATED COUNT: 13.8 % (ref 12.3–15.4)
GFR SERPL CREATININE-BSD FRML MDRD: 71 ML/MIN/1.73
GLOBULIN UR ELPH-MCNC: 3 GM/DL
GLUCOSE SERPL-MCNC: 88 MG/DL (ref 65–99)
HBA1C MFR BLD: 6.22 % (ref 4.8–5.6)
HCT VFR BLD AUTO: 39.6 % (ref 37.5–51)
HDLC SERPL-MCNC: 61 MG/DL (ref 40–60)
HGB BLD-MCNC: 13.1 G/DL (ref 13–17.7)
IMM GRANULOCYTES # BLD AUTO: 0.02 10*3/MM3 (ref 0–0.05)
IMM GRANULOCYTES NFR BLD AUTO: 0.4 % (ref 0–0.5)
LDLC SERPL CALC-MCNC: 50 MG/DL (ref 0–100)
LDLC/HDLC SERPL: 0.82 {RATIO}
LYMPHOCYTES # BLD AUTO: 2.06 10*3/MM3 (ref 0.7–3.1)
LYMPHOCYTES NFR BLD AUTO: 43.2 % (ref 19.6–45.3)
MCH RBC QN AUTO: 29.2 PG (ref 26.6–33)
MCHC RBC AUTO-ENTMCNC: 33.1 G/DL (ref 31.5–35.7)
MCV RBC AUTO: 88.2 FL (ref 79–97)
MICROALBUMIN/CREAT UR: 95 MG/G
MONOCYTES # BLD AUTO: 0.51 10*3/MM3 (ref 0.1–0.9)
MONOCYTES NFR BLD AUTO: 10.7 % (ref 5–12)
NEUTROPHILS NFR BLD AUTO: 2 10*3/MM3 (ref 1.7–7)
NEUTROPHILS NFR BLD AUTO: 42 % (ref 42.7–76)
NRBC BLD AUTO-RTO: 0 /100 WBC (ref 0–0.2)
PLATELET # BLD AUTO: 197 10*3/MM3 (ref 140–450)
PMV BLD AUTO: 12.3 FL (ref 6–12)
POTASSIUM SERPL-SCNC: 4.1 MMOL/L (ref 3.5–5.2)
PROT SERPL-MCNC: 7 G/DL (ref 6–8.5)
RBC # BLD AUTO: 4.49 10*6/MM3 (ref 4.14–5.8)
SODIUM SERPL-SCNC: 139 MMOL/L (ref 136–145)
T-UPTAKE NFR SERPL: 0.99 TBI (ref 0.8–1.3)
T4 SERPL-MCNC: 5.4 MCG/DL (ref 4.5–11.7)
TRIGL SERPL-MCNC: 76 MG/DL (ref 0–150)
TSH SERPL DL<=0.05 MIU/L-ACNC: 2.25 UIU/ML (ref 0.27–4.2)
VLDLC SERPL-MCNC: 15.2 MG/DL (ref 5–40)
WBC # BLD AUTO: 4.77 10*3/MM3 (ref 3.4–10.8)

## 2020-09-11 PROCEDURE — 80053 COMPREHEN METABOLIC PANEL: CPT | Performed by: FAMILY MEDICINE

## 2020-09-11 PROCEDURE — 99396 PREV VISIT EST AGE 40-64: CPT | Performed by: FAMILY MEDICINE

## 2020-09-11 PROCEDURE — 84443 ASSAY THYROID STIM HORMONE: CPT | Performed by: FAMILY MEDICINE

## 2020-09-11 PROCEDURE — 84479 ASSAY OF THYROID (T3 OR T4): CPT | Performed by: FAMILY MEDICINE

## 2020-09-11 PROCEDURE — 36415 COLL VENOUS BLD VENIPUNCTURE: CPT | Performed by: FAMILY MEDICINE

## 2020-09-11 PROCEDURE — 82570 ASSAY OF URINE CREATININE: CPT | Performed by: FAMILY MEDICINE

## 2020-09-11 PROCEDURE — 80061 LIPID PANEL: CPT | Performed by: FAMILY MEDICINE

## 2020-09-11 PROCEDURE — 83036 HEMOGLOBIN GLYCOSYLATED A1C: CPT | Performed by: FAMILY MEDICINE

## 2020-09-11 PROCEDURE — 82043 UR ALBUMIN QUANTITATIVE: CPT | Performed by: FAMILY MEDICINE

## 2020-09-11 PROCEDURE — 84436 ASSAY OF TOTAL THYROXINE: CPT | Performed by: FAMILY MEDICINE

## 2020-09-11 PROCEDURE — 85025 COMPLETE CBC W/AUTO DIFF WBC: CPT | Performed by: FAMILY MEDICINE

## 2020-09-11 RX ORDER — ATORVASTATIN CALCIUM 20 MG/1
10 TABLET, FILM COATED ORAL DAILY
Qty: 90 TABLET | Refills: 3 | Status: SHIPPED | OUTPATIENT
Start: 2020-09-11 | End: 2021-09-24

## 2020-09-11 RX ORDER — SPIRONOLACTONE 25 MG/1
25 TABLET ORAL DAILY
Qty: 30 TABLET | Refills: 2 | Status: SHIPPED | OUTPATIENT
Start: 2020-09-11 | End: 2020-10-07 | Stop reason: SDUPTHER

## 2020-09-11 NOTE — TELEPHONE ENCOUNTER
9/11 -- [facility], this has been scheduled.    Thank you  Brittany ORNELAS

## 2020-09-11 NOTE — TELEPHONE ENCOUNTER
I spoke with patient and he would like to scheduled a 14-day Holter monitor.    He is tolerating the Entresto and carvedilol.  Blood pressure today 120/70.  I will initiate spironolactone 25 mg 1 tablet daily.  He will follow-up at the end of September and we will repeat a BMP at that time.    Brady-please schedule 14-day Holter monitor.  Thank you

## 2020-09-11 NOTE — PROGRESS NOTES
Brandi Munoz is a 55 y.o. male and is here for a comprehensive physical exam. The patient reports no problems.            Social History:   Social History     Socioeconomic History   • Marital status:      Spouse name: Not on file   • Number of children: Not on file   • Years of education: Not on file   • Highest education level: Not on file   Tobacco Use   • Smoking status: Never Smoker   • Smokeless tobacco: Never Used   Substance and Sexual Activity   • Alcohol use: Yes     Comment: occas.   • Drug use: Never   • Sexual activity: Defer       Family History: No family history on file.    Past Medical History:   Past Medical History:   Diagnosis Date   • Cardiomyopathy (CMS/HCC)    • Colon polyp    • Diabetes mellitus (CMS/HCC)     type 2   • Hyperlipidemia    • Hypertension    • Renal calculi     Followed by Dr. Chris Carreon urologist   • Renal infarct (CMS/HCC) 07/2020    Left; noted on CT scan-followed by Dr. Chris Carreon urology   • Sleep apnea        The following portions of the patient's history were reviewed and updated as appropriate: allergies, current medications, past family history, past medical history, past social history, past surgical history and problem list.    Review of Systems    Review of Systems   Constitutional: Negative for chills and fever.   HENT: Negative for congestion, rhinorrhea, sinus pain and sore throat.    Eyes: Negative for photophobia and visual disturbance.   Respiratory: Negative for cough, chest tightness and shortness of breath.    Cardiovascular: Negative for chest pain and palpitations.   Gastrointestinal: Negative for diarrhea, nausea and vomiting.   Genitourinary: Negative for dysuria, frequency and urgency.   Skin: Negative for rash and wound.   Neurological: Negative for dizziness and syncope.   Psychiatric/Behavioral: Negative for behavioral problems and confusion.       Objective   Physical Exam   Constitutional: He is oriented to person, place,  and time. He appears well-developed and well-nourished.   HENT:   Head: Normocephalic and atraumatic.   Right Ear: External ear normal.   Left Ear: External ear normal.   Eyes: EOM are normal.   Neck: Normal range of motion. Neck supple.   Cardiovascular: Normal rate, regular rhythm and normal heart sounds.   Pulmonary/Chest: Effort normal and breath sounds normal. No respiratory distress.   Abdominal: Soft. There is no tenderness. There is no guarding.   Musculoskeletal: Normal range of motion.   Lymphadenopathy:     He has no cervical adenopathy.   Neurological: He is alert and oriented to person, place, and time.   Skin: Skin is warm.   Psychiatric: He has a normal mood and affect. His behavior is normal.   Nursing note and vitals reviewed.      Vitals:    09/11/20 0758   BP: 120/84   Pulse: 57   SpO2: 100%     Body mass index is 36.59 kg/m².    Medications:   Current Outpatient Medications:   •  amLODIPine (NORVASC) 5 MG tablet, Take 1 tablet by mouth Daily., Disp: 90 tablet, Rfl: 3  •  aspirin 81 MG chewable tablet, Chew 81 mg Daily., Disp: , Rfl:   •  atorvastatin (LIPITOR) 20 MG tablet, Take 0.5 tablets by mouth Daily., Disp: 90 tablet, Rfl: 3  •  carvedilol (COREG) 25 MG tablet, Take 1 tablet by mouth 2 (Two) Times a Day., Disp: 180 tablet, Rfl: 1  •  esomeprazole (nexIUM) 40 MG capsule, Take 1 capsule by mouth Every Morning Before Breakfast. (Patient taking differently: Take 40 mg by mouth As Needed.), Disp: 30 capsule, Rfl: 6  •  metFORMIN (GLUCOPHAGE) 500 MG tablet, TAKE 2 TABLETS BY MOUTH EVERY NIGHT AT BEDTIME, Disp: 180 tablet, Rfl: 1  •  sacubitril-valsartan (Entresto) 49-51 MG tablet, Take 1 tablet by mouth 2 (Two) Times a Day., Disp: 60 tablet, Rfl: 6  •  SITagliptin-metFORMIN HCl ER (JANUMET XR)  MG tablet, Take 1 tablet by mouth Daily., Disp: 30 tablet, Rfl: 6       Assessment/Plan   Healthy male exam.      1. Healthcare Maintenance:  2. Patient Counseling:  --Nutrition: Stressed importance  of moderation in sodium/caffeine intake, saturated fat and cholesterol, caloric balance, sufficient intake of fresh fruits, vegetables, fiber, calcium and vit D  --Exercise: Recommended 30 minutes of exercise daily.   --Immunizations reviewed.  --Due to patient's recent renal infarct and his cardiomyopathy I will continue to encourage patient to exercise regularly.  He is also to eat a healthy diet.  He is also to continue taking baby aspirin daily.    Diagnoses and all orders for this visit:    Visit for well man health check  -     CBC & Differential; Future  -     Comprehensive Metabolic Panel; Future  -     CBC & Differential  -     Comprehensive Metabolic Panel  -     CBC Auto Differential    Screening for lipid disorders  -     Lipid Panel With LDL / HDL Ratio; Future  -     Lipid Panel With LDL / HDL Ratio    Screening for thyroid disorder  -     Thyroid Panel With TSH; Future  -     Thyroid Panel With TSH    Screening for diabetes mellitus  -     Hemoglobin A1c; Future  -     Hemoglobin A1c  -     Microalbumin / Creatinine Urine Ratio - Urine, Clean Catch    Renal infarct (CMS/HCC)  -     Ambulatory Referral to Nephrology    Hyperlipidemia, unspecified hyperlipidemia type  -     atorvastatin (LIPITOR) 20 MG tablet; Take 0.5 tablets by mouth Daily.        No follow-ups on file.           Dictated utilizing Dragon Voice Recognition Software

## 2020-09-17 ENCOUNTER — TELEPHONE (OUTPATIENT)
Dept: INTERNAL MEDICINE | Facility: CLINIC | Age: 55
End: 2020-09-17

## 2020-09-17 ENCOUNTER — HOSPITAL ENCOUNTER (OUTPATIENT)
Dept: CT IMAGING | Facility: HOSPITAL | Age: 55
Discharge: HOME | End: 2020-09-17
Admitting: FAMILY MEDICINE

## 2020-09-17 DIAGNOSIS — N28.0 INFARCTION OF KIDNEY (HCC): ICD-10-CM

## 2020-09-17 DIAGNOSIS — R10.11 RUQ PAIN: ICD-10-CM

## 2020-09-17 DIAGNOSIS — R10.11 RUQ PAIN: Primary | ICD-10-CM

## 2020-09-17 PROCEDURE — 82565 ASSAY OF CREATININE: CPT

## 2020-09-17 PROCEDURE — 74178 CT ABD&PLV WO CNTR FLWD CNTR: CPT

## 2020-09-17 PROCEDURE — 25010000002 IOPAMIDOL 61 % SOLUTION: Performed by: FAMILY MEDICINE

## 2020-09-17 RX ADMIN — IOPAMIDOL 85 ML: 612 INJECTION, SOLUTION INTRAVENOUS at 14:59

## 2020-09-17 NOTE — TELEPHONE ENCOUNTER
PATIENTS WIFE CALLED IN,  STATED THAT PATIENT IS HAVING PAIN IN THE MIDDLE PART OF STOMACH. HE SAYS IT FEELS LIKE A MUSCLE PULL.     PLEASE ADVISE     [phone]

## 2020-09-17 NOTE — TELEPHONE ENCOUNTER
He reports the pain is in the RUQ, positional, burning type of pain, intermittent.  He does have hx of kidney infarct.  He states he does not feel this is urgent, but I told him I am messaging Dr. Decker for his advice today.

## 2020-09-18 ENCOUNTER — TELEPHONE (OUTPATIENT)
Dept: INTERNAL MEDICINE | Facility: CLINIC | Age: 55
End: 2020-09-18

## 2020-09-18 NOTE — PROGRESS NOTES
Please inform the patient of the following abnormal results.  CT scan does show that he has bilateral nephrolithiasis (kidney stone).  However there is no ureteral stones or hydronephrosis bilaterally.  And there were no stones within the bladder.  It also notes that the left renal infarct is resolving.  No other acute findings that were seen.  His pain may be related to the kidney stone.  Patient may benefit from contacting the urologist.

## 2020-09-21 LAB — CREAT BLDA-MCNC: 1.4 MG/DL (ref 0.6–1.3)

## 2020-09-23 ENCOUNTER — APPOINTMENT (OUTPATIENT)
Dept: SLEEP MEDICINE | Facility: HOSPITAL | Age: 55
End: 2020-09-23

## 2020-10-01 ENCOUNTER — TELEPHONE (OUTPATIENT)
Dept: INTERNAL MEDICINE | Facility: CLINIC | Age: 55
End: 2020-10-01

## 2020-10-01 DIAGNOSIS — R10.31 RIGHT LOWER QUADRANT ABDOMINAL PAIN: Primary | ICD-10-CM

## 2020-10-01 NOTE — TELEPHONE ENCOUNTER
Jhon calling back today stating he went to see the urologist and let him know about the kidney stones from last CT.  The urologist does not think the pain in the abd on the right side is from kidney stones.  Per pt it is intermittent pain.  Is there anything else you can suggest to do?

## 2020-10-01 NOTE — TELEPHONE ENCOUNTER
CT scan did not show any active processes in his abdomen.  We can refer patient to gastroenterology.  Please refer to Dr. Raudel Osorio.

## 2020-10-07 ENCOUNTER — OFFICE VISIT (OUTPATIENT)
Dept: CARDIOLOGY | Facility: CLINIC | Age: 55
End: 2020-10-07

## 2020-10-07 VITALS
SYSTOLIC BLOOD PRESSURE: 130 MMHG | HEART RATE: 59 BPM | WEIGHT: 256.2 LBS | DIASTOLIC BLOOD PRESSURE: 70 MMHG | HEIGHT: 70 IN | BODY MASS INDEX: 36.68 KG/M2

## 2020-10-07 DIAGNOSIS — I10 ESSENTIAL HYPERTENSION: ICD-10-CM

## 2020-10-07 DIAGNOSIS — I42.8 NONISCHEMIC CARDIOMYOPATHY (HCC): Primary | ICD-10-CM

## 2020-10-07 DIAGNOSIS — N20.0 RENAL CALCULI: ICD-10-CM

## 2020-10-07 DIAGNOSIS — E66.01 CLASS 2 SEVERE OBESITY DUE TO EXCESS CALORIES WITH SERIOUS COMORBIDITY AND BODY MASS INDEX (BMI) OF 36.0 TO 36.9 IN ADULT (HCC): ICD-10-CM

## 2020-10-07 DIAGNOSIS — N28.0 RENAL INFARCT (HCC): ICD-10-CM

## 2020-10-07 DIAGNOSIS — E78.2 MIXED HYPERLIPIDEMIA: ICD-10-CM

## 2020-10-07 PROCEDURE — 99214 OFFICE O/P EST MOD 30 MIN: CPT | Performed by: NURSE PRACTITIONER

## 2020-10-07 PROCEDURE — 93000 ELECTROCARDIOGRAM COMPLETE: CPT | Performed by: NURSE PRACTITIONER

## 2020-10-07 RX ORDER — SPIRONOLACTONE 25 MG/1
25 TABLET ORAL DAILY
Qty: 90 TABLET | Refills: 3 | Status: SHIPPED | OUTPATIENT
Start: 2020-10-07 | End: 2021-08-24

## 2020-10-07 RX ORDER — CARVEDILOL 25 MG/1
25 TABLET ORAL 2 TIMES DAILY
Qty: 180 TABLET | Refills: 3 | Status: SHIPPED | OUTPATIENT
Start: 2020-10-07 | End: 2021-10-04 | Stop reason: SDUPTHER

## 2020-10-07 RX ORDER — SACUBITRIL AND VALSARTAN 49; 51 MG/1; MG/1
1 TABLET, FILM COATED ORAL 2 TIMES DAILY
Qty: 60 TABLET | Refills: 11 | Status: SHIPPED | OUTPATIENT
Start: 2020-10-07 | End: 2020-12-10 | Stop reason: SDUPTHER

## 2020-10-07 NOTE — PROGRESS NOTES
Date of Office Visit: 10/07/2020  Encounter Provider: TERRI Ndiaye  Place of Service: [facility] CARDIOLOGY  Patient Name: Jhon Munoz  :1965  Primary Cardiologist: Dr. Bernadine Graff     Chief Complaint   Patient presents with   • Cardiomyopathy   • Follow-up   :     Dear Dr. Decker,     HPI: Jhon Munoz is a pleasant 55 y.o. male who presents today for cardiac follow up. He has a known history of obstructive sleep apnea (compliant with CPAP), hypertension, hyperlipidemia, and type 2 diabetes mellitus.    He was having right upper quadrant discomfort and his PCP ordered an echocardiogram.  On 2020, an echocardiogram showed EF of 27.5%, moderate left ventricular dilation, global longitudinal strain -12.8%, grade 1 diastolic dysfunction, and no significant valve disease.  He followed up with Dr. Graff in the office and reported some mild dyspnea.  Dr. Graff recommended a cardiac catheterization. On 2020 a cardiac catheterization was completed which showed the LAD, first diagonal, circumflex with luminal irregularities and RCA had 30% mid segment stenosis.  He was felt to have nonischemic cardiomyopathy and medical management was recommended.    On 2020, Dr. Graff stopped his Micardis and started him on Entresto 49/51 mg 1 tablet twice per day.  She ordered a cardiac MRI which showed left ventricle dilated, global hypokinesis, mild right ventricular hypokinesis, normal perfusion of the myocardium, a focal area of mid myocardial delayed enhancement of the inferior septum, and EF calculated at 30%.    On 2020, Dr. Graff recommended that he stop his hydrochlorothiazide because his creatinine was elevated.  She wanted him to have a repeat BMP 2 weeks later. On 2020, Dr. Graff discussed with him the MRI results and advised him to decrease atorvastatin to 10 mg daily due to muscle aching.    On 2020, he followed up  with me in the office.  He reported muscle stiffness that had not improved with decreasing the atorvastatin.  I reviewed Dr. Chris Carreon urology note dated 8/3/2020:  In July 2020, patient had a CT scan that was concerning for left renal infarct involving the lower pole and mid pole anterior cortex. It was noted that a third of his left kidney is likely dead and not functioning.  He was recommended to follow-up with his PCP/cardiology for potential sources of emboli. He was also noted to have a left renal stone that was stable, but may need intervention in the future. Dr. Carreon did not comment on any blood work completed. Due to the renal infarct, Dr. Graff recommended a 14-day Holter monitor to assess for atrial fibrillation.  I also started him on spironolactone 25 mg daily     He presents today for follow-up visit.  He recently wore the ZIO patch monitor and the results are pending.  He reports right lower quadrant abdominal pain.  He saw both urology (Dr. Carreon) and nephrology (Dr. Frandy Castillo).  The patient says neither physician thought his pain was from the kidney stones.  He denies chest pain, shortness of air, palpitations, edema, dizziness, syncope, or bleeding.  His blood pressure and heart rate are both normal today.  He is tolerating his medications just fine.  He says his PCP may refer him to hematology because of the renal infarct.    Past Medical History:   Diagnosis Date   • Cardiomyopathy (CMS/HCC)    • Colon polyp    • Diabetes mellitus (CMS/HCC)     type 2   • Hyperlipidemia    • Hypertension    • Renal calculi     Followed by Dr. Chris Carreon urologist   • Renal infarct (CMS/HCC) 07/2020    Left; noted on CT scan-followed by Dr. Chris Carreon urology   • Sleep apnea     CPAP nightly       Past Surgical History:   Procedure Laterality Date   • CARDIAC CATHETERIZATION N/A 7/24/2020    Procedure: Coronary angiography;  Surgeon: Robert Deluna MD;  Location: [facility] INVASIVE LOCATION;   Service: Cardiovascular;  Laterality: N/A;   • CARDIAC CATHETERIZATION N/A 7/24/2020    Procedure: Left Heart Cath;  Surgeon: Robert Deluna MD;  Location: [facility] INVASIVE LOCATION;  Service: Cardiovascular;  Laterality: N/A;   • COLONOSCOPY     • KNEE SURGERY      meniscus        Social History     Socioeconomic History   • Marital status:      Spouse name: Not on file   • Number of children: Not on file   • Years of education: Not on file   • Highest education level: Not on file   Tobacco Use   • Smoking status: Never Smoker   • Smokeless tobacco: Never Used   Substance and Sexual Activity   • Alcohol use: Yes     Alcohol/week: 2.0 standard drinks     Types: 1 Cans of beer, 1 Shots of liquor per week     Comment: Caffeine use: 1 cup daily   • Drug use: Never   • Sexual activity: Defer       History reviewed. No pertinent family history.    The following portion of the patient's history were reviewed and updated as appropriate: past medical history, past surgical history, past social history, past family history, allergies, current medications, and problem list.    Review of Systems   Constitution: Negative for chills, diaphoresis, fever, malaise/fatigue, night sweats, weight gain and weight loss.   HENT: Negative for hearing loss, nosebleeds, sore throat and tinnitus.    Eyes: Negative for blurred vision, double vision, pain and visual disturbance.   Cardiovascular: Negative for chest pain, claudication, cyanosis, dyspnea on exertion, irregular heartbeat, leg swelling, near-syncope, orthopnea, palpitations, paroxysmal nocturnal dyspnea and syncope.   Respiratory: Negative for cough, hemoptysis, shortness of breath, snoring and wheezing.    Endocrine: Negative for cold intolerance, heat intolerance and polyuria.   Hematologic/Lymphatic: Negative for bleeding problem. Does not bruise/bleed easily.   Skin: Negative for color change, dry skin, flushing and itching.   Musculoskeletal: Negative for  "falls, joint pain, joint swelling, muscle cramps, muscle weakness, myalgias and stiffness.   Gastrointestinal: Negative for abdominal pain, constipation, heartburn, melena, nausea and vomiting.   Genitourinary: Negative for dysuria and hematuria.   Neurological: Negative for excessive daytime sleepiness, dizziness, light-headedness, loss of balance, numbness, paresthesias, seizures and vertigo.   Psychiatric/Behavioral: Negative for altered mental status, depression, memory loss and substance abuse. The patient does not have insomnia and is not nervous/anxious.    Allergic/Immunologic: Negative for environmental allergies.       No Known Allergies      Current Outpatient Medications:   •  amLODIPine (NORVASC) 5 MG tablet, Take 1 tablet by mouth Daily., Disp: 90 tablet, Rfl: 3  •  aspirin 81 MG chewable tablet, Chew 81 mg Daily., Disp: , Rfl:   •  atorvastatin (LIPITOR) 20 MG tablet, Take 0.5 tablets by mouth Daily., Disp: 90 tablet, Rfl: 3  •  carvedilol (COREG) 25 MG tablet, Take 1 tablet by mouth 2 (Two) Times a Day., Disp: 180 tablet, Rfl: 3  •  esomeprazole (nexIUM) 40 MG capsule, Take 1 capsule by mouth Every Morning Before Breakfast. (Patient taking differently: Take 40 mg by mouth As Needed.), Disp: 30 capsule, Rfl: 6  •  metFORMIN (GLUCOPHAGE) 500 MG tablet, TAKE 2 TABLETS BY MOUTH EVERY NIGHT AT BEDTIME, Disp: 180 tablet, Rfl: 1  •  sacubitril-valsartan (Entresto) 49-51 MG tablet, Take 1 tablet by mouth 2 (Two) Times a Day., Disp: 60 tablet, Rfl: 11  •  SITagliptin-metFORMIN HCl ER (JANUMET XR)  MG tablet, Take 1 tablet by mouth Daily., Disp: 30 tablet, Rfl: 6  •  spironolactone (ALDACTONE) 25 MG tablet, Take 1 tablet by mouth Daily., Disp: 90 tablet, Rfl: 3        Objective:     Vitals:    10/07/20 0814 10/07/20 0816   BP: 124/70 130/70   BP Location: Left arm Right arm   Pulse: 59    Weight: 116 kg (256 lb 3.2 oz)    Height: 177.8 cm (70\")      Body mass index is 36.76 kg/m².    PHYSICAL " EXAM:    Vitals Reviewed.   General Appearance: No acute distress, well developed and well nourished. Obese.   Eyes: Conjunctiva and lids: No erythema, swelling, or discharge. Sclera non-icteric.  Wears glasses.  HENT: Atraumatic, normocephalic. External eyes, ears, and nose normal. No hearing loss noted. Mucous membranes normal. Lips not cyanotic. Neck supple with no tenderness.  Respiratory: No signs of respiratory distress. Respiration rhythm and depth normal.   Clear to auscultation. No rales, crackles, rhonchi, or wheezing auscultated.   Cardiovascular:  Jugular Venous Pressure: Normal  Heart Rate and Rhythm: Normal, Heart Sounds: Normal S1 and S2. No S3 or S4 noted.  Murmurs: No murmurs noted. No rubs, thrills, or gallops.   Lower Extremities: No edema noted.  Gastrointestinal:  Abdomen soft, non-distended, non-tender. Normal bowel sounds.    Musculoskeletal: Normal movement of extremities  Skin and Nails: General appearance normal. No pallor, cyanosis, diaphoresis. Skin temperature normal. No clubbing of fingernails.   Psychiatric: Patient alert and oriented to person, place, and time. Speech and behavior appropriate. Normal mood and affect.       ECG 12 Lead    Date/Time: 10/7/2020 8:09 AM  Performed by: Gena Molina APRN  Authorized by: Gena Molina APRN   Comparison: compared with previous ECG from 8/31/2020  Similar to previous ECG  Rhythm: sinus rhythm  Rate: bradycardic  BPM: 59  Conduction: conduction normal  T inversion: V5 and V6  QRS axis: normal  Other findings: non-specific ST-T wave changes and poor R wave progression    Clinical impression: non-specific ECG              Assessment:       Diagnosis Plan   1. Nonischemic cardiomyopathy (CMS/HCC)  Adult Transthoracic Echo Complete W/ Cont if Necessary Per Protocol   2. Renal infarct (CMS/HCC)     3. Renal calculi     4. Essential hypertension     5. Mixed hyperlipidemia     6. Class 2 severe obesity due to excess calories with serious  comorbidity and body mass index (BMI) of 36.0 to 36.9 in adult (CMS/[facility])            Plan:       1.  Nonischemic Cardiomyopathy: EF 27% per echocardiogram and 30% per cardiac MRI.  He is euvolemic today.  We will continue with goal-directed therapy including carvedilol, sacubitril/valsartan, and spironolactone.  Per Dr. Graff's recommendations, repeat echocardiogram at the end of October.    2/3. Renal Infarct/Renal Calculi: Followed by nephrology and urology.  Etiology of renal infarct unknown.  He recently wore a ZIO patch monitor to look for atrial fibrillation as a possible cause and the results are pending.  He said his PCP may be referring him to hematology.  I will request his recent office note and blood work from Dr. Castillo.    4.  Hypertension: Blood pressure well controlled today.    5.  Hyperlipidemia: Remains on atorvastatin 10 mg because of muscle stiffness.    6.  Obesity: BMI is 36.8.  He would benefit from exercise, weight loss, and heart healthy, low-sodium diet.    7.  Obstructive Sleep Apnea: Compliant with CPAP machine.    8.  Abdominal Pain: I recommended follow-up with his PCP.    9.  We will follow-up with him with the results of the echocardiogram.  He is scheduled to see Dr. Graff in November 2020 and he will keep that appointment.    As always, it has been a pleasure to participate in your patient's care. Thank you.       Sincerely,       TERRI Haji  [facility]      · COVID-19 Precautions - Patient was compliant in wearing a mask. When I saw the patient, I used appropriate personal protective equipment (PPE) including mask (standard procedure).  Additionally, I used gown, gloves, and eye shield if indicated.  Hand hygiene was completed before and after seeing the patient.  · Dictated utilizing Dragon Dictation

## 2020-10-28 ENCOUNTER — HOSPITAL ENCOUNTER (OUTPATIENT)
Dept: CARDIOLOGY | Facility: HOSPITAL | Age: 55
Discharge: HOME | End: 2020-10-28
Admitting: NURSE PRACTITIONER

## 2020-10-28 VITALS
SYSTOLIC BLOOD PRESSURE: 98 MMHG | HEIGHT: 70 IN | HEART RATE: 68 BPM | WEIGHT: 256 LBS | DIASTOLIC BLOOD PRESSURE: 70 MMHG | OXYGEN SATURATION: 99 % | BODY MASS INDEX: 36.65 KG/M2

## 2020-10-28 DIAGNOSIS — I42.8 NONISCHEMIC CARDIOMYOPATHY (HCC): ICD-10-CM

## 2020-10-28 PROCEDURE — 93306 TTE W/DOPPLER COMPLETE: CPT

## 2020-10-28 PROCEDURE — 25010000002 PERFLUTREN (DEFINITY) 8.476 MG IN SODIUM CHLORIDE (PF) 0.9 % 10 ML INJECTION: Performed by: NURSE PRACTITIONER

## 2020-10-28 PROCEDURE — 93356 MYOCRD STRAIN IMG SPCKL TRCK: CPT | Performed by: INTERNAL MEDICINE

## 2020-10-28 PROCEDURE — 93306 TTE W/DOPPLER COMPLETE: CPT | Performed by: INTERNAL MEDICINE

## 2020-10-28 PROCEDURE — 93356 MYOCRD STRAIN IMG SPCKL TRCK: CPT

## 2020-10-28 RX ADMIN — PERFLUTREN 1.5 ML: 6.52 INJECTION, SUSPENSION INTRAVENOUS at 08:12

## 2020-10-29 ENCOUNTER — TELEPHONE (OUTPATIENT)
Dept: CARDIOLOGY | Facility: CLINIC | Age: 55
End: 2020-10-29

## 2020-10-29 LAB
AORTIC ARCH: 3.2 CM
ASCENDING AORTA: 3.3 CM
BH CV ECHO MEAS - ACS: 2 CM
BH CV ECHO MEAS - AO MAX PG (FULL): 6.5 MMHG
BH CV ECHO MEAS - AO MAX PG: 8.4 MMHG
BH CV ECHO MEAS - AO MEAN PG (FULL): 4.2 MMHG
BH CV ECHO MEAS - AO MEAN PG: 5.4 MMHG
BH CV ECHO MEAS - AO ROOT AREA (BSA CORRECTED): 1.5
BH CV ECHO MEAS - AO ROOT AREA: 9.6 CM^2
BH CV ECHO MEAS - AO ROOT DIAM: 3.5 CM
BH CV ECHO MEAS - AO V2 MAX: 145.2 CM/SEC
BH CV ECHO MEAS - AO V2 MEAN: 110.2 CM/SEC
BH CV ECHO MEAS - AO V2 VTI: 27.5 CM
BH CV ECHO MEAS - ASC AORTA: 3.3 CM
BH CV ECHO MEAS - AVA(I,A): 2 CM^2
BH CV ECHO MEAS - AVA(I,D): 2 CM^2
BH CV ECHO MEAS - AVA(V,A): 1.9 CM^2
BH CV ECHO MEAS - AVA(V,D): 1.9 CM^2
BH CV ECHO MEAS - BSA(HAYCOCK): 2.4 M^2
BH CV ECHO MEAS - BSA: 2.3 M^2
BH CV ECHO MEAS - BZI_BMI: 36.7 KILOGRAMS/M^2
BH CV ECHO MEAS - BZI_METRIC_HEIGHT: 177.8 CM
BH CV ECHO MEAS - BZI_METRIC_WEIGHT: 116.1 KG
BH CV ECHO MEAS - EDV(MOD-SP2): 144 ML
BH CV ECHO MEAS - EDV(MOD-SP4): 110 ML
BH CV ECHO MEAS - EDV(TEICH): 181.9 ML
BH CV ECHO MEAS - EF(CUBED): 19.9 %
BH CV ECHO MEAS - EF(MOD-BP): 29 %
BH CV ECHO MEAS - EF(MOD-SP2): 26.4 %
BH CV ECHO MEAS - EF(MOD-SP4): 32.7 %
BH CV ECHO MEAS - EF(TEICH): 15.6 %
BH CV ECHO MEAS - EF_3D-VOL: 33 %
BH CV ECHO MEAS - ESV(MOD-SP2): 106 ML
BH CV ECHO MEAS - ESV(MOD-SP4): 74 ML
BH CV ECHO MEAS - ESV(TEICH): 153.5 ML
BH CV ECHO MEAS - FS: 7.1 %
BH CV ECHO MEAS - IVS/LVPW: 1.1
BH CV ECHO MEAS - IVSD: 1.2 CM
BH CV ECHO MEAS - LAT PEAK E' VEL: 5.9 CM/SEC
BH CV ECHO MEAS - LV DIASTOLIC VOL/BSA (35-75): 47.5 ML/M^2
BH CV ECHO MEAS - LV MASS(C)D: 314.2 GRAMS
BH CV ECHO MEAS - LV MASS(C)DI: 135.5 GRAMS/M^2
BH CV ECHO MEAS - LV MAX PG: 1.9 MMHG
BH CV ECHO MEAS - LV MEAN PG: 1.2 MMHG
BH CV ECHO MEAS - LV SYSTOLIC VOL/BSA (12-30): 31.9 ML/M^2
BH CV ECHO MEAS - LV V1 MAX: 69.4 CM/SEC
BH CV ECHO MEAS - LV V1 MEAN: 51.4 CM/SEC
BH CV ECHO MEAS - LV V1 VTI: 14.3 CM
BH CV ECHO MEAS - LVIDD: 6 CM
BH CV ECHO MEAS - LVIDS: 5.6 CM
BH CV ECHO MEAS - LVLD AP2: 7.7 CM
BH CV ECHO MEAS - LVLD AP4: 7 CM
BH CV ECHO MEAS - LVLS AP2: 7.1 CM
BH CV ECHO MEAS - LVLS AP4: 6.4 CM
BH CV ECHO MEAS - LVOT AREA (M): 3.8 CM^2
BH CV ECHO MEAS - LVOT AREA: 3.9 CM^2
BH CV ECHO MEAS - LVOT DIAM: 2.2 CM
BH CV ECHO MEAS - LVPWD: 1.1 CM
BH CV ECHO MEAS - MED PEAK E' VEL: 6.1 CM/SEC
BH CV ECHO MEAS - MV A DUR: 0.12 SEC
BH CV ECHO MEAS - MV A MAX VEL: 62.6 CM/SEC
BH CV ECHO MEAS - MV DEC SLOPE: 234.3 CM/SEC^2
BH CV ECHO MEAS - MV DEC TIME: 0.19 SEC
BH CV ECHO MEAS - MV E MAX VEL: 43.3 CM/SEC
BH CV ECHO MEAS - MV E/A: 0.69
BH CV ECHO MEAS - MV MAX PG: 1.7 MMHG
BH CV ECHO MEAS - MV MEAN PG: 0.61 MMHG
BH CV ECHO MEAS - MV P1/2T MAX VEL: 52.7 CM/SEC
BH CV ECHO MEAS - MV P1/2T: 65.9 MSEC
BH CV ECHO MEAS - MV V2 MAX: 65.2 CM/SEC
BH CV ECHO MEAS - MV V2 MEAN: 37.1 CM/SEC
BH CV ECHO MEAS - MV V2 VTI: 24.1 CM
BH CV ECHO MEAS - MVA P1/2T LCG: 4.2 CM^2
BH CV ECHO MEAS - MVA(P1/2T): 3.3 CM^2
BH CV ECHO MEAS - MVA(VTI): 2.3 CM^2
BH CV ECHO MEAS - PA ACC TIME: 0.08 SEC
BH CV ECHO MEAS - PA MAX PG (FULL): 1.8 MMHG
BH CV ECHO MEAS - PA MAX PG: 3.9 MMHG
BH CV ECHO MEAS - PA PR(ACCEL): 43.3 MMHG
BH CV ECHO MEAS - PA V2 MAX: 99.1 CM/SEC
BH CV ECHO MEAS - PULM A REVS DUR: 0.09 SEC
BH CV ECHO MEAS - PULM A REVS VEL: 25.7 CM/SEC
BH CV ECHO MEAS - PULM DIAS VEL: 29.9 CM/SEC
BH CV ECHO MEAS - PULM S/D: 1.5
BH CV ECHO MEAS - PULM SYS VEL: 44.9 CM/SEC
BH CV ECHO MEAS - PVA(V,A): 2.1 CM^2
BH CV ECHO MEAS - PVA(V,D): 2.1 CM^2
BH CV ECHO MEAS - QP/QS: 0.86
BH CV ECHO MEAS - RAP SYSTOLE: 3 MMHG
BH CV ECHO MEAS - RV MAX PG: 2.2 MMHG
BH CV ECHO MEAS - RV MEAN PG: 1.1 MMHG
BH CV ECHO MEAS - RV V1 MAX: 73.7 CM/SEC
BH CV ECHO MEAS - RV V1 MEAN: 50.5 CM/SEC
BH CV ECHO MEAS - RV V1 VTI: 17.3 CM
BH CV ECHO MEAS - RVOT AREA: 2.8 CM^2
BH CV ECHO MEAS - RVOT DIAM: 1.9 CM
BH CV ECHO MEAS - RVSP: 17 MMHG
BH CV ECHO MEAS - SI(AO): 114.5 ML/M^2
BH CV ECHO MEAS - SI(CUBED): 18.8 ML/M^2
BH CV ECHO MEAS - SI(LVOT): 24 ML/M^2
BH CV ECHO MEAS - SI(MOD-SP2): 16.4 ML/M^2
BH CV ECHO MEAS - SI(MOD-SP4): 15.5 ML/M^2
BH CV ECHO MEAS - SI(TEICH): 12.2 ML/M^2
BH CV ECHO MEAS - SUP REN AO DIAM: 2.2 CM
BH CV ECHO MEAS - SV(AO): 265.4 ML
BH CV ECHO MEAS - SV(CUBED): 43.6 ML
BH CV ECHO MEAS - SV(LVOT): 55.7 ML
BH CV ECHO MEAS - SV(MOD-SP2): 38 ML
BH CV ECHO MEAS - SV(MOD-SP4): 36 ML
BH CV ECHO MEAS - SV(RVOT): 47.9 ML
BH CV ECHO MEAS - SV(TEICH): 28.4 ML
BH CV ECHO MEAS - TAPSE (>1.6): 2.2 CM
BH CV ECHO MEAS - TR MAX VEL: 184.6 CM/SEC
BH CV ECHO MEASUREMENTS AVERAGE E/E' RATIO: 7.22
BH CV VAS BP RIGHT ARM: NORMAL MMHG
BH CV XLRA - RV BASE: 3.4 CM
BH CV XLRA - RV LENGTH: 8.4 CM
BH CV XLRA - RV MID: 3.4 CM
BH CV XLRA - TDI S': 7 CM/SEC
LEFT ATRIUM VOLUME INDEX: 17 ML/M2
MAXIMAL PREDICTED HEART RATE: 163 BPM
SINUS: 3.2 CM
STJ: 3.2 CM
STRESS TARGET HR: 139 BPM

## 2020-10-29 NOTE — TELEPHONE ENCOUNTER
Echo improved, no changes - pls call.     ----- Message from TERRI Torres sent at 10/28/2020  4:49 PM EDT -----  Can you please call results? Thanks.

## 2020-10-29 NOTE — TELEPHONE ENCOUNTER
Notified patient of results. He verbalized understanding.    Sherrill Farnsworth, RN  [place] [facility]

## 2020-10-30 ENCOUNTER — LAB (OUTPATIENT)
Dept: OTHER | Facility: HOSPITAL | Age: 55
End: 2020-10-30

## 2020-10-30 ENCOUNTER — CONSULT (OUTPATIENT)
Dept: ONCOLOGY | Facility: CLINIC | Age: 55
End: 2020-10-30

## 2020-10-30 VITALS
DIASTOLIC BLOOD PRESSURE: 87 MMHG | TEMPERATURE: 97.3 F | WEIGHT: 253.5 LBS | HEART RATE: 60 BPM | BODY MASS INDEX: 37.55 KG/M2 | RESPIRATION RATE: 16 BRPM | OXYGEN SATURATION: 97 % | HEIGHT: 69 IN | SYSTOLIC BLOOD PRESSURE: 128 MMHG

## 2020-10-30 DIAGNOSIS — N28.0 RENAL INFARCT (HCC): Primary | ICD-10-CM

## 2020-10-30 LAB
BASOPHILS # BLD AUTO: 0.03 10*3/MM3 (ref 0–0.2)
BASOPHILS NFR BLD AUTO: 0.5 % (ref 0–1.5)
DEPRECATED RDW RBC AUTO: 48.7 FL (ref 37–54)
EOSINOPHIL # BLD AUTO: 0.19 10*3/MM3 (ref 0–0.4)
EOSINOPHIL NFR BLD AUTO: 3.3 % (ref 0.3–6.2)
ERYTHROCYTE [DISTWIDTH] IN BLOOD BY AUTOMATED COUNT: 14.6 % (ref 12.3–15.4)
FERRITIN SERPL-MCNC: 183.4 NG/ML (ref 30–400)
FOLATE SERPL-MCNC: 6.78 NG/ML (ref 4.78–24.2)
HCT VFR BLD AUTO: 39.6 % (ref 37.5–51)
HCYS SERPL-MCNC: 14.8 UMOL/L (ref 0–15)
HGB BLD-MCNC: 12.9 G/DL (ref 13–17.7)
HGB RETIC QN AUTO: 35 PG (ref 29.8–36.1)
HOLD SPECIMEN: NORMAL
IMM GRANULOCYTES # BLD AUTO: 0.02 10*3/MM3 (ref 0–0.05)
IMM GRANULOCYTES NFR BLD AUTO: 0.3 % (ref 0–0.5)
IMM RETICS NFR: 13.5 % (ref 3–15.8)
IRON 24H UR-MRATE: 75 MCG/DL (ref 59–158)
IRON SATN MFR SERPL: 20 % (ref 20–50)
LYMPHOCYTES # BLD AUTO: 2.03 10*3/MM3 (ref 0.7–3.1)
LYMPHOCYTES NFR BLD AUTO: 35.4 % (ref 19.6–45.3)
MCH RBC QN AUTO: 29.5 PG (ref 26.6–33)
MCHC RBC AUTO-ENTMCNC: 32.6 G/DL (ref 31.5–35.7)
MCV RBC AUTO: 90.6 FL (ref 79–97)
MONOCYTES # BLD AUTO: 0.44 10*3/MM3 (ref 0.1–0.9)
MONOCYTES NFR BLD AUTO: 7.7 % (ref 5–12)
NEUTROPHILS NFR BLD AUTO: 3.03 10*3/MM3 (ref 1.7–7)
NEUTROPHILS NFR BLD AUTO: 52.8 % (ref 42.7–76)
NRBC BLD AUTO-RTO: 0 /100 WBC (ref 0–0.2)
PLATELET # BLD AUTO: 193 10*3/MM3 (ref 140–450)
PMV BLD AUTO: 11.1 FL (ref 6–12)
RBC # BLD AUTO: 4.37 10*6/MM3 (ref 4.14–5.8)
RETICS # AUTO: 0.06 10*6/MM3 (ref 0.02–0.13)
RETICS/RBC NFR AUTO: 1.43 % (ref 0.7–1.9)
TIBC SERPL-MCNC: 371 MCG/DL (ref 298–536)
TRANSFERRIN SERPL-MCNC: 249 MG/DL (ref 200–360)
VIT B12 BLD-MCNC: 372 PG/ML (ref 211–946)
WBC # BLD AUTO: 5.74 10*3/MM3 (ref 3.4–10.8)
WHOLE BLOOD HOLD SPECIMEN: NORMAL

## 2020-10-30 PROCEDURE — 85303 CLOT INHIBIT PROT C ACTIVITY: CPT | Performed by: INTERNAL MEDICINE

## 2020-10-30 PROCEDURE — 81240 F2 GENE: CPT | Performed by: INTERNAL MEDICINE

## 2020-10-30 PROCEDURE — 82607 VITAMIN B-12: CPT | Performed by: INTERNAL MEDICINE

## 2020-10-30 PROCEDURE — 36415 COLL VENOUS BLD VENIPUNCTURE: CPT

## 2020-10-30 PROCEDURE — 85046 RETICYTE/HGB CONCENTRATE: CPT | Performed by: INTERNAL MEDICINE

## 2020-10-30 PROCEDURE — 82728 ASSAY OF FERRITIN: CPT | Performed by: INTERNAL MEDICINE

## 2020-10-30 PROCEDURE — 99244 OFF/OP CNSLTJ NEW/EST MOD 40: CPT | Performed by: INTERNAL MEDICINE

## 2020-10-30 PROCEDURE — 83540 ASSAY OF IRON: CPT | Performed by: INTERNAL MEDICINE

## 2020-10-30 PROCEDURE — 86146 BETA-2 GLYCOPROTEIN ANTIBODY: CPT | Performed by: INTERNAL MEDICINE

## 2020-10-30 PROCEDURE — 85732 THROMBOPLASTIN TIME PARTIAL: CPT | Performed by: INTERNAL MEDICINE

## 2020-10-30 PROCEDURE — 86147 CARDIOLIPIN ANTIBODY EA IG: CPT | Performed by: INTERNAL MEDICINE

## 2020-10-30 PROCEDURE — 84466 ASSAY OF TRANSFERRIN: CPT | Performed by: INTERNAL MEDICINE

## 2020-10-30 PROCEDURE — 85306 CLOT INHIBIT PROT S FREE: CPT | Performed by: INTERNAL MEDICINE

## 2020-10-30 PROCEDURE — 82746 ASSAY OF FOLIC ACID SERUM: CPT | Performed by: INTERNAL MEDICINE

## 2020-10-30 PROCEDURE — 85300 ANTITHROMBIN III ACTIVITY: CPT | Performed by: INTERNAL MEDICINE

## 2020-10-30 PROCEDURE — 81241 F5 GENE: CPT | Performed by: INTERNAL MEDICINE

## 2020-10-30 PROCEDURE — 85613 RUSSELL VIPER VENOM DILUTED: CPT | Performed by: INTERNAL MEDICINE

## 2020-10-30 PROCEDURE — 85025 COMPLETE CBC W/AUTO DIFF WBC: CPT | Performed by: INTERNAL MEDICINE

## 2020-10-30 PROCEDURE — 83090 ASSAY OF HOMOCYSTEINE: CPT | Performed by: INTERNAL MEDICINE

## 2020-10-30 RX ORDER — TELMISARTAN 80 MG/1
80 TABLET ORAL DAILY
COMMUNITY
Start: 2020-10-10 | End: 2020-11-03

## 2020-10-30 NOTE — PROGRESS NOTES
Subjective     REASON FOR CONSULTATION: Left renal infarct  Provide an opinion on any further workup or treatment                             REQUESTING PHYSICIAN: Frandy Castillo MD    RECORDS OBTAINED:  Records of the patients history including those obtained from the referring provider were reviewed and summarized in detail.    HISTORY OF PRESENT ILLNESS:  The patient is a 55 y.o. year old male who is here for an opinion about the above issue.  He is referred to us from his nephrologist, Dr. Castillo, due to development of a left renal infarct.  He developed the renal infarct in July of this year and as part of the work-up was found also to have an idiopathic cardiomyopathy.  He is now followed by Dr. Graff of cardiology as well and reportedly his ejection fraction is improving.  He is taking aspirin 81 mg daily but is not on any other blood thinner medication currently.    He does not have any prior history of venous thrombosis and does not have any known strong family history of thrombotic disorders.    I explained to the patient today that we will check additional lab studies to look for any inherited or acquired thrombophilic defects that may need to be taken into consideration when determining whether or not he needs to be anticoagulation.    History of Present Illness     Past Medical History:   Diagnosis Date   • Cardiomyopathy (CMS/HCC)    • Colon polyp    • Diabetes mellitus (CMS/HCC)     type 2   • Hyperlipidemia    • Hypertension    • Renal calculi     Followed by Dr. Chris Carreon urologist   • Renal infarct (CMS/HCC) 07/2020    Left; noted on CT scan-followed by Dr. Chris Carreon urology   • Sleep apnea     CPAP nightly        Past Surgical History:   Procedure Laterality Date   • CARDIAC CATHETERIZATION N/A 7/24/2020    Procedure: Coronary angiography;  Surgeon: Robert Deluna MD;  Location: [facility] INVASIVE LOCATION;  Service: Cardiovascular;  Laterality: N/A;   • CARDIAC CATHETERIZATION N/A  7/24/2020    Procedure: Left Heart Cath;  Surgeon: Robert Deluna MD;  Location: [facility] INVASIVE LOCATION;  Service: Cardiovascular;  Laterality: N/A;   • COLONOSCOPY     • KNEE SURGERY      meniscus         Current Outpatient Medications on File Prior to Visit   Medication Sig Dispense Refill   • amLODIPine (NORVASC) 5 MG tablet Take 1 tablet by mouth Daily. 90 tablet 3   • aspirin 81 MG chewable tablet Chew 81 mg Daily.     • atorvastatin (LIPITOR) 20 MG tablet Take 0.5 tablets by mouth Daily. 90 tablet 3   • carvedilol (COREG) 25 MG tablet Take 1 tablet by mouth 2 (Two) Times a Day. 180 tablet 3   • esomeprazole (nexIUM) 40 MG capsule Take 1 capsule by mouth Every Morning Before Breakfast. (Patient taking differently: Take 40 mg by mouth As Needed.) 30 capsule 6   • metFORMIN (GLUCOPHAGE) 500 MG tablet TAKE 2 TABLETS BY MOUTH EVERY NIGHT AT BEDTIME 180 tablet 1   • sacubitril-valsartan (Entresto) 49-51 MG tablet Take 1 tablet by mouth 2 (Two) Times a Day. 60 tablet 11   • SITagliptin-metFORMIN HCl ER (JANUMET XR)  MG tablet Take 1 tablet by mouth Daily. 30 tablet 6   • spironolactone (ALDACTONE) 25 MG tablet Take 1 tablet by mouth Daily. 90 tablet 3   • telmisartan (MICARDIS) 80 MG tablet Take 80 mg by mouth Daily.       No current facility-administered medications on file prior to visit.         ALLERGIES:  No Known Allergies     Social History     Socioeconomic History   • Marital status:      Spouse name: Jyoti   • Number of children: Not on file   • Years of education: Not on file   • Highest education level: Not on file   Occupational History     Employer: [employer]   Tobacco Use   • Smoking status: Never Smoker   • Smokeless tobacco: Never Used   Substance and Sexual Activity   • Alcohol use: Yes     Alcohol/week: 2.0 standard drinks     Types: 1 Cans of beer, 1 Shots of liquor per week     Comment: Caffeine use: 1 cup daily   • Drug use: Never   • Sexual  "activity: Defer        History reviewed. No pertinent family history.     Review of Systems   Constitutional: Negative for activity change, chills, fatigue and fever.   HENT: Negative for mouth sores, trouble swallowing and voice change.    Eyes: Negative for pain and visual disturbance.   Respiratory: Negative for cough, shortness of breath and wheezing.    Cardiovascular: Negative for chest pain and palpitations.   Gastrointestinal: Negative for abdominal pain, constipation, diarrhea, nausea and vomiting.   Genitourinary: Negative for difficulty urinating, frequency and urgency.   Musculoskeletal: Negative for arthralgias and joint swelling.   Skin: Negative for rash.   Neurological: Negative for dizziness, seizures, weakness and headaches.   Hematological: Negative for adenopathy. Does not bruise/bleed easily.   Psychiatric/Behavioral: Negative for behavioral problems and confusion. The patient is not nervous/anxious.         Objective     Vitals:    10/30/20 1040   BP: 128/87   Pulse: 60   Resp: 16   Temp: 97.3 °F (36.3 °C)   TempSrc: Temporal   SpO2: 97%   Weight: 115 kg (253 lb 8 oz)   Height: 175 cm (68.9\")   PainSc: 0-No pain     Current Status 10/30/2020   ECOG score 1       Physical Exam  Constitutional:       General: He is not in acute distress.     Appearance: He is well-developed.   HENT:      Head: Normocephalic.   Eyes:      General: No scleral icterus.     Conjunctiva/sclera: Conjunctivae normal.      Pupils: Pupils are equal, round, and reactive to light.   Neck:      Musculoskeletal: Normal range of motion and neck supple.      Thyroid: No thyromegaly.      Vascular: No JVD.   Cardiovascular:      Rate and Rhythm: Normal rate and regular rhythm.      Heart sounds: No murmur. No friction rub. No gallop.    Pulmonary:      Effort: Pulmonary effort is normal.      Breath sounds: Normal breath sounds. No wheezing or rales.   Abdominal:      General: There is no distension.      Palpations: Abdomen is " soft. There is no mass.      Tenderness: There is no abdominal tenderness.   Musculoskeletal: Normal range of motion.         General: No deformity.   Lymphadenopathy:      Cervical: No cervical adenopathy.   Skin:     General: Skin is warm and dry.      Findings: No erythema or rash.   Neurological:      Mental Status: He is alert and oriented to person, place, and time.      Cranial Nerves: No cranial nerve deficit.      Deep Tendon Reflexes: Reflexes are normal and symmetric.   Psychiatric:         Behavior: Behavior normal.         Judgment: Judgment normal.           RECENT LABS:  Hematology WBC   Date Value Ref Range Status   10/30/2020 5.74 3.40 - 10.80 10*3/mm3 Final     RBC   Date Value Ref Range Status   10/30/2020 4.37 4.14 - 5.80 10*6/mm3 Final     Hemoglobin   Date Value Ref Range Status   10/30/2020 12.9 (L) 13.0 - 17.7 g/dL Final     Hematocrit   Date Value Ref Range Status   10/30/2020 39.6 37.5 - 51.0 % Final     Platelets   Date Value Ref Range Status   10/30/2020 193 140 - 450 10*3/mm3 Final          TT ECHO 10/28/2020  Interpretation Summary    · Left ventricular ejection fraction appears to be 36 - 40%. Left ventricular systolic function is moderately decreased.The left ventricular cavity is mildly dilated.Left ventricular wall thickness is consistent with severe eccentric hypertrophy.  · Abnormal regional wall motion. Please see wall motion score index.  · Left ventricular diastolic function is consistent with (grade I) impaired relaxation.  · Estimated right ventricular systolic pressure from tricuspid regurgitation is normal (<35 mmHg).       MRI ABDOMEN 8/17/2020  IMPRESSION:  1. Evolving renal infarct at the lower pole of the left kidney.  2. Small incidental hypointense lesion within the pancreatic tail  favored to represent a small lipoma.    Assessment/Plan   1.  Left renal infarct.  The patient does not have any prior history of thromboembolic disease but has been found to have a  cardiomyopathy on echocardiogram which would suggest this is likely an embolic phenomenon.  2.  Cardiomyopathy.  Patient is currently followed actively by Dr. Graff cardiology and has had improvement on his echocardiogram.  He is taking Entresto and baby aspirin.  3.  Renal insufficiency followed by Dr. Castillo.  4.  Adult onset diabetes    Recommendations  1.  We will obtain a laboratory hypercoagulable work-up looking for any inherited or acquired thrombophilic defects.  2.  For now we would recommend remaining on 81 mg aspirin daily.  3.  We will schedule follow-up appointment in the office in 2 weeks to review the results of the hypercoagulable work-up and make any further recommendations based on those results.  4.  He also was mildly anemic today we will check additional anemia labs also.    Thanks for allowing us to see this very nice gentleman in consultation.

## 2020-10-31 LAB
CARDIOLIPIN IGG SER IA-ACNC: <9 GPL U/ML (ref 0–14)
CARDIOLIPIN IGM SER IA-ACNC: 15 MPL U/ML (ref 0–12)

## 2020-11-01 LAB
LA 2 SCREEN W REFLEX-IMP: NORMAL
SCREEN APTT: 40.5 SEC (ref 0–51.9)
SCREEN DRVVT: 39 SEC (ref 0–47)

## 2020-11-03 ENCOUNTER — TELEPHONE (OUTPATIENT)
Dept: CARDIOLOGY | Facility: CLINIC | Age: 55
End: 2020-11-03

## 2020-11-03 ENCOUNTER — OFFICE VISIT (OUTPATIENT)
Dept: CARDIOLOGY | Facility: CLINIC | Age: 55
End: 2020-11-03

## 2020-11-03 VITALS
SYSTOLIC BLOOD PRESSURE: 124 MMHG | WEIGHT: 251 LBS | DIASTOLIC BLOOD PRESSURE: 70 MMHG | HEIGHT: 69 IN | HEART RATE: 59 BPM | BODY MASS INDEX: 37.18 KG/M2

## 2020-11-03 DIAGNOSIS — E11.9 TYPE 2 DIABETES MELLITUS WITHOUT COMPLICATION, WITHOUT LONG-TERM CURRENT USE OF INSULIN (HCC): ICD-10-CM

## 2020-11-03 DIAGNOSIS — I42.8 NONISCHEMIC CARDIOMYOPATHY (HCC): Primary | ICD-10-CM

## 2020-11-03 DIAGNOSIS — I10 ESSENTIAL HYPERTENSION: ICD-10-CM

## 2020-11-03 DIAGNOSIS — N28.0 RENAL INFARCT (HCC): ICD-10-CM

## 2020-11-03 DIAGNOSIS — E78.2 MIXED HYPERLIPIDEMIA: ICD-10-CM

## 2020-11-03 DIAGNOSIS — E11.9 TYPE 2 DIABETES MELLITUS WITHOUT COMPLICATION, WITHOUT LONG-TERM CURRENT USE OF INSULIN (HCC): Primary | ICD-10-CM

## 2020-11-03 LAB
AT III PPP CHRO-ACNC: 103 % (ref 90–134)
B2 GLYCOPROT1 IGA SER-ACNC: <9 GPI IGA UNITS (ref 0–25)
B2 GLYCOPROT1 IGG SER-ACNC: <9 GPI IGG UNITS (ref 0–20)
B2 GLYCOPROT1 IGM SER-ACNC: <9 GPI IGM UNITS (ref 0–32)
PROT C ACT/NOR PPP: 119 % (ref 86–163)
PROT S ACT/NOR PPP: 85 % (ref 70–127)
PROT S FREE PPP-ACNC: 98 % (ref 49–138)

## 2020-11-03 PROCEDURE — 93000 ELECTROCARDIOGRAM COMPLETE: CPT | Performed by: INTERNAL MEDICINE

## 2020-11-03 PROCEDURE — 99214 OFFICE O/P EST MOD 30 MIN: CPT | Performed by: INTERNAL MEDICINE

## 2020-11-03 RX ORDER — EMPAGLIFLOZIN 25 MG/1
1 TABLET, FILM COATED ORAL DAILY
Qty: 30 TABLET | Refills: 11 | Status: SHIPPED | OUTPATIENT
Start: 2020-11-03 | End: 2021-08-06 | Stop reason: SDUPTHER

## 2020-11-03 NOTE — PROGRESS NOTES
Date of Office Visit: 2020  Encounter Provider: Bernadine Graff MD  Place of Service: [facility] CARDIOLOGY  Patient Name: Jhon Munoz  :1965      Patient ID:  Jhon Munoz is a 55 y.o. male is here for  followup for cardiomyopathy.         History of Present Illness    He is here for new cardiomyopathy.  He has a history of sleep apnea using CPAP, hyperlipidemia, hypertension, diabetes mellitus type 2 on oral hypoglycemics, GERD.       He was having left lower quadrant pain.  He saw Dr. Decker and he had labs done 2020 show glucose 113, creatinine 1.49, magnesium 1.5, C-reactive protein 4.83, normal CBC.  CT abdomen pelvis done 2024 left lower quadrant pain to her left renal infarct involving the lower pole and midpole of the anterior cortex and a nonobstructing left renal calculus.  He saw Dr. Carreon from urology yesterday who does not think that a further work-up really needs to be done at this time.     Echocardiogram done 2020 showed grade 1 diastolic dysfunction with moderate left ventricular dilation and ejection fraction of 27.5%.  Global longitudinal strain was -12.8%.  There was no significant valve disease.     He is , has 3 children works for [employer].  Uses no cigarettes and has alcohol, about 6-10 beers per week as well as 1 vodka.  His 1 cup of coffee per day.  He has not been regularly exercising.  His mother had hypertension  may 2020.    On 2020 a cardiac catheterization was completed which showed the LAD, first diagonal, circumflex with luminal irregularities and RCA had 30% mid segment stenosis.  He was felt to have nonischemic cardiomyopathy and medical management was recommended.     On 2020, he was started on Entresto 49/51 mg 1 tablet twice per day.  She ordered a cardiac MRI which showed left ventricle dilated, global hypokinesis, mild right ventricular hypokinesis, normal perfusion of the myocardium, a focal  area of mid myocardial delayed enhancement of the inferior septum, and EF calculated at 30%.     His hydrochlorothiazide was stopped because his creatinine was elevated.  his atorvastatin was decreased to 10 mg daily due to muscle aching.     On 8/31/2020, he followed up with Gena in the office.  He reported muscle stiffness that had not improved with decreasing the atorvastatin.  I reviewed Dr. Chris Carreon urology note dated 8/3/2020:  In July 2020, patient had a CT scan that was concerning for left renal infarct involving the lower pole and mid pole anterior cortex. It was noted that a third of his left kidney is likely dead and not functioning.  He was recommended to follow-up with his PCP/cardiology for potential sources of emboli. He was also noted to have a left renal stone that was stable, but may need intervention in the future. Dr. Carreon did not comment on any blood work completed. Due to the renal infarct, he had a monitor.  The 12-day Holter monitor that he had September 2020 showed multiple episodes of nonsustained ventricular tachycardia, the longest was 10 seconds.  Short bursts of SVT were also noted but no sustained atrial fibrillation.     He reports right lower quadrant abdominal pain.  He saw both urology (Dr. Carreon) and nephrology (Dr. Frandy Castillo).  The patient says neither physician thought his pain was from the kidney stones.      Labs on 9/11/2020 show normal CMP, hemoglobin A1c 6.22, normal thyroid panel, HDL 61, LDL 50.  He did end up seeing hematology and labs on 10/20/2020 showed normal iron studies, anticardiolipin IgM elevated but normal anticardiolipin IgG, normal beta-2 glycoproteins.  He is to see Dr. Zaldivar next week.  He has had no orthopnea or PND.  He has no exertional dyspnea.  He is not exercising.  He has had no tachycardia or dizziness.  He does not feel his heart palpitating.  He is a little concerned because when he wore his Zio patch, he had his cardiac MRI and he is not  certain that it is accurate.  He has no chest pain or pressure.  He does have erectile dysfunction and wants to use Levitra.  He has 2 grown children 29 and 35.  His middle son  in  with osteogenic sarcoma.  He is taking his medications as directed without difficulty.    Past Medical History:   Diagnosis Date   • Cardiomyopathy (CMS/HCC)    • Colon polyp    • Diabetes mellitus (CMS/HCC)     type 2   • Hyperlipidemia    • Hypertension    • Renal calculi     Followed by Dr. Chris Carreon urologist   • Renal infarct (CMS/HCC) 2020    Left; noted on CT scan-followed by Dr. Chris Carreon urology   • Sleep apnea     CPAP nightly         Past Surgical History:   Procedure Laterality Date   • CARDIAC CATHETERIZATION N/A 2020    Procedure: Coronary angiography;  Surgeon: Robert Deluna MD;  Location:  [facility] CATH INVASIVE LOCATION;  Service: Cardiovascular;  Laterality: N/A;   • CARDIAC CATHETERIZATION N/A 2020    Procedure: Left Heart Cath;  Surgeon: Robert Deluna MD;  Location:  [facility] CATH INVASIVE LOCATION;  Service: Cardiovascular;  Laterality: N/A;   • COLONOSCOPY     • KNEE SURGERY      meniscus        Current Outpatient Medications on File Prior to Visit   Medication Sig Dispense Refill   • amLODIPine (NORVASC) 5 MG tablet Take 1 tablet by mouth Daily. 90 tablet 3   • aspirin 81 MG chewable tablet Chew 81 mg Daily.     • atorvastatin (LIPITOR) 20 MG tablet Take 0.5 tablets by mouth Daily. 90 tablet 3   • carvedilol (COREG) 25 MG tablet Take 1 tablet by mouth 2 (Two) Times a Day. 180 tablet 3   • metFORMIN (GLUCOPHAGE) 500 MG tablet TAKE 2 TABLETS BY MOUTH EVERY NIGHT AT BEDTIME 180 tablet 1   • sacubitril-valsartan (Entresto) 49-51 MG tablet Take 1 tablet by mouth 2 (Two) Times a Day. 60 tablet 11   • SITagliptin-metFORMIN HCl ER (JANUMET XR)  MG tablet Take 1 tablet by mouth Daily. 30 tablet 6   • spironolactone (ALDACTONE) 25 MG tablet Take 1 tablet by mouth Daily. 90 tablet 3   •  telmisartan (MICARDIS) 80 MG tablet Take 80 mg by mouth Daily.     • [DISCONTINUED] esomeprazole (nexIUM) 40 MG capsule Take 1 capsule by mouth Every Morning Before Breakfast. (Patient taking differently: Take 40 mg by mouth As Needed.) 30 capsule 6     No current facility-administered medications on file prior to visit.        Social History     Socioeconomic History   • Marital status:      Spouse name: Jyoti   • Number of children: Not on file   • Years of education: Not on file   • Highest education level: Not on file   Occupational History     Employer: [employer]   Tobacco Use   • Smoking status: Never Smoker   • Smokeless tobacco: Never Used   Substance and Sexual Activity   • Alcohol use: Yes     Alcohol/week: 2.0 standard drinks     Types: 1 Cans of beer, 1 Shots of liquor per week     Comment: Caffeine use: 1 cup daily   • Drug use: Never   • Sexual activity: Defer           Review of Systems   Constitution: Negative.   HENT: Negative for congestion.    Eyes: Negative for vision loss in left eye and vision loss in right eye.   Respiratory: Negative.  Negative for cough, hemoptysis, shortness of breath, sleep disturbances due to breathing, snoring, sputum production and wheezing.    Endocrine: Negative.    Hematologic/Lymphatic: Negative.    Skin: Negative for poor wound healing and rash.   Musculoskeletal: Negative for falls, gout, muscle cramps and myalgias.   Gastrointestinal: Negative for abdominal pain, diarrhea, dysphagia, hematemesis, melena, nausea and vomiting.   Neurological: Negative for excessive daytime sleepiness, dizziness, headaches, light-headedness, loss of balance, seizures and vertigo.   Psychiatric/Behavioral: Negative for depression and substance abuse. The patient is not nervous/anxious.        Procedures    ECG 12 Lead    Date/Time: 11/3/2020 8:33 AM  Performed by: Bernadine Graff MD  Authorized by: Bernadine Graff MD   Comparison: compared  "with previous ECG   Similar to previous ECG  Rhythm: sinus rhythm  Ectopy: unifocal PVCs  T inversion: I, aVL, V5 and V6    Clinical impression: abnormal EKG                Objective:      Vitals:    11/03/20 0818   BP: 124/70   BP Location: Left arm   Pulse: 59   Weight: 114 kg (251 lb)   Height: 175.3 cm (69\")     Body mass index is 37.07 kg/m².    Vitals signs reviewed.   Constitutional:       General: Not in acute distress.     Appearance: Well-developed. Not diaphoretic.   Eyes:      General: No scleral icterus.     Conjunctiva/sclera: Conjunctivae normal.   HENT:      Head: Normocephalic and atraumatic.   Neck:      Musculoskeletal: Neck supple.      Thyroid: No thyromegaly.      Vascular: No carotid bruit or JVD.      Lymphadenopathy: No cervical adenopathy.   Pulmonary:      Effort: Pulmonary effort is normal. No respiratory distress.      Breath sounds: Normal breath sounds. No wheezing. No rhonchi. No rales.   Chest:      Chest wall: Not tender to palpatation.   Cardiovascular:      Normal rate. Regular rhythm.      Murmurs: There is no murmur.      No gallop.   Pulses:     Intact distal pulses.   Edema:     Peripheral edema absent.   Abdominal:      General: Bowel sounds are normal. There is no distension or abdominal bruit.      Palpations: Abdomen is soft. There is no abdominal mass.      Tenderness: There is no abdominal tenderness.   Musculoskeletal:         General: No deformity.      Extremities: No clubbing present.  Skin:     General: Skin is warm and dry. There is no cyanosis.      Coloration: Skin is not pale.      Findings: No rash.   Neurological:      Mental Status: Alert and oriented to person, place, and time.      Cranial Nerves: No cranial nerve deficit.   Psychiatric:         Judgment: Judgment normal.         Lab Review:       Assessment:      Diagnosis Plan   1. Nonischemic cardiomyopathy (CMS/HCC)     2. Essential hypertension     3. Mixed hyperlipidemia     4. Renal infarct (CMS/HCC) "     5. Type 2 diabetes mellitus without complication, without long-term current use of insulin (CMS/[facility])       1. Cardiomyopathy, etiology unknown.    2. Hypertension, goal less than 110/80.   3. Hyperlipidemia, on atorvastatin  4. Diabetes mellitus type 2  5. Obesity  6. Left renal infarct, etiology unknown.  7. Renal insufficiency, likely due to left renal infarct.  8. PVCs and nonsustained VT on monitor, also nonsustained SVT.  Will get all those rhythm strips.     Plan:       We did talk about possibly an AICD in the future.  We will talk with the EP about this.  Also in a talk with his PCP about possibly switching him to Jardiance or Farxiga.  Provided samples of Entresto.  We will have him see Gena in 3 months.  Also did talk about possible genetic testing at [facility].  He is going to think about this.    Also consider still trying to get him off of amlodipine for his blood pressures well controlled right now and I do not really want a switch that if her thinking about changing to Jardiance or Farxiga.

## 2020-11-03 NOTE — TELEPHONE ENCOUNTER
I spoke with the patient and he has picked up jardiance and will continue the other meds.      It is ok to take levitra.     RM

## 2020-11-04 LAB — F5 GENE MUT ANL BLD/T: NORMAL

## 2020-11-06 LAB — F2 GENE MUT ANL BLD/T: NORMAL

## 2020-11-13 ENCOUNTER — OFFICE VISIT (OUTPATIENT)
Dept: ONCOLOGY | Facility: CLINIC | Age: 55
End: 2020-11-13

## 2020-11-13 ENCOUNTER — LAB (OUTPATIENT)
Dept: OTHER | Facility: HOSPITAL | Age: 55
End: 2020-11-13

## 2020-11-13 VITALS
WEIGHT: 249.8 LBS | RESPIRATION RATE: 16 BRPM | HEIGHT: 69 IN | OXYGEN SATURATION: 98 % | TEMPERATURE: 97.3 F | DIASTOLIC BLOOD PRESSURE: 70 MMHG | BODY MASS INDEX: 37 KG/M2 | HEART RATE: 53 BPM | SYSTOLIC BLOOD PRESSURE: 117 MMHG

## 2020-11-13 DIAGNOSIS — I42.8 NONISCHEMIC CARDIOMYOPATHY (HCC): ICD-10-CM

## 2020-11-13 DIAGNOSIS — I10 ESSENTIAL HYPERTENSION: Primary | ICD-10-CM

## 2020-11-13 DIAGNOSIS — N28.0 RENAL INFARCT (HCC): Primary | ICD-10-CM

## 2020-11-13 LAB
BASOPHILS # BLD AUTO: 0.03 10*3/MM3 (ref 0–0.2)
BASOPHILS NFR BLD AUTO: 0.5 % (ref 0–1.5)
DEPRECATED RDW RBC AUTO: 48.5 FL (ref 37–54)
EOSINOPHIL # BLD AUTO: 0.22 10*3/MM3 (ref 0–0.4)
EOSINOPHIL NFR BLD AUTO: 3.5 % (ref 0.3–6.2)
ERYTHROCYTE [DISTWIDTH] IN BLOOD BY AUTOMATED COUNT: 14.7 % (ref 12.3–15.4)
HCT VFR BLD AUTO: 40.8 % (ref 37.5–51)
HGB BLD-MCNC: 13.5 G/DL (ref 13–17.7)
IMM GRANULOCYTES # BLD AUTO: 0.05 10*3/MM3 (ref 0–0.05)
IMM GRANULOCYTES NFR BLD AUTO: 0.8 % (ref 0–0.5)
LYMPHOCYTES # BLD AUTO: 2.58 10*3/MM3 (ref 0.7–3.1)
LYMPHOCYTES NFR BLD AUTO: 41.2 % (ref 19.6–45.3)
MCH RBC QN AUTO: 29.7 PG (ref 26.6–33)
MCHC RBC AUTO-ENTMCNC: 33.1 G/DL (ref 31.5–35.7)
MCV RBC AUTO: 89.9 FL (ref 79–97)
MONOCYTES # BLD AUTO: 0.54 10*3/MM3 (ref 0.1–0.9)
MONOCYTES NFR BLD AUTO: 8.6 % (ref 5–12)
NEUTROPHILS NFR BLD AUTO: 2.84 10*3/MM3 (ref 1.7–7)
NEUTROPHILS NFR BLD AUTO: 45.4 % (ref 42.7–76)
NRBC BLD AUTO-RTO: 0 /100 WBC (ref 0–0.2)
PLATELET # BLD AUTO: 178 10*3/MM3 (ref 140–450)
PMV BLD AUTO: 11.3 FL (ref 6–12)
RBC # BLD AUTO: 4.54 10*6/MM3 (ref 4.14–5.8)
WBC # BLD AUTO: 6.26 10*3/MM3 (ref 3.4–10.8)

## 2020-11-13 PROCEDURE — 36415 COLL VENOUS BLD VENIPUNCTURE: CPT

## 2020-11-13 PROCEDURE — 85025 COMPLETE CBC W/AUTO DIFF WBC: CPT | Performed by: INTERNAL MEDICINE

## 2020-11-13 PROCEDURE — 99214 OFFICE O/P EST MOD 30 MIN: CPT | Performed by: INTERNAL MEDICINE

## 2020-11-13 NOTE — PROGRESS NOTES
Subjective     REASON FOR FOLLOW UP: Left renal infarct    HISTORY OF PRESENT ILLNESS:  The patient is a 55 y.o. year old male who was referred to us from his nephrologist, Dr. Castillo, due to development of a left renal infarct.  He developed the renal infarct in July of this year and as part of the work-up was found also to have an idiopathic cardiomyopathy.  He is now followed by Dr. Graff of cardiology as well and reportedly his ejection fraction is improving.  He is taking aspirin 81 mg daily but is not on any other blood thinner medication currently.    He does not have any prior history of venous thrombosis and does not have any known strong family history of thrombotic disorders.    I explained to the patient today that we will check additional lab studies to look for any inherited or acquired thrombophilic defects that may need to be taken into consideration when determining whether or not he needs to be anticoagulation.    With his initial consultation visit of 10/30/2020 we herbert complete laboratory hypercoagulable work-up and he returns today to discuss those results and receive any final recommendations. In short, his thrombophilia laboratory evaluation was negative with the exception of a borderline IgM anticardiolipin antibody is very unlikely to be clinically significant. We strongly suspect that his renal infarct was embolic related to his cardiomyopathy rather than any hypercoagulability.    History of Present Illness     Past Medical History:   Diagnosis Date   • Cardiomyopathy (CMS/HCC)    • Colon polyp    • Diabetes mellitus (CMS/HCC)     type 2   • Hyperlipidemia    • Hypertension    • Renal calculi     Followed by Dr. Chris Carreon urologist   • Renal infarct (CMS/HCC) 07/2020    Left; noted on CT scan-followed by Dr. Chris Carreon urology   • Sleep apnea     CPAP nightly        Past Surgical History:   Procedure Laterality Date   • CARDIAC CATHETERIZATION N/A 7/24/2020    Procedure: Coronary  angiography;  Surgeon: Robert Deluna MD;  Location:  [facility] CATH INVASIVE LOCATION;  Service: Cardiovascular;  Laterality: N/A;   • CARDIAC CATHETERIZATION N/A 7/24/2020    Procedure: Left Heart Cath;  Surgeon: Robert Deluna MD;  Location:  [facility] CATH INVASIVE LOCATION;  Service: Cardiovascular;  Laterality: N/A;   • COLONOSCOPY     • KNEE SURGERY      meniscus         Current Outpatient Medications on File Prior to Visit   Medication Sig Dispense Refill   • amLODIPine (NORVASC) 5 MG tablet Take 1 tablet by mouth Daily. 90 tablet 3   • aspirin 81 MG chewable tablet Chew 81 mg Daily.     • atorvastatin (LIPITOR) 20 MG tablet Take 0.5 tablets by mouth Daily. 90 tablet 3   • carvedilol (COREG) 25 MG tablet Take 1 tablet by mouth 2 (Two) Times a Day. 180 tablet 3   • Empagliflozin (Jardiance) 25 MG tablet Take 25 mg by mouth Daily. 30 tablet 11   • sacubitril-valsartan (Entresto) 49-51 MG tablet Take 1 tablet by mouth 2 (Two) Times a Day. 60 tablet 11   • spironolactone (ALDACTONE) 25 MG tablet Take 1 tablet by mouth Daily. 90 tablet 3     No current facility-administered medications on file prior to visit.         ALLERGIES:  No Known Allergies     Social History     Socioeconomic History   • Marital status:      Spouse name: Jyoti   • Number of children: Not on file   • Years of education: Not on file   • Highest education level: Not on file   Occupational History     Employer: [employer]   Tobacco Use   • Smoking status: Never Smoker   • Smokeless tobacco: Never Used   Substance and Sexual Activity   • Alcohol use: Yes     Alcohol/week: 2.0 standard drinks     Types: 1 Cans of beer, 1 Shots of liquor per week     Comment: Caffeine use: 1 cup daily   • Drug use: Never   • Sexual activity: Defer        No family history on file.     Review of Systems   Constitutional: Negative for activity change, chills, fatigue and fever.   HENT: Negative for mouth sores, trouble swallowing and  "voice change.    Eyes: Negative for pain and visual disturbance.   Respiratory: Negative for cough, shortness of breath and wheezing.    Cardiovascular: Negative for chest pain and palpitations.   Gastrointestinal: Negative for abdominal pain, constipation, diarrhea, nausea and vomiting.   Genitourinary: Negative for difficulty urinating, frequency and urgency.   Musculoskeletal: Negative for arthralgias and joint swelling.   Skin: Negative for rash.   Neurological: Negative for dizziness, seizures, weakness and headaches.   Hematological: Negative for adenopathy. Does not bruise/bleed easily.   Psychiatric/Behavioral: Negative for behavioral problems and confusion. The patient is not nervous/anxious.    Unchanged 11/13/2020    Objective     Vitals:    11/13/20 0747   BP: 117/70   Pulse: 53   Resp: 16   Temp: 97.3 °F (36.3 °C)   TempSrc: Skin   SpO2: 98%   Weight: 113 kg (249 lb 12.8 oz)   Height: 175 cm (68.9\")   PainSc: 0-No pain     Current Status 10/30/2020   ECOG score 1       Physical Exam  Constitutional:       General: He is not in acute distress.     Appearance: He is well-developed.   HENT:      Head: Normocephalic.   Eyes:      General: No scleral icterus.     Conjunctiva/sclera: Conjunctivae normal.      Pupils: Pupils are equal, round, and reactive to light.   Neck:      Musculoskeletal: Normal range of motion and neck supple.      Thyroid: No thyromegaly.      Vascular: No JVD.   Cardiovascular:      Rate and Rhythm: Normal rate and regular rhythm.      Heart sounds: No murmur. No friction rub. No gallop.    Pulmonary:      Effort: Pulmonary effort is normal.      Breath sounds: Normal breath sounds. No wheezing or rales.   Abdominal:      General: There is no distension.      Palpations: Abdomen is soft. There is no mass.      Tenderness: There is no abdominal tenderness.   Musculoskeletal: Normal range of motion.         General: No deformity.   Lymphadenopathy:      Cervical: No cervical adenopathy. "   Skin:     General: Skin is warm and dry.      Findings: No erythema or rash.   Neurological:      Mental Status: He is alert and oriented to person, place, and time.      Cranial Nerves: No cranial nerve deficit.      Deep Tendon Reflexes: Reflexes are normal and symmetric.   Psychiatric:         Behavior: Behavior normal.         Judgment: Judgment normal.     Unchanged 11/13/2020    RECENT LABS:  Hematology WBC   Date Value Ref Range Status   11/13/2020 6.26 3.40 - 10.80 10*3/mm3 Final     RBC   Date Value Ref Range Status   11/13/2020 4.54 4.14 - 5.80 10*6/mm3 Final     Hemoglobin   Date Value Ref Range Status   11/13/2020 13.5 13.0 - 17.7 g/dL Final     Hematocrit   Date Value Ref Range Status   11/13/2020 40.8 37.5 - 51.0 % Final     Platelets   Date Value Ref Range Status   11/13/2020 178 140 - 450 10*3/mm3 Final            Lupus Anticoagulant Reflex  Comment:    Comment: No lupus anticoagulant was detected.     Protein S Ag, Free   49.0 - 138.0 % 98.0     Protein S Functional   70 - 127 % 85      Protein C Activity   86 - 163 % 119      Homocysteine, Plasma (Quant)   0.0 - 15.0 umol/L 14.8     Folate   4.78 - 24.20 ng/mL 6.78      Vitamin B-12   211 - 946 pg/mL 372      Antithrombin Activity   90 - 134 % 103      Analysis Comment    Comment: NEGATIVE   No mutation identified.     Factor V Leiden Comment    Comment: Result:  Negative (no mutation found)      Beta-2 Glyco 1 IgG   0 - 20 GPI IgG units <9      Beta-2 Glyco 1 IgM   0 - 32 GPI IgM units <9      Anticardiolipin IgG   0 - 14 GPL U/mL <9      Anticardiolipin IgM   0 - 12 MPL U/mL 15High      Lab Results   Component Value Date    IRON 75 10/30/2020    TIBC 371 10/30/2020    FERRITIN 183.40 10/30/2020     Lab Results   Component Value Date    FCYIBSXF15 372 10/30/2020     Lab Results   Component Value Date    FOLATE 6.78 10/30/2020       TT ECHO 10/28/2020  Interpretation Summary    · Left ventricular ejection fraction appears to be 36 - 40%. Left  ventricular systolic function is moderately decreased.The left ventricular cavity is mildly dilated.Left ventricular wall thickness is consistent with severe eccentric hypertrophy.  · Abnormal regional wall motion. Please see wall motion score index.  · Left ventricular diastolic function is consistent with (grade I) impaired relaxation.  · Estimated right ventricular systolic pressure from tricuspid regurgitation is normal (<35 mmHg).       MRI ABDOMEN 8/17/2020  IMPRESSION:  1. Evolving renal infarct at the lower pole of the left kidney.  2. Small incidental hypointense lesion within the pancreatic tail  favored to represent a small lipoma.    Assessment/Plan   1.  Left renal infarct.  The patient does not have any significant abnormalities on his laboratory thrombophilia work-up but has been found to have a cardiomyopathy on echocardiogram which would suggest this is likely an embolic phenomenon.  2.  Cardiomyopathy.  Patient is currently followed actively by Dr. Graff cardiology and has had improvement on his echocardiogram.  He is taking Entresto and baby aspirin.  3.  Renal insufficiency followed by Dr. Castillo.  4.  Adult onset diabetes  5. Mild anemia. No evidence of deficiency state. Hemoglobin today is normal at 13.5.    Recommendations  1.  We reviewed the results of the hypercoagulable labs with the patient and explained that we don't see any significant thrombophilic defects and still feel that his renal infarct was likely embolic in nature.   2.  For now we would recommend remaining on 81 mg aspirin daily.  3. We have not scheduled any regular follow-up in our office and would defer to cardiology whether or not they ultimately feel he will require anticoagulation.    Thanks for allowing us to see this very nice gentleman in consultation.

## 2020-11-14 ENCOUNTER — HOSPITAL ENCOUNTER (EMERGENCY)
Facility: HOSPITAL | Age: 55
Discharge: HOME | End: 2020-11-14
Attending: EMERGENCY MEDICINE | Admitting: EMERGENCY MEDICINE

## 2020-11-14 ENCOUNTER — APPOINTMENT (OUTPATIENT)
Dept: GENERAL RADIOLOGY | Facility: HOSPITAL | Age: 55
End: 2020-11-14

## 2020-11-14 VITALS
SYSTOLIC BLOOD PRESSURE: 111 MMHG | OXYGEN SATURATION: 99 % | HEART RATE: 50 BPM | HEIGHT: 70 IN | WEIGHT: 251 LBS | TEMPERATURE: 98 F | BODY MASS INDEX: 35.93 KG/M2 | DIASTOLIC BLOOD PRESSURE: 91 MMHG | RESPIRATION RATE: 18 BRPM

## 2020-11-14 DIAGNOSIS — R55 SYNCOPE AND COLLAPSE: Primary | ICD-10-CM

## 2020-11-14 LAB
ALBUMIN SERPL-MCNC: 4 G/DL (ref 3.5–5.2)
ALBUMIN/GLOB SERPL: 1.7 G/DL
ALP SERPL-CCNC: 51 U/L (ref 39–117)
ALT SERPL W P-5'-P-CCNC: 25 U/L (ref 1–41)
ANION GAP SERPL CALCULATED.3IONS-SCNC: 10.2 MMOL/L (ref 5–15)
AST SERPL-CCNC: 23 U/L (ref 1–40)
BASOPHILS # BLD AUTO: 0.04 10*3/MM3 (ref 0–0.2)
BASOPHILS NFR BLD AUTO: 0.7 % (ref 0–1.5)
BILIRUB SERPL-MCNC: 0.4 MG/DL (ref 0–1.2)
BUN SERPL-MCNC: 14 MG/DL (ref 6–20)
BUN/CREAT SERPL: 12.4 (ref 7–25)
CALCIUM SPEC-SCNC: 8.3 MG/DL (ref 8.6–10.5)
CHLORIDE SERPL-SCNC: 106 MMOL/L (ref 98–107)
CO2 SERPL-SCNC: 21.8 MMOL/L (ref 22–29)
CREAT SERPL-MCNC: 1.13 MG/DL (ref 0.76–1.27)
DEPRECATED RDW RBC AUTO: 47.6 FL (ref 37–54)
EOSINOPHIL # BLD AUTO: 0.24 10*3/MM3 (ref 0–0.4)
EOSINOPHIL NFR BLD AUTO: 4.1 % (ref 0.3–6.2)
ERYTHROCYTE [DISTWIDTH] IN BLOOD BY AUTOMATED COUNT: 14.4 % (ref 12.3–15.4)
GFR SERPL CREATININE-BSD FRML MDRD: 81 ML/MIN/1.73
GLOBULIN UR ELPH-MCNC: 2.3 GM/DL
GLUCOSE SERPL-MCNC: 136 MG/DL (ref 65–99)
HCT VFR BLD AUTO: 38.5 % (ref 37.5–51)
HGB BLD-MCNC: 12.4 G/DL (ref 13–17.7)
HOLD SPECIMEN: NORMAL
HOLD SPECIMEN: NORMAL
IMM GRANULOCYTES # BLD AUTO: 0.04 10*3/MM3 (ref 0–0.05)
IMM GRANULOCYTES NFR BLD AUTO: 0.7 % (ref 0–0.5)
LYMPHOCYTES # BLD AUTO: 2.89 10*3/MM3 (ref 0.7–3.1)
LYMPHOCYTES NFR BLD AUTO: 49.6 % (ref 19.6–45.3)
MAGNESIUM SERPL-MCNC: 2.1 MG/DL (ref 1.6–2.6)
MCH RBC QN AUTO: 29.2 PG (ref 26.6–33)
MCHC RBC AUTO-ENTMCNC: 32.2 G/DL (ref 31.5–35.7)
MCV RBC AUTO: 90.8 FL (ref 79–97)
MONOCYTES # BLD AUTO: 0.46 10*3/MM3 (ref 0.1–0.9)
MONOCYTES NFR BLD AUTO: 7.9 % (ref 5–12)
NEUTROPHILS NFR BLD AUTO: 2.16 10*3/MM3 (ref 1.7–7)
NEUTROPHILS NFR BLD AUTO: 37 % (ref 42.7–76)
NRBC BLD AUTO-RTO: 0 /100 WBC (ref 0–0.2)
NT-PROBNP SERPL-MCNC: 421.5 PG/ML (ref 0–900)
PLATELET # BLD AUTO: 191 10*3/MM3 (ref 140–450)
PMV BLD AUTO: 11.4 FL (ref 6–12)
POTASSIUM SERPL-SCNC: 4.1 MMOL/L (ref 3.5–5.2)
PROT SERPL-MCNC: 6.3 G/DL (ref 6–8.5)
QT INTERVAL: 444 MS
QT INTERVAL: 461 MS
RBC # BLD AUTO: 4.24 10*6/MM3 (ref 4.14–5.8)
SODIUM SERPL-SCNC: 138 MMOL/L (ref 136–145)
TROPONIN T SERPL-MCNC: <0.01 NG/ML (ref 0–0.03)
TROPONIN T SERPL-MCNC: <0.01 NG/ML (ref 0–0.03)
TSH SERPL DL<=0.05 MIU/L-ACNC: 3.43 UIU/ML (ref 0.27–4.2)
WBC # BLD AUTO: 5.83 10*3/MM3 (ref 3.4–10.8)
WHOLE BLOOD HOLD SPECIMEN: NORMAL
WHOLE BLOOD HOLD SPECIMEN: NORMAL

## 2020-11-14 PROCEDURE — 99285 EMERGENCY DEPT VISIT HI MDM: CPT

## 2020-11-14 PROCEDURE — 93005 ELECTROCARDIOGRAM TRACING: CPT | Performed by: EMERGENCY MEDICINE

## 2020-11-14 PROCEDURE — 83880 ASSAY OF NATRIURETIC PEPTIDE: CPT | Performed by: EMERGENCY MEDICINE

## 2020-11-14 PROCEDURE — 83735 ASSAY OF MAGNESIUM: CPT | Performed by: EMERGENCY MEDICINE

## 2020-11-14 PROCEDURE — 85025 COMPLETE CBC W/AUTO DIFF WBC: CPT

## 2020-11-14 PROCEDURE — 93010 ELECTROCARDIOGRAM REPORT: CPT | Performed by: INTERNAL MEDICINE

## 2020-11-14 PROCEDURE — 99284 EMERGENCY DEPT VISIT MOD MDM: CPT

## 2020-11-14 PROCEDURE — 93005 ELECTROCARDIOGRAM TRACING: CPT

## 2020-11-14 PROCEDURE — 80053 COMPREHEN METABOLIC PANEL: CPT | Performed by: EMERGENCY MEDICINE

## 2020-11-14 PROCEDURE — 84443 ASSAY THYROID STIM HORMONE: CPT | Performed by: EMERGENCY MEDICINE

## 2020-11-14 PROCEDURE — 84484 ASSAY OF TROPONIN QUANT: CPT | Performed by: EMERGENCY MEDICINE

## 2020-11-14 PROCEDURE — 71045 X-RAY EXAM CHEST 1 VIEW: CPT

## 2020-11-14 PROCEDURE — 36415 COLL VENOUS BLD VENIPUNCTURE: CPT

## 2020-11-14 RX ORDER — SODIUM CHLORIDE 0.9 % (FLUSH) 0.9 %
10 SYRINGE (ML) INJECTION AS NEEDED
Status: DISCONTINUED | OUTPATIENT
Start: 2020-11-14 | End: 2020-11-14 | Stop reason: HOSPADM

## 2020-11-14 RX ADMIN — SODIUM CHLORIDE 250 ML: 9 INJECTION, SOLUTION INTRAVENOUS at 08:55

## 2020-11-14 RX ADMIN — SODIUM CHLORIDE 1000 ML: 9 INJECTION, SOLUTION INTRAVENOUS at 06:46

## 2020-11-14 NOTE — DISCHARGE INSTRUCTIONS
You are advised to follow closely with Dr. Graff in 2-3 days for recheck, final results of lab work and imaging testing, and further testing/treatment as needed.    Drink plenty of fluids.  Low-sodium diet.  , Regular meals with frequent snacks.  Take your medications on a regular schedule with food.    Alcohol in moderation.  Regular sleep patterns-at least 7 hours of sleep daily.      Please return to the emergency department immediately with chest pain different than usual for you, shortness of air, abdominal pain, persistent vomiting/fever, blood in emesis or stool, lightheadedness/fainting, problems with speech, one sided weakness/numbness, new incontinence, problems with vision, altered mental status or for worsening of symptoms or other concerns.

## 2020-11-14 NOTE — ED NOTES
Ambulated pt while monitoring O2 and HR in hallway. HR maintained 80's bpm and O2 was 97> %    Pt had steady gait with no complaints. MD notified of results.      Gloria Castellanos, RN  11/14/20 3230

## 2020-11-14 NOTE — ED TRIAGE NOTES
"Wife witnessed syncopal episode this morning while pt standing in kitchen. Pt denies hitting head, positive for LOC. EMS arrived on scene, pt awake and alert stating \"I feel like I;m gonna pass out again.\" EMS observed  HR in 20's 0602 Atropine 0.5 administered by EMS on scene. HR in 60's en route. Pt HR 60 upon arrival. Pt AOX4, answering appropriately, denies any pain and no s/s of acute distress at this time.  "

## 2020-11-14 NOTE — ED PROVIDER NOTES
EMERGENCY DEPARTMENT ENCOUNTER    CHIEF COMPLAINT  Chief Complaint: Syncope  History given by: Patient and family  History limited by: Nothing  Room Number: 16/16  PMD: Oneil Decker MD  Hematologist: Dr. Zaldivar  Cardiologist: Dr. Bernadine Graff  Urologist: Dr. Carreon    HPI:  Pt is a 55 y.o. male presents with report of syncope at approximately 5:30 AM this morning.  Patient reports he had been asleep approximately 2 and half hours, woke up with his wife prior to her going to work felt lightheaded and his wife reports he slid down the cabinets and was unresponsive for 2 to 3 seconds before returning to consciousness.  EMS reports on arrival he was bradycardic with a heart rate in the 20s and they gave him atropine 0.5.  Patient arrives with a heart rate in the 60s and blood pressure above 100 systolic.  Patient denies palpitations, chest pain, shortness of air, unilateral weakness or numbness, abdominal pain, nausea/vomiting, fever, cough, swelling of extremities.  Patient reports he is taking his medications as directed.  The last time he ate was 1:30 PM yesterday and had 4 drinks last night before going to bed at approximately 3 AM.      Duration: Few seconds  Associated Symptoms: Lightheaded, nauseated  Aggravating Factors: None  Alleviating Factors: Atropine  Treatment before arrival: Atropine    Upon review of chart it is noted patient:  Patient with history of cardiomyopathy, nonischemic on Coreg, left renal infarct PVCs and nonsustained VT and history of nonsustained SVT echo done 1026 8/20 the EF of 35 to 40%  Obstructive sleep apnea    PAST MEDICAL HISTORY  Active Ambulatory Problems     Diagnosis Date Noted   • Essential hypertension 08/29/2018   • Mixed hyperlipidemia 08/29/2018   • Low libido 12/31/2018   • Nonischemic cardiomyopathy (CMS/HCC) 07/21/2020   • Type 2 diabetes mellitus without complication, without long-term current use of insulin (CMS/HCC) 07/21/2020   • Class 2 severe obesity with  serious comorbidity and body mass index (BMI) of 36.0 to 36.9 in adult (CMS/HCC) 08/31/2020   • Renal infarct (CMS/HCC) 09/01/2020   • Renal calculi      Resolved Ambulatory Problems     Diagnosis Date Noted   • Diabetes mellitus (CMS/HCC) 08/29/2018     Past Medical History:   Diagnosis Date   • Cardiomyopathy (CMS/HCC)    • Colon polyp    • Hyperlipidemia    • Hypertension    • Sleep apnea        PAST SURGICAL HISTORY  Past Surgical History:   Procedure Laterality Date   • CARDIAC CATHETERIZATION N/A 7/24/2020    Procedure: Coronary angiography;  Surgeon: Robert Deluna MD;  Location:  [facility];  Service: Cardiovascular;  Laterality: N/A;   • CARDIAC CATHETERIZATION N/A 7/24/2020    Procedure: Left Heart Cath;  Surgeon: Robert Deluna MD;  Location:  [facility];  Service: Cardiovascular;  Laterality: N/A;   • COLONOSCOPY     • KNEE SURGERY      meniscus        FAMILY HISTORY  History reviewed. No pertinent family history.    SOCIAL HISTORY  Social History     Socioeconomic History   • Marital status:      Spouse name: Jyoti   • Number of children: Not on file   • Years of education: Not on file   • Highest education level: Not on file   Occupational History     Employer: [employer] [place]   Tobacco Use   • Smoking status: Never Smoker   • Smokeless tobacco: Never Used   Substance and Sexual Activity   • Alcohol use: Yes     Alcohol/week: 2.0 standard drinks     Types: 1 Cans of beer, 1 Shots of liquor per week     Comment: Caffeine use: 1 cup daily   • Drug use: Never   • Sexual activity: Defer       ALLERGIES  Patient has no known allergies.    REVIEW OF SYSTEMS  Review of Systems   Constitutional: Negative for chills and fever.   HENT: Negative for sore throat and trouble swallowing.    Eyes: Negative for visual disturbance.   Respiratory: Negative for cough and shortness of breath.    Cardiovascular: Negative for chest pain and leg swelling.    Gastrointestinal: Negative for abdominal pain, diarrhea and vomiting.   Endocrine: Negative.    Genitourinary: Negative for decreased urine volume and frequency.   Musculoskeletal: Negative for neck pain.   Skin: Negative for rash.   Allergic/Immunologic: Negative.    Neurological: Positive for syncope and light-headedness. Negative for weakness and numbness.   Hematological: Negative.    Psychiatric/Behavioral: Negative.    All other systems reviewed and are negative.      PHYSICAL EXAM  ED Triage Vitals   Temp Heart Rate Resp BP SpO2   11/14/20 0639 11/14/20 0635 11/14/20 0639 11/14/20 0635 11/14/20 0635   98 °F (36.7 °C) 57 18 94/62 96 %      Temp src Heart Rate Source Patient Position BP Location FiO2 (%)   11/14/20 0639 -- -- -- --   Oral           Physical Exam   Constitutional: He is oriented to person, place, and time. He appears distressed.   HENT:   Head: Normocephalic and atraumatic.   Eyes: EOM are normal.   Neck: Normal range of motion.   Cardiovascular: Normal rate, regular rhythm and normal heart sounds.   No murmur heard.  Pulses:       Posterior tibial pulses are 2+ on the right side and 2+ on the left side.   Pulmonary/Chest: Effort normal and breath sounds normal. No respiratory distress. He has no wheezes.   Abdominal: Soft. Bowel sounds are normal. There is no abdominal tenderness. There is no rebound and no guarding.   Musculoskeletal: Normal range of motion.         General: No edema.   Neurological: He is alert and oriented to person, place, and time.   Skin: Skin is warm and dry.   Psychiatric: Affect normal.   Nursing note and vitals reviewed.      LAB RESULTS  Lab Results (last 24 hours)     Procedure Component Value Units Date/Time    CBC & Differential [[id number]]  (Abnormal) Collected: 11/14/20 0638    Specimen: Blood Updated: 11/14/20 0654    Narrative:      The following orders were created for panel order CBC & Differential.  Procedure                               Abnormality          Status                     ---------                               -----------         ------                     CBC Auto Differential[[id number]]        Abnormal            Final result                 Please view results for these tests on the individual orders.    Comprehensive Metabolic Panel [[id number]]  (Abnormal) Collected: 11/14/20 0638    Specimen: Blood Updated: 11/14/20 0714     Glucose 136 mg/dL      BUN 14 mg/dL      Creatinine 1.13 mg/dL      Sodium 138 mmol/L      Potassium 4.1 mmol/L      Chloride 106 mmol/L      CO2 21.8 mmol/L      Calcium 8.3 mg/dL      Total Protein 6.3 g/dL      Albumin 4.00 g/dL      ALT (SGPT) 25 U/L      AST (SGOT) 23 U/L      Alkaline Phosphatase 51 U/L      Total Bilirubin 0.4 mg/dL      eGFR   Amer 81 mL/min/1.73      Globulin 2.3 gm/dL      A/G Ratio 1.7 g/dL      BUN/Creatinine Ratio 12.4     Anion Gap 10.2 mmol/L     Narrative:      GFR Normal >60  Chronic Kidney Disease <60  Kidney Failure <15      Troponin [[id number]]  (Normal) Collected: 11/14/20 0638    Specimen: Blood Updated: 11/14/20 0713     Troponin T <0.010 ng/mL     Narrative:      Troponin T Reference Range:  <= 0.03 ng/mL-   Negative for AMI  >0.03 ng/mL-     Abnormal for myocardial necrosis.  Clinicians would have to utilize clinical acumen, EKG, Troponin and serial changes to determine if it is an Acute Myocardial Infarction or myocardial injury due to an underlying chronic condition.       Results may be falsely decreased if patient taking Biotin.      CBC Auto Differential [[id number]]  (Abnormal) Collected: 11/14/20 0638    Specimen: Blood Updated: 11/14/20 0654     WBC 5.83 10*3/mm3      RBC 4.24 10*6/mm3      Hemoglobin 12.4 g/dL      Hematocrit 38.5 %      MCV 90.8 fL      MCH 29.2 pg      MCHC 32.2 g/dL      RDW 14.4 %      RDW-SD 47.6 fl      MPV 11.4 fL      Platelets 191 10*3/mm3      Neutrophil % 37.0 %      Lymphocyte % 49.6 %      Monocyte % 7.9 %      Eosinophil % 4.1 %       Basophil % 0.7 %      Immature Grans % 0.7 %      Neutrophils, Absolute 2.16 10*3/mm3      Lymphocytes, Absolute 2.89 10*3/mm3      Monocytes, Absolute 0.46 10*3/mm3      Eosinophils, Absolute 0.24 10*3/mm3      Basophils, Absolute 0.04 10*3/mm3      Immature Grans, Absolute 0.04 10*3/mm3      nRBC 0.0 /100 WBC     BNP [[id number]]  (Normal) Collected: 11/14/20 0638    Specimen: Blood Updated: 11/14/20 0732     proBNP 421.5 pg/mL     Narrative:      Among patients with dyspnea, NT-proBNP is highly sensitive for the detection of acute congestive heart failure. In addition NT-proBNP of <300 pg/ml effectively rules out acute congestive heart failure with 99% negative predictive value.    Results may be falsely decreased if patient taking Biotin.      Magnesium [[id number]]  (Normal) Collected: 11/14/20 0638    Specimen: Blood Updated: 11/14/20 0719     Magnesium 2.1 mg/dL     TSH [[id number]]  (Normal) Collected: 11/14/20 0638    Specimen: Blood Updated: 11/14/20 0732     TSH 3.430 uIU/mL     Troponin [[id number]]  (Normal) Collected: 11/14/20 0848    Specimen: Blood Updated: 11/14/20 0928     Troponin T <0.010 ng/mL     Narrative:      Troponin T Reference Range:  <= 0.03 ng/mL-   Negative for AMI  >0.03 ng/mL-     Abnormal for myocardial necrosis.  Clinicians would have to utilize clinical acumen, EKG, Troponin and serial changes to determine if it is an Acute Myocardial Infarction or myocardial injury due to an underlying chronic condition.       Results may be falsely decreased if patient taking Biotin.            I ordered the above labs and reviewed the results    RADIOLOGY  XR Chest 1 View   Final Result      No acute disease    I ordered the above noted radiological studies. Interpreted by radiologist. Viewed by me in PACS.       PROCEDURES  Procedures      PROGRESS AND CONSULTS  ED Course as of Nov 14 1610   Sat Nov 14, 2020   0843 Discussed with Dr. Bernadine Graff who is aware of patient's presentation, labs,  imaging history he is well-known to her and she is aware of report of low heart rate by EMS without a rhythm strip/objective findings of this.  She does not feel patient needs to be admitted nor his medications adjusted.  Given hold her for 2 weeks in September, she does not feel patient needs Holter today.  She reports she will have a follow closely in the office this week for recheck and recommends taking his meds with food,  regular sleep, and close outpatient follow-up    [TO]   0916 ED tech reports patient ambulates well no with difficulty, lightheadedness or symptomatology.RN reports patient is not orthostatic and occasionally drops momentarily to a heart rate in the 40s as noted on monitor in the ER but largely in the 50s and 60s    [TO]      ED Course User Index  [TO] Gena Rocha MD        EKG          EKG time: 06 33  Rhythm/Rate: Sinus rhythm, rate in the 50s  P waves and IA: Normal P waves, normal DC's  QRS, axis: Unremarkable  ST and T waves: Nonspecific ST/T wave findings    Interpreted Contemporaneously by me, independently viewed  unchanged compared to prior 11/3/2020    EKG          EKG time: 0905  Rhythm/Rate: Sinus rhythm, rate in the 50s  P waves and IA: Normal P waves, normal DC's  QRS, axis: Unremarkable  ST and T waves: Nonspecific ST/T wave findings    Interpreted Contemporaneously by me, independently viewed  unchanged compared to prior today    Patient voices understanding of my discussion with Dr. Delarosa, plan for close outpatient follow-up, no adjustment to medications and need for regular sleep patterns, regular meals, hydration with noncaffeinated beverages and alcohol only in moderation and agrees he is comfortable with outpatient follow-up for further testing, treatment as needed.      MEDICAL DECISION MAKING  Results were reviewed/discussed with the patient and they were also made aware of online access. Pt also made aware that some labs, such as cultures, will not be resulted  during ER visit and follow up with PMD is necessary.       MDM       DIAGNOSIS  Final diagnoses:   Syncope and collapse       DISPOSITION  DISCHARGE    Patient discharged in stable condition.    Reviewed implications of results, diagnosis, meds, responsibility to follow up, warning signs and symptoms of possible worsening, potential complications and reasons to return to ER, including altered mental status, chest pain, recurrent passing out, other concerns.    Patient/Family voiced understanding of above instructions.    Discussed plan for discharge, as there is no emergent indication for admission. Patient referred to primary care provider for BP management due to today's BP. Pt/family is agreeable and understands need for follow up and repeat testing.  Pt is aware that discharge does not mean that nothing is wrong but it indicates no emergency is present that requires admission and they must continue care with follow-up as given below or physician of their choice.     FOLLOW-UP  Bernadine Graff MD  [address] [id number] [phone]    Schedule an appointment as soon as possible for a visit in 3 days  EVEN IF WELL         Medication List      No changes were made to your prescriptions during this visit.           Latest Documented Vital Signs:  As of 16:10 EST  BP- 111/91 HR- 50 Temp- 98 °F (36.7 °C) (Oral) O2 sat- 99%    --  Patient was wearing facemask when I entered the room and throughout our encounter. Full protective equipment was worn throughout this patient encounter including a face mask, eye protection and gloves. Hand hygiene was performed before donning protective equipment and after removal when leaving the room.      Gena Rocha MD  11/14/20 [id number]

## 2020-11-19 ENCOUNTER — OFFICE VISIT (OUTPATIENT)
Dept: INTERNAL MEDICINE | Facility: CLINIC | Age: 55
End: 2020-11-19

## 2020-11-19 DIAGNOSIS — Z12.5 SCREENING FOR PROSTATE CANCER: ICD-10-CM

## 2020-11-19 DIAGNOSIS — I10 ESSENTIAL HYPERTENSION: ICD-10-CM

## 2020-11-19 DIAGNOSIS — E78.2 MIXED HYPERLIPIDEMIA: ICD-10-CM

## 2020-11-19 DIAGNOSIS — E11.9 TYPE 2 DIABETES MELLITUS WITHOUT COMPLICATION, WITHOUT LONG-TERM CURRENT USE OF INSULIN (HCC): Primary | ICD-10-CM

## 2020-11-19 PROCEDURE — 99214 OFFICE O/P EST MOD 30 MIN: CPT | Performed by: FAMILY MEDICINE

## 2020-11-20 ENCOUNTER — LAB (OUTPATIENT)
Dept: LAB | Facility: HOSPITAL | Age: 55
End: 2020-11-20

## 2020-11-20 LAB
ALBUMIN SERPL-MCNC: 4.4 G/DL (ref 3.5–5.2)
ALBUMIN/GLOB SERPL: 1.7 G/DL
ALP SERPL-CCNC: 55 U/L (ref 39–117)
ALT SERPL W P-5'-P-CCNC: 23 U/L (ref 1–41)
ANION GAP SERPL CALCULATED.3IONS-SCNC: 6.5 MMOL/L (ref 5–15)
AST SERPL-CCNC: 20 U/L (ref 1–40)
BASOPHILS # BLD AUTO: 0.03 10*3/MM3 (ref 0–0.2)
BASOPHILS NFR BLD AUTO: 0.6 % (ref 0–1.5)
BILIRUB SERPL-MCNC: 0.3 MG/DL (ref 0–1.2)
BUN SERPL-MCNC: 17 MG/DL (ref 6–20)
BUN/CREAT SERPL: 13 (ref 7–25)
CALCIUM SPEC-SCNC: 9.3 MG/DL (ref 8.6–10.5)
CHLORIDE SERPL-SCNC: 107 MMOL/L (ref 98–107)
CHOLEST SERPL-MCNC: 129 MG/DL (ref 0–200)
CO2 SERPL-SCNC: 27.5 MMOL/L (ref 22–29)
CREAT SERPL-MCNC: 1.31 MG/DL (ref 0.76–1.27)
DEPRECATED RDW RBC AUTO: 46 FL (ref 37–54)
EOSINOPHIL # BLD AUTO: 0.15 10*3/MM3 (ref 0–0.4)
EOSINOPHIL NFR BLD AUTO: 2.8 % (ref 0.3–6.2)
ERYTHROCYTE [DISTWIDTH] IN BLOOD BY AUTOMATED COUNT: 14.1 % (ref 12.3–15.4)
GFR SERPL CREATININE-BSD FRML MDRD: 68 ML/MIN/1.73
GLOBULIN UR ELPH-MCNC: 2.6 GM/DL
GLUCOSE SERPL-MCNC: 163 MG/DL (ref 65–99)
HBA1C MFR BLD: 6.46 % (ref 4.8–5.6)
HCT VFR BLD AUTO: 39.7 % (ref 37.5–51)
HDLC SERPL-MCNC: 52 MG/DL (ref 40–60)
HGB BLD-MCNC: 13.2 G/DL (ref 13–17.7)
IMM GRANULOCYTES # BLD AUTO: 0.02 10*3/MM3 (ref 0–0.05)
IMM GRANULOCYTES NFR BLD AUTO: 0.4 % (ref 0–0.5)
LDLC SERPL CALC-MCNC: 52 MG/DL (ref 0–100)
LDLC/HDLC SERPL: 0.92 {RATIO}
LYMPHOCYTES # BLD AUTO: 2.02 10*3/MM3 (ref 0.7–3.1)
LYMPHOCYTES NFR BLD AUTO: 37.3 % (ref 19.6–45.3)
MCH RBC QN AUTO: 29.9 PG (ref 26.6–33)
MCHC RBC AUTO-ENTMCNC: 33.2 G/DL (ref 31.5–35.7)
MCV RBC AUTO: 89.8 FL (ref 79–97)
MONOCYTES # BLD AUTO: 0.43 10*3/MM3 (ref 0.1–0.9)
MONOCYTES NFR BLD AUTO: 7.9 % (ref 5–12)
NEUTROPHILS NFR BLD AUTO: 2.76 10*3/MM3 (ref 1.7–7)
NEUTROPHILS NFR BLD AUTO: 51 % (ref 42.7–76)
NRBC BLD AUTO-RTO: 0 /100 WBC (ref 0–0.2)
PLATELET # BLD AUTO: 203 10*3/MM3 (ref 140–450)
PMV BLD AUTO: 11.6 FL (ref 6–12)
POTASSIUM SERPL-SCNC: 4.6 MMOL/L (ref 3.5–5.2)
PROT SERPL-MCNC: 7 G/DL (ref 6–8.5)
PSA SERPL-MCNC: 0.47 NG/ML (ref 0–4)
RBC # BLD AUTO: 4.42 10*6/MM3 (ref 4.14–5.8)
SODIUM SERPL-SCNC: 141 MMOL/L (ref 136–145)
TRIGL SERPL-MCNC: 147 MG/DL (ref 0–150)
VLDLC SERPL-MCNC: 25 MG/DL (ref 5–40)
WBC # BLD AUTO: 5.41 10*3/MM3 (ref 3.4–10.8)

## 2020-11-20 PROCEDURE — 80053 COMPREHEN METABOLIC PANEL: CPT | Performed by: FAMILY MEDICINE

## 2020-11-20 PROCEDURE — G0103 PSA SCREENING: HCPCS | Performed by: FAMILY MEDICINE

## 2020-11-20 PROCEDURE — 80061 LIPID PANEL: CPT | Performed by: FAMILY MEDICINE

## 2020-11-20 PROCEDURE — 85025 COMPLETE CBC W/AUTO DIFF WBC: CPT | Performed by: FAMILY MEDICINE

## 2020-11-20 PROCEDURE — 83036 HEMOGLOBIN GLYCOSYLATED A1C: CPT | Performed by: FAMILY MEDICINE

## 2020-11-20 PROCEDURE — 36415 COLL VENOUS BLD VENIPUNCTURE: CPT | Performed by: FAMILY MEDICINE

## 2020-11-22 NOTE — PROGRESS NOTES
Brandi Munoz is a 55 y.o. male.     No chief complaint on file.  cc dm2    This visit has been rescheduled as a phone visit to comply with patient safety concerns in accordance with CDC recommendations. Total time of discussion was 15 minutes.    You have chosen to receive care through a telephone visit. Do you consent to use a telephone visit for your medical care today? Yes        History of Present Illness     Patient has a hx of essential hypertension. Patient is currently taking amlodipine 5mg daily, coreg 25mg bid, spironalactone 25mg daily, and entresto bid.     Patient has a hx of hyperlipidemia. Patient is taking lipitor 20mg daily. Patient denies any side effects of the medication.    Patient has as hx of DM2. Patient has stopped the janumet and has been started on jardiance 25mg daily. Patient denies any side effects of the medication.     The following portions of the patient's history were reviewed and updated as appropriate: allergies, current medications, past family history, past medical history, past social history, past surgical history and problem list.    Review of Systems   Constitutional: Negative for chills and fever.   HENT: Negative for congestion, rhinorrhea, sinus pain and sore throat.    Eyes: Negative for photophobia and visual disturbance.   Respiratory: Negative for cough, chest tightness and shortness of breath.    Cardiovascular: Negative for chest pain and palpitations.   Gastrointestinal: Negative for diarrhea, nausea and vomiting.   Genitourinary: Negative for dysuria, frequency and urgency.   Skin: Negative for rash and wound.   Neurological: Negative for dizziness and syncope.   Psychiatric/Behavioral: Negative for behavioral problems and confusion.       Objective   Physical Exam  Vitals signs and nursing note reviewed.   Constitutional:       Appearance: He is well-developed.   HENT:      Head: Normocephalic and atraumatic.   Neck:      Musculoskeletal: Normal  range of motion and neck supple.   Neurological:      Mental Status: He is alert and oriented to person, place, and time.   Psychiatric:         Behavior: Behavior normal.         There were no vitals filed for this visit.  There is no height or weight on file to calculate BMI.      Assessment/Plan   Diagnoses and all orders for this visit:    1. Type 2 diabetes mellitus without complication, without long-term current use of insulin (CMS/[facility]) (Primary)  -     Comprehensive Metabolic Panel  -     Hemoglobin A1c  -     Will check hba1c, and continue jardiance 25mg daily.     2. Mixed hyperlipidemia  -     Lipid Panel With LDL / HDL Ratio  -     Continue atorvastatin 20mg daily.     3. Essential hypertension  -     CBC & Differential  -     CBC Auto Differential  -     Continue spironolactone 25mg daily, coreg 25 bid, entresto, and amlodipine.     4. Screening for prostate cancer  -     PSA Screen          No follow-ups on file.    Dictated utilizing Dragon Voice Recognition Software

## 2020-11-22 NOTE — PROGRESS NOTES
Please inform the patient of the following abnormal results.  Kidney function is starting to increase, will need to keep monitoring, and follow up with nephrology. Continue taking jardiance, as hba1c is rising, but can monitor diet and exercise.

## 2020-11-23 ENCOUNTER — OFFICE VISIT (OUTPATIENT)
Dept: CARDIOLOGY | Facility: CLINIC | Age: 55
End: 2020-11-23

## 2020-11-23 VITALS
DIASTOLIC BLOOD PRESSURE: 70 MMHG | HEART RATE: 58 BPM | SYSTOLIC BLOOD PRESSURE: 130 MMHG | WEIGHT: 249.8 LBS | HEIGHT: 70 IN | BODY MASS INDEX: 35.76 KG/M2

## 2020-11-23 DIAGNOSIS — I10 ESSENTIAL HYPERTENSION: ICD-10-CM

## 2020-11-23 DIAGNOSIS — I42.8 NONISCHEMIC CARDIOMYOPATHY (HCC): Primary | ICD-10-CM

## 2020-11-23 DIAGNOSIS — E11.9 TYPE 2 DIABETES MELLITUS WITHOUT COMPLICATION, WITHOUT LONG-TERM CURRENT USE OF INSULIN (HCC): ICD-10-CM

## 2020-11-23 DIAGNOSIS — N28.0 RENAL INFARCT (HCC): ICD-10-CM

## 2020-11-23 DIAGNOSIS — E78.2 MIXED HYPERLIPIDEMIA: ICD-10-CM

## 2020-11-23 PROCEDURE — 99213 OFFICE O/P EST LOW 20 MIN: CPT | Performed by: INTERNAL MEDICINE

## 2020-11-23 PROCEDURE — 93000 ELECTROCARDIOGRAM COMPLETE: CPT | Performed by: INTERNAL MEDICINE

## 2020-11-23 NOTE — PROGRESS NOTES
Date of Office Visit: 2020  Encounter Provider: Bernadine Graff MD  Place of Service: [facility] CARDIOLOGY  Patient Name: Jhon Munoz  :1965      Patient ID:  Jhon Munoz is a 55 y.o. male is here for  followup for         History of Present Illness    He is here for new cardiomyopathy.  He has a history of sleep apnea using CPAP, hyperlipidemia, hypertension, diabetes mellitus type 2 on oral hypoglycemics, GERD.       He was having left lower quadrant pain.  He saw Dr. Decker and he had labs done 2020 show glucose 113, creatinine 1.49, magnesium 1.5, C-reactive protein 4.83, normal CBC.  CT abdomen pelvis done 2024 left lower quadrant pain to her left renal infarct involving the lower pole and midpole of the anterior cortex and a nonobstructing left renal calculus.  He saw Dr. Carreon from urology yesterday who does not think that a further work-up really needs to be done at this time.     Echocardiogram done 2020 showed grade 1 diastolic dysfunction with moderate left ventricular dilation and ejection fraction of 27.5%.  Global longitudinal strain was -12.8%.  There was no significant valve disease.     He is , has 3 children works for [employer].  Uses no cigarettes and has alcohol, about 6-10 beers per week as well as 1 vodka.  His 1 cup of coffee per day.  He has not been regularly exercising.  His mother had hypertension  may 2020.     On 2020 a cardiac catheterization was completed which showed the LAD, first diagonal, circumflex with luminal irregularities and RCA had 30% mid segment stenosis.  He was felt to have nonischemic cardiomyopathy and medical management was recommended.     On 2020, he was started on Entresto 49/51 mg 1 tablet twice per day.  She ordered a cardiac MRI which showed left ventricle dilated, global hypokinesis, mild right ventricular hypokinesis, normal perfusion of the myocardium, a focal area of mid  myocardial delayed enhancement of the inferior septum, and EF calculated at 30%.     His hydrochlorothiazide was stopped because his creatinine was elevated.  his atorvastatin was decreased to 10 mg daily due to muscle aching.     On 2020, he followed up with Gena in the office.  He reported muscle stiffness that had not improved with decreasing the atorvastatin.  I reviewed Dr. Chris Carreon urology note dated 8/3/2020:  In 2020, patient had a CT scan that was concerning for left renal infarct involving the lower pole and mid pole anterior cortex. It was noted that a third of his left kidney is likely dead and not functioning.  He was recommended to follow-up with his PCP/cardiology for potential sources of emboli. He was also noted to have a left renal stone that was stable, but may need intervention in the future. Dr. Carreon did not comment on any blood work completed. Due to the renal infarct, he had a monitor.  The 12-day Holter monitor that he had 2020 showed multiple episodes of nonsustained ventricular tachycardia, the longest was 10 seconds.  Short bursts of SVT were also noted but no sustained atrial fibrillation.     He reports right lower quadrant abdominal pain.  He saw both urology (Dr. Carreon) and nephrology (Dr. Frandy Castillo).  The patient says neither physician thought his pain was from the kidney stones.       Labs on 2020 show normal CMP, hemoglobin A1c 6.22, normal thyroid panel, HDL 61, LDL 50.  He did end up seeing hematology and labs on 10/20/2020 showed normal iron studies, anticardiolipin IgM elevated but normal anticardiolipin IgG, normal beta-2 glycoproteins.  He is to see Dr. Zaldivar.  He does have erectile dysfunction and wants to use Levitra.  He has 2 grown children 29 and 35.  His middle son  in  with osteogenic sarcoma.  He is taking his medications as directed without difficulty.     He was in the emergency department on 2020 with an episode of  syncope that occurred at 5:30 AM.  He had been asleep for 2 and half hours and got up to say goodbye to his wife when she left for work.  When he got up, he reports that he was lightheaded and became unresponsive for to 30 seconds.  EMS was called and when they arrived, his heart rate was in the 20s and they given atropine.  His heart rate then came up to the 60s and his blood pressure was 100.  The night before, he had taken his medications as directed.  He had eaten 1:30 PM the day prior and had 4 drinks before going to bed at 3 AM.  His labs showed negative troponin and normal CMP, normal TSH.  He also had lipids done 11/20/2020 which showed HDL 52, LDL 52.  CBC on 11/14/2020 was normal.  He had repeat labs on 11/20/2020 showing creatinine 1.31, glucose 163, normal CBC.    He has had no chest pain, palpitations, tachycardia.  Has had no further dizziness.  He said when this happened, he jumped out of bed and had stated way to light.  He thinks this was 100% related to a multiple variety of factors.    Past Medical History:   Diagnosis Date   • Cardiomyopathy (CMS/HCC)    • Colon polyp    • Diabetes mellitus (CMS/HCC)     type 2   • Hyperlipidemia    • Hypertension    • Renal calculi     Followed by Dr. Chris Carreon urologist   • Renal infarct (CMS/HCC) 07/2020    Left; noted on CT scan-followed by Dr. Chris Carreon urology   • Sleep apnea     CPAP nightly         Past Surgical History:   Procedure Laterality Date   • CARDIAC CATHETERIZATION N/A 7/24/2020    Procedure: Coronary angiography;  Surgeon: Robert Deluna MD;  Location:  [facility] CATH INVASIVE LOCATION;  Service: Cardiovascular;  Laterality: N/A;   • CARDIAC CATHETERIZATION N/A 7/24/2020    Procedure: Left Heart Cath;  Surgeon: Robert Deluna MD;  Location:  [facility] CATH INVASIVE LOCATION;  Service: Cardiovascular;  Laterality: N/A;   • COLONOSCOPY     • KNEE SURGERY      meniscus        Current Outpatient Medications on File Prior to Visit   Medication  Sig Dispense Refill   • amLODIPine (NORVASC) 5 MG tablet Take 1 tablet by mouth Daily. 90 tablet 3   • aspirin 81 MG chewable tablet Chew 81 mg Daily.     • atorvastatin (LIPITOR) 20 MG tablet Take 0.5 tablets by mouth Daily. 90 tablet 3   • carvedilol (COREG) 25 MG tablet Take 1 tablet by mouth 2 (Two) Times a Day. 180 tablet 3   • Empagliflozin (Jardiance) 25 MG tablet Take 25 mg by mouth Daily. 30 tablet 11   • sacubitril-valsartan (Entresto) 49-51 MG tablet Take 1 tablet by mouth 2 (Two) Times a Day. 60 tablet 11   • spironolactone (ALDACTONE) 25 MG tablet Take 1 tablet by mouth Daily. 90 tablet 3     No current facility-administered medications on file prior to visit.        Social History     Socioeconomic History   • Marital status:      Spouse name: Jyoti   • Number of children: Not on file   • Years of education: Not on file   • Highest education level: Not on file   Occupational History     Employer: [employer]   Tobacco Use   • Smoking status: Never Smoker   • Smokeless tobacco: Never Used   Substance and Sexual Activity   • Alcohol use: Yes     Alcohol/week: 2.0 standard drinks     Types: 1 Cans of beer, 1 Shots of liquor per week     Comment: Caffeine use: 1 cup daily   • Drug use: Never   • Sexual activity: Defer           Review of Systems   Constitution: Negative.   HENT: Negative for congestion.    Eyes: Negative for vision loss in left eye and vision loss in right eye.   Respiratory: Negative.  Negative for cough, hemoptysis, shortness of breath, sleep disturbances due to breathing, snoring, sputum production and wheezing.    Endocrine: Negative.    Hematologic/Lymphatic: Negative.    Skin: Negative for poor wound healing and rash.   Musculoskeletal: Negative for falls, gout, muscle cramps and myalgias.   Gastrointestinal: Negative for abdominal pain, diarrhea, dysphagia, hematemesis, melena, nausea and vomiting.   Neurological: Negative for excessive daytime  "sleepiness, dizziness, headaches, light-headedness, loss of balance, seizures and vertigo.   Psychiatric/Behavioral: Negative for depression and substance abuse. The patient is not nervous/anxious.        Procedures    ECG 12 Lead    Date/Time: 11/23/2020 11:23 AM  Performed by: Bernadine Graff MD  Authorized by: Bernadine Graff MD   Comparison: compared with previous ECG   Similar to previous ECG  Rhythm: sinus rhythm  T inversion: I, aVL, V5 and V6    Clinical impression: abnormal EKG                Objective:      Vitals:    11/23/20 1107   BP: 130/70   BP Location: Right arm   Pulse: 58   Weight: 113 kg (249 lb 12.8 oz)   Height: 177.8 cm (70\")     Body mass index is 35.84 kg/m².    Vitals signs reviewed.   Constitutional:       General: Not in acute distress.     Appearance: Well-developed. Not diaphoretic.   Eyes:      General: No scleral icterus.     Conjunctiva/sclera: Conjunctivae normal.   HENT:      Head: Normocephalic and atraumatic.   Neck:      Musculoskeletal: Neck supple.      Thyroid: No thyromegaly.      Vascular: No carotid bruit or JVD.      Lymphadenopathy: No cervical adenopathy.   Pulmonary:      Effort: Pulmonary effort is normal. No respiratory distress.      Breath sounds: Normal breath sounds. No wheezing. No rhonchi. No rales.   Chest:      Chest wall: Not tender to palpatation.   Cardiovascular:      Normal rate. Regular rhythm.      Murmurs: There is no murmur.      No gallop.   Pulses:     Intact distal pulses.   Edema:     Peripheral edema absent.   Abdominal:      General: Bowel sounds are normal. There is no distension or abdominal bruit.      Palpations: Abdomen is soft. There is no abdominal mass.      Tenderness: There is no abdominal tenderness.   Musculoskeletal:         General: No deformity.      Extremities: No clubbing present.  Skin:     General: Skin is warm and dry. There is no cyanosis.      Coloration: Skin is not pale.      Findings: No rash. "   Neurological:      Mental Status: Alert and oriented to person, place, and time.      Cranial Nerves: No cranial nerve deficit.   Psychiatric:         Judgment: Judgment normal.         Lab Review:       Assessment:      Diagnosis Plan   1. Nonischemic cardiomyopathy (CMS/HCC)     2. Essential hypertension     3. Mixed hyperlipidemia     4. Renal infarct (CMS/HCC)     5. Type 2 diabetes mellitus without complication, without long-term current use of insulin (CMS/HCC)       1. Cardiomyopathy, etiology unknown.  Continue current medications.  2. Hypertension, goal less than 110/80.   3. Hyperlipidemia, on atorvastatin  4. Diabetes mellitus type 2  5. Obesity  6. Left renal infarct, etiology unknown.  7. Renal insufficiency, likely due to left renal infarct.  8. PVCs and nonsustained VT on monitor, also nonsustained SVT.  Will get all those rhythm strips.  9. Vasovagal syncope 11/14/2020, no changes needed, needs to be careful jumping up quickly.     Plan:       Keep previously scheduled appointment.  No further testing at this time.

## 2020-12-10 RX ORDER — SACUBITRIL AND VALSARTAN 49; 51 MG/1; MG/1
1 TABLET, FILM COATED ORAL 2 TIMES DAILY
Qty: 180 TABLET | Refills: 3 | Status: SHIPPED | OUTPATIENT
Start: 2020-12-10 | End: 2021-02-09 | Stop reason: SDUPTHER

## 2020-12-18 RX ORDER — ESOMEPRAZOLE MAGNESIUM 40 MG/1
CAPSULE, DELAYED RELEASE ORAL
Qty: 90 CAPSULE | Refills: 2 | Status: SHIPPED | OUTPATIENT
Start: 2020-12-18 | End: 2022-05-27 | Stop reason: SDUPTHER

## 2021-01-14 ENCOUNTER — TELEPHONE (OUTPATIENT)
Dept: INTERNAL MEDICINE | Facility: CLINIC | Age: 56
End: 2021-01-14

## 2021-01-14 NOTE — TELEPHONE ENCOUNTER
Caller: Jyoti Munoz    Relationship: Emergency Contact    Best call back number: [phone]    PATIENT'S WIFE CALLED AND ASKED ABOUT RECEIVING THE COVID-19 VACCINE, SHE WOULD LIKE TO TALK TO DR. REY'S NURSE ABOUT THE VACCINE FOR HER . SHE HAS SOME QUESTIONS    PATIENT'S WIFE WOULD LIKE A CALLBACK

## 2021-02-01 ENCOUNTER — TELEPHONE (OUTPATIENT)
Dept: INTERNAL MEDICINE | Facility: CLINIC | Age: 56
End: 2021-02-01

## 2021-02-09 ENCOUNTER — OFFICE VISIT (OUTPATIENT)
Dept: CARDIOLOGY | Facility: CLINIC | Age: 56
End: 2021-02-09

## 2021-02-09 VITALS
HEIGHT: 70 IN | WEIGHT: 251.8 LBS | DIASTOLIC BLOOD PRESSURE: 86 MMHG | HEART RATE: 55 BPM | BODY MASS INDEX: 36.05 KG/M2 | SYSTOLIC BLOOD PRESSURE: 124 MMHG

## 2021-02-09 DIAGNOSIS — E78.2 MIXED HYPERLIPIDEMIA: ICD-10-CM

## 2021-02-09 DIAGNOSIS — I10 ESSENTIAL HYPERTENSION: ICD-10-CM

## 2021-02-09 DIAGNOSIS — E11.9 TYPE 2 DIABETES MELLITUS WITHOUT COMPLICATION, WITHOUT LONG-TERM CURRENT USE OF INSULIN (HCC): ICD-10-CM

## 2021-02-09 DIAGNOSIS — I42.8 NONISCHEMIC CARDIOMYOPATHY (HCC): Primary | ICD-10-CM

## 2021-02-09 DIAGNOSIS — N28.0 RENAL INFARCT (HCC): ICD-10-CM

## 2021-02-09 PROBLEM — E66.812 CLASS 2 SEVERE OBESITY WITH SERIOUS COMORBIDITY AND BODY MASS INDEX (BMI) OF 36.0 TO 36.9 IN ADULT: Status: RESOLVED | Noted: 2020-08-31 | Resolved: 2021-02-09

## 2021-02-09 PROBLEM — E66.01 CLASS 2 SEVERE OBESITY WITH SERIOUS COMORBIDITY AND BODY MASS INDEX (BMI) OF 36.0 TO 36.9 IN ADULT (HCC): Status: RESOLVED | Noted: 2020-08-31 | Resolved: 2021-02-09

## 2021-02-09 PROCEDURE — 99214 OFFICE O/P EST MOD 30 MIN: CPT | Performed by: NURSE PRACTITIONER

## 2021-02-09 PROCEDURE — 93000 ELECTROCARDIOGRAM COMPLETE: CPT | Performed by: NURSE PRACTITIONER

## 2021-02-09 RX ORDER — SACUBITRIL AND VALSARTAN 49; 51 MG/1; MG/1
1 TABLET, FILM COATED ORAL 2 TIMES DAILY
Qty: 30 TABLET | Refills: 12 | Status: SHIPPED | OUTPATIENT
Start: 2021-02-09 | End: 2021-04-06 | Stop reason: SDUPTHER

## 2021-02-09 NOTE — PROGRESS NOTES
Date of Office Visit: 2021  Encounter Provider: TERRI Ndiaye  Place of Service: [facility] CARDIOLOGY  Patient Name: Jhon Munoz  :1965  Primary Cardiologist: Dr. Bernadine Graff    Chief Complaint   Patient presents with   • Cardiomyopathy   • Follow-up   :     HPI: Jhon Munoz is a pleasant 55 y.o. male who presents today for cardiac follow-up. I have reviewed prior notes/records and there are no changes, except for any new updates described below. I have also reviewed any information entered into the medical record by the patient or by ancillary staff.      He has been diagnosed with obstructive sleep apnea (compliant with CPAP), hypertension, hyperlipidemia, and diabetes.  In 2020, he was diagnosed with a left renal infarct and this is followed by Dr. Carreon (urology) and Dr. Castillo (nephrology).    In 2020, he was having right upper quadrant discomfort.  Echocardiogram revealed an EF of 27.5%.  He was referred to Dr. Graff. Coronary angiography on 2020 showed nonobstructive coronary artery disease.  He was diagnosed with nonischemic cardiomyopathy and medical treatment was recommended.  On 2020, cardiac MRI showed EF 30%, left ventricle dilated, global hypokinesis, mild right ventricular hypokinesis, normal perfusion of the myocardium, and a focal area of mid myocardial delayed enhancement of the inferior septum.      In 2020, he also wore a 14-day Holter monitor which showed no episodes of atrial fibrillation, nonsustained ventricular tachycardia lasting 10 seconds in duration, and short bursts of SVT some which could be atrial fibrillation.  I reviewed the strips with Dr. Graff and he was to follow-up with her in the office.  She felt that it was more consistent with SVT, not atrial fibrillation.    On 10/28/2020 repeat echocardiogram showed improved EF of 36-40%, left ventricle mildly dilated, severe left  ventricular hypertrophy, wall motion abnormalities, and grade 1 diastolic dysfunction. In November 2020, he followed up in the office with Dr. Graff.  They discussed potential AICD implant, switching to Farxiga or Jardiance, and the patient having genetic testing at [facility].  Based on his echocardiogram results, he did not qualify for ICD and he was started on Jardiance.    On 11/14/2020, he presented to the [facility] ED with a syncopal episode via EMS.  He had jumped out of bed quickly and felt that it may have been related to that.    Today is his follow-up visit.  We reviewed his recent cardiac testing and he verbalizes understanding.  He has had no further dizziness, lightheaded, or syncopal episodes.  He is keeping himself well-hydrated.  He denies chest pain, shortness of breath, palpitations, or edema.  His weight, blood pressure, and heart rates have all been stable.    Past Medical History:   Diagnosis Date   • Cardiomyopathy (CMS/HCC)    • Colon polyp    • Diabetes mellitus (CMS/HCC)     type 2   • Hyperlipidemia    • Hypertension    • Renal calculi     Followed by Dr. Chris Carreon urologist   • Renal infarct (CMS/HCC) 07/2020    Left; noted on CT scan-followed by Dr. Chris Carreon urology   • Sleep apnea     CPAP nightly       Past Surgical History:   Procedure Laterality Date   • CARDIAC CATHETERIZATION N/A 7/24/2020    Procedure: Coronary angiography;  Surgeon: Robert Deluna MD;  Location: [facility] CATH INVASIVE LOCATION;  Service: Cardiovascular;  Laterality: N/A;   • CARDIAC CATHETERIZATION N/A 7/24/2020    Procedure: Left Heart Cath;  Surgeon: Robert Deluna MD;  Location:  [facility] CATH INVASIVE LOCATION;  Service: Cardiovascular;  Laterality: N/A;   • COLONOSCOPY     • KNEE SURGERY      meniscus        Social History     Socioeconomic History   • Marital status:      Spouse name: Jyoti   • Number of children: Not on file   • Years of education: Not on file   • Highest education  "level: Not on file   Occupational History     Employer: [employer]   Tobacco Use   • Smoking status: Never Smoker   • Smokeless tobacco: Never Used   Substance and Sexual Activity   • Alcohol use: Yes     Alcohol/week: 2.0 standard drinks     Types: 1 Cans of beer, 1 Shots of liquor per week     Comment: Caffeine use: 1 cup daily   • Drug use: Never   • Sexual activity: Defer       Family History   Problem Relation Age of Onset   • Hypertension Mother        The following portion of the patient's history were reviewed and updated as appropriate: past medical history, past surgical history, past social history, past family history, allergies, current medications, and problem list.    Review of Systems   Constitution: Negative.   Cardiovascular: Negative.    Respiratory: Negative.    Hematologic/Lymphatic: Negative.    Neurological: Negative.        No Known Allergies      Current Outpatient Medications:   •  amLODIPine (NORVASC) 5 MG tablet, Take 1 tablet by mouth Daily., Disp: 90 tablet, Rfl: 3  •  aspirin 81 MG chewable tablet, Chew 81 mg Daily., Disp: , Rfl:   •  atorvastatin (LIPITOR) 20 MG tablet, Take 0.5 tablets by mouth Daily., Disp: 90 tablet, Rfl: 3  •  carvedilol (COREG) 25 MG tablet, Take 1 tablet by mouth 2 (Two) Times a Day., Disp: 180 tablet, Rfl: 3  •  Empagliflozin (Jardiance) 25 MG tablet, Take 25 mg by mouth Daily., Disp: 30 tablet, Rfl: 11  •  esomeprazole (nexIUM) 40 MG capsule, TAKE 1 CAPSULE BY MOUTH EVERY DAY IN THE MORNING BEFORE BREAKFAST, Disp: 90 capsule, Rfl: 2  •  sacubitril-valsartan (Entresto) 49-51 MG tablet, Take 1 tablet by mouth 2 (Two) Times a Day., Disp: 30 tablet, Rfl: 12  •  spironolactone (ALDACTONE) 25 MG tablet, Take 1 tablet by mouth Daily., Disp: 90 tablet, Rfl: 3        Objective:     Vitals:    02/09/21 0759 02/09/21 0806   BP: 128/88 124/86   BP Location: Left arm Right arm   Pulse: 55    Weight: 114 kg (251 lb 12.8 oz)    Height: 177.8 cm (70\")  "     Body mass index is 36.13 kg/m².    PHYSICAL EXAM:    Vitals Reviewed.   General Appearance: No acute distress, well developed and well nourished. Obese.   Eyes: Conjunctiva and lids: No erythema, swelling, or discharge. Sclera non-icteric.   HENT: Atraumatic, normocephalic. External eyes, ears, and nose normal. No hearing loss noted. Mucous membranes normal. Lips not cyanotic. Neck supple with no tenderness.  Respiratory: No signs of respiratory distress. Respiration rhythm and depth normal.   Clear to auscultation. No rales, crackles, rhonchi, or wheezing auscultated.   Cardiovascular:  Jugular Venous Pressure: Normal  Heart Rate and Rhythm: Normal, Heart Sounds: Normal S1 and S2. No S3 or S4 noted.  Murmurs: No murmurs noted. No rubs, thrills, or gallops.   Lower Extremities: No edema noted.  Gastrointestinal:  Abdomen soft, non-distended, non-tender. Normal bowel sounds.    Musculoskeletal: Normal movement of extremities  Skin and Nails: General appearance normal. No pallor, cyanosis, diaphoresis. Skin temperature normal. No clubbing of fingernails.   Psychiatric: Patient alert and oriented to person, place, and time. Speech and behavior appropriate. Normal mood and affect.       ECG 12 Lead    Date/Time: 2/9/2021 8:00 AM  Performed by: Gena Molina APRN  Authorized by: Gena Molina APRN   Comparison: compared with previous ECG from 11/23/2020  Similar to previous ECG  Rhythm: sinus rhythm  Rate: bradycardic  BPM: 55  Conduction: conduction normal  T inversion: I, aVL, V4, V5, V6 and V3  T flattening: III  QRS axis: normal  Other findings: non-specific ST-T wave changes    Clinical impression: non-specific ECG              Assessment:       Diagnosis Plan   1. Nonischemic cardiomyopathy (CMS/HCC)     2. Essential hypertension     3. Mixed hyperlipidemia     4. Renal infarct (CMS/HCC)     5. Type 2 diabetes mellitus without complication, without long-term current use of insulin (CMS/HCC)             Plan:       1.  Nonischemic Cardiomyopathy: NYHA class I.  Diagnosed with nonischemic EF with initial ejection fraction of 27%.  In October 2020, echocardiogram showed improved EF of 36-40%.  Continue goal-directed therapy with carvedilol, spironolactone, and sacubitril/valsartan.  A savings card was provided for the sacubitril/valsartan. He is euvolemic today.    2.  Hypertension: Blood pressure well controlled.    3.  Hyperlipidemia: Remains on a atorvastatin.    4.  Left renal Infarct: Of unknown etiology.  He wore a Zio patch monitor which showed brief episodes of PSVT, not atrial fibrillation.    5.  Type II Diabetes: Started on Jardiance for both diabetic and cardiac protection.  Savings card provided.    6.  I recommended follow-up with Dr. Graff in 6 months, unless otherwise needed sooner.    As always, it has been a pleasure to participate in your patient's care. Thank you.       Sincerely,       TERRI Haji  [facility] Cardiology      · COVID-19 Precautions - Patient was compliant in wearing a mask. When I saw the patient, I used appropriate personal protective equipment (PPE) including mask and eye shield (standard procedure).  Additionally, I used gown and gloves if indicated.  Hand hygiene was completed before and after seeing the patient.  · Dictated utilizing Dragon Dictation

## 2021-03-24 ENCOUNTER — BULK ORDERING (OUTPATIENT)
Dept: CASE MANAGEMENT | Facility: OTHER | Age: 56
End: 2021-03-24

## 2021-03-24 DIAGNOSIS — Z23 IMMUNIZATION DUE: ICD-10-CM

## 2021-03-24 DIAGNOSIS — E11.9 TYPE 2 DIABETES MELLITUS WITHOUT COMPLICATION, WITHOUT LONG-TERM CURRENT USE OF INSULIN (HCC): ICD-10-CM

## 2021-03-24 RX ORDER — SITAGLIPTIN AND METFORMIN HYDROCHLORIDE 1000; 50 MG/1; MG/1
TABLET, FILM COATED, EXTENDED RELEASE ORAL
Qty: 30 TABLET | Refills: 6 | Status: SHIPPED | OUTPATIENT
Start: 2021-03-24 | End: 2021-03-24

## 2021-04-06 RX ORDER — SACUBITRIL AND VALSARTAN 49; 51 MG/1; MG/1
1 TABLET, FILM COATED ORAL 2 TIMES DAILY
Qty: 180 TABLET | Refills: 3 | Status: SHIPPED | OUTPATIENT
Start: 2021-04-06 | End: 2022-02-28 | Stop reason: SDUPTHER

## 2021-04-06 NOTE — TELEPHONE ENCOUNTER
Received a request from pt's pharmacy for a 90 day supply for pt's Enstresto 49-51 mg .  I was sent in Feb for 30 day with 12 refills.  Please see pending rx.

## 2021-05-05 ENCOUNTER — OFFICE VISIT (OUTPATIENT)
Dept: INTERNAL MEDICINE | Facility: CLINIC | Age: 56
End: 2021-05-05

## 2021-05-05 VITALS
DIASTOLIC BLOOD PRESSURE: 60 MMHG | OXYGEN SATURATION: 99 % | BODY MASS INDEX: 36.09 KG/M2 | HEART RATE: 61 BPM | WEIGHT: 252.1 LBS | SYSTOLIC BLOOD PRESSURE: 132 MMHG | HEIGHT: 70 IN

## 2021-05-05 DIAGNOSIS — M25.511 ACUTE PAIN OF RIGHT SHOULDER: Primary | ICD-10-CM

## 2021-05-05 PROCEDURE — 99213 OFFICE O/P EST LOW 20 MIN: CPT | Performed by: FAMILY MEDICINE

## 2021-05-05 RX ORDER — PREDNISONE 10 MG/1
TABLET ORAL
Qty: 18 TABLET | Refills: 0 | Status: SHIPPED | OUTPATIENT
Start: 2021-05-05 | End: 2021-06-16

## 2021-05-05 NOTE — PROGRESS NOTES
"Chief Complaint  pain in shoulder - right (for about 1 month) and pain in lower pain (for about 1 month)    Subjective          Jhon B Alexander presents to [facility]  History of Present Illness  Has noticed some shoulder pain and low back pain for the last couple weeks had some increased physical activity might of aggravated muscles although he has no tingling in his hands he has some decreased range of motion of his shoulder is concerned about his low back pain because his house is kidney infarct presented although he feels that it is getting better  Objective   Vital Signs:   /60 (BP Location: Left arm, Patient Position: Sitting, Cuff Size: Large Adult)   Pulse 61   Ht 177.8 cm (70\")   Wt 114 kg (252 lb 1.6 oz)   SpO2 99%   BMI 36.17 kg/m²     Physical Exam  Constitutional:       Appearance: Normal appearance.   Cardiovascular:      Rate and Rhythm: Normal rate and regular rhythm.      Pulses: Normal pulses.   Pulmonary:      Effort: Pulmonary effort is normal.      Breath sounds: Normal breath sounds.   Musculoskeletal:      Left shoulder: Tenderness present. Decreased range of motion.      Comments: Tender subacromial bursa   Neurological:      Mental Status: He is alert.   Psychiatric:         Mood and Affect: Mood normal.         Behavior: Behavior normal.         Thought Content: Thought content normal.         Judgment: Judgment normal.        Result Review :                 Assessment and Plan    Diagnoses and all orders for this visit:    1. Acute pain of right shoulder (Primary)  -     predniSONE (DELTASONE) 10 MG tablet; 3 pills daily for 3 days 2 pills daily for 3 days 1 pill daily for 3 days  Dispense: 18 tablet; Refill: 0        Follow Up   Return if symptoms worsen or fail to improve, for Recheck.  Patient was given instructions and counseling regarding his condition or for health maintenance advice. Please see specific information pulled into the AVS if " appropriate.

## 2021-05-24 DIAGNOSIS — I10 ESSENTIAL HYPERTENSION: ICD-10-CM

## 2021-05-24 RX ORDER — AMLODIPINE BESYLATE 10 MG/1
TABLET ORAL
Qty: 90 TABLET | Refills: 3 | OUTPATIENT
Start: 2021-05-24

## 2021-05-24 RX ORDER — TELMISARTAN 80 MG/1
TABLET ORAL
Qty: 90 TABLET | Refills: 3 | OUTPATIENT
Start: 2021-05-24

## 2021-06-16 ENCOUNTER — HOSPITAL ENCOUNTER (OUTPATIENT)
Dept: GENERAL RADIOLOGY | Facility: HOSPITAL | Age: 56
Discharge: HOME | End: 2021-06-16

## 2021-06-16 ENCOUNTER — LAB (OUTPATIENT)
Dept: LAB | Facility: HOSPITAL | Age: 56
End: 2021-06-16

## 2021-06-16 ENCOUNTER — OFFICE VISIT (OUTPATIENT)
Dept: INTERNAL MEDICINE | Facility: CLINIC | Age: 56
End: 2021-06-16

## 2021-06-16 VITALS
HEART RATE: 63 BPM | DIASTOLIC BLOOD PRESSURE: 82 MMHG | BODY MASS INDEX: 35.15 KG/M2 | RESPIRATION RATE: 16 BRPM | WEIGHT: 245 LBS | OXYGEN SATURATION: 98 % | SYSTOLIC BLOOD PRESSURE: 124 MMHG

## 2021-06-16 DIAGNOSIS — Z00.00 HEALTHCARE MAINTENANCE: ICD-10-CM

## 2021-06-16 DIAGNOSIS — E11.9 TYPE 2 DIABETES MELLITUS WITHOUT COMPLICATION, WITHOUT LONG-TERM CURRENT USE OF INSULIN (HCC): ICD-10-CM

## 2021-06-16 DIAGNOSIS — E78.2 MIXED HYPERLIPIDEMIA: ICD-10-CM

## 2021-06-16 DIAGNOSIS — M25.511 ACUTE PAIN OF RIGHT SHOULDER: Primary | ICD-10-CM

## 2021-06-16 LAB
25(OH)D3 SERPL-MCNC: 14.9 NG/ML (ref 30–100)
ALBUMIN SERPL-MCNC: 4.2 G/DL (ref 3.5–5.2)
ALBUMIN/GLOB SERPL: 1.5 G/DL
ALP SERPL-CCNC: 68 U/L (ref 39–117)
ALT SERPL W P-5'-P-CCNC: 17 U/L (ref 1–41)
ANION GAP SERPL CALCULATED.3IONS-SCNC: 9.3 MMOL/L (ref 5–15)
AST SERPL-CCNC: 17 U/L (ref 1–40)
BASOPHILS # BLD AUTO: 0.02 10*3/MM3 (ref 0–0.2)
BASOPHILS NFR BLD AUTO: 0.4 % (ref 0–1.5)
BILIRUB SERPL-MCNC: 0.3 MG/DL (ref 0–1.2)
BUN SERPL-MCNC: 17 MG/DL (ref 6–20)
BUN/CREAT SERPL: 12.7 (ref 7–25)
CALCIUM SPEC-SCNC: 9.2 MG/DL (ref 8.6–10.5)
CHLORIDE SERPL-SCNC: 104 MMOL/L (ref 98–107)
CHOLEST SERPL-MCNC: 130 MG/DL (ref 0–200)
CO2 SERPL-SCNC: 24.7 MMOL/L (ref 22–29)
CREAT SERPL-MCNC: 1.34 MG/DL (ref 0.76–1.27)
DEPRECATED RDW RBC AUTO: 43.8 FL (ref 37–54)
EOSINOPHIL # BLD AUTO: 0.16 10*3/MM3 (ref 0–0.4)
EOSINOPHIL NFR BLD AUTO: 3.1 % (ref 0.3–6.2)
ERYTHROCYTE [DISTWIDTH] IN BLOOD BY AUTOMATED COUNT: 13.4 % (ref 12.3–15.4)
GFR SERPL CREATININE-BSD FRML MDRD: 66 ML/MIN/1.73
GLOBULIN UR ELPH-MCNC: 2.8 GM/DL
GLUCOSE SERPL-MCNC: 114 MG/DL (ref 65–99)
HBA1C MFR BLD: 7.3 % (ref 4.8–5.6)
HCT VFR BLD AUTO: 40.9 % (ref 37.5–51)
HDLC SERPL-MCNC: 48 MG/DL (ref 40–60)
HGB BLD-MCNC: 13.7 G/DL (ref 13–17.7)
IMM GRANULOCYTES # BLD AUTO: 0.02 10*3/MM3 (ref 0–0.05)
IMM GRANULOCYTES NFR BLD AUTO: 0.4 % (ref 0–0.5)
LDLC SERPL CALC-MCNC: 60 MG/DL (ref 0–100)
LDLC/HDLC SERPL: 1.2 {RATIO}
LYMPHOCYTES # BLD AUTO: 2.09 10*3/MM3 (ref 0.7–3.1)
LYMPHOCYTES NFR BLD AUTO: 40.1 % (ref 19.6–45.3)
MCH RBC QN AUTO: 30.3 PG (ref 26.6–33)
MCHC RBC AUTO-ENTMCNC: 33.5 G/DL (ref 31.5–35.7)
MCV RBC AUTO: 90.5 FL (ref 79–97)
MONOCYTES # BLD AUTO: 0.53 10*3/MM3 (ref 0.1–0.9)
MONOCYTES NFR BLD AUTO: 10.2 % (ref 5–12)
NEUTROPHILS NFR BLD AUTO: 2.39 10*3/MM3 (ref 1.7–7)
NEUTROPHILS NFR BLD AUTO: 45.8 % (ref 42.7–76)
NRBC BLD AUTO-RTO: 0 /100 WBC (ref 0–0.2)
PLATELET # BLD AUTO: 203 10*3/MM3 (ref 140–450)
PMV BLD AUTO: 11.4 FL (ref 6–12)
POTASSIUM SERPL-SCNC: 4.4 MMOL/L (ref 3.5–5.2)
PROT SERPL-MCNC: 7 G/DL (ref 6–8.5)
RBC # BLD AUTO: 4.52 10*6/MM3 (ref 4.14–5.8)
SODIUM SERPL-SCNC: 138 MMOL/L (ref 136–145)
T-UPTAKE NFR SERPL: 0.97 TBI (ref 0.8–1.3)
T4 SERPL-MCNC: 4.02 MCG/DL (ref 4.5–11.7)
TRIGL SERPL-MCNC: 121 MG/DL (ref 0–150)
TSH SERPL DL<=0.05 MIU/L-ACNC: 1.1 UIU/ML (ref 0.27–4.2)
VLDLC SERPL-MCNC: 22 MG/DL (ref 5–40)
WBC # BLD AUTO: 5.21 10*3/MM3 (ref 3.4–10.8)

## 2021-06-16 PROCEDURE — 36415 COLL VENOUS BLD VENIPUNCTURE: CPT | Performed by: FAMILY MEDICINE

## 2021-06-16 PROCEDURE — 99214 OFFICE O/P EST MOD 30 MIN: CPT | Performed by: FAMILY MEDICINE

## 2021-06-16 PROCEDURE — 84443 ASSAY THYROID STIM HORMONE: CPT

## 2021-06-16 PROCEDURE — 83036 HEMOGLOBIN GLYCOSYLATED A1C: CPT | Performed by: FAMILY MEDICINE

## 2021-06-16 PROCEDURE — 84479 ASSAY OF THYROID (T3 OR T4): CPT

## 2021-06-16 PROCEDURE — 85025 COMPLETE CBC W/AUTO DIFF WBC: CPT | Performed by: FAMILY MEDICINE

## 2021-06-16 PROCEDURE — 80053 COMPREHEN METABOLIC PANEL: CPT | Performed by: FAMILY MEDICINE

## 2021-06-16 PROCEDURE — 73030 X-RAY EXAM OF SHOULDER: CPT

## 2021-06-16 PROCEDURE — 82306 VITAMIN D 25 HYDROXY: CPT

## 2021-06-16 PROCEDURE — 80061 LIPID PANEL: CPT | Performed by: FAMILY MEDICINE

## 2021-06-16 PROCEDURE — 84436 ASSAY OF TOTAL THYROXINE: CPT

## 2021-06-16 RX ORDER — DICLOFENAC SODIUM 20 MG/G
1 SOLUTION TOPICAL 2 TIMES DAILY PRN
Qty: 112 G | Refills: 11 | Status: SHIPPED | OUTPATIENT
Start: 2021-06-16 | End: 2021-08-24

## 2021-06-16 NOTE — PROGRESS NOTES
Chief Complaint  Diabetes    Subjective          Jhon Munoz presents to [facility] PRIMARY CARE  History of Present Illness    Patient presents at today's office visit with history of having type 2 diabetes.  He is currently taking Jardiance 25 mg daily.  He does not have any side effects of the medication.    Patient also has a history of having hyperlipidemia.  Patient is currently taking atorvastatin 20 mg daily.  Patient denies any side effects of the medicine.    At today's office visit he states that he continues to have right shoulder pain.  He states been going on for about a month.  He is not exactly sure if he did any particular activities that could trigger this, but did note that he was trying to reach back and get something out of a closet.  He did not hear any popping sounds.  He was seen by another physician in this office, and was started on a prednisone taper.  Patient states that he did not really necessarily get any better with this.  At today's office visit he still continues to have pain in the right shoulder, particular pain in the right anterior shoulder.  Patient is range of motion at today's office visit is very limited.  Range of motion is limited in any posterior movement, laterally he can remove it to 90 degrees.    Objective   Vital Signs:   /82   Pulse 63   Resp 16   Wt 111 kg (245 lb)   SpO2 98%   BMI 35.15 kg/m²     Physical Exam  Vitals and nursing note reviewed.   Constitutional:       Appearance: He is well-developed.   HENT:      Head: Normocephalic and atraumatic.   Musculoskeletal:      Left shoulder: Normal.      Cervical back: Normal range of motion and neck supple.      Comments: Right shoulder has limited range of motion, unable to have any motion in the posterior direction.  Set negative Valentine's test.  However positive Pandya test.  Positive liftoff test.   Neurological:      Mental Status: He is alert and oriented to person, place, and time.    Psychiatric:         Behavior: Behavior normal.        Result Review :                 Assessment and Plan    Diagnoses and all orders for this visit:    1. Acute pain of right shoulder (Primary)  -     XR Shoulder 2+ View Right  -     Diclofenac Sodium (Pennsaid) 2 % solution; Apply 1 application topically 2 (Two) Times a Day As Needed (pain).  Dispense: 112 g; Refill: 11  -     Comprehensive Metabolic Panel  -     CBC & Differential  -     Ambulatory Referral to Orthopedic Surgery  -     We will get x-ray of the right shoulder.  We will start him on Pennsaid.  We will also refer to orthopedics.    2. Type 2 diabetes mellitus without complication, without long-term current use of insulin (CMS/[facility])  -     Hemoglobin A1c  -     Currently stable, continue Jardiance 25 mg daily.  We will check a hemoglobin A1c.    3. Mixed hyperlipidemia  -     Lipid Panel With LDL / HDL Ratio  -     Currently stable, continue atorvastatin 20 mg daily.    4. Healthcare maintenance  -     CBC & Differential; Future  -     Comprehensive Metabolic Panel; Future  -     Hemoglobin A1c; Future  -     Thyroid Panel With TSH; Future  -     Lipid Panel With LDL / HDL Ratio; Future  -     Vitamin D 25 Hydroxy; Future        Follow Up   No follow-ups on file.  Patient was given instructions and counseling regarding his condition or for health maintenance advice. Please see specific information pulled into the AVS if appropriate.

## 2021-06-17 NOTE — PROGRESS NOTES
Please inform the patient of the following abnormal results.   Mild degenerative change at the right acromioclavicular  joint. Otherwise negative right shoulder x-ray.  May benefit from an orthopedic consult.

## 2021-06-19 RX ORDER — SEMAGLUTIDE 1.34 MG/ML
0.5 INJECTION, SOLUTION SUBCUTANEOUS WEEKLY
Qty: 1 PEN | Refills: 6 | Status: SHIPPED | OUTPATIENT
Start: 2021-06-19 | End: 2021-08-24

## 2021-06-19 NOTE — PROGRESS NOTES
Please inform the patient of the following abnormal results. Low vitamin d. Needs to do 5000 IU daily.   Hba1c is 7.3. Needs to start patient on ozempic. Rx sent in, see if we can give him samples.

## 2021-06-19 NOTE — PROGRESS NOTES
Please inform the patient of the following abnormal results.   Hba1c is elevated, needs to start ozempic.

## 2021-06-24 ENCOUNTER — TELEPHONE (OUTPATIENT)
Dept: INTERNAL MEDICINE | Facility: CLINIC | Age: 56
End: 2021-06-24

## 2021-06-24 DIAGNOSIS — M25.511 ACUTE PAIN OF RIGHT SHOULDER: Primary | ICD-10-CM

## 2021-06-24 NOTE — PROGRESS NOTES
Pt was on a course of prednisone due to shoulder pain.  Can he hold off on Ozempic for now?  Also, the Pennsaid made him itch very badly.  He wants a referral to ortho.  I will put in referral to Dr. Serrano.

## 2021-08-06 DIAGNOSIS — E11.9 TYPE 2 DIABETES MELLITUS WITHOUT COMPLICATION, WITHOUT LONG-TERM CURRENT USE OF INSULIN (HCC): ICD-10-CM

## 2021-08-24 ENCOUNTER — OFFICE VISIT (OUTPATIENT)
Dept: CARDIOLOGY | Facility: CLINIC | Age: 56
End: 2021-08-24

## 2021-08-24 VITALS
OXYGEN SATURATION: 99 % | HEIGHT: 70 IN | SYSTOLIC BLOOD PRESSURE: 110 MMHG | DIASTOLIC BLOOD PRESSURE: 90 MMHG | WEIGHT: 253 LBS | HEART RATE: 49 BPM | BODY MASS INDEX: 36.22 KG/M2

## 2021-08-24 DIAGNOSIS — I10 ESSENTIAL HYPERTENSION: ICD-10-CM

## 2021-08-24 DIAGNOSIS — I42.8 NONISCHEMIC CARDIOMYOPATHY (HCC): Primary | ICD-10-CM

## 2021-08-24 DIAGNOSIS — E11.9 TYPE 2 DIABETES MELLITUS WITHOUT COMPLICATION, WITHOUT LONG-TERM CURRENT USE OF INSULIN (HCC): ICD-10-CM

## 2021-08-24 DIAGNOSIS — E78.2 MIXED HYPERLIPIDEMIA: ICD-10-CM

## 2021-08-24 PROCEDURE — 99214 OFFICE O/P EST MOD 30 MIN: CPT | Performed by: INTERNAL MEDICINE

## 2021-08-24 PROCEDURE — 93000 ELECTROCARDIOGRAM COMPLETE: CPT | Performed by: INTERNAL MEDICINE

## 2021-08-24 RX ORDER — SPIRONOLACTONE 25 MG/1
37.5 TABLET ORAL DAILY
Qty: 135 TABLET | Refills: 3 | Status: SHIPPED | OUTPATIENT
Start: 2021-08-24 | End: 2021-11-04

## 2021-08-24 NOTE — PROGRESS NOTES
Date of Office Visit: 2021  Encounter Provider: Bernadine Graff MD  Place of Service: [facility] CARDIOLOGY  Patient Name: Jhon Munoz  :1965      Patient ID:  Jhon Munoz is a 56 y.o. male is here for  followup for nonischemic cardiomyopathy        History of Present Illness       He is followed for cardiomyopathy.  He has a history of sleep apnea using CPAP, hyperlipidemia, hypertension, diabetes mellitus type 2 on oral hypoglycemics, erectile dysfunction, renal calculi, GERD.       He was having left lower quadrant pain.  CT abdomen pelvis done 2024 left lower quadrant pain to her left renal infarct involving the lower pole and midpole of the anterior cortex and a nonobstructing left renal calculus.  He saw Dr. Carreon from urology yesterday who does not think that a further work-up really needs to be done at this time.  Due to the renal infarct, he had a monitor.  The 12-day Holter monitor that he had 2020 showed multiple episodes of nonsustained ventricular tachycardia, the longest was 10 seconds.  Short bursts of SVT were also noted but no sustained atrial fibrillation.     Echocardiogram done 2020 showed grade 1 diastolic dysfunction with moderate left ventricular dilation and ejection fraction of 27.5%.  Global longitudinal strain was -12.8%.  There was no significant valve disease.     He is , has 3 children works for [employer].  Uses no cigarettes and has alcohol, about 6-10 beers per week as well as 1 vodka.  His 1 cup of coffee per day.  He has not been regularly exercising.  His mother had hypertension  may 2020. He has 2 grown children 29 and 35.  His middle son  in  with osteogenic sarcoma.       On 2020 a cardiac catheterization was completed which showed the LAD, first diagonal, circumflex with luminal irregularities and RCA had 30% mid segment stenosis.  He was felt to have nonischemic cardiomyopathy and  medical management was recommended.  cardiac MRI in 2020 showed left ventricle dilated, global hypokinesis, mild right ventricular hypokinesis, normal perfusion of the myocardium, a focal area of mid myocardial delayed enhancement of the inferior septum, and EF calculated at 30%.     He reports right lower quadrant abdominal pain.  He saw both urology (Dr. Carreon) and nephrology (Dr. Frandy Castillo).  The patient says neither physician thought his pain was from the kidney stones.        labs on 10/20/2020 showed normal iron studies, anticardiolipin IgM elevated but normal anticardiolipin IgG, normal beta-2 glycoproteins.  He saw Dr. Zaldivar.      He was in the emergency department on 11/14/2020 with an episode of syncope that occurred at 5:30 AM.  He had been asleep for 2 and half hours and got up to say goodbye to his wife when she left for work.  When he got up, he reports that he was lightheaded and became unresponsive for to 30 seconds.  EMS was called and when they arrived, his heart rate was in the 20s and they given atropine.  His heart rate then came up to the 60s and his blood pressure was 100.  The night before, he had taken his medications as directed.  He had eaten 1:30 PM the day prior and had 4 drinks before going to bed at 3 AM.  His labs showed negative troponin and normal CMP, normal TSH.  He also had lipids done 11/20/2020 which showed HDL 52, LDL 52.  CBC on 11/14/2020 was normal.  He had repeat labs on 11/20/2020 showing creatinine 1.31, glucose 163, normal CBC.     Labs in 6/16/1 show glucose 114, creatinine 1.34, otherwise normal CMP, hemoglobin A1c 7.3, TSH normal, low total T4 of 4, normal T uptake 0.97, total cholesterol 130, HDL 48, LDL 60, triglycerides 121, normal CBC. He had normal bilateral renal artery duplex done 7/2020. Echo done 10/28/2020 showed ejection fraction 36-40% with severe eccentric left ventricular perjury and mild left ventricular dilation, grade 1 diastolic dysfunction,  anterior and anteroseptal hypokinesis.    He did see Dr. Zaldivar and he did not recommend any changes other than maintaining aspirin.  He has no chest pain or pressure.  He said a right shoulder pain and did require some steroids and steroid injection for his right shoulder.  He has a frozen shoulder.  He has no orthopnea or PND.  He has no exertional dyspnea.  He has no further syncope.  Does not feels heart racing or skipping.  His energy level stable.  He is tolerating his medications well.    Past Medical History:   Diagnosis Date   • Cardiomyopathy (CMS/HCC)    • Colon polyp    • Diabetes mellitus (CMS/HCC)     type 2   • Hyperlipidemia    • Hypertension    • Renal calculi     Followed by Dr. Chris Carreon urologist   • Renal infarct (CMS/HCC) 07/2020    Left; noted on CT scan-followed by Dr. Chris Carreon urology   • Sleep apnea     CPAP nightly         Past Surgical History:   Procedure Laterality Date   • CARDIAC CATHETERIZATION N/A 7/24/2020    Procedure: Coronary angiography;  Surgeon: Robert Deluna MD;  Location:  [facility] CATH [facility]VASIVE [facility];  Service: Cardiovascular;  Laterality: N/A;   • CARDIAC CATHETERIZATION N/A 7/24/2020    Procedure: Left Heart Cath;  Surgeon: Robert Deluna MD;  Location:  [facility] CATH INVAS[facility] LOCATION;  Service: Cardiovascular;  Laterality: N/A;   • COLONOSCOPY     • KNEE SURGERY      meniscus        Current Outpatient Medications on File Prior to Visit   Medication Sig Dispense Refill   • amLODIPine (NORVASC) 5 MG tablet Take 1 tablet by mouth Daily. 90 tablet 3   • aspirin 81 MG chewable tablet Chew 81 mg Daily.     • atorvastatin (LIPITOR) 20 MG tablet Take 0.5 tablets by mouth Daily. 90 tablet 3   • carvedilol (COREG) 25 MG tablet Take 1 tablet by mouth 2 (Two) Times a Day. 180 tablet 3   • empagliflozin (Jardiance) 25 MG tablet tablet Take 1 tablet by mouth Daily. 90 tablet 1   • esomeprazole (nexIUM) 40 MG capsule TAKE 1 CAPSULE BY MOUTH EVERY DAY IN THE MORNING BEFORE  "BREAKFAST (Patient taking differently: PRN) 90 capsule 2   • sacubitril-valsartan (Entresto) 49-51 MG tablet Take 1 tablet by mouth 2 (Two) Times a Day. 180 tablet 3   • spironolactone (ALDACTONE) 25 MG tablet Take 1 tablet by mouth Daily. 90 tablet 3   • [DISCONTINUED] Diclofenac Sodium (Pennsaid) 2 % solution Apply 1 application topically 2 (Two) Times a Day As Needed (pain). 112 g 11   • [DISCONTINUED] Semaglutide,0.25 or 0.5MG/DOS, (Ozempic, 0.25 or 0.5 MG/DOSE,) 2 MG/1.5ML solution pen-injector Inject 0.5 mg under the skin into the appropriate area as directed 1 (One) Time Per Week. 1 pen 6     No current facility-administered medications on file prior to visit.       Social History     Socioeconomic History   • Marital status:      Spouse name: Jyoti   • Number of children: Not on file   • Years of education: Not on file   • Highest education level: Not on file   Tobacco Use   • Smoking status: Never Smoker   • Smokeless tobacco: Never Used   Vaping Use   • Vaping Use: Never assessed   Substance and Sexual Activity   • Alcohol use: Yes     Alcohol/week: 2.0 standard drinks     Types: 1 Cans of beer, 1 Shots of liquor per week     Comment: Caffeine use: 1 cup daily   • Drug use: Never   • Sexual activity: Defer           ROS    Procedures    ECG 12 Lead    Date/Time: 8/24/2021 8:45 AM  Performed by: Bernadine Graff MD  Authorized by: Bernadine Graff MD   Comparison: compared with previous ECG   Similar to previous ECG  Rhythm: sinus rhythm  T inversion: I and aVL  T flattening: V4, V5 and V6    Clinical impression: abnormal EKG                Objective:      Vitals:    08/24/21 0841   BP: 110/90   Pulse: (!) 49   SpO2: 99%   Weight: 115 kg (253 lb)   Height: 177.8 cm (70\")     Body mass index is 36.3 kg/m².    Vitals reviewed.   Constitutional:       General: Not in acute distress.     Appearance: Well-developed. Not diaphoretic.   Eyes:      General: No scleral icterus.     " Conjunctiva/sclera: Conjunctivae normal.   HENT:      Head: Normocephalic and atraumatic.   Neck:      Thyroid: No thyromegaly.      Vascular: No carotid bruit or JVD.      Lymphadenopathy: No cervical adenopathy.   Pulmonary:      Effort: Pulmonary effort is normal. No respiratory distress.      Breath sounds: Normal breath sounds. No wheezing. No rhonchi. No rales.   Chest:      Chest wall: Not tender to palpatation.   Cardiovascular:      Normal rate. Regular rhythm.      Murmurs: There is no murmur.      No gallop.   Pulses:     Intact distal pulses.   Edema:     Peripheral edema absent.   Abdominal:      General: Bowel sounds are normal. There is no distension or abdominal bruit.      Palpations: Abdomen is soft. There is no abdominal mass.      Tenderness: There is no abdominal tenderness.   Musculoskeletal:         General: No deformity.      Extremities: No clubbing present.     Cervical back: Neck supple. Skin:     General: Skin is warm and dry. There is no cyanosis.      Coloration: Skin is not pale.      Findings: No rash.   Neurological:      Mental Status: Alert and oriented to person, place, and time.      Cranial Nerves: No cranial nerve deficit.   Psychiatric:         Judgment: Judgment normal.         Lab Review:       Assessment:      Diagnosis Plan   1. Nonischemic cardiomyopathy (CMS/HCC)     2. Essential hypertension     3. Mixed hyperlipidemia     4. Type 2 diabetes mellitus without complication, without long-term current use of insulin (CMS/HCC)       1. Cardiomyopathy, nonischemic.  No CHF at this time  2. Hypertension, goal less than 110/80.   3. Hyperlipidemia, on atorvastatin  4. Diabetes mellitus type 2  5. Obesity  6. Left renal infarct, etiology unknown.  Now on aspirin  7. Renal insufficiency, likely due to left renal infarct.  8. PVCs and nonsustained VT on monitor, also nonsustained SVT.  Will get all those rhythm strips.  9. Vasovagal syncope 11/14/2020.          Plan:        Recommend Covid booster when available, see APRN in 6 months with a repeat echo after the next visit, increase spironolactone to 37.5 mg daily to get his diastolic pressure close to 80.

## 2021-09-08 ENCOUNTER — TELEPHONE (OUTPATIENT)
Dept: INTERNAL MEDICINE | Facility: CLINIC | Age: 56
End: 2021-09-08

## 2021-09-08 NOTE — TELEPHONE ENCOUNTER
Pt called reporting he is covid + and experiencing fatigue, body aches, and sore throat.  He is a candidate for the monoclonal antibody infusion.  Informed consent obtained.  Message sent to doctor for consent to treat.

## 2021-09-09 ENCOUNTER — TRANSCRIBE ORDERS (OUTPATIENT)
Dept: ADMINISTRATIVE | Facility: HOSPITAL | Age: 56
End: 2021-09-09

## 2021-09-09 DIAGNOSIS — U07.1 CLINICAL DIAGNOSIS OF SEVERE ACUTE RESPIRATORY SYNDROME CORONAVIRUS 2 (SARS-COV-2) DISEASE: Primary | ICD-10-CM

## 2021-09-09 RX ORDER — EPINEPHRINE 1 MG/ML
0.3 INJECTION, SOLUTION, CONCENTRATE INTRAVENOUS AS NEEDED
Status: CANCELLED | OUTPATIENT
Start: 2021-09-10

## 2021-09-09 RX ORDER — METHYLPREDNISOLONE SODIUM SUCCINATE 125 MG/2ML
125 INJECTION, POWDER, LYOPHILIZED, FOR SOLUTION INTRAMUSCULAR; INTRAVENOUS AS NEEDED
Status: CANCELLED | OUTPATIENT
Start: 2021-09-10

## 2021-09-09 RX ORDER — SODIUM CHLORIDE 9 MG/ML
30 INJECTION, SOLUTION INTRAVENOUS ONCE
Status: CANCELLED | OUTPATIENT
Start: 2021-09-10

## 2021-09-09 RX ORDER — DIPHENHYDRAMINE HYDROCHLORIDE 50 MG/ML
50 INJECTION INTRAMUSCULAR; INTRAVENOUS AS NEEDED
Status: CANCELLED | OUTPATIENT
Start: 2021-09-10

## 2021-09-10 ENCOUNTER — HOSPITAL ENCOUNTER (OUTPATIENT)
Dept: INFUSION THERAPY | Facility: HOSPITAL | Age: 56
Setting detail: INFUSION SERIES
Discharge: HOME | End: 2021-09-10

## 2021-09-10 VITALS
DIASTOLIC BLOOD PRESSURE: 62 MMHG | OXYGEN SATURATION: 96 % | TEMPERATURE: 97.3 F | SYSTOLIC BLOOD PRESSURE: 103 MMHG | HEART RATE: 51 BPM

## 2021-09-10 DIAGNOSIS — U07.1 CLINICAL DIAGNOSIS OF COVID-19: Primary | ICD-10-CM

## 2021-09-10 PROCEDURE — M0243 CASIRIVI AND IMDEVI INFUSION: HCPCS | Performed by: FAMILY MEDICINE

## 2021-09-10 PROCEDURE — 96365 THER/PROPH/DIAG IV INF INIT: CPT | Performed by: NURSE PRACTITIONER

## 2021-09-10 PROCEDURE — 25010000006 INJECTION, CASIRIVIMAB AND IMDEVIMAB, 1200 MG: Performed by: FAMILY MEDICINE

## 2021-09-10 RX ORDER — DIPHENHYDRAMINE HYDROCHLORIDE 50 MG/ML
50 INJECTION INTRAMUSCULAR; INTRAVENOUS AS NEEDED
Status: DISCONTINUED | OUTPATIENT
Start: 2021-09-10 | End: 2021-09-12 | Stop reason: HOSPADM

## 2021-09-10 RX ORDER — METHYLPREDNISOLONE SODIUM SUCCINATE 125 MG/2ML
125 INJECTION, POWDER, LYOPHILIZED, FOR SOLUTION INTRAMUSCULAR; INTRAVENOUS AS NEEDED
Status: DISCONTINUED | OUTPATIENT
Start: 2021-09-10 | End: 2021-09-12 | Stop reason: HOSPADM

## 2021-09-10 RX ORDER — SODIUM CHLORIDE 9 MG/ML
30 INJECTION, SOLUTION INTRAVENOUS ONCE
Status: CANCELLED | OUTPATIENT
Start: 2021-09-10

## 2021-09-10 RX ORDER — DIPHENHYDRAMINE HYDROCHLORIDE 50 MG/ML
50 INJECTION INTRAMUSCULAR; INTRAVENOUS AS NEEDED
Status: CANCELLED | OUTPATIENT
Start: 2021-09-10

## 2021-09-10 RX ORDER — SODIUM CHLORIDE 9 MG/ML
30 INJECTION, SOLUTION INTRAVENOUS ONCE
Status: COMPLETED | OUTPATIENT
Start: 2021-09-10 | End: 2021-09-10

## 2021-09-10 RX ORDER — METHYLPREDNISOLONE SODIUM SUCCINATE 125 MG/2ML
125 INJECTION, POWDER, LYOPHILIZED, FOR SOLUTION INTRAMUSCULAR; INTRAVENOUS AS NEEDED
Status: CANCELLED | OUTPATIENT
Start: 2021-09-10

## 2021-09-10 RX ADMIN — CASIRIVIMAB AND IMDEVIMAB: 600; 600 INJECTION, SOLUTION, CONCENTRATE INTRAVENOUS at 11:11

## 2021-09-10 RX ADMIN — SODIUM CHLORIDE 100 ML: 9 INJECTION, SOLUTION INTRAVENOUS at 11:14

## 2021-09-10 NOTE — PROGRESS NOTES
Patient provided with Fact Sheet for Patients, Parents and Caregivers Emergency Use Authorization (EUA) of Casirivimab / Imdevimab for Coronavirus Disease 2019 (COVID-19) form.    Reviewed and patient verbalized understanding.  Appropriate PPE worn during the care of the patient.  Advised patient not to receive Covid vaccine for 90 days.    1258  DC'd per WC.  Tolerated infusion without difficulty.

## 2021-09-13 ENCOUNTER — OFFICE VISIT (OUTPATIENT)
Dept: INTERNAL MEDICINE | Facility: CLINIC | Age: 56
End: 2021-09-13

## 2021-09-13 ENCOUNTER — TELEPHONE (OUTPATIENT)
Dept: CARDIOLOGY | Facility: CLINIC | Age: 56
End: 2021-09-13

## 2021-09-13 DIAGNOSIS — Z00.00 HEALTHCARE MAINTENANCE: ICD-10-CM

## 2021-09-13 DIAGNOSIS — U07.1 COVID-19 VIRUS DETECTED: Primary | ICD-10-CM

## 2021-09-13 PROCEDURE — 99442 PR PHYS/QHP TELEPHONE EVALUATION 11-20 MIN: CPT | Performed by: FAMILY MEDICINE

## 2021-09-13 NOTE — TELEPHONE ENCOUNTER
Pt called stating that he tested positive for Covis and is released from quarantine tomorrow. He did get the Regenoron infusion    At his last appt with you on 8/24/2021 you increased his Spironolactone to 37.5 mg daily and since then he states that he feels cold all the time. He was wondering if this could be from the increase of the Spironolactone or lasting effects of Covid.    Also he states that his wife has not had Covid and would like to know if she can get the booster

## 2021-09-13 NOTE — PROGRESS NOTES
Chief Complaint  No chief complaint on file.  Cc covid    This visit has been rescheduled as a phone visit to comply with patient safety concerns in accordance with CDC recommendations. Total time of discussion was 15 minutes.    You have chosen to receive care through a telephone visit. Do you consent to use a telephone visit for your medical care today? Yes    This was an audio telemedicine encounter.      Brandi Munoz presents to [facility] PRIMARY [facility]  History of Present Illness    Patient was diagnosed with Covid earlier in the month.  Patient states that he is doing much better.  He is currently not having any issues.  He has had the infusion of the monoclonal antibodies.  He is also fully vaccinated.  He states that he is doing a lot better with no symptoms as of today.  He would like to get back to work.    Objective   Vital Signs:   There were no vitals taken for this visit.    Physical Exam  Vitals and nursing note reviewed.   Constitutional:       Appearance: He is well-developed.   HENT:      Head: Normocephalic and atraumatic.   Musculoskeletal:      Cervical back: Normal range of motion and neck supple.   Neurological:      Mental Status: He is alert and oriented to person, place, and time.   Psychiatric:         Behavior: Behavior normal.        Result Review :                 Assessment and Plan    Diagnoses and all orders for this visit:    1. COVID-19 virus detected (Primary)    2. Healthcare maintenance  -     CBC & Differential  -     Comprehensive Metabolic Panel  -     Hemoglobin A1c  -     Thyroid Panel With TSH  -     Lipid Panel With LDL / HDL Ratio  -     Vitamin D 25 Hydroxy  -     Microalbumin / Creatinine Urine Ratio - Urine, Clean Catch    Did discuss with patient that I am okay with him going back to work if you would like especially if he has no symptoms.  He has quarantine for 10 days as well.      Follow Up   No follow-ups on file.  Patient  was given instructions and counseling regarding his condition or for health maintenance advice. Please see specific information pulled into the AVS if appropriate.

## 2021-09-17 DIAGNOSIS — Z00.00 VISIT FOR WELL MAN HEALTH CHECK: ICD-10-CM

## 2021-09-17 DIAGNOSIS — Z13.29 SCREENING FOR THYROID DISORDER: ICD-10-CM

## 2021-09-17 DIAGNOSIS — Z13.1 SCREENING FOR DIABETES MELLITUS: ICD-10-CM

## 2021-09-17 DIAGNOSIS — Z13.220 SCREENING FOR LIPID DISORDERS: ICD-10-CM

## 2021-09-23 ENCOUNTER — LAB (OUTPATIENT)
Dept: LAB | Facility: HOSPITAL | Age: 56
End: 2021-09-23

## 2021-09-23 LAB
25(OH)D3 SERPL-MCNC: 32.2 NG/ML (ref 30–100)
ALBUMIN SERPL-MCNC: 4.3 G/DL (ref 3.5–5.2)
ALBUMIN UR-MCNC: 2.3 MG/DL
ALBUMIN/GLOB SERPL: 1.6 G/DL
ALP SERPL-CCNC: 71 U/L (ref 39–117)
ALT SERPL W P-5'-P-CCNC: 21 U/L (ref 1–41)
ANION GAP SERPL CALCULATED.3IONS-SCNC: 10.4 MMOL/L (ref 5–15)
AST SERPL-CCNC: 22 U/L (ref 1–40)
BASOPHILS # BLD AUTO: 0.05 10*3/MM3 (ref 0–0.2)
BASOPHILS NFR BLD AUTO: 1 % (ref 0–1.5)
BILIRUB SERPL-MCNC: 0.6 MG/DL (ref 0–1.2)
BUN SERPL-MCNC: 15 MG/DL (ref 6–20)
BUN/CREAT SERPL: 12.9 (ref 7–25)
CALCIUM SPEC-SCNC: 9.5 MG/DL (ref 8.6–10.5)
CHLORIDE SERPL-SCNC: 105 MMOL/L (ref 98–107)
CHOLEST SERPL-MCNC: 116 MG/DL (ref 0–200)
CO2 SERPL-SCNC: 23.6 MMOL/L (ref 22–29)
CREAT SERPL-MCNC: 1.16 MG/DL (ref 0.76–1.27)
CREAT UR-MCNC: 101.9 MG/DL
DEPRECATED RDW RBC AUTO: 45.5 FL (ref 37–54)
EOSINOPHIL # BLD AUTO: 0.17 10*3/MM3 (ref 0–0.4)
EOSINOPHIL NFR BLD AUTO: 3.2 % (ref 0.3–6.2)
ERYTHROCYTE [DISTWIDTH] IN BLOOD BY AUTOMATED COUNT: 13.3 % (ref 12.3–15.4)
GFR SERPL CREATININE-BSD FRML MDRD: 78 ML/MIN/1.73
GLOBULIN UR ELPH-MCNC: 2.7 GM/DL
GLUCOSE SERPL-MCNC: 115 MG/DL (ref 65–99)
HBA1C MFR BLD: 8.01 % (ref 4.8–5.6)
HCT VFR BLD AUTO: 43.3 % (ref 37.5–51)
HDLC SERPL-MCNC: 48 MG/DL (ref 40–60)
HGB BLD-MCNC: 13.6 G/DL (ref 13–17.7)
IMM GRANULOCYTES # BLD AUTO: 0.02 10*3/MM3 (ref 0–0.05)
IMM GRANULOCYTES NFR BLD AUTO: 0.4 % (ref 0–0.5)
LDLC SERPL CALC-MCNC: 53 MG/DL (ref 0–100)
LDLC/HDLC SERPL: 1.1 {RATIO}
LYMPHOCYTES # BLD AUTO: 2.32 10*3/MM3 (ref 0.7–3.1)
LYMPHOCYTES NFR BLD AUTO: 44.3 % (ref 19.6–45.3)
MCH RBC QN AUTO: 29.4 PG (ref 26.6–33)
MCHC RBC AUTO-ENTMCNC: 31.4 G/DL (ref 31.5–35.7)
MCV RBC AUTO: 93.5 FL (ref 79–97)
MICROALBUMIN/CREAT UR: 22.6 MG/G
MONOCYTES # BLD AUTO: 0.5 10*3/MM3 (ref 0.1–0.9)
MONOCYTES NFR BLD AUTO: 9.5 % (ref 5–12)
NEUTROPHILS NFR BLD AUTO: 2.18 10*3/MM3 (ref 1.7–7)
NEUTROPHILS NFR BLD AUTO: 41.6 % (ref 42.7–76)
NRBC BLD AUTO-RTO: 0 /100 WBC (ref 0–0.2)
PLATELET # BLD AUTO: 268 10*3/MM3 (ref 140–450)
PMV BLD AUTO: 11.7 FL (ref 6–12)
POTASSIUM SERPL-SCNC: 4.5 MMOL/L (ref 3.5–5.2)
PROT SERPL-MCNC: 7 G/DL (ref 6–8.5)
RBC # BLD AUTO: 4.63 10*6/MM3 (ref 4.14–5.8)
SODIUM SERPL-SCNC: 139 MMOL/L (ref 136–145)
T-UPTAKE NFR SERPL: 0.99 TBI (ref 0.8–1.3)
T4 SERPL-MCNC: 4.43 MCG/DL (ref 4.5–11.7)
TRIGL SERPL-MCNC: 75 MG/DL (ref 0–150)
TSH SERPL DL<=0.05 MIU/L-ACNC: 1.84 UIU/ML (ref 0.27–4.2)
VLDLC SERPL-MCNC: 15 MG/DL (ref 5–40)
WBC # BLD AUTO: 5.24 10*3/MM3 (ref 3.4–10.8)

## 2021-09-23 PROCEDURE — 85025 COMPLETE CBC W/AUTO DIFF WBC: CPT | Performed by: FAMILY MEDICINE

## 2021-09-23 PROCEDURE — 80053 COMPREHEN METABOLIC PANEL: CPT | Performed by: FAMILY MEDICINE

## 2021-09-23 PROCEDURE — 82570 ASSAY OF URINE CREATININE: CPT | Performed by: FAMILY MEDICINE

## 2021-09-23 PROCEDURE — 82306 VITAMIN D 25 HYDROXY: CPT | Performed by: FAMILY MEDICINE

## 2021-09-23 PROCEDURE — 83036 HEMOGLOBIN GLYCOSYLATED A1C: CPT | Performed by: FAMILY MEDICINE

## 2021-09-23 PROCEDURE — 80061 LIPID PANEL: CPT | Performed by: FAMILY MEDICINE

## 2021-09-23 PROCEDURE — 82043 UR ALBUMIN QUANTITATIVE: CPT | Performed by: FAMILY MEDICINE

## 2021-09-23 PROCEDURE — 84479 ASSAY OF THYROID (T3 OR T4): CPT | Performed by: FAMILY MEDICINE

## 2021-09-23 PROCEDURE — 84443 ASSAY THYROID STIM HORMONE: CPT | Performed by: FAMILY MEDICINE

## 2021-09-23 PROCEDURE — 84436 ASSAY OF TOTAL THYROXINE: CPT | Performed by: FAMILY MEDICINE

## 2021-09-23 PROCEDURE — 36415 COLL VENOUS BLD VENIPUNCTURE: CPT | Performed by: FAMILY MEDICINE

## 2021-09-24 DIAGNOSIS — Z13.1 SCREENING FOR DIABETES MELLITUS: Primary | ICD-10-CM

## 2021-09-24 DIAGNOSIS — E78.5 HYPERLIPIDEMIA, UNSPECIFIED HYPERLIPIDEMIA TYPE: ICD-10-CM

## 2021-09-24 RX ORDER — ATORVASTATIN CALCIUM 20 MG/1
TABLET, FILM COATED ORAL
Qty: 45 TABLET | Refills: 1 | Status: SHIPPED | OUTPATIENT
Start: 2021-09-24 | End: 2021-10-04 | Stop reason: SDUPTHER

## 2021-09-24 RX ORDER — SEMAGLUTIDE 1.34 MG/ML
0.5 INJECTION, SOLUTION SUBCUTANEOUS WEEKLY
Qty: 4 PEN | Refills: 3 | Status: SHIPPED | OUTPATIENT
Start: 2021-09-24 | End: 2021-10-04

## 2021-10-04 ENCOUNTER — OFFICE VISIT (OUTPATIENT)
Dept: INTERNAL MEDICINE | Facility: CLINIC | Age: 56
End: 2021-10-04

## 2021-10-04 VITALS
DIASTOLIC BLOOD PRESSURE: 63 MMHG | TEMPERATURE: 98 F | HEIGHT: 70 IN | OXYGEN SATURATION: 97 % | BODY MASS INDEX: 35.79 KG/M2 | SYSTOLIC BLOOD PRESSURE: 110 MMHG | RESPIRATION RATE: 16 BRPM | WEIGHT: 250 LBS | HEART RATE: 60 BPM

## 2021-10-04 DIAGNOSIS — Z12.5 SCREENING FOR PROSTATE CANCER: ICD-10-CM

## 2021-10-04 DIAGNOSIS — E78.5 HYPERLIPIDEMIA, UNSPECIFIED HYPERLIPIDEMIA TYPE: ICD-10-CM

## 2021-10-04 DIAGNOSIS — E11.9 TYPE 2 DIABETES MELLITUS WITHOUT COMPLICATION, WITHOUT LONG-TERM CURRENT USE OF INSULIN (HCC): ICD-10-CM

## 2021-10-04 DIAGNOSIS — I10 ESSENTIAL HYPERTENSION: ICD-10-CM

## 2021-10-04 DIAGNOSIS — J11.1 FLU: Primary | ICD-10-CM

## 2021-10-04 PROCEDURE — 90471 IMMUNIZATION ADMIN: CPT | Performed by: FAMILY MEDICINE

## 2021-10-04 PROCEDURE — 90686 IIV4 VACC NO PRSV 0.5 ML IM: CPT | Performed by: FAMILY MEDICINE

## 2021-10-04 PROCEDURE — 99214 OFFICE O/P EST MOD 30 MIN: CPT | Performed by: FAMILY MEDICINE

## 2021-10-04 RX ORDER — CARVEDILOL 25 MG/1
25 TABLET ORAL 2 TIMES DAILY
Qty: 180 TABLET | Refills: 3 | Status: SHIPPED | OUTPATIENT
Start: 2021-10-04 | End: 2022-11-18 | Stop reason: SDUPTHER

## 2021-10-04 RX ORDER — ATORVASTATIN CALCIUM 20 MG/1
10 TABLET, FILM COATED ORAL DAILY
Qty: 45 TABLET | Refills: 1 | Status: SHIPPED | OUTPATIENT
Start: 2021-10-04 | End: 2022-05-09

## 2021-10-04 RX ORDER — SEMAGLUTIDE 1.34 MG/ML
0.5 INJECTION, SOLUTION SUBCUTANEOUS WEEKLY
Qty: 1 PEN | Refills: 6 | Status: SHIPPED | OUTPATIENT
Start: 2021-10-04 | End: 2022-06-15 | Stop reason: SDUPTHER

## 2021-10-04 RX ORDER — AMLODIPINE BESYLATE 5 MG/1
5 TABLET ORAL DAILY
Qty: 90 TABLET | Refills: 3 | Status: SHIPPED | OUTPATIENT
Start: 2021-10-04 | End: 2022-11-16 | Stop reason: SDUPTHER

## 2021-10-04 NOTE — PROGRESS NOTES
"Chief Complaint  Annual Exam (COVID questions )    Subjective          Jhon Munoz presents to [facility] PRIMARY CARE  History of Present Illness    Patient has history of hyperlipidemia.  Currently Lipitor 10 mg daily.  Patient denies any side effects of the medication patient states that he is to monitor his diet well but could be exercising more.    Patient does have type 2 diabetes.  Continue Jardiance 25 mg daily.  His hemoglobin A1c has gone up.  We did discuss about starting him on a new medication such as Ozempic.    Patient also has a past medical history of having essential hypertension.  Blood pressure is 110/63.  He is currently taking spironolactone 37.5 mg daily, carvedilol 25 mg twice daily, and Entresto 49-51 mg twice a day.  Along with amlodipine 5 mg daily.  He denies any side effects of these medications.    Objective   Vital Signs:   /63   Pulse 60   Temp 98 °F (36.7 °C)   Resp 16   Ht 177.8 cm (70\")   Wt 113 kg (250 lb)   SpO2 97%   BMI 35.87 kg/m²     Physical Exam  Vitals and nursing note reviewed.   Constitutional:       Appearance: He is well-developed.   HENT:      Head: Normocephalic and atraumatic.   Musculoskeletal:      Cervical back: Normal range of motion and neck supple.   Neurological:      Mental Status: He is alert and oriented to person, place, and time.   Psychiatric:         Behavior: Behavior normal.        Result Review :                 Assessment and Plan    Diagnoses and all orders for this visit:    1. Hyperlipidemia, unspecified hyperlipidemia type (Primary)  -     atorvastatin (LIPITOR) 20 MG tablet; Take 0.5 tablets by mouth Daily.  Dispense: 45 tablet; Refill: 1  -     Lipid Panel With LDL / HDL Ratio  -     Stable, continue Lipitor 10 mg daily.    2. Type 2 diabetes mellitus without complication, without long-term current use of insulin ([facility])  -     empagliflozin (Jardiance) 25 MG tablet tablet; Take 1 tablet by mouth Daily.  " Dispense: 90 tablet; Refill: 1  -     Comprehensive Metabolic Panel  -     Hemoglobin A1c  -     Semaglutide,0.25 or 0.5MG/DOS, (Ozempic, 0.25 or 0.5 MG/DOSE,) 2 MG/1.5ML solution pen-injector; Inject 0.5 mg under the skin into the appropriate area as directed 1 (One) Time Per Week.  Dispense: 1 pen; Refill: 6  -     Continue Jardiance, start Ozempic.    3. Essential hypertension  -     Comprehensive Metabolic Panel  -     CBC & Differential  -     Continue current blood pressure medicines.    4. Screening for prostate cancer  -     PSA Screen    Other orders  -     amLODIPine (NORVASC) 5 MG tablet; Take 1 tablet by mouth Daily.  Dispense: 90 tablet; Refill: 3  -     carvedilol (COREG) 25 MG tablet; Take 1 tablet by mouth 2 (Two) Times a Day.  Dispense: 180 tablet; Refill: 3        Follow Up   No follow-ups on file.  Patient was given instructions and counseling regarding his condition or for health maintenance advice. Please see specific information pulled into the AVS if appropriate.

## 2021-10-28 NOTE — PROGRESS NOTES
Physical Therapy Daily Progress Note  Visit: 4    Subjective Jhon Alexander reports: his hip is feeling better but still feels tight.     Objective   See Exercise, Manual, and Modality Logs for complete treatment. Added L hip mobilization with belt. Provided instruction in exercises and proper technique and purpose of exercises.       Assessment/Plan: Compliant/cooperative with current rehab efforts.  Benefits from verbal/tactile cues to ensure correct exercise performance/technique, hold time and position. Plan details: Progress ROM / strengthening / stabilization / functional activity as tolerated     Manual Therapy:    10   mins  08365;  Therapeutic Exercise:      30    mins  53819;     Neuromuscular Roberto:         mins  43471;    Therapeutic Activity:           mins  59799;     Gait Training:            mins  27940;     Ultrasound:           mins  40413;    Electrical Stimulation:          mins  62039 ( );  Dry Needling           mins self-pay  Traction           mins 79161  Canalith Repositioning         mins 91831      Timed Treatment:   40   mins   Total Treatment:    40    mins    RUBÉN Valdez License #: [id number]    Physical Therapist     Self

## 2021-11-04 RX ORDER — SPIRONOLACTONE 25 MG/1
TABLET ORAL
Qty: 90 TABLET | Refills: 2 | Status: SHIPPED | OUTPATIENT
Start: 2021-11-04 | End: 2022-02-28 | Stop reason: SDUPTHER

## 2022-01-04 ENCOUNTER — LAB (OUTPATIENT)
Dept: LAB | Facility: HOSPITAL | Age: 57
End: 2022-01-04

## 2022-01-04 ENCOUNTER — OFFICE VISIT (OUTPATIENT)
Dept: INTERNAL MEDICINE | Facility: CLINIC | Age: 57
End: 2022-01-04

## 2022-01-04 VITALS
OXYGEN SATURATION: 99 % | DIASTOLIC BLOOD PRESSURE: 72 MMHG | SYSTOLIC BLOOD PRESSURE: 124 MMHG | RESPIRATION RATE: 18 BRPM | HEIGHT: 70 IN | WEIGHT: 239 LBS | BODY MASS INDEX: 34.22 KG/M2 | HEART RATE: 78 BPM

## 2022-01-04 DIAGNOSIS — I10 ESSENTIAL HYPERTENSION: ICD-10-CM

## 2022-01-04 DIAGNOSIS — E78.5 HYPERLIPIDEMIA, UNSPECIFIED HYPERLIPIDEMIA TYPE: Primary | ICD-10-CM

## 2022-01-04 DIAGNOSIS — E11.9 TYPE 2 DIABETES MELLITUS WITHOUT COMPLICATION, WITHOUT LONG-TERM CURRENT USE OF INSULIN: ICD-10-CM

## 2022-01-04 LAB
ALBUMIN SERPL-MCNC: 4.2 G/DL (ref 3.5–5.2)
ALBUMIN UR-MCNC: 2.8 MG/DL
ALBUMIN/GLOB SERPL: 1.6 G/DL
ALP SERPL-CCNC: 68 U/L (ref 39–117)
ALT SERPL W P-5'-P-CCNC: 15 U/L (ref 1–41)
ANION GAP SERPL CALCULATED.3IONS-SCNC: 6.8 MMOL/L (ref 5–15)
AST SERPL-CCNC: 16 U/L (ref 1–40)
BASOPHILS # BLD AUTO: 0.03 10*3/MM3 (ref 0–0.2)
BASOPHILS NFR BLD AUTO: 0.7 % (ref 0–1.5)
BILIRUB SERPL-MCNC: 0.4 MG/DL (ref 0–1.2)
BUN SERPL-MCNC: 13 MG/DL (ref 6–20)
BUN/CREAT SERPL: 9.6 (ref 7–25)
CALCIUM SPEC-SCNC: 9.2 MG/DL (ref 8.6–10.5)
CHLORIDE SERPL-SCNC: 105 MMOL/L (ref 98–107)
CHOLEST SERPL-MCNC: 120 MG/DL (ref 0–200)
CO2 SERPL-SCNC: 26.2 MMOL/L (ref 22–29)
CREAT SERPL-MCNC: 1.36 MG/DL (ref 0.76–1.27)
CREAT UR-MCNC: 124.2 MG/DL
DEPRECATED RDW RBC AUTO: 45.2 FL (ref 37–54)
EOSINOPHIL # BLD AUTO: 0.13 10*3/MM3 (ref 0–0.4)
EOSINOPHIL NFR BLD AUTO: 3.2 % (ref 0.3–6.2)
ERYTHROCYTE [DISTWIDTH] IN BLOOD BY AUTOMATED COUNT: 13.6 % (ref 12.3–15.4)
GFR SERPL CREATININE-BSD FRML MDRD: 65 ML/MIN/1.73
GLOBULIN UR ELPH-MCNC: 2.7 GM/DL
GLUCOSE SERPL-MCNC: 91 MG/DL (ref 65–99)
HBA1C MFR BLD: 6.11 % (ref 4.8–5.6)
HCT VFR BLD AUTO: 44.4 % (ref 37.5–51)
HDLC SERPL-MCNC: 51 MG/DL (ref 40–60)
HGB BLD-MCNC: 14.7 G/DL (ref 13–17.7)
IMM GRANULOCYTES # BLD AUTO: 0.02 10*3/MM3 (ref 0–0.05)
IMM GRANULOCYTES NFR BLD AUTO: 0.5 % (ref 0–0.5)
LDLC SERPL CALC-MCNC: 54 MG/DL (ref 0–100)
LDLC/HDLC SERPL: 1.07 {RATIO}
LYMPHOCYTES # BLD AUTO: 1.51 10*3/MM3 (ref 0.7–3.1)
LYMPHOCYTES NFR BLD AUTO: 36.7 % (ref 19.6–45.3)
MCH RBC QN AUTO: 30.1 PG (ref 26.6–33)
MCHC RBC AUTO-ENTMCNC: 33.1 G/DL (ref 31.5–35.7)
MCV RBC AUTO: 90.8 FL (ref 79–97)
MICROALBUMIN/CREAT UR: 22.5 MG/G
MONOCYTES # BLD AUTO: 0.41 10*3/MM3 (ref 0.1–0.9)
MONOCYTES NFR BLD AUTO: 10 % (ref 5–12)
NEUTROPHILS NFR BLD AUTO: 2.01 10*3/MM3 (ref 1.7–7)
NEUTROPHILS NFR BLD AUTO: 48.9 % (ref 42.7–76)
NRBC BLD AUTO-RTO: 0.2 /100 WBC (ref 0–0.2)
PLATELET # BLD AUTO: 166 10*3/MM3 (ref 140–450)
PMV BLD AUTO: 11.3 FL (ref 6–12)
POTASSIUM SERPL-SCNC: 4.4 MMOL/L (ref 3.5–5.2)
PROT SERPL-MCNC: 6.9 G/DL (ref 6–8.5)
PSA SERPL-MCNC: 0.44 NG/ML (ref 0–4)
RBC # BLD AUTO: 4.89 10*6/MM3 (ref 4.14–5.8)
SODIUM SERPL-SCNC: 138 MMOL/L (ref 136–145)
TRIGL SERPL-MCNC: 73 MG/DL (ref 0–150)
VLDLC SERPL-MCNC: 15 MG/DL (ref 5–40)
WBC NRBC COR # BLD: 4.11 10*3/MM3 (ref 3.4–10.8)

## 2022-01-04 PROCEDURE — 85025 COMPLETE CBC W/AUTO DIFF WBC: CPT | Performed by: FAMILY MEDICINE

## 2022-01-04 PROCEDURE — 80053 COMPREHEN METABOLIC PANEL: CPT | Performed by: FAMILY MEDICINE

## 2022-01-04 PROCEDURE — 36415 COLL VENOUS BLD VENIPUNCTURE: CPT | Performed by: FAMILY MEDICINE

## 2022-01-04 PROCEDURE — 83036 HEMOGLOBIN GLYCOSYLATED A1C: CPT | Performed by: FAMILY MEDICINE

## 2022-01-04 PROCEDURE — 82043 UR ALBUMIN QUANTITATIVE: CPT | Performed by: FAMILY MEDICINE

## 2022-01-04 PROCEDURE — 80061 LIPID PANEL: CPT | Performed by: FAMILY MEDICINE

## 2022-01-04 PROCEDURE — 82570 ASSAY OF URINE CREATININE: CPT | Performed by: FAMILY MEDICINE

## 2022-01-04 PROCEDURE — G0103 PSA SCREENING: HCPCS | Performed by: FAMILY MEDICINE

## 2022-01-04 PROCEDURE — 99214 OFFICE O/P EST MOD 30 MIN: CPT | Performed by: FAMILY MEDICINE

## 2022-01-04 NOTE — PROGRESS NOTES
"Chief Complaint  Hypertension (follow-up)    Subjective          Jhon Munoz presents to [facility] PRIMARY CARE  History of Present Illness    Patient has a past medical history of having type 2 diabetes.  He is currently taking Jardiance 25 mg daily.  Patient is also taking Ozempic 0.5 mg weekly.  He does believe the medicine is helping him.  He has lost 11 pounds since last office visit.  He denies any side effects of the medicine.    Patient does have hyperlipidemia.  Patient is currently taking atorvastatin 20 mg daily.  Denies any side effects of the medicine.    Patient also has history of having essential hypertension.  Blood pressure today is 124/72.  He is on spironolactone 37.5 mg daily, Entresto 49-51 mg daily, carvedilol 25 mg twice a day.  As well as amlodipine 5 mg daily.  He denies any side effects of the medications.    Objective   Vital Signs:   /72   Pulse 78   Resp 18   Ht 177.8 cm (70\")   Wt 108 kg (239 lb)   SpO2 99%   BMI 34.29 kg/m²     Physical Exam  Vitals and nursing note reviewed.   Constitutional:       Appearance: He is well-developed.   HENT:      Head: Normocephalic and atraumatic.   Musculoskeletal:      Cervical back: Normal range of motion and neck supple.   Neurological:      Mental Status: He is alert and oriented to person, place, and time.   Psychiatric:         Behavior: Behavior normal.        Result Review :                 Assessment and Plan    Diagnoses and all orders for this visit:    1. Hyperlipidemia, unspecified hyperlipidemia type (Primary)  -     Lipid Panel With LDL / HDL Ratio  -     Continue atorvastatin 20 mg daily.    2. Type 2 diabetes mellitus without complication, without long-term current use of insulin ([facility])  -     Hemoglobin A1c  -     Microalbumin / Creatinine Urine Ratio - Urine, Clean Catch  -     Continue Jardiance and Ozempic.    3. Essential hypertension  -     Comprehensive Metabolic Panel  -     CBC & " Differential  -     Currently stable, will continue current blood pressure medicines.        Follow Up   No follow-ups on file.  Patient was given instructions and counseling regarding his condition or for health maintenance advice. Please see specific information pulled into the AVS if appropriate.

## 2022-01-06 NOTE — PROGRESS NOTES
Please inform the patient of the following abnormal results. Hba1c is much improved, continue current meds. Serum creatnine is elevated, I do believe he sees nephrology regularly?

## 2022-02-28 ENCOUNTER — OFFICE VISIT (OUTPATIENT)
Dept: CARDIOLOGY | Facility: CLINIC | Age: 57
End: 2022-02-28

## 2022-02-28 VITALS
DIASTOLIC BLOOD PRESSURE: 74 MMHG | WEIGHT: 238 LBS | BODY MASS INDEX: 34.07 KG/M2 | HEIGHT: 70 IN | SYSTOLIC BLOOD PRESSURE: 118 MMHG | HEART RATE: 59 BPM

## 2022-02-28 DIAGNOSIS — I42.8 NONISCHEMIC CARDIOMYOPATHY: Primary | ICD-10-CM

## 2022-02-28 DIAGNOSIS — I25.10 CORONARY ARTERY DISEASE INVOLVING NATIVE CORONARY ARTERY OF NATIVE HEART WITHOUT ANGINA PECTORIS: ICD-10-CM

## 2022-02-28 DIAGNOSIS — G47.33 OSA (OBSTRUCTIVE SLEEP APNEA): ICD-10-CM

## 2022-02-28 DIAGNOSIS — I10 ESSENTIAL HYPERTENSION: ICD-10-CM

## 2022-02-28 DIAGNOSIS — E78.2 MIXED HYPERLIPIDEMIA: ICD-10-CM

## 2022-02-28 DIAGNOSIS — E66.2 CLASS 1 OBESITY WITH ALVEOLAR HYPOVENTILATION WITHOUT SERIOUS COMORBIDITY WITH BODY MASS INDEX (BMI) OF 34.0 TO 34.9 IN ADULT: ICD-10-CM

## 2022-02-28 PROBLEM — H25.019 CORTICAL SENILE CATARACT: Status: RESOLVED | Noted: 2019-04-01 | Resolved: 2022-02-28

## 2022-02-28 PROBLEM — M25.511 ACUTE PAIN OF RIGHT SHOULDER: Status: RESOLVED | Noted: 2021-05-05 | Resolved: 2022-02-28

## 2022-02-28 PROBLEM — E11.9 DIABETES MELLITUS WITHOUT COMPLICATION (HCC): Status: ACTIVE | Noted: 2019-04-01

## 2022-02-28 PROBLEM — H25.019 CORTICAL SENILE CATARACT: Status: ACTIVE | Noted: 2019-04-01

## 2022-02-28 PROCEDURE — 93000 ELECTROCARDIOGRAM COMPLETE: CPT | Performed by: NURSE PRACTITIONER

## 2022-02-28 PROCEDURE — 99214 OFFICE O/P EST MOD 30 MIN: CPT | Performed by: NURSE PRACTITIONER

## 2022-02-28 RX ORDER — SPIRONOLACTONE 25 MG/1
TABLET ORAL
Qty: 135 TABLET | Refills: 3 | Status: SHIPPED | OUTPATIENT
Start: 2022-02-28 | End: 2022-11-18 | Stop reason: SDUPTHER

## 2022-02-28 RX ORDER — SACUBITRIL AND VALSARTAN 49; 51 MG/1; MG/1
1 TABLET, FILM COATED ORAL 2 TIMES DAILY
Qty: 180 TABLET | Refills: 3 | COMMUNITY
Start: 2022-02-28 | End: 2022-05-09

## 2022-02-28 NOTE — PROGRESS NOTES
Date of Office Visit: 2022  Encounter Provider: TERRI Ndiaye  Place of Service: [facility] CARDIOLOGY  Patient Name: Jhon Munoz  :1965  Primary Cardiologist: Dr. Bernadine Graff     Chief Complaint   Patient presents with   • Cardiomyopathy   • Follow-up       HPI: Jhon Munoz is a pleasant 57 y.o. male who presents today for follow-up on nonischemic cardiomyopathy.  I reviewed his medical records.    He has been diagnosed with obstructive sleep apnea (compliant with CPAP), hypertension, hyperlipidemia, and type 2 diabetes mellitus.    In 2020, he was experiencing right upper quadrant discomfort.  His PCP ordered an echocardiogram which revealed an EF of 27.5%, grade 1 diastolic dysfunction, and GLS -12.8%.  He was experiencing some mild dyspnea with exertion.  Coronary angiogram was normal except for luminal irregularities in the circumflex and RCA 30% mid segment stenosis.  He was diagnosed with nonischemic cardiomyopathy and goal-directed medical therapy was recommended.  He then had a cardiac MRI which showed the following: left ventricle dilated, global hypokinesis, mild right ventricular hypokinesis, normal perfusion of the myocardium, a focal area of mid myocardial delayed enhancement of the inferior septum, and EF calculated at 30%. He tolerated Entresto well.  HCTZ was discontinued because of elevation of creatinine.  His a atorvastatin was decreased to 10 mg daily due to myalgias.  His muscle stiffness/pain did not improve with decreasing atorvastatin.    In 2020, he saw Dr. Chris Carreon (urology). CT scan that was concerning for left renal infarct involving the lower pole and mid pole anterior cortex. It was noted that a third of his left kidney is likely dead/not functioning, and possible renal stone. Due to the renal infarct, Dr. Graff recommended a 14-day Holter monitor to assess for atrial fibrillation. Holter monitor showed  normal sinus rhythm, with multiple episodes of nonsustained ventricular tachycardia lasting 10 seconds and short bursts of SVT versus atrial fibrillation.    In October 2020, repeat echocardiogram showed EF of 36-40% and grade 1 diastolic dysfunction.    In August 2021, he followed up with Dr. Graff.  She increased his spironolactone to 37.5 and recommended blood pressure goal of less than 110/80.  She also recommended a repeat echocardiogram after the February visit. In September 2021, he had the COVID-19 virus and received the Regeneron infusion.    Today is his follow up visit. Today his blood pressure is 118/74.  He states he has been taking spironolactone 37.5 mg daily and reports occasional breast tenderness.  He does not monitor his blood pressure regularly. He denies chest pain, shortness of air, PND, orthopnea, palpitations, edema, dizziness, syncope, fatigue, and bleeding.  He recently started Ozempic and reports occasional lightheadedness in the morning upon standing.  He is due for a repeat echocardiogram. I reviewed his blood work from January 2022: CMP normal except for creatinine of 1.36.  Hemoglobin and hematocrit normal.  Hemoglobin A1c 6.11.  Total cholesterol 120, triglycerides 73, HDL 51, and LDL 54.      Past Medical History:   Diagnosis Date   • Cardiomyopathy (HCC)    • Colon polyp    • Cortical senile cataract 4/1/2019   • Diabetes mellitus (HCC)     type 2   • Hyperlipidemia    • Hypertension    • Renal calculi     Followed by Dr. Chris Carreon urologist   • Renal infarct (HCC) 07/2020    Left; noted on CT scan-followed by Dr. Chris Carreon urology   • Sleep apnea     CPAP nightly       Past Surgical History:   Procedure Laterality Date   • CARDIAC CATHETERIZATION N/A 7/24/2020    Procedure: Coronary angiography;  Surgeon: Robert Deluna MD;  Location: [facility] CATH INVASIVE LOCATION;  Service: Cardiovascular;  Laterality: N/A;   • CARDIAC CATHETERIZATION N/A 7/24/2020    Procedure: Left  Heart Cath;  Surgeon: Robert Deluna MD;  Location:  [facility] CATH INVASIVE LOCATION;  Service: Cardiovascular;  Laterality: N/A;   • COLONOSCOPY     • KNEE SURGERY      meniscus        Social History     Socioeconomic History   • Marital status:      Spouse name: Jyoti   Tobacco Use   • Smoking status: Never Smoker   • Smokeless tobacco: Never Used   Vaping Use   • Vaping Use: Never used   Substance and Sexual Activity   • Alcohol use: Yes     Alcohol/week: 2.0 standard drinks     Types: 1 Cans of beer, 1 Shots of liquor per week     Comment: Caffeine use: 1 cup daily   • Drug use: Never   • Sexual activity: Defer       Family History   Problem Relation Age of Onset   • Hypertension Mother        The following portion of the patient's history were reviewed and updated as appropriate: past medical history, past surgical history, past social history, past family history, allergies, current medications, and problem list.    Review of Systems   Constitutional: Negative for chills, diaphoresis, fever, malaise/fatigue, night sweats, weight gain and weight loss.        Breast tenderness   HENT: Negative for hearing loss, nosebleeds, sore throat and tinnitus.    Eyes: Negative for blurred vision, double vision, pain and visual disturbance.   Cardiovascular: Negative.  Negative for chest pain, claudication, cyanosis, dyspnea on exertion, irregular heartbeat, leg swelling, near-syncope, orthopnea, palpitations, paroxysmal nocturnal dyspnea and syncope.   Respiratory: Negative.  Negative for cough, hemoptysis, shortness of breath, snoring and wheezing.    Endocrine: Negative for cold intolerance, heat intolerance and polyuria.   Hematologic/Lymphatic: Negative for bleeding problem. Does not bruise/bleed easily.   Skin: Negative.  Negative for color change, dry skin, flushing and itching.   Musculoskeletal: Negative for falls, joint pain, joint swelling, muscle cramps, muscle weakness, myalgias and stiffness.  "  Gastrointestinal: Negative.  Negative for abdominal pain, constipation, heartburn, melena, nausea and vomiting.   Genitourinary: Negative for dysuria and hematuria.   Neurological: Negative.  Negative for excessive daytime sleepiness, dizziness, light-headedness, loss of balance, numbness, paresthesias, seizures and vertigo.   Psychiatric/Behavioral: Negative for altered mental status, depression, memory loss and substance abuse. The patient does not have insomnia and is not nervous/anxious.    Allergic/Immunologic: Negative for environmental allergies.       No Known Allergies      Current Outpatient Medications:   •  amLODIPine (NORVASC) 5 MG tablet, Take 1 tablet by mouth Daily., Disp: 90 tablet, Rfl: 3  •  aspirin 81 MG chewable tablet, Chew 81 mg Daily., Disp: , Rfl:   •  atorvastatin (LIPITOR) 20 MG tablet, Take 0.5 tablets by mouth Daily., Disp: 45 tablet, Rfl: 1  •  carvedilol (COREG) 25 MG tablet, Take 1 tablet by mouth 2 (Two) Times a Day., Disp: 180 tablet, Rfl: 3  •  empagliflozin (Jardiance) 25 MG tablet tablet, Take 1 tablet by mouth Daily., Disp: 90 tablet, Rfl: 1  •  esomeprazole (nexIUM) 40 MG capsule, TAKE 1 CAPSULE BY MOUTH EVERY DAY IN THE MORNING BEFORE BREAKFAST (Patient taking differently: PRN), Disp: 90 capsule, Rfl: 2  •  Semaglutide,0.25 or 0.5MG/DOS, (Ozempic, 0.25 or 0.5 MG/DOSE,) 2 MG/1.5ML solution pen-injector, Inject 0.5 mg under the skin into the appropriate area as directed 1 (One) Time Per Week., Disp: 1 pen, Rfl: 6  •  spironolactone (ALDACTONE) 25 MG tablet, 1.5 tablets daily, Disp: 135 tablet, Rfl: 3  •  sacubitril-valsartan (Entresto) 49-51 MG tablet, Take 1 tablet by mouth 2 (Two) Times a Day., Disp: 180 tablet, Rfl: 3        Objective:     Vitals:    02/28/22 0831 02/28/22 0845   BP: 118/74 118/74   BP Location: Left arm Right arm   Pulse: 59    Weight: 108 kg (238 lb)    Height: 177.8 cm (70\")      Body mass index is 34.15 kg/m².    PHYSICAL EXAM:    Vitals Reviewed. "   General Appearance: No acute distress, well developed and well nourished. Obese.   Eyes: Conjunctiva and lids: No erythema, swelling, or discharge. Sclera non-icteric.  Wears glasses.  HENT: Atraumatic, normocephalic. External eyes, ears, and nose normal. No hearing loss noted. Mucous membranes normal. Lips not cyanotic. Neck supple with no tenderness.  Wearing a mask.  Respiratory: No signs of respiratory distress. Respiration rhythm and depth normal.   Clear to auscultation. No rales, crackles, rhonchi, or wheezing auscultated.   Cardiovascular:  Jugular Venous Pressure: Normal  Heart Rate and Rhythm: Normal, Heart Sounds: Normal S1 and S2. No S3 or S4 noted.  Murmurs: No murmurs noted. No rubs, thrills, or gallops.   Lower Extremities: No edema noted.  Gastrointestinal:  Abdomen soft, non-distended, non-tender. Normal bowel sounds.    Musculoskeletal: Normal movement of extremities  Skin and Nails: General appearance normal. No pallor, cyanosis, diaphoresis. Skin temperature normal. No clubbing of fingernails.   Psychiatric: Patient alert and oriented to person, place, and time. Speech and behavior appropriate. Normal mood and affect.       ECG 12 Lead    Date/Time: 2/28/2022 8:35 AM  Performed by: Gena Molina APRN  Authorized by: Gena Molina APRN   Comparison: compared with previous ECG from 8/24/2021  Comparison to previous ECG: Normal sinus rhythm with T wave flattening  Rhythm: sinus rhythm  Rate: normal  BPM: 59  Conduction: conduction normal  T inversion: I, aVL, V5 and V6  QRS axis: normal    Clinical impression: abnormal EKG  Comments: T wave inversions new              Assessment:       Diagnosis Plan   1. Nonischemic cardiomyopathy (HCC)  Adult Transthoracic Echo Complete W/ Cont if Necessary Per Protocol   2. Coronary artery disease involving native coronary artery of native heart without angina pectoris     3. Essential hypertension     4. Mixed hyperlipidemia     5. OCHOA (obstructive  sleep apnea)     6. Class 1 obesity with alveolar hypoventilation without serious comorbidity with body mass index (BMI) of 34.0 to 34.9 in adult ([facility])            Plan:       1.  Nonischemic Cardiomyopathy: NYHA class I.  Repeat echocardiogram in October 2020 showed EF of 36-40% and grade 1 diastolic dysfunction.  Repeat echocardiogram.  We will continue with goal-directed therapy including carvedilol, sacubitril/valsartan, and spironolactone.  Euvolemic today.    2.  Coronary Artery Disease: He has some mild T wave inversions on the EKG today, but not in the inferior portion where he had the 30% RCA stenosis.  I think this is more related to his cardiomyopathy.  He denies anginal symptoms.  Continue aspirin and atorvastatin.    3.  Hypertension: Blood pressure goal less than 110/80 per Dr. Graff.  He is hesitant to change medications at this time.  Patient directed to monitor blood pressure readings at home and continue to follow a low-sodium diet less than 2000 mg daily.  He would also benefit from regular exercise and weight loss.  He is experiencing some breast tenderness when wearing shirts and we may need to lower the spironolactone dosage.    4.  Hyperlipidemia: Remains on atorvastatin 20 mg daily.  He denies muscle aches.    5.  Obstructive Sleep Apnea: Compliant with CPAP machine.    6.  Obesity: BMI is 34.1. He would benefit from exercise, weight loss, heart healthy, and low-sodium diet.    7.  Type 2 diabetes: Remains on Jardiance and Ozempic.  Follow-up with his PCP.  Last hemoglobin A1c 6.11.    8.  He will have the echocardiogram completed in the near future. I will review his blood pressures and determine further treatment at that time.    ADDENDUM 3/16/2022:  I discussed the echocardiogram results with Dr. Graff.  She said there is a wall motion abnormality, but she is not concerned.  Patient had an 30% RCA disease noted on catheterization in 2020.  Her interpretation of the ejection fraction  is at 35% and this is unchanged from the previous echocardiogram.  She feels like his blood pressure is well controlled and wants him to continue with his current medication regimen.  She recommended that he follow-up with her as scheduled in September.  I spoke with patient via telephone and he verbalized understanding.      As always, it has been a pleasure to participate in your patient's care. Thank you.       Sincerely,       TERRI Haji  [facility] Cardiology      · COVID-19 Precautions - Patient was compliant in wearing a mask. When I saw the patient, I used appropriate personal protective equipment (PPE) including mask (standard procedure).  Additionally, I used gown, gloves, and eye shield if indicated.  Hand hygiene was completed before and after seeing the patient.  · Dictated utilizing Dragon Dictation  I spent 35 minutes reviewing her medical records/testing/previous office notes/labs, face-to-face interaction with patient, physical examination, formulating the plan of care, and discussion of plan of care with patient.

## 2022-03-11 ENCOUNTER — HOSPITAL ENCOUNTER (OUTPATIENT)
Dept: CARDIOLOGY | Facility: HOSPITAL | Age: 57
Discharge: HOME | End: 2022-03-11
Admitting: NURSE PRACTITIONER

## 2022-03-11 ENCOUNTER — TELEPHONE (OUTPATIENT)
Dept: CARDIOLOGY | Facility: CLINIC | Age: 57
End: 2022-03-11

## 2022-03-11 VITALS
DIASTOLIC BLOOD PRESSURE: 73 MMHG | BODY MASS INDEX: 34.07 KG/M2 | HEIGHT: 70 IN | WEIGHT: 238 LBS | SYSTOLIC BLOOD PRESSURE: 123 MMHG | HEART RATE: 66 BPM

## 2022-03-11 DIAGNOSIS — I42.8 NONISCHEMIC CARDIOMYOPATHY: ICD-10-CM

## 2022-03-11 LAB
AORTIC ARCH: 3.5 CM
ASCENDING AORTA: 3.1 CM
BH CV ECHO LEFT VENTRICLE GLOBAL LONGITUDINAL STRAIN: -15.1 %
BH CV ECHO MEAS - ACS: 1.88 CM
BH CV ECHO MEAS - AO MAX PG: 6.8 MMHG
BH CV ECHO MEAS - AO MEAN PG: 3.7 MMHG
BH CV ECHO MEAS - AO ROOT DIAM: 3.8 CM
BH CV ECHO MEAS - AO V2 MAX: 129.9 CM/SEC
BH CV ECHO MEAS - AO V2 VTI: 25.1 CM
BH CV ECHO MEAS - AVA(I,D): 2.43 CM2
BH CV ECHO MEAS - EDV(CUBED): 174.3 ML
BH CV ECHO MEAS - EDV(MOD-SP2): 188 ML
BH CV ECHO MEAS - EDV(MOD-SP4): 203 ML
BH CV ECHO MEAS - EF(MOD-BP): 44.4 %
BH CV ECHO MEAS - EF(MOD-SP2): 46.3 %
BH CV ECHO MEAS - EF(MOD-SP4): 45.8 %
BH CV ECHO MEAS - ESV(CUBED): 119.9 ML
BH CV ECHO MEAS - ESV(MOD-SP2): 101 ML
BH CV ECHO MEAS - ESV(MOD-SP4): 110 ML
BH CV ECHO MEAS - FS: 11.7 %
BH CV ECHO MEAS - IVS/LVPW: 1.08 CM
BH CV ECHO MEAS - IVSD: 1.32 CM
BH CV ECHO MEAS - LAT PEAK E' VEL: 12.8 CM/SEC
BH CV ECHO MEAS - LV DIASTOLIC VOL/BSA (35-75): 90.3 CM2
BH CV ECHO MEAS - LV MASS(C)D: 302.6 GRAMS
BH CV ECHO MEAS - LV MAX PG: 4.8 MMHG
BH CV ECHO MEAS - LV MEAN PG: 2.5 MMHG
BH CV ECHO MEAS - LV SYSTOLIC VOL/BSA (12-30): 48.9 CM2
BH CV ECHO MEAS - LV V1 MAX: 109.3 CM/SEC
BH CV ECHO MEAS - LV V1 VTI: 20.4 CM
BH CV ECHO MEAS - LVIDD: 5.6 CM
BH CV ECHO MEAS - LVIDS: 4.9 CM
BH CV ECHO MEAS - LVOT AREA: 3 CM2
BH CV ECHO MEAS - LVOT DIAM: 1.95 CM
BH CV ECHO MEAS - LVPWD: 1.22 CM
BH CV ECHO MEAS - MED PEAK E' VEL: 8.7 CM/SEC
BH CV ECHO MEAS - MR MAX PG: 59.8 MMHG
BH CV ECHO MEAS - MR MAX VEL: 386.7 CM/SEC
BH CV ECHO MEAS - MV A DUR: 0.14 SEC
BH CV ECHO MEAS - MV A MAX VEL: 59.6 CM/SEC
BH CV ECHO MEAS - MV DEC SLOPE: 175 CM/SEC2
BH CV ECHO MEAS - MV DEC TIME: 0.18 MSEC
BH CV ECHO MEAS - MV E MAX VEL: 49.3 CM/SEC
BH CV ECHO MEAS - MV E/A: 0.83
BH CV ECHO MEAS - MV MAX PG: 1.14 MMHG
BH CV ECHO MEAS - MV MEAN PG: 0.54 MMHG
BH CV ECHO MEAS - MV V2 VTI: 19.6 CM
BH CV ECHO MEAS - MVA(VTI): 3.1 CM2
BH CV ECHO MEAS - PA ACC TIME: 0.06 SEC
BH CV ECHO MEAS - PA PR(ACCEL): 50.5 MMHG
BH CV ECHO MEAS - PA V2 MAX: 99.1 CM/SEC
BH CV ECHO MEAS - PULM A REVS DUR: 0.13 SEC
BH CV ECHO MEAS - PULM A REVS VEL: 59.1 CM/SEC
BH CV ECHO MEAS - PULM DIAS VEL: 32.5 CM/SEC
BH CV ECHO MEAS - PULM SYS VEL: 78 CM/SEC
BH CV ECHO MEAS - RAP SYSTOLE: 8 MMHG
BH CV ECHO MEAS - RV MAX PG: 2.22 MMHG
BH CV ECHO MEAS - RV V1 MAX: 74.6 CM/SEC
BH CV ECHO MEAS - RV V1 VTI: 17.4 CM
BH CV ECHO MEAS - RVOT DIAM: 2.31 CM
BH CV ECHO MEAS - SI(MOD-SP2): 38.7 ML/M2
BH CV ECHO MEAS - SI(MOD-SP4): 41.4 ML/M2
BH CV ECHO MEAS - SV(LVOT): 61.2 ML
BH CV ECHO MEAS - SV(MOD-SP2): 87 ML
BH CV ECHO MEAS - SV(MOD-SP4): 93 ML
BH CV ECHO MEAS - SV(RVOT): 72.8 ML
BH CV ECHO MEAS - TAPSE (>1.6): 2.19 CM
BH CV ECHO MEASUREMENTS AVERAGE E/E' RATIO: 4.59
BH CV XLRA - RV BASE: 3.4 CM
BH CV XLRA - RV LENGTH: 7.2 CM
BH CV XLRA - RV MID: 2.6 CM
BH CV XLRA - TDI S': 10.9 CM/SEC
LEFT ATRIUM VOLUME INDEX: 18.3 ML/M2
MAXIMAL PREDICTED HEART RATE: 161 BPM
SINUS: 2.9 CM
STJ: 2.8 CM
STRESS TARGET HR: 137 BPM

## 2022-03-11 PROCEDURE — 93306 TTE W/DOPPLER COMPLETE: CPT

## 2022-03-11 PROCEDURE — 93356 MYOCRD STRAIN IMG SPCKL TRCK: CPT | Performed by: INTERNAL MEDICINE

## 2022-03-11 PROCEDURE — 25010000002 PERFLUTREN (DEFINITY) 8.476 MG IN SODIUM CHLORIDE (PF) 0.9 % 10 ML INJECTION: Performed by: NURSE PRACTITIONER

## 2022-03-11 PROCEDURE — 93306 TTE W/DOPPLER COMPLETE: CPT | Performed by: INTERNAL MEDICINE

## 2022-03-11 PROCEDURE — 93356 MYOCRD STRAIN IMG SPCKL TRCK: CPT

## 2022-03-11 RX ADMIN — PERFLUTREN 6 ML: 6.52 INJECTION, SUSPENSION INTRAVENOUS at 09:15

## 2022-03-11 NOTE — TELEPHONE ENCOUNTER
Dr. Graff -     Please review the echo and advise anything further.  There were wall motion abnormalities. 30% RCA per cath in July 2020.  He denies any symptoms.  EF has improved slightly.  Please advise. Thanks!

## 2022-03-11 NOTE — TELEPHONE ENCOUNTER
Echocardiogram result Text  · Calculated left ventricular EF = 44.4% Estimated left ventricular EF was in agreement with the calculated left ventricular EF. Left ventricular systolic function is mildly decreased.  · Left ventricular diastolic function is consistent with (grade I) impaired relaxation.  · The left ventricular cavity is moderately dilated.  · Left ventricular wall thickness is consistent with moderate concentric hypertrophy.  · The following left ventricular wall segments are hypokinetic: mid anterior, apical anterior, basal anterolateral, mid anterolateral, apical lateral, basal inferolateral, mid inferolateral, apical inferior, mid inferior, apical septal, basal inferoseptal, mid inferoseptal, apex hypokinetic, mid anteroseptal, basal anterior, basal inferior and basal inferoseptal.  · Abnormal global longitudinal LV strain (GLS) = -15.1%.  · Normal valvular structure and function      EF mildly improved. BP have been running less than 110 systolic. He will continue with current medications and follow up with Dr. Graff in 6 months. I will have Dr. Graff review and advise.

## 2022-03-16 NOTE — TELEPHONE ENCOUNTER
I discussed the echocardiogram results with Dr. Graff.  She said there is a wall motion abnormality, but she is not concerned.  Patient had an 30% RCA disease noted on catheterization in 2020.  Her interpretation of the ejection fraction is at 35% and this is unchanged from the previous echocardiogram.  She feels like his blood pressure is well controlled and wants him to continue with his current medication regimen.  She recommended that he follow-up with her as scheduled in September.  I spoke with patient via telephone and he verbalized understanding.

## 2022-03-28 ENCOUNTER — TELEPHONE (OUTPATIENT)
Dept: INTERNAL MEDICINE | Facility: CLINIC | Age: 57
End: 2022-03-28

## 2022-03-28 NOTE — TELEPHONE ENCOUNTER
Caller: Jyoti Munoz    Relationship: Emergency Contact    Best call back number: [phone]    What was the call regarding: PATIENT IS OUT OF MEDICATION, PLEASE ADVISE IF WE HAVE ANY SAMPLES OF THIS MEDICATION THAT WE CAN GIVE HIM. PATIENT'S WIFE ALSO WOULD LIKE TO KNOW IF HE NEEDS TO STILL BE TAKING IT.    Semaglutide,0.25 or 0.5MG/DOS, (Ozempic, 0.25 or 0.5 MG/DOSE,) 2 MG/1.5ML solution pen-injector      Do you require a callback: YES

## 2022-03-28 NOTE — TELEPHONE ENCOUNTER
Caller: Jyoti Munoz    Relationship: Emergency Contact    Best call back number: [phone] (H)      WIFE CALLED CURIOUS ABOUT THIS REQUEST.    STATES THE PATIENT TAKES HIS MEDICATION ON Wednesday'S     PLEASE ADVISE

## 2022-04-05 ENCOUNTER — OFFICE VISIT (OUTPATIENT)
Dept: INTERNAL MEDICINE | Facility: CLINIC | Age: 57
End: 2022-04-05

## 2022-04-05 ENCOUNTER — LAB (OUTPATIENT)
Dept: LAB | Facility: HOSPITAL | Age: 57
End: 2022-04-05

## 2022-04-05 VITALS
HEART RATE: 73 BPM | OXYGEN SATURATION: 97 % | BODY MASS INDEX: 33.21 KG/M2 | RESPIRATION RATE: 16 BRPM | WEIGHT: 232 LBS | HEIGHT: 70 IN | TEMPERATURE: 98 F | DIASTOLIC BLOOD PRESSURE: 81 MMHG | SYSTOLIC BLOOD PRESSURE: 126 MMHG

## 2022-04-05 DIAGNOSIS — I10 ESSENTIAL HYPERTENSION: ICD-10-CM

## 2022-04-05 DIAGNOSIS — E11.9 TYPE 2 DIABETES MELLITUS WITHOUT COMPLICATION, WITHOUT LONG-TERM CURRENT USE OF INSULIN: Primary | ICD-10-CM

## 2022-04-05 DIAGNOSIS — E78.5 HYPERLIPIDEMIA, UNSPECIFIED HYPERLIPIDEMIA TYPE: ICD-10-CM

## 2022-04-05 LAB
ALBUMIN SERPL-MCNC: 4.6 G/DL (ref 3.5–5.2)
ALBUMIN UR-MCNC: 1.8 MG/DL
ALBUMIN/GLOB SERPL: 1.6 G/DL
ALP SERPL-CCNC: 60 U/L (ref 39–117)
ALT SERPL W P-5'-P-CCNC: 20 U/L (ref 1–41)
ANION GAP SERPL CALCULATED.3IONS-SCNC: 8 MMOL/L (ref 5–15)
AST SERPL-CCNC: 19 U/L (ref 1–40)
BASOPHILS # BLD AUTO: 0.04 10*3/MM3 (ref 0–0.2)
BASOPHILS NFR BLD AUTO: 0.7 % (ref 0–1.5)
BILIRUB SERPL-MCNC: 0.6 MG/DL (ref 0–1.2)
BUN SERPL-MCNC: 19 MG/DL (ref 6–20)
BUN/CREAT SERPL: 14.8 (ref 7–25)
CALCIUM SPEC-SCNC: 9.4 MG/DL (ref 8.6–10.5)
CHLORIDE SERPL-SCNC: 103 MMOL/L (ref 98–107)
CHOLEST SERPL-MCNC: 133 MG/DL (ref 0–200)
CO2 SERPL-SCNC: 25 MMOL/L (ref 22–29)
CREAT SERPL-MCNC: 1.28 MG/DL (ref 0.76–1.27)
CREAT UR-MCNC: 136.6 MG/DL
DEPRECATED RDW RBC AUTO: 44.5 FL (ref 37–54)
EGFRCR SERPLBLD CKD-EPI 2021: 64.5 ML/MIN/1.73
EOSINOPHIL # BLD AUTO: 0.18 10*3/MM3 (ref 0–0.4)
EOSINOPHIL NFR BLD AUTO: 3.3 % (ref 0.3–6.2)
ERYTHROCYTE [DISTWIDTH] IN BLOOD BY AUTOMATED COUNT: 13.2 % (ref 12.3–15.4)
GLOBULIN UR ELPH-MCNC: 2.8 GM/DL
GLUCOSE SERPL-MCNC: 83 MG/DL (ref 65–99)
HBA1C MFR BLD: 6.2 % (ref 4.8–5.6)
HCT VFR BLD AUTO: 44.4 % (ref 37.5–51)
HDLC SERPL-MCNC: 55 MG/DL (ref 40–60)
HGB BLD-MCNC: 14.5 G/DL (ref 13–17.7)
IMM GRANULOCYTES # BLD AUTO: 0.02 10*3/MM3 (ref 0–0.05)
IMM GRANULOCYTES NFR BLD AUTO: 0.4 % (ref 0–0.5)
LDLC SERPL CALC-MCNC: 62 MG/DL (ref 0–100)
LDLC/HDLC SERPL: 1.13 {RATIO}
LYMPHOCYTES # BLD AUTO: 2.17 10*3/MM3 (ref 0.7–3.1)
LYMPHOCYTES NFR BLD AUTO: 39.5 % (ref 19.6–45.3)
MCH RBC QN AUTO: 29.8 PG (ref 26.6–33)
MCHC RBC AUTO-ENTMCNC: 32.7 G/DL (ref 31.5–35.7)
MCV RBC AUTO: 91.2 FL (ref 79–97)
MICROALBUMIN/CREAT UR: 13.2 MG/G
MONOCYTES # BLD AUTO: 0.51 10*3/MM3 (ref 0.1–0.9)
MONOCYTES NFR BLD AUTO: 9.3 % (ref 5–12)
NEUTROPHILS NFR BLD AUTO: 2.57 10*3/MM3 (ref 1.7–7)
NEUTROPHILS NFR BLD AUTO: 46.8 % (ref 42.7–76)
NRBC BLD AUTO-RTO: 0 /100 WBC (ref 0–0.2)
PLATELET # BLD AUTO: 199 10*3/MM3 (ref 140–450)
PMV BLD AUTO: 11.1 FL (ref 6–12)
POTASSIUM SERPL-SCNC: 4.7 MMOL/L (ref 3.5–5.2)
PROT SERPL-MCNC: 7.4 G/DL (ref 6–8.5)
RBC # BLD AUTO: 4.87 10*6/MM3 (ref 4.14–5.8)
SODIUM SERPL-SCNC: 136 MMOL/L (ref 136–145)
TRIGL SERPL-MCNC: 79 MG/DL (ref 0–150)
VLDLC SERPL-MCNC: 16 MG/DL (ref 5–40)
WBC NRBC COR # BLD: 5.49 10*3/MM3 (ref 3.4–10.8)

## 2022-04-05 PROCEDURE — 36415 COLL VENOUS BLD VENIPUNCTURE: CPT | Performed by: FAMILY MEDICINE

## 2022-04-05 PROCEDURE — 80053 COMPREHEN METABOLIC PANEL: CPT | Performed by: FAMILY MEDICINE

## 2022-04-05 PROCEDURE — 82043 UR ALBUMIN QUANTITATIVE: CPT | Performed by: FAMILY MEDICINE

## 2022-04-05 PROCEDURE — 83036 HEMOGLOBIN GLYCOSYLATED A1C: CPT | Performed by: FAMILY MEDICINE

## 2022-04-05 PROCEDURE — 85025 COMPLETE CBC W/AUTO DIFF WBC: CPT | Performed by: FAMILY MEDICINE

## 2022-04-05 PROCEDURE — 82570 ASSAY OF URINE CREATININE: CPT | Performed by: FAMILY MEDICINE

## 2022-04-05 PROCEDURE — 80061 LIPID PANEL: CPT | Performed by: FAMILY MEDICINE

## 2022-04-05 PROCEDURE — 99214 OFFICE O/P EST MOD 30 MIN: CPT | Performed by: FAMILY MEDICINE

## 2022-04-05 NOTE — PROGRESS NOTES
"Chief Complaint  Follow up to hyperlipidemia    Subjective          Jhondarlene Munoz presents to [facility] PRIMARY CARE  History of Present Illness    The patient has consented to being recorded using JU.    He is here for follow-up on his type 2 diabetes along with his high cholesterol and high blood pressure.    Type 2 diabetes mellitus   The patient reports that he has been monitoring his blood glucose at home and this has been well controlled. He is currently on Ozempic 0.5 mg weekly, along with Jardiance 25 mg daily.     Hyperlipidemia  He continues on atorvastatin 10 mg daily.     Hypertension  He has a history of cardiomyopathy and is currently taking carvedilol 25 mg 2 times per day, Entresto 49-51 mg 1 time per day, spironolactone 37.5 mg 1 time per day, and amlodipine 5 mg 1 time per day. His systolic blood pressure at home averages at about 118 mmHg. His breathing is reportedly stable. The patient last saw cardiology 1 month ago, where he had an echocardiogram. He states that his ejection fraction was at approximately 35%. He was advised to continue his current medications and that the Entresto is maintaining his condition at this time.       Objective   Vital Signs:   /81   Pulse 73   Temp 98 °F (36.7 °C)   Resp 16   Ht 177.8 cm (70\")   Wt 105 kg (232 lb)   SpO2 97%   BMI 33.29 kg/m²     Physical Exam  Vitals and nursing note reviewed.   Constitutional:       Appearance: He is well-developed.   HENT:      Head: Normocephalic and atraumatic.      Right Ear: External ear normal.      Left Ear: External ear normal.   Cardiovascular:      Rate and Rhythm: Normal rate and regular rhythm.      Heart sounds: Normal heart sounds.   Pulmonary:      Effort: Pulmonary effort is normal. No respiratory distress.      Breath sounds: Normal breath sounds.   Abdominal:      Palpations: Abdomen is soft.      Tenderness: There is no abdominal tenderness. There is no guarding. "   Musculoskeletal:         General: Normal range of motion.      Cervical back: Normal range of motion and neck supple.   Lymphadenopathy:      Cervical: No cervical adenopathy.   Skin:     General: Skin is warm.   Neurological:      Mental Status: He is alert and oriented to person, place, and time.   Psychiatric:         Behavior: Behavior normal.        Result Review :                 Assessment and Plan    Diagnoses and all orders for this visit:    1. Type 2 diabetes mellitus without complication, without long-term current use of insulin (HCC) (Primary)  -     Microalbumin / Creatinine Urine Ratio - Urine, Clean Catch  -     Hemoglobin A1c    2. Hyperlipidemia, unspecified hyperlipidemia type  -     Lipid Panel With LDL / HDL Ratio  -     Comprehensive Metabolic Panel    3. Essential hypertension  -     Comprehensive Metabolic Panel  -     CBC & Differential    1. Type 2 diabetes mellitus   - Continue current medication regimen, including Ozempic 0.5 mg weekly, along with Jardiance 25 mg daily.   - Microalbumin and A1c ordered today.     2. Hypertension  - Continue current medications, carvedilol 25 mg 2 times per day, Entresto 49-51 mg 1 time per day, spironolactone 37.5 mg 1 time per day, and amlodipine 5 mg 1 time per day.   - CBC and CMP ordered today.     3. Hyperlipidemia  - Continue atorvastatin 10 mg daily.   - Lipid panel is ordered.     He will follow up for physical exam in the next few months.       Follow Up   No follow-ups on file.  Patient was given instructions and counseling regarding his condition or for health maintenance advice. Please see specific information pulled into the AVS if appropriate.         Transcribed from ambient dictation for Oneil Decker MD by Erma Alvarez.  04/05/22   12:42 EDT    Patient verbalized consent to the visit recording.  I have personally performed the services described in this document as transcribed by the above individual, and it is both accurate and  complete.  Oneil Decker MD  4/5/2022  12:50 EDT

## 2022-05-09 DIAGNOSIS — E78.5 HYPERLIPIDEMIA, UNSPECIFIED HYPERLIPIDEMIA TYPE: ICD-10-CM

## 2022-05-09 RX ORDER — SACUBITRIL AND VALSARTAN 49; 51 MG/1; MG/1
TABLET, FILM COATED ORAL
Qty: 180 TABLET | Refills: 1 | Status: SHIPPED | OUTPATIENT
Start: 2022-05-09 | End: 2022-09-13

## 2022-05-09 RX ORDER — ATORVASTATIN CALCIUM 20 MG/1
TABLET, FILM COATED ORAL
Qty: 45 TABLET | Refills: 1 | Status: SHIPPED | OUTPATIENT
Start: 2022-05-09 | End: 2022-11-16 | Stop reason: SDUPTHER

## 2022-05-27 ENCOUNTER — TELEPHONE (OUTPATIENT)
Dept: INTERNAL MEDICINE | Facility: CLINIC | Age: 57
End: 2022-05-27

## 2022-05-27 RX ORDER — ESOMEPRAZOLE MAGNESIUM 40 MG/1
40 CAPSULE, DELAYED RELEASE ORAL
Qty: 90 CAPSULE | Refills: 1 | Status: ON HOLD | OUTPATIENT
Start: 2022-05-27 | End: 2022-10-18 | Stop reason: SDUPTHER

## 2022-05-27 NOTE — TELEPHONE ENCOUNTER
Caller: Jyoti Munoz    Relationship: Emergency Contact    Best call back number: [phone]    Requested Prescriptions:   Requested Prescriptions     Pending Prescriptions Disp Refills   • esomeprazole (nexIUM) 40 MG capsule 90 capsule 2     Sig: Take 1 capsule by mouth Every Morning Before Breakfast.        Pharmacy where request should be sent: [facility]/PHARMACY #[id number] [place], [place] - [address] [address] AT [facility] [phone] [facility] [phone] FX     Additional details provided by patient: COMPLETELY OUT    Does the patient have less than a 3 day supply:  [x] Yes  [] No    Carole Michaels Rep   05/27/22 08:38 EDT

## 2022-05-27 NOTE — TELEPHONE ENCOUNTER
Caller: Jyoti Munoz    Relationship: Emergency Contact    Best call back number: [phone]    Requested Prescriptions:   Requested Prescriptions      No prescriptions requested or ordered in this encounter      empagliflozin (Jardiance) 25 MG tablet tablet         Pharmacy where request should be sent:  PATIENT REQUESTING SAMPLES FROM THE OFFICE    Additional details provided by patient: COUPLE WEEKS    Does the patient have less than a 3 day supply:  [] Yes  [] No    Carole Michaels Rep   05/27/22 08:40 EDT

## 2022-06-15 ENCOUNTER — LAB (OUTPATIENT)
Dept: LAB | Facility: HOSPITAL | Age: 57
End: 2022-06-15

## 2022-06-15 ENCOUNTER — OFFICE VISIT (OUTPATIENT)
Dept: INTERNAL MEDICINE | Facility: CLINIC | Age: 57
End: 2022-06-15

## 2022-06-15 VITALS
HEIGHT: 70 IN | SYSTOLIC BLOOD PRESSURE: 120 MMHG | HEART RATE: 63 BPM | OXYGEN SATURATION: 98 % | WEIGHT: 234.5 LBS | DIASTOLIC BLOOD PRESSURE: 66 MMHG | BODY MASS INDEX: 33.57 KG/M2

## 2022-06-15 DIAGNOSIS — Z12.11 SCREEN FOR COLON CANCER: ICD-10-CM

## 2022-06-15 DIAGNOSIS — Z00.00 VISIT FOR WELL MAN HEALTH CHECK: Primary | ICD-10-CM

## 2022-06-15 DIAGNOSIS — Z00.00 HEALTHCARE MAINTENANCE: ICD-10-CM

## 2022-06-15 DIAGNOSIS — Z12.5 SCREENING FOR PROSTATE CANCER: ICD-10-CM

## 2022-06-15 DIAGNOSIS — E11.9 TYPE 2 DIABETES MELLITUS WITHOUT COMPLICATION, WITHOUT LONG-TERM CURRENT USE OF INSULIN: ICD-10-CM

## 2022-06-15 DIAGNOSIS — B35.4 TINEA CORPORIS: ICD-10-CM

## 2022-06-15 LAB
25(OH)D3 SERPL-MCNC: 22.9 NG/ML (ref 30–100)
ALBUMIN SERPL-MCNC: 4.5 G/DL (ref 3.5–5.2)
ALBUMIN/GLOB SERPL: 1.6 G/DL
ALP SERPL-CCNC: 63 U/L (ref 39–117)
ALT SERPL W P-5'-P-CCNC: 16 U/L (ref 1–41)
ANION GAP SERPL CALCULATED.3IONS-SCNC: 10.6 MMOL/L (ref 5–15)
AST SERPL-CCNC: 24 U/L (ref 1–40)
BASOPHILS # BLD AUTO: 0.03 10*3/MM3 (ref 0–0.2)
BASOPHILS NFR BLD AUTO: 0.6 % (ref 0–1.5)
BILIRUB SERPL-MCNC: 0.5 MG/DL (ref 0–1.2)
BUN SERPL-MCNC: 17 MG/DL (ref 6–20)
BUN/CREAT SERPL: 12.8 (ref 7–25)
CALCIUM SPEC-SCNC: 9.4 MG/DL (ref 8.6–10.5)
CHLORIDE SERPL-SCNC: 103 MMOL/L (ref 98–107)
CHOLEST SERPL-MCNC: 132 MG/DL (ref 0–200)
CO2 SERPL-SCNC: 22.4 MMOL/L (ref 22–29)
CREAT SERPL-MCNC: 1.33 MG/DL (ref 0.76–1.27)
DEPRECATED RDW RBC AUTO: 44.4 FL (ref 37–54)
EGFRCR SERPLBLD CKD-EPI 2021: 61.6 ML/MIN/1.73
EOSINOPHIL # BLD AUTO: 0.18 10*3/MM3 (ref 0–0.4)
EOSINOPHIL NFR BLD AUTO: 3.5 % (ref 0.3–6.2)
ERYTHROCYTE [DISTWIDTH] IN BLOOD BY AUTOMATED COUNT: 13.2 % (ref 12.3–15.4)
GLOBULIN UR ELPH-MCNC: 2.8 GM/DL
GLUCOSE SERPL-MCNC: 78 MG/DL (ref 65–99)
HBA1C MFR BLD: 5.9 % (ref 4.8–5.6)
HCT VFR BLD AUTO: 43.7 % (ref 37.5–51)
HDLC SERPL-MCNC: 57 MG/DL (ref 40–60)
HGB BLD-MCNC: 14.3 G/DL (ref 13–17.7)
IMM GRANULOCYTES # BLD AUTO: 0.02 10*3/MM3 (ref 0–0.05)
IMM GRANULOCYTES NFR BLD AUTO: 0.4 % (ref 0–0.5)
LDLC SERPL CALC-MCNC: 61 MG/DL (ref 0–100)
LDLC/HDLC SERPL: 1.07 {RATIO}
LYMPHOCYTES # BLD AUTO: 2.06 10*3/MM3 (ref 0.7–3.1)
LYMPHOCYTES NFR BLD AUTO: 40.6 % (ref 19.6–45.3)
MCH RBC QN AUTO: 30.4 PG (ref 26.6–33)
MCHC RBC AUTO-ENTMCNC: 32.7 G/DL (ref 31.5–35.7)
MCV RBC AUTO: 92.8 FL (ref 79–97)
MONOCYTES # BLD AUTO: 0.43 10*3/MM3 (ref 0.1–0.9)
MONOCYTES NFR BLD AUTO: 8.5 % (ref 5–12)
NEUTROPHILS NFR BLD AUTO: 2.36 10*3/MM3 (ref 1.7–7)
NEUTROPHILS NFR BLD AUTO: 46.4 % (ref 42.7–76)
NRBC BLD AUTO-RTO: 0 /100 WBC (ref 0–0.2)
PLATELET # BLD AUTO: 202 10*3/MM3 (ref 140–450)
PMV BLD AUTO: 11.3 FL (ref 6–12)
POTASSIUM SERPL-SCNC: 4.5 MMOL/L (ref 3.5–5.2)
PROT SERPL-MCNC: 7.3 G/DL (ref 6–8.5)
PSA SERPL-MCNC: 0.47 NG/ML (ref 0–4)
RBC # BLD AUTO: 4.71 10*6/MM3 (ref 4.14–5.8)
SODIUM SERPL-SCNC: 136 MMOL/L (ref 136–145)
T-UPTAKE NFR SERPL: 1.03 TBI (ref 0.8–1.3)
T4 SERPL-MCNC: 4.39 MCG/DL (ref 4.5–11.7)
TRIGL SERPL-MCNC: 69 MG/DL (ref 0–150)
TSH SERPL DL<=0.05 MIU/L-ACNC: 1.39 UIU/ML (ref 0.27–4.2)
VLDLC SERPL-MCNC: 14 MG/DL (ref 5–40)
WBC NRBC COR # BLD: 5.08 10*3/MM3 (ref 3.4–10.8)

## 2022-06-15 PROCEDURE — G0103 PSA SCREENING: HCPCS | Performed by: FAMILY MEDICINE

## 2022-06-15 PROCEDURE — 99213 OFFICE O/P EST LOW 20 MIN: CPT | Performed by: FAMILY MEDICINE

## 2022-06-15 PROCEDURE — 99396 PREV VISIT EST AGE 40-64: CPT | Performed by: FAMILY MEDICINE

## 2022-06-15 PROCEDURE — 82306 VITAMIN D 25 HYDROXY: CPT | Performed by: FAMILY MEDICINE

## 2022-06-15 PROCEDURE — 36415 COLL VENOUS BLD VENIPUNCTURE: CPT | Performed by: FAMILY MEDICINE

## 2022-06-15 PROCEDURE — 84479 ASSAY OF THYROID (T3 OR T4): CPT | Performed by: FAMILY MEDICINE

## 2022-06-15 PROCEDURE — 84410 TESTOSTERONE BIOAVAILABLE: CPT | Performed by: FAMILY MEDICINE

## 2022-06-15 PROCEDURE — 80053 COMPREHEN METABOLIC PANEL: CPT | Performed by: FAMILY MEDICINE

## 2022-06-15 PROCEDURE — 80061 LIPID PANEL: CPT | Performed by: FAMILY MEDICINE

## 2022-06-15 PROCEDURE — 85025 COMPLETE CBC W/AUTO DIFF WBC: CPT | Performed by: FAMILY MEDICINE

## 2022-06-15 PROCEDURE — 84436 ASSAY OF TOTAL THYROXINE: CPT | Performed by: FAMILY MEDICINE

## 2022-06-15 PROCEDURE — 83036 HEMOGLOBIN GLYCOSYLATED A1C: CPT | Performed by: FAMILY MEDICINE

## 2022-06-15 PROCEDURE — 84443 ASSAY THYROID STIM HORMONE: CPT | Performed by: FAMILY MEDICINE

## 2022-06-15 RX ORDER — KETOCONAZOLE 20 MG/G
1 CREAM TOPICAL DAILY
Qty: 15 G | Refills: 0 | Status: SHIPPED | OUTPATIENT
Start: 2022-06-15 | End: 2022-06-22

## 2022-06-15 RX ORDER — SEMAGLUTIDE 1.34 MG/ML
0.5 INJECTION, SOLUTION SUBCUTANEOUS WEEKLY
Qty: 1 PEN | Refills: 6 | Status: SHIPPED | OUTPATIENT
Start: 2022-06-15 | End: 2022-07-07 | Stop reason: SDUPTHER

## 2022-06-17 LAB
TESTOST SERPL-MCNC: 436 NG/DL (ref 264–916)
TESTOSTERONE.FREE+WB MFR SERPL: 31.5 % (ref 9–46)
TESTOSTERONE.FREE+WB SERPL-MCNC: 137.3 NG/DL (ref 40–250)

## 2022-06-22 ENCOUNTER — TELEPHONE (OUTPATIENT)
Dept: INTERNAL MEDICINE | Facility: CLINIC | Age: 57
End: 2022-06-22

## 2022-06-22 NOTE — TELEPHONE ENCOUNTER
Caller: Jhon Munoz    Relationship: Self    Best call back number: [phone]     Caller requesting test results:     What test was performed: BLOOD WORK     When was the test performed: 06/15/22    Where was the test performed: IN OFFICE    Additional notes: PATIENT WOULD LIKE TO GET THE RESULTS OF HIS LABS AS WELL AS AN UPDATE ON THE COLONOSCOPY REFERRAL

## 2022-06-23 DIAGNOSIS — E03.8 SUBCLINICAL HYPOTHYROIDISM: ICD-10-CM

## 2022-06-23 DIAGNOSIS — E55.9 VITAMIN D DEFICIENCY: Primary | ICD-10-CM

## 2022-06-23 RX ORDER — LEVOTHYROXINE SODIUM 0.03 MG/1
25 TABLET ORAL DAILY
Qty: 90 TABLET | Refills: 2 | Status: SHIPPED | OUTPATIENT
Start: 2022-06-23 | End: 2022-11-18 | Stop reason: SDUPTHER

## 2022-06-23 RX ORDER — ERGOCALCIFEROL 1.25 MG/1
50000 CAPSULE ORAL
Qty: 12 CAPSULE | Refills: 1 | Status: SHIPPED | OUTPATIENT
Start: 2022-06-23 | End: 2022-11-16 | Stop reason: SDUPTHER

## 2022-06-24 NOTE — TELEPHONE ENCOUNTER
Pt. Called back and I informed him of his lab work as well as the medicine sent it and also provided the Pt. With the number to set up his colonoscopy.  06/24/22 [facility]

## 2022-06-27 ENCOUNTER — PRE-PROCEDURE SCREENING (OUTPATIENT)
Dept: GASTROENTEROLOGY | Facility: CLINIC | Age: 57
End: 2022-06-27

## 2022-06-30 ENCOUNTER — OFFICE VISIT (OUTPATIENT)
Dept: INTERNAL MEDICINE | Facility: CLINIC | Age: 57
End: 2022-06-30

## 2022-06-30 VITALS
WEIGHT: 231 LBS | SYSTOLIC BLOOD PRESSURE: 100 MMHG | HEART RATE: 62 BPM | DIASTOLIC BLOOD PRESSURE: 75 MMHG | HEIGHT: 70 IN | BODY MASS INDEX: 33.07 KG/M2 | OXYGEN SATURATION: 95 % | TEMPERATURE: 97.1 F

## 2022-06-30 DIAGNOSIS — B35.4 TINEA CORPORIS: ICD-10-CM

## 2022-06-30 DIAGNOSIS — L30.9 DERMATITIS: Primary | ICD-10-CM

## 2022-06-30 PROCEDURE — 99214 OFFICE O/P EST MOD 30 MIN: CPT | Performed by: FAMILY MEDICINE

## 2022-06-30 RX ORDER — METHYLPREDNISOLONE 4 MG/1
TABLET ORAL
Qty: 1 EACH | Refills: 0 | Status: SHIPPED | OUTPATIENT
Start: 2022-06-30 | End: 2022-08-23

## 2022-07-07 DIAGNOSIS — E11.9 TYPE 2 DIABETES MELLITUS WITHOUT COMPLICATION, WITHOUT LONG-TERM CURRENT USE OF INSULIN: ICD-10-CM

## 2022-07-07 RX ORDER — SEMAGLUTIDE 1.34 MG/ML
0.5 INJECTION, SOLUTION SUBCUTANEOUS WEEKLY
Qty: 1 PEN | Refills: 6 | Status: SHIPPED | OUTPATIENT
Start: 2022-07-07 | End: 2022-07-07 | Stop reason: SDUPTHER

## 2022-07-07 RX ORDER — SEMAGLUTIDE 1.34 MG/ML
0.5 INJECTION, SOLUTION SUBCUTANEOUS WEEKLY
Qty: 1 PEN | Refills: 6 | Status: SHIPPED | OUTPATIENT
Start: 2022-07-07 | End: 2022-10-17 | Stop reason: SDUPTHER

## 2022-07-08 ENCOUNTER — PREP FOR SURGERY (OUTPATIENT)
Dept: OTHER | Facility: HOSPITAL | Age: 57
End: 2022-07-08

## 2022-07-08 DIAGNOSIS — K63.5 COLON POLYPS: Primary | ICD-10-CM

## 2022-07-08 NOTE — PROGRESS NOTES
Brandi Munoz is a 57 y.o. male and is here for a comprehensive physical exam. The patient reports no problems.    Patient has a dm2, and is currently taking ozempic and jardaince. He doesn't have any side effects.    Patient notes that he has a rash on his chest. He notes the edges are raised and circular. He notes its pruritic.        Social History:   Social History     Socioeconomic History   • Marital status:      Spouse name: Jyoti   Tobacco Use   • Smoking status: Never Smoker   • Smokeless tobacco: Never Used   Vaping Use   • Vaping Use: Never used   Substance and Sexual Activity   • Alcohol use: Yes     Types: 1 Cans of beer, 1 Shots of liquor per week     Comment: Caffeine use: 1 cup daily   • Drug use: Never   • Sexual activity: Defer       Family History:   Family History   Problem Relation Age of Onset   • Hypertension Mother        Past Medical History:   Past Medical History:   Diagnosis Date   • Cardiomyopathy (HCC)    • Colon polyp    • Cortical senile cataract 4/1/2019   • Diabetes mellitus (HCC)     type 2   • Hyperlipidemia    • Hypertension    • Renal calculi     Followed by Dr. Chris Carreon urologist   • Renal infarct (HCC) 07/2020    Left; noted on CT scan-followed by Dr. Chris Carreon urology   • Sleep apnea     CPAP nightly       The following portions of the patient's history were reviewed and updated as appropriate: allergies, current medications, past family history, past medical history, past social history, past surgical history and problem list.    Review of Systems    Review of Systems   Constitutional: Negative for chills and fever.   HENT: Negative for congestion, rhinorrhea, sinus pain and sore throat.    Eyes: Negative for photophobia and visual disturbance.   Respiratory: Negative for cough, chest tightness and shortness of breath.    Cardiovascular: Negative for chest pain and palpitations.   Gastrointestinal: Negative for diarrhea, nausea and vomiting.    Genitourinary: Negative for dysuria, frequency and urgency.   Skin: Negative for rash and wound.   Neurological: Negative for dizziness and syncope.   Psychiatric/Behavioral: Negative for behavioral problems and confusion.       Objective   Physical Exam  Vitals and nursing note reviewed.   Constitutional:       Appearance: He is well-developed.   HENT:      Head: Normocephalic and atraumatic.      Right Ear: External ear normal.      Left Ear: External ear normal.   Cardiovascular:      Rate and Rhythm: Normal rate and regular rhythm.      Heart sounds: Normal heart sounds.   Pulmonary:      Effort: Pulmonary effort is normal. No respiratory distress.      Breath sounds: Normal breath sounds.   Abdominal:      Palpations: Abdomen is soft.      Tenderness: There is no abdominal tenderness. There is no guarding.   Musculoskeletal:         General: Normal range of motion.      Cervical back: Normal range of motion and neck supple.   Lymphadenopathy:      Cervical: No cervical adenopathy.   Skin:     General: Skin is warm.      Comments: Small quarter size rash on chest with raised edges.    Neurological:      Mental Status: He is alert and oriented to person, place, and time.   Psychiatric:         Behavior: Behavior normal.         Vitals:    06/15/22 1112   BP: 120/66   Pulse: 63   SpO2: 98%     Body mass index is 33.65 kg/m².    Medications:   Current Outpatient Medications:   •  amLODIPine (NORVASC) 5 MG tablet, Take 1 tablet by mouth Daily., Disp: 90 tablet, Rfl: 3  •  aspirin 81 MG chewable tablet, Chew 81 mg Daily., Disp: , Rfl:   •  atorvastatin (LIPITOR) 20 MG tablet, TAKE 1/2 TABLET BY MOUTH EVERY DAY, Disp: 45 tablet, Rfl: 1  •  carvedilol (COREG) 25 MG tablet, Take 1 tablet by mouth 2 (Two) Times a Day., Disp: 180 tablet, Rfl: 3  •  empagliflozin (Jardiance) 25 MG tablet tablet, Take 1 tablet by mouth Daily., Disp: 90 tablet, Rfl: 1  •  Entresto 49-51 MG tablet, TAKE 1 TABLET BY MOUTH TWICE A DAY, Disp:  180 tablet, Rfl: 1  •  esomeprazole (nexIUM) 40 MG capsule, Take 1 capsule by mouth Every Morning Before Breakfast. (Patient taking differently: Take 40 mg by mouth Every Morning Before Breakfast. prn), Disp: 90 capsule, Rfl: 1  •  spironolactone (ALDACTONE) 25 MG tablet, 1.5 tablets daily (Patient taking differently: 1 in the am and 1/2 in pm), Disp: 135 tablet, Rfl: 3  •  levothyroxine (SYNTHROID, LEVOTHROID) 25 MCG tablet, Take 1 tablet by mouth Daily., Disp: 90 tablet, Rfl: 2  •  methylPREDNISolone (MEDROL) 4 MG dose pack, Take as directed on package instructions., Disp: 1 each, Rfl: 0  •  Semaglutide,0.25 or 0.5MG/DOS, (Ozempic, 0.25 or 0.5 MG/DOSE,) 2 MG/1.5ML solution pen-injector, Inject 0.5 mg under the skin into the appropriate area as directed 1 (One) Time Per Week., Disp: 1 pen, Rfl: 6  •  vitamin D (ERGOCALCIFEROL) 1.25 MG (16625 UT) capsule capsule, Take 1 capsule by mouth Every 7 (Seven) Days., Disp: 12 capsule, Rfl: 1       Assessment & Plan   Healthy male exam.      1. Healthcare Maintenance:  2. Patient Counseling:  --Nutrition: Stressed importance of moderation in sodium/caffeine intake, saturated fat and cholesterol, caloric balance, sufficient intake of fresh fruits, vegetables, fiber, calcium and vit D  --Exercise: Recommended 30 minutes of exercise daily.   --Immunizations reviewed.  --Discussed benefits of screening colonoscopy.    Diagnoses and all orders for this visit:    Visit for well [place] health check    Healthcare maintenance  -     CBC & Differential  -     Comprehensive Metabolic Panel  -     Hemoglobin A1c  -     Thyroid Panel With TSH  -     Lipid Panel With LDL / HDL Ratio  -     Vitamin D 25 Hydroxy  -     Cancel: CBC & Differential  -     Cancel: Comprehensive Metabolic Panel  -     Cancel: Hemoglobin A1c  -     Cancel: Thyroid Panel With TSH  -     Cancel: Lipid Panel With LDL / HDL Ratio  -     Cancel: Vitamin D 25 Hydroxy  -     Testosterone,Free+Weakly Bound    Screen for  colon cancer  -     Ambulatory Referral For Screening Colonoscopy    Screening for prostate cancer  -     PSA Screen    Tinea corporis  -     ketoconazole (NIZORAL) 2 % cream; Apply 1 application topically to the appropriate area as directed Daily for 7 days.  -     Has ring worm, will tx with ketoconazole.     Type 2 diabetes mellitus without complication, without long-term current use of insulin (HCC)  -      Semaglutide,0.25 or 0.5MG/DOS, (Ozempic, 0.25 or 0.5 MG/DOSE,) 2 MG/1.5ML solution pen-injector; Inject 0.5 mg under the skin into the appropriate area as directed 1 (One) Time Per Week.  -     empagliflozin (Jardiance) 25 MG tablet tablet; Take 1 tablet by mouth Daily.   -     Will continue tx with jardiance and ozempic.        No follow-ups on file.           Dictated utilizing Dragon Voice Recognition Software

## 2022-07-09 NOTE — PROGRESS NOTES
"Chief Complaint  Recurrent Skin Infections (Ringworm on chest, rash on left arm/Past week)    Subjective        Jhon JOHNOSN Munoz presents to [facility]  History of Present Illness    Patient states that his ringworm has cleared up after taking the medication.  However now he is got a rash to sporadic over his arms and his chest.  It appears that they are pruritic.  Looks as if there is some sort of dermatitis, however is unclear exactly why.    Objective   Vital Signs:  /75   Pulse 62   Temp 97.1 °F (36.2 °C)   Ht 177.8 cm (70\")   Wt 105 kg (231 lb)   SpO2 95%   BMI 33.15 kg/m²   Estimated body mass index is 33.15 kg/m² as calculated from the following:    Height as of this encounter: 177.8 cm (70\").    Weight as of this encounter: 105 kg (231 lb).          Physical Exam  Vitals and nursing note reviewed.   Constitutional:       Appearance: He is well-developed.   HENT:      Head: Normocephalic and atraumatic.   Musculoskeletal:      Cervical back: Normal range of motion and neck supple.   Neurological:      Mental Status: He is alert and oriented to person, place, and time.   Psychiatric:         Behavior: Behavior normal.        Result Review :                Assessment and Plan   Diagnoses and all orders for this visit:    1. Dermatitis (Primary)  -     methylPREDNISolone (MEDROL) 4 MG dose pack; Take as directed on package instructions.  Dispense: 1 each; Refill: 0    2. Tinea corporis      Appears to be a form of dermatitis, unclear exactly what it could be from.  We will start him on Medrol Dosepak to see if this clears up.       Follow Up   No follow-ups on file.  Patient was given instructions and counseling regarding his condition or for health maintenance advice. Please see specific information pulled into the AVS if appropriate.       "

## 2022-08-23 ENCOUNTER — LAB (OUTPATIENT)
Dept: LAB | Facility: HOSPITAL | Age: 57
End: 2022-08-23

## 2022-08-23 ENCOUNTER — OFFICE VISIT (OUTPATIENT)
Dept: INTERNAL MEDICINE | Facility: CLINIC | Age: 57
End: 2022-08-23

## 2022-08-23 VITALS
WEIGHT: 232.8 LBS | BODY MASS INDEX: 33.33 KG/M2 | HEIGHT: 70 IN | SYSTOLIC BLOOD PRESSURE: 102 MMHG | HEART RATE: 66 BPM | OXYGEN SATURATION: 97 % | DIASTOLIC BLOOD PRESSURE: 60 MMHG

## 2022-08-23 DIAGNOSIS — E55.9 VITAMIN D DEFICIENCY: ICD-10-CM

## 2022-08-23 DIAGNOSIS — E03.8 SUBCLINICAL HYPOTHYROIDISM: Primary | ICD-10-CM

## 2022-08-23 LAB
25(OH)D3 SERPL-MCNC: 45 NG/ML (ref 30–100)
T-UPTAKE NFR SERPL: 0.97 TBI (ref 0.8–1.3)
T4 SERPL-MCNC: 4.82 MCG/DL (ref 4.5–11.7)
TSH SERPL DL<=0.05 MIU/L-ACNC: 1.58 UIU/ML (ref 0.27–4.2)

## 2022-08-23 PROCEDURE — 36415 COLL VENOUS BLD VENIPUNCTURE: CPT | Performed by: FAMILY MEDICINE

## 2022-08-23 PROCEDURE — 82306 VITAMIN D 25 HYDROXY: CPT | Performed by: FAMILY MEDICINE

## 2022-08-23 PROCEDURE — 84436 ASSAY OF TOTAL THYROXINE: CPT | Performed by: FAMILY MEDICINE

## 2022-08-23 PROCEDURE — 84479 ASSAY OF THYROID (T3 OR T4): CPT | Performed by: FAMILY MEDICINE

## 2022-08-23 PROCEDURE — 99214 OFFICE O/P EST MOD 30 MIN: CPT | Performed by: FAMILY MEDICINE

## 2022-08-23 PROCEDURE — 84443 ASSAY THYROID STIM HORMONE: CPT | Performed by: FAMILY MEDICINE

## 2022-08-28 NOTE — PROGRESS NOTES
"Chief Complaint  follow up to hyperlipidemia and follow up to hypertension    Subjective        Jhon Munoz presents to [facility]  History of Present Illness    Patient vitamin D deficiency.  Patient is currently doing 50,000 IUs of vitamin D weekly.  He denies any side effects of the medication.    Patient also has subclinical hypothyroidism.  Patient was started on levothyroxine 25 mcg daily.  Patient states that she does not necessarily notice significant change with starting the medication.  But is interested to see what his values were.  Historically his T4 levels have been low over the last 3 times a week when checking.    Objective   Vital Signs:  /60 (BP Location: Left arm, Patient Position: Sitting, Cuff Size: Large Adult)   Pulse 66   Ht 177.8 cm (70\")   Wt 106 kg (232 lb 12.8 oz)   SpO2 97%   BMI 33.40 kg/m²   Estimated body mass index is 33.4 kg/m² as calculated from the following:    Height as of this encounter: 177.8 cm (70\").    Weight as of this encounter: 106 kg (232 lb 12.8 oz).          Physical Exam  Vitals and nursing note reviewed.   Constitutional:       Appearance: He is well-developed.   HENT:      Head: Normocephalic and atraumatic.   Musculoskeletal:      Cervical back: Normal range of motion and neck supple.   Neurological:      Mental Status: He is alert and oriented to person, place, and time.   Psychiatric:         Behavior: Behavior normal.        Result Review :                Assessment and Plan   Diagnoses and all orders for this visit:    1. Subclinical hypothyroidism (Primary)  -     Thyroid Panel With TSH    2. Vitamin D deficiency  -     Vitamin D 25 Hydroxy    Vitamin D deficiency, will check vitamin D levels.  If normal, he can do 1 more month of this, and then stop.  For the hypothyroidism if normal thyroid panel, we can continue on this current dose.         Follow Up   No follow-ups on file.  Patient was given instructions and " counseling regarding his condition or for health maintenance advice. Please see specific information pulled into the AVS if appropriate.

## 2022-09-13 ENCOUNTER — OFFICE VISIT (OUTPATIENT)
Dept: CARDIOLOGY | Facility: CLINIC | Age: 57
End: 2022-09-13

## 2022-09-13 VITALS
SYSTOLIC BLOOD PRESSURE: 122 MMHG | WEIGHT: 233 LBS | BODY MASS INDEX: 33.36 KG/M2 | DIASTOLIC BLOOD PRESSURE: 78 MMHG | HEIGHT: 70 IN | HEART RATE: 55 BPM

## 2022-09-13 DIAGNOSIS — I42.8 NONISCHEMIC CARDIOMYOPATHY: Primary | ICD-10-CM

## 2022-09-13 DIAGNOSIS — I10 ESSENTIAL HYPERTENSION: ICD-10-CM

## 2022-09-13 DIAGNOSIS — E78.2 MIXED HYPERLIPIDEMIA: ICD-10-CM

## 2022-09-13 DIAGNOSIS — E11.9 TYPE 2 DIABETES MELLITUS WITHOUT COMPLICATION, WITHOUT LONG-TERM CURRENT USE OF INSULIN: ICD-10-CM

## 2022-09-13 PROCEDURE — 99214 OFFICE O/P EST MOD 30 MIN: CPT | Performed by: INTERNAL MEDICINE

## 2022-09-13 PROCEDURE — 93000 ELECTROCARDIOGRAM COMPLETE: CPT | Performed by: INTERNAL MEDICINE

## 2022-09-13 RX ORDER — SACUBITRIL AND VALSARTAN 49; 51 MG/1; MG/1
TABLET, FILM COATED ORAL
Qty: 180 TABLET | Refills: 1 | Status: SHIPPED | OUTPATIENT
Start: 2022-09-13 | End: 2022-11-18 | Stop reason: SDUPTHER

## 2022-09-13 NOTE — PROGRESS NOTES
Date of Office Visit: 2022  Encounter Provider: Bernadine Graff MD  Place of Service: [facility] CARDIOLOGY  Patient Name: Jhon Munoz  :1965      Patient ID:  Jhon Munoz is a 57 y.o. male is here for  followup for cardiomyopathy, nonischemic.        History of Present Illness       He is followed for cardiomyopathy.  He has a history of sleep apnea using CPAP, hyperlipidemia, hypertension, diabetes mellitus type 2 on oral hypoglycemics, erectile dysfunction, renal calculi, GERD.       He was having left lower quadrant pain.  CT abdomen pelvis done 2024 left lower quadrant pain to her left renal infarct involving the lower pole and midpole of the anterior cortex and a nonobstructing left renal calculus.  He saw Dr. Carreon from urology yesterday who does not think that a further work-up really needs to be done at this time.  Due to the renal infarct, he had a monitor.  The 12-day Holter monitor that he had 2020 showed multiple episodes of nonsustained ventricular tachycardia, the longest was 10 seconds.  Short bursts of SVT were also noted but no sustained atrial fibrillation.     Echocardiogram done 2020 showed grade 1 diastolic dysfunction with moderate left ventricular dilation and ejection fraction of 27.5%.  Global longitudinal strain was -12.8%.  There was no significant valve disease.     He is , has 3 children works for [employer].  Uses no cigarettes and has alcohol, about 6-10 beers per week as well as 1 vodka.  His 1 cup of coffee per day.  He has not been regularly exercising.  His mother had hypertension  may 2020. He has 2 grown children 29 and 35.  His middle son  in  with osteogenic sarcoma.       On 2020 a cardiac catheterization was completed which showed the LAD, first diagonal, circumflex with luminal irregularities and RCA had 30% mid segment stenosis.  He was felt to have nonischemic cardiomyopathy and  medical management was recommended.  cardiac MRI in 2020 showed left ventricle dilated, global hypokinesis, mild right ventricular hypokinesis, normal perfusion of the myocardium, a focal area of mid myocardial delayed enhancement of the inferior septum, and EF calculated at 30%.     He reports right lower quadrant abdominal pain.  He saw both urology (Dr. Carreon) and nephrology (Dr. Frandy Castillo).  The patient says neither physician thought his pain was from the kidney stones.        labs on 10/20/2020 showed normal iron studies, anticardiolipin IgM elevated but normal anticardiolipin IgG, normal beta-2 glycoproteins.  He saw Dr. Zaldivar.      He was in the emergency department on 11/14/2020 with an episode of syncope that occurred at 5:30 AM.  He had been asleep for 2 and half hours and got up to say goodbye to his wife when she left for work.  When he got up, he reports that he was lightheaded and became unresponsive for to 30 seconds.  EMS was called and when they arrived, his heart rate was in the 20s and they given atropine.  His heart rate then came up to the 60s and his blood pressure was 100.  The night before, he had taken his medications as directed.  He had eaten 1:30 PM the day prior and had 4 drinks before going to bed at 3 AM.  His labs showed negative troponin and normal CMP, normal TSH.  He also had lipids done 11/20/2020 which showed HDL 52, LDL 52.  CBC on 11/14/2020 was normal.  He had repeat labs on 11/20/2020 showing creatinine 1.31, glucose 163, normal CBC.     He had normal bilateral renal artery duplex done 7/2020. Echo done 10/28/2020 showed ejection fraction 36-40% with severe eccentric left ventricular hypertrophy and mild left ventricular dilation, grade 1 diastolic dysfunction, anterior and anteroseptal hypokinesis.  Echo done 3/11/2022 showed eject fraction 44% with grade 1 diastolic dysfunction, moderate left atrial dilation, moderate concentric left ventricle hypertrophy, global  longitudinal strain of -15.1%, normal valvular structure and function, global hypokinesis noted.     He did see Dr. Zaldivar and he did not recommend any changes other than maintaining aspirin.      Labs on 6/15/2022 show creatinine 1.33, hemoglobin A1c 5.9%, otherwise normal CMP, lipids showed total cholesterol 3 2, HDL 57, LDL 61, triglycerides 69, normal CBC.  He had an unremarkable thyroid panel and normal vitamin D done 8/23/2022.    He has been started on levothyroxine.  He notices that when he bends over and stands up, he is mildly lightheaded.  He wonders if his medications are causing issues requiring other medications.  He has had no tachycardia or syncope.  He has no exertional chest tightness or pressure.  He has no orthopnea or PND.  He may retire next year.  He has no fevers, chills or cough.  He is not exercising.  His blood pressure is measuring about 115/70 at home but sometimes as low as 105/70.    Past Medical History:   Diagnosis Date   • Cardiomyopathy (HCC)    • Colon polyp    • Cortical senile cataract 4/1/2019   • Diabetes mellitus (HCC)     type 2   • Hyperlipidemia    • Hypertension    • Renal calculi     Followed by Dr. Chris Carreon urologist   • Renal infarct (HCC) 07/2020    Left; noted on CT scan-followed by Dr. Chris Carreon urology   • Sleep apnea     CPAP nightly         Past Surgical History:   Procedure Laterality Date   • CARDIAC CATHETERIZATION N/A 7/24/2020    Procedure: Coronary angiography;  Surgeon: Robert Deluna MD;  Location: [facility] INVASIVE LOCATION;  Service: Cardiovascular;  Laterality: N/A;   • CARDIAC CATHETERIZATION N/A 7/24/2020    Procedure: Left Heart Cath;  Surgeon: Robert Deluna MD;  Location: [facility] CATH INVASIVE LOCATION;  Service: Cardiovascular;  Laterality: N/A;   • COLONOSCOPY     • KNEE SURGERY      meniscus        Current Outpatient Medications on File Prior to Visit   Medication Sig Dispense Refill   • amLODIPine (NORVASC) 5 MG tablet Take 1  tablet by mouth Daily. 90 tablet 3   • aspirin 81 MG chewable tablet Chew 81 mg Daily.     • atorvastatin (LIPITOR) 20 MG tablet TAKE 1/2 TABLET BY MOUTH EVERY DAY 45 tablet 1   • carvedilol (COREG) 25 MG tablet Take 1 tablet by mouth 2 (Two) Times a Day. 180 tablet 3   • empagliflozin (Jardiance) 25 MG tablet tablet Take 1 tablet by mouth Daily. 90 tablet 1   • Entresto 49-51 MG tablet TAKE 1 TABLET BY MOUTH TWICE A  tablet 1   • esomeprazole (nexIUM) 40 MG capsule Take 1 capsule by mouth Every Morning Before Breakfast. (Patient taking differently: Take 40 mg by mouth Every Morning Before Breakfast. prn) 90 capsule 1   • levothyroxine (SYNTHROID, LEVOTHROID) 25 MCG tablet Take 1 tablet by mouth Daily. 90 tablet 2   • Semaglutide,0.25 or 0.5MG/DOS, (Ozempic, 0.25 or 0.5 MG/DOSE,) 2 MG/1.5ML solution pen-injector Inject 0.5 mg under the skin into the appropriate area as directed 1 (One) Time Per Week. 1 pen 6   • spironolactone (ALDACTONE) 25 MG tablet 1.5 tablets daily (Patient taking differently: 1 in the am and 1/2 in pm) 135 tablet 3   • vitamin D (ERGOCALCIFEROL) 1.25 MG (22014 UT) capsule capsule Take 1 capsule by mouth Every 7 (Seven) Days. 12 capsule 1     No current facility-administered medications on file prior to visit.       Social History     Socioeconomic History   • Marital status:      Spouse name: Jyoti   Tobacco Use   • Smoking status: Never Smoker   • Smokeless tobacco: Never Used   Vaping Use   • Vaping Use: Never used   Substance and Sexual Activity   • Alcohol use: Yes     Types: 1 Cans of beer, 1 Shots of liquor per week     Comment: Caffeine use: 1 cup daily   • Drug use: Never   • Sexual activity: Defer           ROS    Procedures    ECG 12 Lead    Date/Time: 9/13/2022 10:32 AM  Performed by: Bernadine Graff MD  Authorized by: Bernadine Graff MD   Comparison: compared with previous ECG   Similar to previous ECG  Rhythm: sinus bradycardia  Ectopy: unifocal PVCs  T  "inversion: II, aVL, aVF, V4, V6 and V5  T flattening: I and V3    Clinical impression: abnormal EKG                Objective:      Vitals:    09/13/22 1024   BP: 122/78   Pulse: 55   Weight: 106 kg (233 lb)   Height: 177.8 cm (70\")     Body mass index is 33.43 kg/m².    Vitals reviewed.   Constitutional:       General: Not in acute distress.     Appearance: Well-developed. Not diaphoretic.   Eyes:      General: No scleral icterus.     Conjunctiva/sclera: Conjunctivae normal.   HENT:      Head: Normocephalic and atraumatic.   Neck:      Thyroid: No thyromegaly.      Vascular: No carotid bruit or JVD.      Lymphadenopathy: No cervical adenopathy.   Pulmonary:      Effort: Pulmonary effort is normal. No respiratory distress.      Breath sounds: Normal breath sounds. No wheezing. No rhonchi. No rales.   Chest:      Chest wall: Not tender to palpatation.   Cardiovascular:      Normal rate. Regular rhythm.      Murmurs: There is no murmur.      No gallop.   Pulses:     Intact distal pulses.   Edema:     Peripheral edema absent.   Abdominal:      General: Bowel sounds are normal. There is no distension or abdominal bruit.      Palpations: Abdomen is soft. There is no abdominal mass.      Tenderness: There is no abdominal tenderness.   Musculoskeletal:         General: No deformity.      Extremities: No clubbing present.     Cervical back: Neck supple. Skin:     General: Skin is warm and dry. There is no cyanosis.      Coloration: Skin is not pale.      Findings: No rash.   Neurological:      Mental Status: Alert and oriented to person, place, and time.      Cranial Nerves: No cranial nerve deficit.   Psychiatric:         Judgment: Judgment normal.         Lab Review:       Assessment:      Diagnosis Plan   1. Nonischemic cardiomyopathy (HCC)     2. Mixed hyperlipidemia     3. Essential hypertension     4. Type 2 diabetes mellitus without complication, without long-term current use of insulin (HCC)   "        1. Cardiomyopathy, nonischemic.  No CHF at this time.  Cardiac MRI done in 2026 showed no significant evidence of infiltrative cardiomyopathy, left ventricle was dilated.  2. Hypertension, goal less than 110/80.   3. Hyperlipidemia, on atorvastatin  4. Diabetes mellitus type 2  5. Obesity  6. Left renal infarct, etiology unknown.  Now on aspirin  7. Renal insufficiency, likely due to left renal infarct.  8. PVCs and nonsustained VT on monitor, also nonsustained SVT.    9. Vasovagal syncope 11/14/2020.        Plan:       Decrease amlodipine to 2.5 mg nightly as he is getting low blood pressure readings at home is having some lightheadedness when he bends over and stands up.  See Salima in 3 months.  My hope is to discontinue amlodipine altogether.  We did talk about potential genetic testing for dilated cardiomyopathy.  He will think about this.  No other testing at this time.

## 2022-10-17 ENCOUNTER — OFFICE VISIT (OUTPATIENT)
Dept: INTERNAL MEDICINE | Facility: CLINIC | Age: 57
End: 2022-10-17

## 2022-10-17 VITALS
DIASTOLIC BLOOD PRESSURE: 74 MMHG | HEIGHT: 70 IN | WEIGHT: 231 LBS | BODY MASS INDEX: 33.07 KG/M2 | SYSTOLIC BLOOD PRESSURE: 122 MMHG | OXYGEN SATURATION: 96 % | HEART RATE: 77 BPM | TEMPERATURE: 97.1 F | RESPIRATION RATE: 18 BRPM

## 2022-10-17 DIAGNOSIS — E03.8 SUBCLINICAL HYPOTHYROIDISM: ICD-10-CM

## 2022-10-17 DIAGNOSIS — E78.5 HYPERLIPIDEMIA, UNSPECIFIED HYPERLIPIDEMIA TYPE: ICD-10-CM

## 2022-10-17 DIAGNOSIS — E11.9 TYPE 2 DIABETES MELLITUS WITHOUT COMPLICATION, WITHOUT LONG-TERM CURRENT USE OF INSULIN: Primary | ICD-10-CM

## 2022-10-17 DIAGNOSIS — I10 ESSENTIAL HYPERTENSION: ICD-10-CM

## 2022-10-17 DIAGNOSIS — E55.9 VITAMIN D DEFICIENCY: ICD-10-CM

## 2022-10-17 PROCEDURE — 99214 OFFICE O/P EST MOD 30 MIN: CPT | Performed by: FAMILY MEDICINE

## 2022-10-17 RX ORDER — SEMAGLUTIDE 1.34 MG/ML
0.5 INJECTION, SOLUTION SUBCUTANEOUS WEEKLY
Qty: 2 ML | Refills: 6 | Status: SHIPPED | OUTPATIENT
Start: 2022-10-17 | End: 2022-11-18 | Stop reason: SDUPTHER

## 2022-10-17 NOTE — PROGRESS NOTES
"Chief Complaint  Hypertension    Brandi Munoz presents to [facility]  History of Present Illness    Patient presents at today's office visit with a history having essential hypertension.  Is currently on amlodipine 2.5 mg daily, spironolactone where he is taking 25 mg in the morning and 12.5 mg at night.  He denies any side effects of the medication.  His blood pressure today is 122/74.  He is also taking Entresto 49-51 mg twice a day.  He does not have any side effects of the medicine.    Patient does have hypothyroidism.  Patient is currently taking levothyroxine 25 mcg daily.  Patient does believe that he is feeling better since taking the medication.    Patient does have a history having type 2 diabetes.  He is currently on Jardiance 25 mg daily along with Ozempic 0.5 mg weekly.  He denies any side effects of the medication.    Patient does have vitamin D deficiency.  Is currently on 5000 IUs of vitamin D weekly.  He denies any side effects of the medicine.    Patient does have hyperlipidemia.  Is currently on Lipitor 10 mg daily.  Patient denies any side effects of the medication.    Objective   Vital Signs:  /74 (BP Location: Left arm, Patient Position: Sitting, Cuff Size: Adult)   Pulse 77   Temp 97.1 °F (36.2 °C)   Resp 18   Ht 177.8 cm (70\")   Wt 105 kg (231 lb)   SpO2 96%   BMI 33.15 kg/m²   Estimated body mass index is 33.15 kg/m² as calculated from the following:    Height as of this encounter: 177.8 cm (70\").    Weight as of this encounter: 105 kg (231 lb).          Physical Exam  Vitals and nursing note reviewed.   Constitutional:       Appearance: He is well-developed.   HENT:      Head: Normocephalic and atraumatic.   Musculoskeletal:      Cervical back: Normal range of motion and neck supple.   Neurological:      Mental Status: He is alert and oriented to person, place, and time.   Psychiatric:         Behavior: Behavior normal.      "   Result Review :                Assessment and Plan   Diagnoses and all orders for this visit:    1. Type 2 diabetes mellitus without complication, without long-term current use of insulin (HCC) (Primary)  -     Semaglutide,0.25 or 0.5MG/DOS, (Ozempic, 0.25 or 0.5 MG/DOSE,) 2 MG/1.5ML solution pen-injector; Inject 0.5 mg under the skin into the appropriate area as directed 1 (One) Time Per Week.  Dispense: 2 mL; Refill: 6    2. Subclinical hypothyroidism  -     Thyroid Panel With TSH; Future    3. Vitamin D deficiency  -     Vitamin D 25 Hydroxy; Future  -     Hemoglobin A1c; Future    4. Essential hypertension  -     Comprehensive Metabolic Panel; Future  -     CBC & Differential; Future    5. Hyperlipidemia, unspecified hyperlipidemia type  -     Lipid Panel With LDL / HDL Ratio; Future    For patient essential hypertension, continue amlodipine, spironolactone, and Entresto.  He is also taking carvedilol which he should continue.  For his hyperlipidemia, continue Lipitor 20 mg daily.  For the vitamin D deficiency, continue 50,000 IUs of vitamin D weekly.  For his hypothyroidism, continue levothyroxine 25 mcg daily.  For his type 2 diabetes, continue Jardiance 25 mg daily along with Ozempic 0.5 mg weekly.         Follow Up   No follow-ups on file.  Patient was given instructions and counseling regarding his condition or for health maintenance advice. Please see specific information pulled into the AVS if appropriate.

## 2022-10-18 ENCOUNTER — ANESTHESIA (OUTPATIENT)
Dept: GASTROENTEROLOGY | Facility: HOSPITAL | Age: 57
End: 2022-10-18

## 2022-10-18 ENCOUNTER — HOSPITAL ENCOUNTER (OUTPATIENT)
Facility: HOSPITAL | Age: 57
Setting detail: HOSPITAL OUTPATIENT SURGERY
Discharge: HOME | End: 2022-10-18
Attending: INTERNAL MEDICINE | Admitting: INTERNAL MEDICINE

## 2022-10-18 ENCOUNTER — ANESTHESIA EVENT (OUTPATIENT)
Dept: GASTROENTEROLOGY | Facility: HOSPITAL | Age: 57
End: 2022-10-18

## 2022-10-18 VITALS
WEIGHT: 223 LBS | BODY MASS INDEX: 31.92 KG/M2 | HEIGHT: 70 IN | RESPIRATION RATE: 16 BRPM | DIASTOLIC BLOOD PRESSURE: 71 MMHG | SYSTOLIC BLOOD PRESSURE: 106 MMHG | HEART RATE: 57 BPM | OXYGEN SATURATION: 96 %

## 2022-10-18 DIAGNOSIS — K63.5 COLON POLYPS: ICD-10-CM

## 2022-10-18 LAB
25(OH)D3+25(OH)D2 SERPL-MCNC: 44.8 NG/ML (ref 30–100)
ALBUMIN SERPL-MCNC: 4.3 G/DL (ref 3.5–5.2)
ALBUMIN/GLOB SERPL: 2 G/DL
ALP SERPL-CCNC: 63 U/L (ref 39–117)
ALT SERPL-CCNC: 17 U/L (ref 1–41)
AST SERPL-CCNC: 22 U/L (ref 1–40)
BASOPHILS # BLD AUTO: 0.05 10*3/MM3 (ref 0–0.2)
BASOPHILS NFR BLD AUTO: 0.9 % (ref 0–1.5)
BILIRUB SERPL-MCNC: 0.6 MG/DL (ref 0–1.2)
BUN SERPL-MCNC: 19 MG/DL (ref 6–20)
BUN/CREAT SERPL: 13.9 (ref 7–25)
CALCIUM SERPL-MCNC: 9.4 MG/DL (ref 8.6–10.5)
CHLORIDE SERPL-SCNC: 104 MMOL/L (ref 98–107)
CHOLEST SERPL-MCNC: 131 MG/DL (ref 0–200)
CO2 SERPL-SCNC: 25.5 MMOL/L (ref 22–29)
CREAT SERPL-MCNC: 1.37 MG/DL (ref 0.76–1.27)
EGFRCR SERPLBLD CKD-EPI 2021: 59.4 ML/MIN/1.73
EOSINOPHIL # BLD AUTO: 0.23 10*3/MM3 (ref 0–0.4)
EOSINOPHIL NFR BLD AUTO: 4.1 % (ref 0.3–6.2)
ERYTHROCYTE [DISTWIDTH] IN BLOOD BY AUTOMATED COUNT: 12.9 % (ref 12.3–15.4)
FT4I SERPL CALC-MCNC: 1.7 (ref 1.2–4.9)
GLOBULIN SER CALC-MCNC: 2.1 GM/DL
GLUCOSE SERPL-MCNC: 88 MG/DL (ref 65–99)
HBA1C MFR BLD: 6.3 % (ref 4.8–5.6)
HCT VFR BLD AUTO: 41.7 % (ref 37.5–51)
HDLC SERPL-MCNC: 53 MG/DL (ref 40–60)
HGB BLD-MCNC: 14.1 G/DL (ref 13–17.7)
IMM GRANULOCYTES # BLD AUTO: 0.02 10*3/MM3 (ref 0–0.05)
IMM GRANULOCYTES NFR BLD AUTO: 0.4 % (ref 0–0.5)
LDLC SERPL CALC-MCNC: 65 MG/DL (ref 0–100)
LDLC/HDLC SERPL: 1.23 {RATIO}
LYMPHOCYTES # BLD AUTO: 2.32 10*3/MM3 (ref 0.7–3.1)
LYMPHOCYTES NFR BLD AUTO: 41.4 % (ref 19.6–45.3)
MCH RBC QN AUTO: 30.3 PG (ref 26.6–33)
MCHC RBC AUTO-ENTMCNC: 33.8 G/DL (ref 31.5–35.7)
MCV RBC AUTO: 89.5 FL (ref 79–97)
MONOCYTES # BLD AUTO: 0.5 10*3/MM3 (ref 0.1–0.9)
MONOCYTES NFR BLD AUTO: 8.9 % (ref 5–12)
NEUTROPHILS # BLD AUTO: 2.48 10*3/MM3 (ref 1.7–7)
NEUTROPHILS NFR BLD AUTO: 44.3 % (ref 42.7–76)
NRBC BLD AUTO-RTO: 0 /100 WBC (ref 0–0.2)
PLATELET # BLD AUTO: 207 10*3/MM3 (ref 140–450)
POTASSIUM SERPL-SCNC: 4.8 MMOL/L (ref 3.5–5.2)
PROT SERPL-MCNC: 6.4 G/DL (ref 6–8.5)
RBC # BLD AUTO: 4.66 10*6/MM3 (ref 4.14–5.8)
SODIUM SERPL-SCNC: 139 MMOL/L (ref 136–145)
T3RU NFR SERPL: 29 % (ref 24–39)
T4 SERPL-MCNC: 5.7 UG/DL (ref 4.5–12)
TRIGL SERPL-MCNC: 64 MG/DL (ref 0–150)
TSH SERPL DL<=0.005 MIU/L-ACNC: 1.68 UIU/ML (ref 0.45–4.5)
VLDLC SERPL CALC-MCNC: 13 MG/DL (ref 5–40)
WBC # BLD AUTO: 5.6 10*3/MM3 (ref 3.4–10.8)

## 2022-10-18 PROCEDURE — 0 LIDOCAINE 1 % SOLUTION: Performed by: ANESTHESIOLOGY

## 2022-10-18 PROCEDURE — 25010000002 PROPOFOL 200 MG/20ML EMULSION: Performed by: ANESTHESIOLOGY

## 2022-10-18 PROCEDURE — 45380 COLONOSCOPY AND BIOPSY: CPT | Performed by: INTERNAL MEDICINE

## 2022-10-18 PROCEDURE — 88305 TISSUE EXAM BY PATHOLOGIST: CPT | Performed by: INTERNAL MEDICINE

## 2022-10-18 PROCEDURE — S0260 H&P FOR SURGERY: HCPCS | Performed by: INTERNAL MEDICINE

## 2022-10-18 RX ORDER — ESOMEPRAZOLE MAGNESIUM 40 MG/1
40 CAPSULE, DELAYED RELEASE ORAL
Qty: 30 CAPSULE | Refills: 12 | Status: SHIPPED | OUTPATIENT
Start: 2022-10-18 | End: 2022-11-18 | Stop reason: SDUPTHER

## 2022-10-18 RX ORDER — PROPOFOL 10 MG/ML
INJECTION, EMULSION INTRAVENOUS CONTINUOUS PRN
Status: DISCONTINUED | OUTPATIENT
Start: 2022-10-18 | End: 2022-10-18 | Stop reason: SURG

## 2022-10-18 RX ORDER — LIDOCAINE HYDROCHLORIDE 10 MG/ML
INJECTION, SOLUTION INFILTRATION; PERINEURAL AS NEEDED
Status: DISCONTINUED | OUTPATIENT
Start: 2022-10-18 | End: 2022-10-18 | Stop reason: SURG

## 2022-10-18 RX ORDER — SODIUM CHLORIDE, SODIUM LACTATE, POTASSIUM CHLORIDE, CALCIUM CHLORIDE 600; 310; 30; 20 MG/100ML; MG/100ML; MG/100ML; MG/100ML
30 INJECTION, SOLUTION INTRAVENOUS CONTINUOUS
Status: DISCONTINUED | OUTPATIENT
Start: 2022-10-18 | End: 2022-10-18 | Stop reason: HOSPADM

## 2022-10-18 RX ADMIN — PROPOFOL 160 MCG/KG/MIN: 10 INJECTION, EMULSION INTRAVENOUS at 11:36

## 2022-10-18 RX ADMIN — SODIUM CHLORIDE, POTASSIUM CHLORIDE, SODIUM LACTATE AND CALCIUM CHLORIDE 30 ML/HR: 600; 310; 30; 20 INJECTION, SOLUTION INTRAVENOUS at 10:59

## 2022-10-18 RX ADMIN — LIDOCAINE HYDROCHLORIDE 40 MG: 10 INJECTION, SOLUTION INFILTRATION; PERINEURAL at 11:35

## 2022-10-18 NOTE — DISCHARGE INSTRUCTIONS
For the next 24 hours patient needs to be with a responsible adult.    For 24 hours DO NOT drive, operate machinery, appliances, drink alcohol, make important decisions or sign legal documents.    Start with a light or bland diet if you are feeling sick to your stomach otherwise advance to regular diet as tolerated.    Follow recommendations on procedure report if provided by your doctor.    Call Dr Olivera for problems [phone]    Problems may include but not limited to: large amounts of bleeding, trouble breathing, repeated vomiting, severe unrelieved pain, fever or chills.

## 2022-10-18 NOTE — H&P
[facility]  Pre Procedure History & Physical    Chief Complaint:   Time for my colonoscopy    Subjective     HPI:   57 y.o. male presenting to endoscopy unit today for surveillance colonoscopy.    Past Medical History:   Past Medical History:   Diagnosis Date   • Cardiomyopathy (HCC)    • Colon polyp    • Cortical senile cataract 4/1/2019   • Diabetes mellitus (HCC)     type 2   • Hyperlipidemia    • Hypertension    • Renal calculi     Followed by Dr. Chris Carreon urologist   • Renal infarct (HCC) 07/2020    Left; noted on CT scan-followed by Dr. Chris Carreon urology   • Sleep apnea     CPAP nightly       Family History:  Family History   Problem Relation Age of Onset   • Hypertension Mother        Social History:   reports that he has never smoked. He has never used smokeless tobacco. He reports current alcohol use. He reports that he does not use drugs.    Medications:   Medications Prior to Admission   Medication Sig Dispense Refill Last Dose   • amLODIPine (NORVASC) 5 MG tablet Take 1 tablet by mouth Daily. 90 tablet 3 10/17/2022   • aspirin 81 MG chewable tablet Chew 81 mg Daily.   10/17/2022   • carvedilol (COREG) 25 MG tablet Take 1 tablet by mouth 2 (Two) Times a Day. 180 tablet 3 10/17/2022   • empagliflozin (Jardiance) 25 MG tablet tablet Take 1 tablet by mouth Daily. 90 tablet 1 10/17/2022   • Entresto 49-51 MG tablet TAKE 1 TABLET BY MOUTH TWICE A  tablet 1 10/17/2022   • atorvastatin (LIPITOR) 20 MG tablet TAKE 1/2 TABLET BY MOUTH EVERY DAY 45 tablet 1    • esomeprazole (nexIUM) 40 MG capsule Take 1 capsule by mouth Every Morning Before Breakfast. (Patient taking differently: Take 1 capsule by mouth Every Morning Before Breakfast. prn) 90 capsule 1    • levothyroxine (SYNTHROID, LEVOTHROID) 25 MCG tablet Take 1 tablet by mouth Daily. 90 tablet 2    • Semaglutide,0.25 or 0.5MG/DOS, (Ozempic, 0.25 or 0.5 MG/DOSE,) 2 MG/1.5ML solution pen-injector Inject 0.5 mg under the skin  "into the appropriate area as directed 1 (One) Time Per Week. 2 mL 6 10/12/2022   • spironolactone (ALDACTONE) 25 MG tablet 1.5 tablets daily (Patient taking differently: 1 in the am and 1/2 in pm) 135 tablet 3    • vitamin D (ERGOCALCIFEROL) 1.25 MG (24258 UT) capsule capsule Take 1 capsule by mouth Every 7 (Seven) Days. 12 capsule 1        Allergies:  Patient has no known allergies.    ROS:    Pertinent items are noted in HPI     Objective     Blood pressure 114/78, pulse 60, resp. rate 13, height 177.8 cm (70\"), weight 101 kg (223 lb), SpO2 99 %.    Physical Exam   Constitutional: Pt is oriented to person, place, and time and well-developed, well-nourished, and in no distress.   Abdominal: Soft.   Psychiatric: Mood, memory, affect and judgment normal.     Assessment & Plan     Diagnosis:  Personal hx of colon polyps    Anticipated Surgical Procedure:  Colonoscopy    The risks, benefits, and alternatives of this procedure have been discussed with the patient or the responsible party- the patient understands and agrees to proceed.                                                                "

## 2022-10-18 NOTE — ANESTHESIA PREPROCEDURE EVALUATION
Anesthesia Evaluation     NPO Solid Status: > 8 hours  NPO Liquid Status: > 8 hours           Airway   Mallampati: II  TM distance: >3 FB  No difficulty expected  Dental      Pulmonary    (+) sleep apnea,   Cardiovascular     (+) hypertension, CHF , hyperlipidemia,     ROS comment: ? Calculated left ventricular EF = 44.4% Estimated left ventricular EF was in agreement with the calculated left ventricular EF. Left ventricular systolic function is mildly decreased.  ? Left ventricular diastolic function is consistent with (grade I) impaired relaxation.  ? The left ventricular cavity is moderately dilated.  ? Left ventricular wall thickness is consistent with moderate concentric hypertrophy.  ? The following left ventricular wall segments are hypokinetic: mid anterior, apical anterior, basal anterolateral, mid anterolateral, apical lateral, basal inferolateral, mid inferolateral, apical inferior, mid inferior, apical septal, basal inferoseptal, mid inferoseptal, apex hypokinetic, mid anteroseptal, basal anterior, basal inferior and basal inferoseptal.  ? Abnormal global longitudinal LV strain (GLS) = -15.1%.  ? Normal valvular structure and function      Neuro/Psych  (+) psychiatric history,    GI/Hepatic/Renal/Endo    (+) obesity,   renal disease, diabetes mellitus,     Musculoskeletal     Abdominal    Substance History      OB/GYN          Other                      Anesthesia Plan    ASA 3     MAC     intravenous induction     Anesthetic plan, risks, benefits, and alternatives have been provided, discussed and informed consent has been obtained with: patient.        CODE STATUS:

## 2022-10-18 NOTE — ANESTHESIA POSTPROCEDURE EVALUATION
Patient: Jhon Munoz    Procedure Summary     Date: 10/18/22 Room / Location: [facility]  [facility]    Anesthesia Start: 1133 Anesthesia Stop: 1154    Procedure: COLONOSCOPY TO CECUM WITH COLD BIOPSY POLYPECTOMY Diagnosis:       Colon polyps      (Colon polyps [K63.5])    Surgeons: Robert Evans MD Provider: Osmin Turpin MD    Anesthesia Type: MAC ASA Status: 3          Anesthesia Type: MAC    Vitals  Vitals Value Taken Time   /71 10/18/22 1214   Temp     Pulse 57 10/18/22 1214   Resp 16 10/18/22 1214   SpO2 96 % 10/18/22 1214           Post Anesthesia Care and Evaluation    Patient location during evaluation: PACU  Patient participation: complete - patient participated  Level of consciousness: awake  Pain scale: See nurse's notes for pain score.  Pain management: adequate    Airway patency: patent  Anesthetic complications: No anesthetic complications  PONV Status: none  Cardiovascular status: acceptable  Respiratory status: acceptable  Hydration status: acceptable    Comments: Patient seen and examined postoperatively; vital signs stable; SpO2 greater than or equal to 90%; cardiopulmonary status stable; nausea/vomiting adequately controlled; pain adequately controlled; no apparent anesthesia complications; patient discharged from anesthesia care when discharge criteria were met

## 2022-10-20 LAB
LAB AP CASE REPORT: NORMAL
PATH REPORT.FINAL DX SPEC: NORMAL
PATH REPORT.GROSS SPEC: NORMAL

## 2022-10-21 ENCOUNTER — TELEPHONE (OUTPATIENT)
Dept: GASTROENTEROLOGY | Facility: CLINIC | Age: 57
End: 2022-10-21

## 2022-10-21 NOTE — TELEPHONE ENCOUNTER
Caller: Jhon Munoz    Relationship to patient: Self    Best call back number: [phone]    Patient is needing: PT HAS NOT HAD A BOWEL MOVEMENT SINCE 10/18 COLONOSCOPY. PLEASE CALL WITH SUGGESTIONS ON WHAT PT NEED TO DO. GAVE PERMISSION TO LEAVE DETAIL MESSAGE ON VOICEMAIL.    PLEASE CALL AFTER 10:30AM IF NEED TO SPEAK DIRECTLY TO PATIENT THANKS

## 2022-11-16 DIAGNOSIS — E55.9 VITAMIN D DEFICIENCY: ICD-10-CM

## 2022-11-16 DIAGNOSIS — E78.5 HYPERLIPIDEMIA, UNSPECIFIED HYPERLIPIDEMIA TYPE: ICD-10-CM

## 2022-11-16 RX ORDER — ATORVASTATIN CALCIUM 20 MG/1
10 TABLET, FILM COATED ORAL DAILY
Qty: 45 TABLET | Refills: 1 | Status: SHIPPED | OUTPATIENT
Start: 2022-11-16 | End: 2022-11-18 | Stop reason: SDUPTHER

## 2022-11-16 RX ORDER — ERGOCALCIFEROL 1.25 MG/1
50000 CAPSULE ORAL
Qty: 12 CAPSULE | Refills: 1 | Status: SHIPPED | OUTPATIENT
Start: 2022-11-16 | End: 2022-11-18 | Stop reason: SDUPTHER

## 2022-11-16 RX ORDER — AMLODIPINE BESYLATE 5 MG/1
5 TABLET ORAL DAILY
Qty: 90 TABLET | Refills: 3 | Status: SHIPPED | OUTPATIENT
Start: 2022-11-16 | End: 2022-11-18 | Stop reason: SDUPTHER

## 2022-11-18 DIAGNOSIS — E11.9 TYPE 2 DIABETES MELLITUS WITHOUT COMPLICATION, WITHOUT LONG-TERM CURRENT USE OF INSULIN: ICD-10-CM

## 2022-11-18 DIAGNOSIS — E55.9 VITAMIN D DEFICIENCY: ICD-10-CM

## 2022-11-18 DIAGNOSIS — E78.5 HYPERLIPIDEMIA, UNSPECIFIED HYPERLIPIDEMIA TYPE: ICD-10-CM

## 2022-11-18 DIAGNOSIS — E03.8 SUBCLINICAL HYPOTHYROIDISM: ICD-10-CM

## 2022-11-18 RX ORDER — ERGOCALCIFEROL 1.25 MG/1
50000 CAPSULE ORAL
Qty: 12 CAPSULE | Refills: 1 | Status: SHIPPED | OUTPATIENT
Start: 2022-11-18

## 2022-11-18 RX ORDER — ESOMEPRAZOLE MAGNESIUM 40 MG/1
40 CAPSULE, DELAYED RELEASE ORAL
Qty: 30 CAPSULE | Refills: 12 | Status: SHIPPED | OUTPATIENT
Start: 2022-11-18

## 2022-11-18 RX ORDER — AMLODIPINE BESYLATE 5 MG/1
5 TABLET ORAL DAILY
Qty: 90 TABLET | Refills: 3 | Status: SHIPPED | OUTPATIENT
Start: 2022-11-18 | End: 2022-12-14

## 2022-11-18 RX ORDER — SACUBITRIL AND VALSARTAN 49; 51 MG/1; MG/1
1 TABLET, FILM COATED ORAL 2 TIMES DAILY
Qty: 180 TABLET | Refills: 1 | Status: SHIPPED | OUTPATIENT
Start: 2022-11-18

## 2022-11-18 RX ORDER — LEVOTHYROXINE SODIUM 0.03 MG/1
25 TABLET ORAL DAILY
Qty: 90 TABLET | Refills: 2 | Status: SHIPPED | OUTPATIENT
Start: 2022-11-18

## 2022-11-18 RX ORDER — SPIRONOLACTONE 25 MG/1
TABLET ORAL
Qty: 135 TABLET | Refills: 3 | Status: SHIPPED | OUTPATIENT
Start: 2022-11-18

## 2022-11-18 RX ORDER — ATORVASTATIN CALCIUM 20 MG/1
10 TABLET, FILM COATED ORAL DAILY
Qty: 45 TABLET | Refills: 1 | Status: SHIPPED | OUTPATIENT
Start: 2022-11-18

## 2022-11-18 RX ORDER — EMPAGLIFLOZIN 25 MG/1
TABLET, FILM COATED ORAL
Qty: 90 TABLET | Refills: 1 | Status: SHIPPED | OUTPATIENT
Start: 2022-11-18

## 2022-11-18 RX ORDER — CARVEDILOL 25 MG/1
25 TABLET ORAL 2 TIMES DAILY
Qty: 180 TABLET | Refills: 3 | Status: SHIPPED | OUTPATIENT
Start: 2022-11-18

## 2022-11-18 RX ORDER — SEMAGLUTIDE 1.34 MG/ML
0.5 INJECTION, SOLUTION SUBCUTANEOUS WEEKLY
Qty: 2 ML | Refills: 6 | Status: SHIPPED | OUTPATIENT
Start: 2022-11-18 | End: 2022-12-06 | Stop reason: SDUPTHER

## 2022-11-29 ENCOUNTER — TELEPHONE (OUTPATIENT)
Dept: INTERNAL MEDICINE | Facility: CLINIC | Age: 57
End: 2022-11-29

## 2022-11-29 NOTE — TELEPHONE ENCOUNTER
Caller: Jyoti Munoz    Relationship: Emergency Contact    Best call back number:[phone]    What medications are you currently taking:   Current Outpatient Medications on File Prior to Visit   Medication Sig Dispense Refill   • amLODIPine (NORVASC) 5 MG tablet Take 1 tablet by mouth Daily. 90 tablet 3   • aspirin 81 MG chewable tablet Chew 81 mg Daily.     • atorvastatin (LIPITOR) 20 MG tablet Take 0.5 tablets by mouth Daily. 45 tablet 1   • carvedilol (COREG) 25 MG tablet Take 1 tablet by mouth 2 (Two) Times a Day. 180 tablet 3   • esomeprazole (nexIUM) 40 MG capsule Take 1 capsule by mouth Every Morning Before Breakfast. prn 30 capsule 12   • Jardiance 25 MG tablet tablet TAKE 1 TABLET BY MOUTH EVERY DAY 90 tablet 1   • levothyroxine (SYNTHROID, LEVOTHROID) 25 MCG tablet Take 1 tablet by mouth Daily. 90 tablet 2   • sacubitril-valsartan (Entresto) 49-51 MG tablet Take 1 tablet by mouth 2 (Two) Times a Day. 180 tablet 1   • Semaglutide,0.25 or 0.5MG/DOS, (Ozempic, 0.25 or 0.5 MG/DOSE,) 2 MG/1.5ML solution pen-injector Inject 0.5 mg under the skin into the appropriate area as directed 1 (One) Time Per Week. 2 mL 6   • spironolactone (ALDACTONE) 25 MG tablet 1 in the am and 1/2 in pm 135 tablet 3   • vitamin D (ERGOCALCIFEROL) 1.25 MG (84300 UT) capsule capsule Take 1 capsule by mouth Every 7 (Seven) Days. 12 capsule 1     No current facility-administered medications on file prior to visit.              What are your concerns: PATIENT'S WIFE CALLED AND STATED THAT THE PATIENT'S OZEMPIC NEEDS TO BE CALLED IN, THERE IS A [place] SHORTAGE AND THEY NEED TO KNOW WHAT NEXT STEPS TO TAKE.

## 2022-12-06 DIAGNOSIS — E11.9 TYPE 2 DIABETES MELLITUS WITHOUT COMPLICATION, WITHOUT LONG-TERM CURRENT USE OF INSULIN: ICD-10-CM

## 2022-12-06 RX ORDER — SEMAGLUTIDE 1.34 MG/ML
0.5 INJECTION, SOLUTION SUBCUTANEOUS WEEKLY
Qty: 2 ML | Refills: 6 | Status: SHIPPED | OUTPATIENT
Start: 2022-12-06 | End: 2023-01-02 | Stop reason: SDUPTHER

## 2022-12-06 NOTE — TELEPHONE ENCOUNTER
Ozempic has been sent to patients [facility]. There is a nationwide shortage, but to my understanding [facility] has it. If they want to try a different pharmacy they can. Patient is getting for $25 as well.

## 2022-12-07 ENCOUNTER — TELEPHONE (OUTPATIENT)
Dept: INTERNAL MEDICINE | Facility: CLINIC | Age: 57
End: 2022-12-07

## 2022-12-09 ENCOUNTER — TELEPHONE (OUTPATIENT)
Dept: GASTROENTEROLOGY | Facility: CLINIC | Age: 57
End: 2022-12-09

## 2022-12-09 NOTE — TELEPHONE ENCOUNTER
----- Message from Robert Evans MD sent at 10/24/2022  8:20 AM EDT -----  Tubular adenoma colon polyp  Colonoscopy recall 5 years

## 2022-12-14 ENCOUNTER — OFFICE VISIT (OUTPATIENT)
Dept: CARDIOLOGY | Facility: CLINIC | Age: 57
End: 2022-12-14

## 2022-12-14 VITALS
DIASTOLIC BLOOD PRESSURE: 86 MMHG | SYSTOLIC BLOOD PRESSURE: 134 MMHG | HEART RATE: 64 BPM | WEIGHT: 231 LBS | HEIGHT: 70 IN | BODY MASS INDEX: 33.07 KG/M2

## 2022-12-14 DIAGNOSIS — N18.31 STAGE 3A CHRONIC KIDNEY DISEASE: ICD-10-CM

## 2022-12-14 DIAGNOSIS — E11.9 TYPE 2 DIABETES MELLITUS WITHOUT COMPLICATION, WITHOUT LONG-TERM CURRENT USE OF INSULIN: ICD-10-CM

## 2022-12-14 DIAGNOSIS — I10 ESSENTIAL HYPERTENSION: ICD-10-CM

## 2022-12-14 DIAGNOSIS — N28.0 RENAL INFARCT: ICD-10-CM

## 2022-12-14 DIAGNOSIS — E78.2 MIXED HYPERLIPIDEMIA: ICD-10-CM

## 2022-12-14 DIAGNOSIS — N20.0 RENAL CALCULI: ICD-10-CM

## 2022-12-14 DIAGNOSIS — I42.8 NONISCHEMIC CARDIOMYOPATHY: Primary | ICD-10-CM

## 2022-12-14 DIAGNOSIS — R55 SYNCOPE AND COLLAPSE: ICD-10-CM

## 2022-12-14 PROCEDURE — 93000 ELECTROCARDIOGRAM COMPLETE: CPT | Performed by: NURSE PRACTITIONER

## 2022-12-14 PROCEDURE — 99214 OFFICE O/P EST MOD 30 MIN: CPT | Performed by: NURSE PRACTITIONER

## 2022-12-14 NOTE — PROGRESS NOTES
"      [facility]  [address]  [facility] [address]  [facility] [address]  Phone: [phone]  Patient Name: Jhon Munoz  :1965  Age: 57 y.o.  Primary Cardiologist: Bernadine Graff MD  Encounter Provider:  TERRI Sutton    History of Present Illness     Jhon Munoz is a 57 y.o. Black male whose medical history includes OCHOA/CPAP, hyperlipidemia, hypertension, type 2 diabetes, renal calculi, and GERD.  He is followed in our office by Dr. Graff for nonischemic cardiomyopathy. I have reviewed the past medical records in preparation of today's visit.     22 Follow-up:  He is here for 3-month follow-up and I am seeing him for the first time today.  At last visit his amlodipine was decreased to 2.5 mg nightly due to low blood pressure and dizziness with position changes.  He still feels some dizziness with position changes; his blood pressure at home is typically 1 teens/70s.  He is scheduled to see  today for concerns of a possible kidney stone.  Otherwise he is doing well; he plans to retire in 2023 but plans to remain active with volunteer work and his grandchildren.  He denies chest pain, dyspnea with exertion, syncope, palpitations, or leg swelling.  He is taking his medications as prescribed.    Data Review     The following data was reviewed by TERRI Sutton on 22:    Vital Signs:   /86 (BP Location: Left arm, Patient Position: Sitting, Cuff Size: Large Adult)   Pulse 64   Ht 177.8 cm (70\")   Wt 105 kg (231 lb)   BMI 33.15 kg/m²       Weight:  Wt Readings from Last 3 Encounters:   22 105 kg (231 lb)   10/18/22 101 kg (223 lb)   10/17/22 105 kg (231 lb)     Body mass index is 33.15 kg/m².    Below is a summary of pertinent cardiology findings:  • He is , has 3 children, works for [employer].  He uses no cigarettes and has about 6-10 beers per week as well as 1 vodka.  He has 1 cup of coffee daily.  " Family history includes hypertension in his mother who  in  and a son who  in  osteogenic sarcoma.  • 2020 CT abdomen pelvis for left lower quadrant pain showed left renal infarct and nonobstructing left renal calculus.  He was evaluated by Dr. Carreon with urology who felt no further work-up was needed.  He did have 12-day Holter monitor due to infarct.  • 2020 bilateral renal artery duplex was normal.  • 2020 echocardiogram showed grade 1 diastolic dysfunction, moderate left ventricular dilation, EF 27.5%, GLS -12.8, and no significant valvular disease.  • 2020 cardiac catheterization showed the LAD, first diagonal, left circumflex to have luminal irregularities, and the RCA with a 30% mid segment stenosis.  He was treated medically for nonischemic cardiomyopathy.  •  cardiac MRI showed dilated left ventricle, global hypokinesis, mild right ventricular hypokinesis, normal perfusion of the myocardium, a focal area of mid myocardial delayed enhancement of the inferior septum, and EF at 30%.  • 2020 Holter monitor showed multiple episodes of nonsustained VT with the longest lasting 10 seconds, and short bursts of SVT were also noted; no evidence of atrial fibrillation.  • 2020 echocardiogram showed EF 36 to 40%, severe eccentric LV hypertrophy, mild left ventricular dilation, grade 1 LV diastolic dysfunction, anterior and anteroseptal hypokinesis.  • 2020 evaluated in the emergency room for syncope; EMS arrived and found his heart rate to be in the 20s and he was treated with atropine.  He is taking medications as directed before bedtime and had eaten a normal meal but he had 4 drinks prior to going to bed at 3 AM.  He was felt to have suffered from vasovagal syncope.  • 2022 echocardiogram showed EF 44%, grade 1 LV diastolic dysfunction, moderate left atrial dilation, moderate concentric LV hypertrophy, global longitudinal strain of -15.1%,  normal valvular structure and function, and global hypokinesis.    Labs:  • 10/17/2022:  cr 1.4, K 4.8, otherwise unremarkable CMP, Hgb A1c 6.3,, TSH 1.680, total T4 5.7 Chol 131, HDL 53, LDL 65, Trig 64, Hgb 14.1, Plt 207      ECG 12 Lead    Date/Time: 12/14/2022 11:07 AM  Performed by: Sravanthi Guido APRN  Authorized by: Sravanthi Guido APRN   Comparison: compared with previous ECG from 9/13/2022  Similar to previous ECG  Rhythm: sinus rhythm  Rate: normal  BPM: 66  T inversion: III, aVF and V6  T flattening: II, aVL, V4 and V5  Other findings: non-specific ST-T wave changes and T wave abnormality    Clinical impression: abnormal EKG            Medications     Allergies as of 12/14/2022   • (No Known Allergies)         Current Outpatient Medications:   •  aspirin 81 MG chewable tablet, Chew 81 mg Daily., Disp: , Rfl:   •  atorvastatin (LIPITOR) 20 MG tablet, Take 0.5 tablets by mouth Daily., Disp: 45 tablet, Rfl: 1  •  carvedilol (COREG) 25 MG tablet, Take 1 tablet by mouth 2 (Two) Times a Day., Disp: 180 tablet, Rfl: 3  •  esomeprazole (nexIUM) 40 MG capsule, Take 1 capsule by mouth Every Morning Before Breakfast. prn, Disp: 30 capsule, Rfl: 12  •  Jardiance 25 MG tablet tablet, TAKE 1 TABLET BY MOUTH EVERY DAY, Disp: 90 tablet, Rfl: 1  •  levothyroxine (SYNTHROID, LEVOTHROID) 25 MCG tablet, Take 1 tablet by mouth Daily., Disp: 90 tablet, Rfl: 2  •  sacubitril-valsartan (Entresto) 49-51 MG tablet, Take 1 tablet by mouth 2 (Two) Times a Day., Disp: 180 tablet, Rfl: 1  •  Semaglutide,0.25 or 0.5MG/DOS, (Ozempic, 0.25 or 0.5 MG/DOSE,) 2 MG/1.5ML solution pen-injector, Inject 0.5 mg under the skin into the appropriate area as directed 1 (One) Time Per Week., Disp: 2 mL, Rfl: 6  •  spironolactone (ALDACTONE) 25 MG tablet, 1 in the am and 1/2 in pm, Disp: 135 tablet, Rfl: 3  •  vitamin D (ERGOCALCIFEROL) 1.25 MG (32826 UT) capsule capsule, Take 1 capsule by mouth Every 7 (Seven) Days., Disp: 12  capsule, Rfl: 1     Past History, Review of Systems, Exam     Past Medical History:   Diagnosis Date   • Cardiomyopathy (HCC)    • Colon polyp    • Cortical senile cataract 4/1/2019   • Diabetes mellitus (HCC)    • Hyperlipidemia    • Hypertension    • Renal calculi    • Renal infarct (HCC) 07/2020   • Sleep apnea        Past Surgical History:   has a past surgical history that includes Knee surgery; Colonoscopy (2020); Cardiac catheterization (N/A, 07/24/2020); Cardiac catheterization (N/A, 07/24/2020); and Colonoscopy (N/A, 10/18/2022).     Social History     Socioeconomic History   • Marital status:      Spouse name: Jyoti   Tobacco Use   • Smoking status: Never   • Smokeless tobacco: Never   Vaping Use   • Vaping Use: Never used   Substance and Sexual Activity   • Alcohol use: Yes     Types: 1 Cans of beer, 1 Shots of liquor per week     Comment: Caffeine use: 1 cup daily   • Drug use: Never   • Sexual activity: Defer       Review of Systems   Eyes: Negative.    Neurological: Positive for dizziness and light-headedness.       Vitals reviewed.   Constitutional:       Appearance: Not in distress.   Eyes:      Conjunctiva/sclera: Conjunctivae normal.      Pupils: Pupils are equal, round, and reactive to light.   HENT:      Head: Normocephalic.      Nose: Nose normal.    Mouth/Throat:      Pharynx: Oropharynx is clear.   Neck:      Vascular: JVD normal.   Pulmonary:      Effort: Pulmonary effort is normal.      Breath sounds: Normal breath sounds. No wheezing. No rhonchi. No rales.   Cardiovascular:      Normal rate. Regular rhythm.      Murmurs: There is no murmur.   Pulses:     Intact distal pulses.   Abdominal:      General: Bowel sounds are normal. There is no distension.      Palpations: Abdomen is soft.      Tenderness: There is no abdominal tenderness.   Musculoskeletal: Normal range of motion.      Cervical back: Normal range of motion and neck supple. Skin:     General: Skin is warm and dry.    Neurological:      Mental Status: Alert and oriented to person, place and time.   Psychiatric:         Attention and Perception: Attention normal.         Mood and Affect: Mood normal.         Speech: Speech normal.         Behavior: Behavior is cooperative.         Assessment and Plan     Assessment:  1. Nonischemic cardiomyopathy ([facility])    2. Essential hypertension    3. Mixed hyperlipidemia    4. Type 2 diabetes mellitus without complication, without long-term current use of insulin ([facility])    5. Renal infarct ([facility])    6. Stage 3a chronic kidney disease ([facility])    7. Renal calculi    8. Syncope and collapse         1. Nonischemic cardiomyopathy: Diagnosed with left renal infarct in July 2020 and follow-up echocardiogram showed EF 27.5%.  Cardiac catheterization showed nonobstructive coronary artery disease, and cardiac MRI showed fairly normal cardiac structure.  His last   Echocardiogram was in March 2022 and showed EFhad improved to 44%.  His medical therapy includes 49-51 mg Entresto twice daily, 25 mg Jardiance daily, 25 mg carvedilol twice daily, and 25 mg spironolactone daily.  He is compensated by exam.  2. Hypertension: He still has some orthostatic symptoms on lower dose of amlodipine.  Blood pressure at home is usually low normal.  3. Hyperlipidemia: Lipids were at goal when checked in October 2022.  He is treated with 10 mg atorvastatin daily.  4. Type 2 diabetes: Hemoglobin A1c at goal when last checked in October 2022.  He is treated with Ozempic and Jardiance.  5. Renal infarct: This was noted in July 2022 and he continues to follow with  and nephrology.  6. Stage IIIa CKD: His renal function is stable.  He follows with Dr. Castillo.  7. Renal calculi: He has some symptoms of possible renal calculi today and is scheduled to see  later.  He follows with Dr. Carreon.  8. Syncope and collapse: This occurred in November 2022 and was associated with episode of bradycardia.  This was felt to be in response to  vasovagal reaction.  He has had no further episodes.    Mr. Munoz is a patient of Dr. Graff's with nonischemic cardiomyopathy diagnosed in July 2022 following renal infarct.  Cardiac catheterization showed nonobstructive coronary artery disease and his cardiac MRI was fairly normal.  His medical therapy includes moderate dose Entresto, carvedilol, Jardiance, spironolactone.  He feels no need to recheck echocardiogram at this time given his upcoming senior care; he would like to wait until his insurance changes with that.  He will also likely proceed with genetic testing at that time.  For now I made no medication changes and I will have him follow-up with Dr. Graff in 3 months.    Return in about 3 months (around 3/14/2023) for Follow-up with Dr. Graff.  Orders Placed This Encounter   Procedures   • ECG 12 Lead      No orders of the defined types were placed in this encounter.        Thank you for the opportunity to participate in this patient's care.    TERRI Velazquez    This office note has been dictated.

## 2022-12-27 ENCOUNTER — TELEPHONE (OUTPATIENT)
Dept: INTERNAL MEDICINE | Facility: CLINIC | Age: 57
End: 2022-12-27

## 2022-12-27 NOTE — TELEPHONE ENCOUNTER
Caller: Jyoti Munoz    Relationship to patient: Emergency Contact    Best call back number: [phone]    Patient is needing: PATIENT IS IN NEED OF SOME SAMPLES OF OZEMPIC,. PLEASE REACH OUT TO WIFE AND LET HER KNOW IF WE HAVE ANY.

## 2022-12-30 NOTE — TELEPHONE ENCOUNTER
Patient's wife stated that she needs a 90 day prescription for Ozempic sent to [facility] [place] since this will be cheaper for them.  She was notified that the office does not have any samples of this medication and expressed understanding.

## 2023-01-02 DIAGNOSIS — E11.9 TYPE 2 DIABETES MELLITUS WITHOUT COMPLICATION, WITHOUT LONG-TERM CURRENT USE OF INSULIN: ICD-10-CM

## 2023-01-02 RX ORDER — SEMAGLUTIDE 1.34 MG/ML
0.5 INJECTION, SOLUTION SUBCUTANEOUS WEEKLY
Qty: 6 ML | Refills: 1 | Status: SHIPPED | OUTPATIENT
Start: 2023-01-02

## 2023-02-09 ENCOUNTER — OFFICE VISIT (OUTPATIENT)
Dept: INTERNAL MEDICINE | Facility: CLINIC | Age: 58
End: 2023-02-09
Payer: COMMERCIAL

## 2023-02-09 VITALS
HEART RATE: 69 BPM | BODY MASS INDEX: 33.07 KG/M2 | HEIGHT: 70 IN | WEIGHT: 231 LBS | SYSTOLIC BLOOD PRESSURE: 119 MMHG | OXYGEN SATURATION: 98 % | DIASTOLIC BLOOD PRESSURE: 74 MMHG

## 2023-02-09 DIAGNOSIS — E55.9 VITAMIN D DEFICIENCY: ICD-10-CM

## 2023-02-09 DIAGNOSIS — E78.5 HYPERLIPIDEMIA, UNSPECIFIED HYPERLIPIDEMIA TYPE: ICD-10-CM

## 2023-02-09 DIAGNOSIS — E03.8 SUBCLINICAL HYPOTHYROIDISM: ICD-10-CM

## 2023-02-09 DIAGNOSIS — E11.9 TYPE 2 DIABETES MELLITUS WITHOUT COMPLICATION, WITHOUT LONG-TERM CURRENT USE OF INSULIN: Primary | ICD-10-CM

## 2023-02-09 DIAGNOSIS — I10 ESSENTIAL HYPERTENSION: ICD-10-CM

## 2023-02-09 PROCEDURE — 99214 OFFICE O/P EST MOD 30 MIN: CPT | Performed by: FAMILY MEDICINE

## 2023-02-09 NOTE — PROGRESS NOTES
"Chief Complaint  Follow-up    Brandi Munoz presents to [facility]  History of Present Illness    Patient presents at today's office visit with a history of having hyperlipidemia.  Is currently on Lipitor 20 mg daily.  Denies any side effects of the medication.    Patient has a history of having hypothyroidism.  Is currently on levothyroxine 25 mcg daily.  Patient denies any side effects of the medication.    Patient has a history of having essential hypertension.  He also has history of heart failure.  He is currently taking Entresto 49-51 along with carvedilol 25 mg twice a day.  He is also doing spironolactone 37.5 mg daily.  Today's blood pressure looks good at 119/74.  He denies any side effects of the medication.    He has a history of vitamin D deficiency.  Is currently on 50,000 IUs of vitamin D weekly.  He denies any side effects of the medication.    Patient also has a history of having type 2 diabetes.  He is currently taking Jardiance 25 mg daily along with Ozempic 0.5 mg weekly.  He denies any side effects of the medication.    Objective   Vital Signs:  /74   Pulse 69   Ht 177 cm (69.69\")   Wt 105 kg (231 lb)   SpO2 98%   BMI 33.45 kg/m²   Estimated body mass index is 33.45 kg/m² as calculated from the following:    Height as of this encounter: 177 cm (69.69\").    Weight as of this encounter: 105 kg (231 lb).             Physical Exam  Vitals and nursing note reviewed.   Constitutional:       Appearance: He is well-developed.   HENT:      Head: Normocephalic and atraumatic.   Musculoskeletal:      Cervical back: Normal range of motion and neck supple.   Neurological:      Mental Status: He is alert and oriented to person, place, and time.   Psychiatric:         Behavior: Behavior normal.        Result Review :                   Assessment and Plan   Diagnoses and all orders for this visit:    1. Type 2 diabetes mellitus without complication, " without long-term current use of insulin (HCC) (Primary)  -     Hemoglobin A1c  -     Microalbumin / Creatinine Urine Ratio - Urine, Clean Catch    2. Vitamin D deficiency  -     Vitamin D,25-Hydroxy    3. Subclinical hypothyroidism  -     Thyroid Panel With TSH    4. Hyperlipidemia, unspecified hyperlipidemia type  -     Lipid Panel With LDL / HDL Ratio    5. Essential hypertension  -     CBC & Differential  -     Comprehensive Metabolic Panel      At today's office visit we will continue him on his diabetic medicines of Jardiance and Ozempic.  For the vitamin D, check vitamin D level, will continue 50,000 IUs of vitamin D weekly for now.  For the hypothyroidism, continue levothyroxine 25 mcg daily.  For the hyperlipidemia, continue Lipitor.  For the essential hypertension, continue carvedilol and Entresto.       Follow Up   No follow-ups on file.  Patient was given instructions and counseling regarding his condition or for health maintenance advice. Please see specific information pulled into the AVS if appropriate.

## 2023-02-10 LAB
25(OH)D3+25(OH)D2 SERPL-MCNC: 52.7 NG/ML (ref 30–100)
ALBUMIN SERPL-MCNC: 4.5 G/DL (ref 3.5–5.2)
ALBUMIN/CREAT UR: 12 MG/G CREAT (ref 0–29)
ALBUMIN/GLOB SERPL: 2.3 G/DL
ALP SERPL-CCNC: 64 U/L (ref 39–117)
ALT SERPL-CCNC: 20 U/L (ref 1–41)
AST SERPL-CCNC: 23 U/L (ref 1–40)
BASOPHILS # BLD AUTO: 0.04 10*3/MM3 (ref 0–0.2)
BASOPHILS NFR BLD AUTO: 0.7 % (ref 0–1.5)
BILIRUB SERPL-MCNC: 0.4 MG/DL (ref 0–1.2)
BUN SERPL-MCNC: 15 MG/DL (ref 6–20)
BUN/CREAT SERPL: 12.1 (ref 7–25)
CALCIUM SERPL-MCNC: 9.2 MG/DL (ref 8.6–10.5)
CHLORIDE SERPL-SCNC: 105 MMOL/L (ref 98–107)
CHOLEST SERPL-MCNC: 136 MG/DL (ref 0–200)
CO2 SERPL-SCNC: 26.8 MMOL/L (ref 22–29)
CREAT SERPL-MCNC: 1.24 MG/DL (ref 0.76–1.27)
CREAT UR-MCNC: 126 MG/DL
EGFRCR SERPLBLD CKD-EPI 2021: 67 ML/MIN/1.73
EOSINOPHIL # BLD AUTO: 0.18 10*3/MM3 (ref 0–0.4)
EOSINOPHIL NFR BLD AUTO: 3.1 % (ref 0.3–6.2)
ERYTHROCYTE [DISTWIDTH] IN BLOOD BY AUTOMATED COUNT: 13 % (ref 12.3–15.4)
FT4I SERPL CALC-MCNC: 1.3 (ref 1.2–4.9)
GLOBULIN SER CALC-MCNC: 2 GM/DL
GLUCOSE SERPL-MCNC: 86 MG/DL (ref 65–99)
HBA1C MFR BLD: 6.1 % (ref 4.8–5.6)
HCT VFR BLD AUTO: 44.4 % (ref 37.5–51)
HDLC SERPL-MCNC: 72 MG/DL (ref 40–60)
HGB BLD-MCNC: 14.7 G/DL (ref 13–17.7)
IMM GRANULOCYTES # BLD AUTO: 0.03 10*3/MM3 (ref 0–0.05)
IMM GRANULOCYTES NFR BLD AUTO: 0.5 % (ref 0–0.5)
LDLC SERPL CALC-MCNC: 52 MG/DL (ref 0–100)
LDLC/HDLC SERPL: 0.73 {RATIO}
LYMPHOCYTES # BLD AUTO: 2.26 10*3/MM3 (ref 0.7–3.1)
LYMPHOCYTES NFR BLD AUTO: 38.3 % (ref 19.6–45.3)
MCH RBC QN AUTO: 30.6 PG (ref 26.6–33)
MCHC RBC AUTO-ENTMCNC: 33.1 G/DL (ref 31.5–35.7)
MCV RBC AUTO: 92.3 FL (ref 79–97)
MICROALBUMIN UR-MCNC: 15.4 UG/ML
MONOCYTES # BLD AUTO: 0.44 10*3/MM3 (ref 0.1–0.9)
MONOCYTES NFR BLD AUTO: 7.5 % (ref 5–12)
NEUTROPHILS # BLD AUTO: 2.95 10*3/MM3 (ref 1.7–7)
NEUTROPHILS NFR BLD AUTO: 49.9 % (ref 42.7–76)
NRBC BLD AUTO-RTO: 0 /100 WBC (ref 0–0.2)
PLATELET # BLD AUTO: 208 10*3/MM3 (ref 140–450)
POTASSIUM SERPL-SCNC: 4.7 MMOL/L (ref 3.5–5.2)
PROT SERPL-MCNC: 6.5 G/DL (ref 6–8.5)
RBC # BLD AUTO: 4.81 10*6/MM3 (ref 4.14–5.8)
SODIUM SERPL-SCNC: 143 MMOL/L (ref 136–145)
T3RU NFR SERPL: 28 % (ref 24–39)
T4 SERPL-MCNC: 4.8 UG/DL (ref 4.5–12)
TRIGL SERPL-MCNC: 56 MG/DL (ref 0–150)
TSH SERPL DL<=0.005 MIU/L-ACNC: 2.35 UIU/ML (ref 0.45–4.5)
VLDLC SERPL CALC-MCNC: 12 MG/DL (ref 5–40)
WBC # BLD AUTO: 5.9 10*3/MM3 (ref 3.4–10.8)

## 2023-02-10 NOTE — PROGRESS NOTES
The labs were reviewed. Please inform patient that labs were normal. This is the best his labs looked in years. Have him continue doing everything the way it currently is.

## 2023-04-26 ENCOUNTER — OFFICE VISIT (OUTPATIENT)
Dept: CARDIOLOGY | Facility: CLINIC | Age: 58
End: 2023-04-26
Payer: COMMERCIAL

## 2023-04-26 VITALS
HEIGHT: 70 IN | SYSTOLIC BLOOD PRESSURE: 112 MMHG | WEIGHT: 231 LBS | HEART RATE: 63 BPM | BODY MASS INDEX: 33.07 KG/M2 | DIASTOLIC BLOOD PRESSURE: 72 MMHG

## 2023-04-26 DIAGNOSIS — E78.2 MIXED HYPERLIPIDEMIA: ICD-10-CM

## 2023-04-26 DIAGNOSIS — I10 ESSENTIAL HYPERTENSION: ICD-10-CM

## 2023-04-26 DIAGNOSIS — I42.8 NONISCHEMIC CARDIOMYOPATHY: Primary | ICD-10-CM

## 2023-04-26 DIAGNOSIS — E11.9 TYPE 2 DIABETES MELLITUS WITHOUT COMPLICATION, WITHOUT LONG-TERM CURRENT USE OF INSULIN: ICD-10-CM

## 2023-04-26 NOTE — PROGRESS NOTES
Date of Office Visit: 2023  Encounter Provider: Bernadine Graff MD  Place of Service: [facility] CARDIOLOGY  Patient Name: Jhon Munoz  :1965      Patient ID:  Jhon Munoz is a 58 y.o. male is here for  followup for nonischemic cardiomyopathy.        History of Present Illness    He is followed for cardiomyopathy.  He has a history of sleep apnea using CPAP, hyperlipidemia, hypertension, diabetes mellitus type 2 on oral hypoglycemics, erectile dysfunction, renal calculi, GERD.  He saw Dr. Zaldivar because of the left renal infarct and has been maintained on aspirin.     He was having left lower quadrant pain.  CT abdomen pelvis done 2024 left lower quadrant pain to her left renal infarct involving the lower pole and midpole of the anterior cortex and a nonobstructing left renal calculus.  He saw Dr. Carreon from urology yesterday who does not think that a further work-up really needs to be done at this time.  Due to the renal infarct, he had a monitor.  The 12-day Holter monitor that he had 2020 showed multiple episodes of nonsustained ventricular tachycardia, the longest was 10 seconds.  Short bursts of SVT were also noted but no sustained atrial fibrillation.     Echocardiogram done 2020 showed grade 1 diastolic dysfunction with moderate left ventricular dilation and ejection fraction of 27.5%.  Global longitudinal strain was -12.8%.  There was no significant valve disease.     He is , has 3 children works for [employer].  Uses no cigarettes and has alcohol, about 6-10 beers per week as well as 1 vodka.  His 1 cup of coffee per day.  He has not been regularly exercising.  His mother had hypertension  may 2020. He has 2 grown children 29 and 35.  His middle son  in  with osteogenic sarcoma.       On 2020 a cardiac catheterization was completed which showed the LAD, first diagonal, circumflex with luminal irregularities and RCA had  30% mid segment stenosis.  He was felt to have nonischemic cardiomyopathy and medical management was recommended.  cardiac MRI in 2020 showed left ventricle dilated, global hypokinesis, mild right ventricular hypokinesis, normal perfusion of the myocardium, a focal area of mid myocardial delayed enhancement of the inferior septum, and EF calculated at 30%.     He reports right lower quadrant abdominal pain.  He saw both urology (Dr. Carreon) and nephrology (Dr. Frandy Castillo).  The patient says neither physician thought his pain was from the kidney stones.       He was in the emergency department on 11/14/2020 with an episode of syncope that occurred at 5:30 AM.  He had been asleep for 2 and half hours and got up to say goodbye to his wife when she left for work.  When he got up, he reports that he was lightheaded and became unresponsive for to 30 seconds.  EMS was called and when they arrived, his heart rate was in the 20s and they given atropine.  His heart rate then came up to the 60s and his blood pressure was 100.  .     He had normal bilateral renal artery duplex done 7/2020. Echo done 10/28/2020 showed ejection fraction 36-40% with severe eccentric left ventricular hypertrophy and mild left ventricular dilation, grade 1 diastolic dysfunction, anterior and anteroseptal hypokinesis.  Echo done 3/11/2022 showed eject fraction 44% with grade 1 diastolic dysfunction, moderate left atrial dilation, moderate concentric left ventricle hypertrophy, global longitudinal strain of -15.1%, normal valvular structure and function, global hypokinesis noted.     Labs on 2/9/2023 showed normal CMP, hemoglobin A1c 6.1%, normal thyroid panel, total cholesterol 136, HDL 72, LDL 52, triglycerides 56, normal CBC.    He has no chest pain or pressure.  He is now retired happened in February 2023.  He has no orthopnea or PND.  He has no difficulty with breathing and no tachycardia, or syncope.  He has occasional dizziness but is not  sustained or severe.  He is taking his medications as directed without difficulty.      Past Medical History:   Diagnosis Date   • Cardiomyopathy    • Colon polyp    • Cortical senile cataract 4/1/2019   • Diabetes mellitus     type 2   • Hyperlipidemia    • Hypertension    • Renal calculi     Followed by Dr. Chris Carreon urologist   • Renal infarct 07/2020    Left; noted on CT scan-followed by Dr. Chris Carreon urology   • Sleep apnea     CPAP nightly         Past Surgical History:   Procedure Laterality Date   • CARDIAC CATHETERIZATION N/A 07/24/2020    Procedure: Coronary angiography;  Surgeon: Robert Deluna MD;  Location:  [facility]VAS[facility];  Service: Cardiovascular;  Laterality: N/A;   • CARDIAC CATHETERIZATION N/A 07/24/2020    Procedure: Left Heart Cath;  Surgeon: Robert Deluna MD;  Location:  [facility]ATH [facility]VAS[facility];  Service: Cardiovascular;  Laterality: N/A;   • COLONOSCOPY  2020   • COLONOSCOPY N/A 10/18/2022    Procedure: COLONOSCOPY TO CECUM WITH COLD BIOPSY POLYPECTOMY;  Surgeon: Robert Evans MD;  Location: [facility] ENDOSCOPY;  Service: Gastroenterology;  Laterality: N/A;  PRE: HX COLON POLYPS  POST: POLYP, DIVERTICULOSIS, HEMORRHOIDS   • KNEE SURGERY      meniscus        Current Outpatient Medications on File Prior to Visit   Medication Sig Dispense Refill   • aspirin 81 MG chewable tablet Chew 1 tablet Daily.     • atorvastatin (LIPITOR) 20 MG tablet Take 0.5 tablets by mouth Daily. 45 tablet 1   • carvedilol (COREG) 25 MG tablet Take 1 tablet by mouth 2 (Two) Times a Day. 180 tablet 3   • esomeprazole (nexIUM) 40 MG capsule Take 1 capsule by mouth Every Morning Before Breakfast. prn 30 capsule 12   • Jardiance 25 MG tablet tablet TAKE 1 TABLET BY MOUTH EVERY DAY 90 tablet 1   • levothyroxine (SYNTHROID, LEVOTHROID) 25 MCG tablet Take 1 tablet by mouth Daily. 90 tablet 2   • sacubitril-valsartan (Entresto) 49-51 MG tablet Take 1 tablet by mouth 2 (Two) Times a Day. 180 tablet  "1   • Semaglutide,0.25 or 0.5MG/DOS, (Ozempic, 0.25 or 0.5 MG/DOSE,) 2 MG/1.5ML solution pen-injector Inject 0.5 mg under the skin into the appropriate area as directed 1 (One) Time Per Week. 6 mL 1   • spironolactone (ALDACTONE) 25 MG tablet 1 in the am and 1/2 in pm 135 tablet 3   • vitamin D (ERGOCALCIFEROL) 1.25 MG (48200 UT) capsule capsule Take 1 capsule by mouth Every 7 (Seven) Days. 12 capsule 1     No current facility-administered medications on file prior to visit.       Social History     Socioeconomic History   • Marital status:      Spouse name: Jyoti   Tobacco Use   • Smoking status: Never     Passive exposure: Never   • Smokeless tobacco: Never   Vaping Use   • Vaping Use: Never used   Substance and Sexual Activity   • Alcohol use: Yes     Types: 1 Cans of beer, 1 Shots of liquor per week     Comment: Caffeine use: 1 cup daily   • Drug use: Never   • Sexual activity: Defer           ROS    Procedures    ECG 12 Lead    Date/Time: 4/26/2023 10:07 AM  Performed by: Bernadine Graff MD  Authorized by: Bernadine Graff MD   Comparison: compared with previous ECG   Similar to previous ECG  Rhythm: sinus rhythm  Ectopy: unifocal PVCs  T flattening: aVL, V4, V5 and V6    Clinical impression: abnormal EKG                Objective:      Vitals:    04/26/23 0949   BP: 112/72   Pulse: 63   Weight: 105 kg (231 lb)   Height: 177.8 cm (70\")     Body mass index is 33.15 kg/m².    Vitals reviewed.   Constitutional:       General: Not in acute distress.     Appearance: Well-developed. Not diaphoretic.   Eyes:      General: No scleral icterus.     Conjunctiva/sclera: Conjunctivae normal.   HENT:      Head: Normocephalic and atraumatic.   Neck:      Thyroid: No thyromegaly.      Vascular: No carotid bruit or JVD.      Lymphadenopathy: No cervical adenopathy.   Pulmonary:      Effort: Pulmonary effort is normal. No respiratory distress.      Breath sounds: Normal breath sounds. No wheezing. No rhonchi. " No rales.   Chest:      Chest wall: Not tender to palpatation.   Cardiovascular:      Normal rate. Regular rhythm.      Murmurs: There is no murmur.      No gallop.   Pulses:     Intact distal pulses.   Edema:     Peripheral edema absent.   Abdominal:      General: Bowel sounds are normal. There is no distension or abdominal bruit.      Palpations: Abdomen is soft. There is no abdominal mass.      Tenderness: There is no abdominal tenderness.   Musculoskeletal:         General: No deformity.      Extremities: No clubbing present.     Cervical back: Neck supple. Skin:     General: Skin is warm and dry. There is no cyanosis.      Coloration: Skin is not pale.      Findings: No rash.   Neurological:      Mental Status: Alert and oriented to person, place, and time.      Cranial Nerves: No cranial nerve deficit.   Psychiatric:         Judgment: Judgment normal.         Lab Review:       Assessment:      Diagnosis Plan   1. Nonischemic cardiomyopathy  Adult Transthoracic Echo Complete W/ Cont if Necessary Per Protocol      2. Essential hypertension        3. Mixed hyperlipidemia        4. Type 2 diabetes mellitus without complication, without long-term current use of insulin          1. Cardiomyopathy, nonischemic.  No CHF at this time.  Cardiac MRI done in 2016 showed no significant evidence of infiltrative cardiomyopathy, left ventricle was dilated.  2. Hypertension, goal less than 110/80.   3. Hyperlipidemia, on atorvastatin  4. Diabetes mellitus type 2  5. Obesity  6. Left renal infarct, etiology unknown.  Now on aspirin  7. Renal insufficiency, likely due to left renal infarct.  8. PVCs and nonsustained VT on monitor, also nonsustained SVT.    9. Vasovagal syncope 11/14/2020.     Plan:       No medication changes, see Salima in 6 months and see me in 12 months with a repeat echocardiogram the same day that he sees me.  Overall doing well.

## 2023-05-11 DIAGNOSIS — E55.9 VITAMIN D DEFICIENCY: ICD-10-CM

## 2023-05-11 RX ORDER — ERGOCALCIFEROL 1.25 MG/1
CAPSULE ORAL
Qty: 12 CAPSULE | Refills: 1 | OUTPATIENT
Start: 2023-05-11

## 2023-09-19 ENCOUNTER — OFFICE VISIT (OUTPATIENT)
Dept: INTERNAL MEDICINE | Facility: CLINIC | Age: 58
End: 2023-09-19
Payer: COMMERCIAL

## 2023-09-19 VITALS
HEART RATE: 91 BPM | DIASTOLIC BLOOD PRESSURE: 70 MMHG | WEIGHT: 233 LBS | OXYGEN SATURATION: 98 % | RESPIRATION RATE: 14 BRPM | BODY MASS INDEX: 33.36 KG/M2 | HEIGHT: 70 IN | SYSTOLIC BLOOD PRESSURE: 110 MMHG

## 2023-09-19 DIAGNOSIS — E03.8 SUBCLINICAL HYPOTHYROIDISM: ICD-10-CM

## 2023-09-19 DIAGNOSIS — E78.5 HYPERLIPIDEMIA, UNSPECIFIED HYPERLIPIDEMIA TYPE: ICD-10-CM

## 2023-09-19 DIAGNOSIS — I10 ESSENTIAL HYPERTENSION: ICD-10-CM

## 2023-09-19 DIAGNOSIS — E11.9 TYPE 2 DIABETES MELLITUS WITHOUT COMPLICATION, WITHOUT LONG-TERM CURRENT USE OF INSULIN: Primary | ICD-10-CM

## 2023-09-19 RX ORDER — SEMAGLUTIDE 1.34 MG/ML
0.5 INJECTION, SOLUTION SUBCUTANEOUS WEEKLY
Qty: 6 ML | Refills: 1 | Status: SHIPPED | OUTPATIENT
Start: 2023-09-19

## 2023-09-19 NOTE — PROGRESS NOTES
"Chief Complaint  Diabetes    Brandi Munoz presents to [facility]  Diabetes      Patient has underlying essential hypertension.  Patient is currently taking carvedilol 25 mg twice a day.  Patient is also taking Entresto 49-51 mg twice a day.  Patient blood pressure today is 110/70.  Is also on spironolactone 25 mg daily.  Denies any side effects of the medication.    Patient has history of hyperlipidemia.  Currently Lipitor 20 mg daily.  Patient denies any side effects of the medicine.    Also has a history having hypothyroidism.  Currently levothyroxine 25 mcg daily.  He denies any side effects of the medication.    Patient does have 2 diabetes.  Patient is currently on Jardiance 25 mg daily.  He is also taking Ozempic 0.5 mg weekly.  He denies any side effects of the medicine.        Objective   Vital Signs:  /70 (BP Location: Left arm, Patient Position: Sitting, Cuff Size: Adult)   Pulse 91   Resp 14   Ht 177.8 cm (70\")   Wt 106 kg (233 lb)   SpO2 98%   BMI 33.43 kg/m²   Estimated body mass index is 33.43 kg/m² as calculated from the following:    Height as of this encounter: 177.8 cm (70\").    Weight as of this encounter: 106 kg (233 lb).             Physical Exam  Vitals and nursing note reviewed.   Constitutional:       Appearance: He is well-developed.   HENT:      Head: Normocephalic and atraumatic.   Musculoskeletal:      Cervical back: Normal range of motion and neck supple.   Neurological:      Mental Status: He is alert and oriented to person, place, and time.   Psychiatric:         Behavior: Behavior normal.      Result Review :                   Assessment and Plan   Diagnoses and all orders for this visit:    1. Type 2 diabetes mellitus without complication, without long-term current use of insulin (Primary)  -     Hemoglobin A1c  -     Microalbumin / Creatinine Urine Ratio - Urine, Clean Catch  -     Semaglutide,0.25 or 0.5MG/DOS, (Ozempic, " 0.25 or 0.5 MG/DOSE,) 2 MG/1.5ML solution pen-injector; Inject 0.5 mg under the skin into the appropriate area as directed 1 (One) Time Per Week.  Dispense: 6 mL; Refill: 1    2. Hyperlipidemia, unspecified hyperlipidemia type  -     Lipid Panel With LDL / HDL Ratio    3. Essential hypertension  -     Comprehensive Metabolic Panel  -     CBC & Differential    For his hyperlipidemia, we will continue him on Lipitor.  For the essential hypertension, continue his current blood pressure medicines.  For type 2 diabetes, continue taking Jardiance as well as Ozempic.  For the hypothyroidism, continue levothyroxine.         Follow Up   No follow-ups on file.  Patient was given instructions and counseling regarding his condition or for health maintenance advice. Please see specific information pulled into the AVS if appropriate.

## 2023-09-20 LAB
ALBUMIN SERPL-MCNC: 4.3 G/DL (ref 3.8–4.9)
ALBUMIN/CREAT UR: 7 MG/G CREAT (ref 0–29)
ALBUMIN/GLOB SERPL: 1.7 {RATIO} (ref 1.2–2.2)
ALP SERPL-CCNC: 64 IU/L (ref 44–121)
ALT SERPL-CCNC: 16 IU/L (ref 0–44)
AST SERPL-CCNC: 21 IU/L (ref 0–40)
BASOPHILS # BLD AUTO: 0 X10E3/UL (ref 0–0.2)
BASOPHILS NFR BLD AUTO: 1 %
BILIRUB SERPL-MCNC: 0.7 MG/DL (ref 0–1.2)
BUN SERPL-MCNC: 18 MG/DL (ref 8–27)
BUN/CREAT SERPL: 14 (ref 10–24)
CALCIUM SERPL-MCNC: 9.3 MG/DL (ref 8.6–10.2)
CHLORIDE SERPL-SCNC: 102 MMOL/L (ref 96–106)
CHOLEST SERPL-MCNC: 133 MG/DL (ref 100–199)
CO2 SERPL-SCNC: 23 MMOL/L (ref 20–29)
CREAT SERPL-MCNC: 1.27 MG/DL (ref 0.76–1.27)
CREAT UR-MCNC: 80.2 MG/DL
EGFRCR SERPLBLD CKD-EPI 2021: 65 ML/MIN/1.73
EOSINOPHIL # BLD AUTO: 0.2 X10E3/UL (ref 0–0.4)
EOSINOPHIL NFR BLD AUTO: 3 %
ERYTHROCYTE [DISTWIDTH] IN BLOOD BY AUTOMATED COUNT: 13.1 % (ref 11.6–15.4)
GLOBULIN SER CALC-MCNC: 2.6 G/DL (ref 1.5–4.5)
GLUCOSE SERPL-MCNC: 84 MG/DL (ref 70–99)
HBA1C MFR BLD: 6.1 % (ref 4.8–5.6)
HCT VFR BLD AUTO: 44.2 % (ref 37.5–51)
HDLC SERPL-MCNC: 57 MG/DL
HGB BLD-MCNC: 14.3 G/DL (ref 13–17.7)
IMM GRANULOCYTES # BLD AUTO: 0 X10E3/UL (ref 0–0.1)
IMM GRANULOCYTES NFR BLD AUTO: 1 %
LDLC SERPL CALC-MCNC: 62 MG/DL (ref 0–99)
LDLC/HDLC SERPL: 1.1 RATIO (ref 0–3.6)
LYMPHOCYTES # BLD AUTO: 1.8 X10E3/UL (ref 0.7–3.1)
LYMPHOCYTES NFR BLD AUTO: 35 %
MCH RBC QN AUTO: 30.6 PG (ref 26.6–33)
MCHC RBC AUTO-ENTMCNC: 32.4 G/DL (ref 31.5–35.7)
MCV RBC AUTO: 95 FL (ref 79–97)
MICROALBUMIN UR-MCNC: 5.6 UG/ML
MONOCYTES # BLD AUTO: 0.5 X10E3/UL (ref 0.1–0.9)
MONOCYTES NFR BLD AUTO: 10 %
NEUTROPHILS # BLD AUTO: 2.6 X10E3/UL (ref 1.4–7)
NEUTROPHILS NFR BLD AUTO: 50 %
PLATELET # BLD AUTO: 189 X10E3/UL (ref 150–450)
POTASSIUM SERPL-SCNC: 5.2 MMOL/L (ref 3.5–5.2)
PROT SERPL-MCNC: 6.9 G/DL (ref 6–8.5)
RBC # BLD AUTO: 4.67 X10E6/UL (ref 4.14–5.8)
SODIUM SERPL-SCNC: 139 MMOL/L (ref 134–144)
TRIGL SERPL-MCNC: 69 MG/DL (ref 0–149)
VLDLC SERPL CALC-MCNC: 14 MG/DL (ref 5–40)
WBC # BLD AUTO: 5.1 X10E3/UL (ref 3.4–10.8)

## 2023-10-17 RX ORDER — SACUBITRIL AND VALSARTAN 49; 51 MG/1; MG/1
1 TABLET, FILM COATED ORAL 2 TIMES DAILY
Qty: 180 TABLET | Refills: 1 | Status: SHIPPED | OUTPATIENT
Start: 2023-10-17

## 2023-11-01 ENCOUNTER — OFFICE VISIT (OUTPATIENT)
Dept: CARDIOLOGY | Facility: CLINIC | Age: 58
End: 2023-11-01
Payer: COMMERCIAL

## 2023-11-01 VITALS
HEART RATE: 66 BPM | HEIGHT: 70 IN | BODY MASS INDEX: 33.64 KG/M2 | SYSTOLIC BLOOD PRESSURE: 118 MMHG | DIASTOLIC BLOOD PRESSURE: 78 MMHG | WEIGHT: 235 LBS

## 2023-11-01 DIAGNOSIS — N28.0 RENAL INFARCT: ICD-10-CM

## 2023-11-01 DIAGNOSIS — R55 SYNCOPE AND COLLAPSE: ICD-10-CM

## 2023-11-01 DIAGNOSIS — E78.2 MIXED HYPERLIPIDEMIA: ICD-10-CM

## 2023-11-01 DIAGNOSIS — I42.8 NONISCHEMIC CARDIOMYOPATHY: Primary | ICD-10-CM

## 2023-11-01 DIAGNOSIS — N18.31 STAGE 3A CHRONIC KIDNEY DISEASE: ICD-10-CM

## 2023-11-01 DIAGNOSIS — I10 ESSENTIAL HYPERTENSION: ICD-10-CM

## 2023-11-01 DIAGNOSIS — E11.9 TYPE 2 DIABETES MELLITUS WITHOUT COMPLICATION, WITHOUT LONG-TERM CURRENT USE OF INSULIN: ICD-10-CM

## 2023-11-01 NOTE — PROGRESS NOTES
"      [facility] CARDIOLOGY  [address] [facility] [address]  [facility] [address]  Phone: [phone]  Fax: [phone]  Patient Name: Jhon Munoz  :1965  Age: 58 y.o.  Primary Cardiologist: Bernadine Graff MD  Encounter Provider:  TERRI Sutton    History of Present Illness     Jhon Munoz is a 58 y.o. Black male whose medical history includes OCHOA/CPAP, hyperlipidemia, hypertension, type 2 diabetes, renal calculi, and GERD.  He is followed in our office by Dr. Graff for nonischemic cardiomyopathy. I have reviewed the past medical records in preparation of today's visit.     23 Follow-up:  He is here for 6-month follow-up.  He retired in 2023.  He recently came back from [place] and felt great while he was there.  His blood pressure at home is similar to today's reading.  He does get lightheaded with standing too quickly but this is gradually improving.  Overall he feels well but he is having some bone pain or myalgias; he is wondering if Ozempic might be contributing to this.  He is going to start exercising more; his weight has been stable on Ozempic but it is doing a good job of managing his blood sugar.  He is not having any chest pain, dyspnea with exertion, orthopnea, palpitations, or leg swelling.  He is taking his medications as prescribed.    Data Review     The following data was reviewed by TERRI Sutton on 23:    Vital Signs:   /78   Pulse 66   Ht 177.8 cm (70\")   Wt 107 kg (235 lb)   BMI 33.72 kg/m²       Weight:  Wt Readings from Last 3 Encounters:   23 107 kg (235 lb)   23 106 kg (233 lb)   23 105 kg (230 lb 8 oz)     Body mass index is 33.72 kg/m².    Below is a summary of pertinent cardiology findings:  He is , has 3 children, works for [employer].  He uses no cigarettes and has about 6-10 beers per week as well as 1 vodka.  He has 1 cup of coffee daily.  Family history includes " hypertension in his mother who  in  and a son who  in  osteogenic sarcoma.  2020 CT abdomen pelvis for left lower quadrant pain showed left renal infarct and nonobstructing left renal calculus.  He was evaluated by Dr. Carreon with urology who felt no further work-up was needed.  He did have 12-day Holter monitor due to infarct.  2020 bilateral renal artery duplex was normal.  2020 echocardiogram showed grade 1 diastolic dysfunction, moderate left ventricular dilation, EF 27.5%, GLS -12.8, and no significant valvular disease.  2020 cardiac catheterization showed the LAD, first diagonal, left circumflex to have luminal irregularities, and the RCA with a 30% mid segment stenosis.  He was treated medically for nonischemic cardiomyopathy.   cardiac MRI showed dilated left ventricle, global hypokinesis, mild right ventricular hypokinesis, normal perfusion of the myocardium, a focal area of mid myocardial delayed enhancement of the inferior septum, and EF at 30%.  2020 Holter monitor showed multiple episodes of nonsustained VT with the longest lasting 10 seconds, and short bursts of SVT were also noted; no evidence of atrial fibrillation.  2020 echocardiogram showed EF 36 to 40%, severe eccentric LV hypertrophy, mild left ventricular dilation, grade 1 LV diastolic dysfunction, anterior and anteroseptal hypokinesis.  2020 evaluated in the emergency room for syncope; EMS arrived and found his heart rate to be in the 20s and he was treated with atropine.  He is taking medications as directed before bedtime and had eaten a normal meal but he had 4 drinks prior to going to bed at 3 AM.  He was felt to have suffered from vasovagal syncope.  2022 echocardiogram showed EF 44%, grade 1 LV diastolic dysfunction, moderate left atrial dilation, moderate concentric LV hypertrophy, global longitudinal strain of -15.1%, normal valvular structure and function, and  global hypokinesis.    Labs:  10/17/2022:  cr 1.4, K 4.8, otherwise unremarkable CMP, Hgb A1c 6.3,, TSH 1.680, total T4 5.7 Chol 131, HDL 53, LDL 65, Trig 64, Hgb 14.1, Plt 207  09/19/2023:  cr 1.3, K 5.2, otherwise unremarkable CMP, hgb A1c 6.1, Chol 133, HDL 57, LDL 62, Trig 69, hgb 14.3, plt 189      ECG 12 Lead    Date/Time: 11/1/2023 9:21 AM  Performed by: Sravanthi Guido APRN    Authorized by: Sravanthi Guido APRN  Comparison: compared with previous ECG from 4/26/2023  Similar to previous ECG  Rhythm: sinus rhythm  Rate: normal  BPM: 66  T inversion: V5 and V6  T flattening: I, II, III, aVR, aVL, aVF, V3 and V4    Clinical impression: abnormal EKG          Medications     Allergies as of 11/01/2023    (No Known Allergies)         Current Outpatient Medications:     aspirin 81 MG chewable tablet, Chew 1 tablet Daily., Disp: , Rfl:     atorvastatin (LIPITOR) 20 MG tablet, Take 0.5 tablets by mouth Daily., Disp: 45 tablet, Rfl: 1    carvedilol (COREG) 25 MG tablet, Take 1 tablet by mouth 2 (Two) Times a Day., Disp: 180 tablet, Rfl: 3    empagliflozin (Jardiance) 25 MG tablet tablet, Take 1 tablet by mouth Daily., Disp: 90 tablet, Rfl: 1    Entresto 49-51 MG tablet, TAKE 1 TABLET BY MOUTH TWICE A DAY, Disp: 180 tablet, Rfl: 1    esomeprazole (nexIUM) 40 MG capsule, Take 1 capsule by mouth Every Morning Before Breakfast. prn, Disp: 30 capsule, Rfl: 12    levothyroxine (SYNTHROID, LEVOTHROID) 25 MCG tablet, Take 1 tablet by mouth Daily., Disp: 90 tablet, Rfl: 2    Semaglutide,0.25 or 0.5MG/DOS, (Ozempic, 0.25 or 0.5 MG/DOSE,) 2 MG/1.5ML solution pen-injector, Inject 0.5 mg under the skin into the appropriate area as directed 1 (One) Time Per Week., Disp: 6 mL, Rfl: 1    spironolactone (ALDACTONE) 25 MG tablet, 1 in the am and 1/2 in pm, Disp: 135 tablet, Rfl: 3    vitamin D (ERGOCALCIFEROL) 1.25 MG (53506 UT) capsule capsule, Take 1 capsule by mouth Every 7 (Seven) Days., Disp: 12 capsule,  Rfl: 1     Past History, Review of Systems, Exam     Past Medical History:   Diagnosis Date    Cardiomyopathy     Colon polyp     Cortical senile cataract 4/1/2019    Diabetes mellitus     Hyperlipidemia     Hypertension     Renal calculi     Renal infarct 07/2020    Sleep apnea        Past Surgical History:   has a past surgical history that includes Knee surgery; Colonoscopy (2020); Cardiac catheterization (N/A, 07/24/2020); Cardiac catheterization (N/A, 07/24/2020); and Colonoscopy (N/A, 10/18/2022).     Social History     Socioeconomic History    Marital status:      Spouse name: Jyoti   Tobacco Use    Smoking status: Never     Passive exposure: Never    Smokeless tobacco: Never   Vaping Use    Vaping Use: Never used   Substance and Sexual Activity    Alcohol use: Yes     Types: 1 Cans of beer, 1 Shots of liquor per week     Comment: Caffeine use: 1 cup daily    Drug use: Never    Sexual activity: Defer       Review of Systems   Eyes: Negative.    Cardiovascular: Negative.    Musculoskeletal:  Positive for myalgias.   Neurological:  Positive for light-headedness.       Vitals reviewed.   Constitutional:       Appearance: Not in distress.   Eyes:      Conjunctiva/sclera: Conjunctivae normal.      Pupils: Pupils are equal, round, and reactive to light.   HENT:      Head: Normocephalic.      Nose: Nose normal.    Mouth/Throat:      Pharynx: Oropharynx is clear.   Neck:      Vascular: JVD normal.   Pulmonary:      Effort: Pulmonary effort is normal.      Breath sounds: Normal breath sounds. No wheezing. No rhonchi. No rales.   Cardiovascular:      Normal rate. Regular rhythm. Normal S1. Normal S2.       Murmurs: There is no murmur.   Pulses:     Intact distal pulses.   Edema:     Peripheral edema absent.   Abdominal:      General: Bowel sounds are normal. There is no distension.      Palpations: Abdomen is soft.      Tenderness: There is no abdominal tenderness.   Musculoskeletal: Normal range of motion.       Cervical back: Normal range of motion and neck supple. Skin:     General: Skin is warm and dry.   Neurological:      Mental Status: Alert and oriented to person, place and time.   Psychiatric:         Attention and Perception: Attention normal.         Mood and Affect: Mood normal.         Speech: Speech normal.         Behavior: Behavior is cooperative.         Assessment and Plan     Assessment:  1. Nonischemic cardiomyopathy    2. Essential hypertension    3. Mixed hyperlipidemia    4. Type 2 diabetes mellitus without complication, without long-term current use of insulin    5. Renal infarct    6. Stage 3a chronic kidney disease    7. Syncope and collapse           Nonischemic cardiomyopathy: Diagnosed with left renal infarct in July 2020 and follow-up echocardiogram showed EF 27.5%.  Cardiac catheterization showed nonobstructive coronary artery disease, and cardiac MRI showed fairly normal cardiac structure.  Echocardiogram was in March 2022 and showed EFhad improved to 44%.  His medical therapy includes 49-51 mg Entresto twice daily, 25 mg Jardiance daily, 25 mg carvedilol twice daily, and 25 mg spironolactone daily.  He is compensated by exam.  Repeat echocardiogram is pending in April 2024.  Hypertension: Overall controlled; his orthostatic symptoms have improved.  Hyperlipidemia: Lipids were at goal when checked in September 2023.  He is treated with 10 mg atorvastatin daily but he is having myalgias and bone pain.  Type 2 diabetes: Hemoglobin A1c at goal when last checked in September 2023.  He is treated with Ozempic and Jardiance.  Renal infarct: This was noted in July 2022 and he continues to follow with  and nephrology.  Dr. Castillo is his nephrologist.  Stage IIIa CKD: His renal function is stable on labs in September 2023..  He follows with Dr. Castillo.  Renal calculi:  He follows with Dr. Carreon.  No recent issues.  Syncope and collapse: This occurred in November 2022 and was associated with episode of  bradycardia.  This was felt to be in response to vasovagal reaction.  He has had no further episodes.    Mr. Munoz is a patient of Dr. Graff's with nonischemic cardiomyopathy diagnosed in July 2022 following renal infarct.  Cardiac catheterization showed nonobstructive coronary artery disease and his cardiac MRI was fairly normal.  His medical therapy includes moderate dose Entresto, carvedilol, Jardiance, spironolactone.  I think he is doing well but I am going to have him stop his atorvastatin to see if his bone pain and myalgias improved.  I will call him in a few weeks to see how he is doing.  He is scheduled to see Dr. Delarosa in April 2024 with echocardiogram prior to that appointment.    No follow-ups on file.  Orders Placed This Encounter   Procedures    ECG 12 Lead      No orders of the defined types were placed in this encounter.        Thank you for the opportunity to participate in this patient's care.    TERRI Velazquez    This office note has been dictated.

## 2023-11-14 ENCOUNTER — TELEPHONE (OUTPATIENT)
Dept: CARDIOLOGY | Facility: CLINIC | Age: 58
End: 2023-11-14
Payer: COMMERCIAL

## 2023-11-14 RX ORDER — ROSUVASTATIN CALCIUM 10 MG/1
10 TABLET, COATED ORAL DAILY
Qty: 90 TABLET | Refills: 3 | Status: SHIPPED | OUTPATIENT
Start: 2023-11-14

## 2023-11-14 NOTE — TELEPHONE ENCOUNTER
Okay, we can stop the atorvastatin.  I would like him to be on a statin for cardiovascular risk reduction; would he be willing to try a different one at a lower dose?    Thanks!  TERRI Velazquez

## 2023-11-14 NOTE — TELEPHONE ENCOUNTER
Can you call to see how his muscle pain is doing off the atorvastatin?    Thanks!  TERRI Velazquez

## 2023-11-14 NOTE — TELEPHONE ENCOUNTER
I sent in 10 mg rosuvastatin; if he has any issues with it, please have him call the office.     Thanks!  TERRI Velazquez

## 2023-11-14 NOTE — TELEPHONE ENCOUNTER
Spoke to patient. He said pain has improved being off the atorvastatin.     Mar Khanna RN  Triage [facility]

## 2023-11-14 NOTE — TELEPHONE ENCOUNTER
Spoke to patient. He is agreeable to trying a different medication.     Mar Khanna RN  Triage [facility]

## 2023-11-14 NOTE — TELEPHONE ENCOUNTER
PATIENT CALLED IN TO CLARIFY THAT HE IS ABSOLUTELY FEELING BETTER, ESPECIALLY IN THE MORNINGS. THAT IT IS HAVING A POSITIVE IMPACT.

## 2023-11-15 NOTE — TELEPHONE ENCOUNTER
Notified patient of recommendations. Patient verbalized understanding.    Mar Khanna RN  Triage [facility]

## 2023-11-22 DIAGNOSIS — E11.9 TYPE 2 DIABETES MELLITUS WITHOUT COMPLICATION, WITHOUT LONG-TERM CURRENT USE OF INSULIN: ICD-10-CM

## 2023-11-27 RX ORDER — SEMAGLUTIDE 0.68 MG/ML
0.5 INJECTION, SOLUTION SUBCUTANEOUS
Qty: 3 ML | Refills: 3 | Status: SHIPPED | OUTPATIENT
Start: 2023-11-27

## 2023-12-04 ENCOUNTER — TELEPHONE (OUTPATIENT)
Dept: INTERNAL MEDICINE | Facility: CLINIC | Age: 58
End: 2023-12-04

## 2023-12-04 NOTE — TELEPHONE ENCOUNTER
Caller: Jyoti Munoz    Relationship to patient: Emergency Contact      Patient is needing: PATIENT'S APPT FOR A PHYSICAL WITH DR. REY FOR 12/5 WAS CANCELLED.  PATIENT WANTED A PHYSICAL THIS YEAR SO RESCHEDULED PHYSICAL WITH JOHN ARENAS.        WIFE WANTS A CALLBACK FROM [facility] AS SHE WANTS THE PATIENT TO HAVE THE PHYSICAL THIS YEAR PREFERABLY WITH DR. REY.  PLEASE CALL TO ADVISE IF POSSIBLE.

## 2023-12-12 ENCOUNTER — OFFICE VISIT (OUTPATIENT)
Dept: INTERNAL MEDICINE | Facility: CLINIC | Age: 58
End: 2023-12-12
Payer: COMMERCIAL

## 2023-12-12 VITALS
BODY MASS INDEX: 33.07 KG/M2 | WEIGHT: 231 LBS | HEART RATE: 75 BPM | SYSTOLIC BLOOD PRESSURE: 112 MMHG | TEMPERATURE: 96.5 F | HEIGHT: 70 IN | OXYGEN SATURATION: 95 % | RESPIRATION RATE: 18 BRPM | DIASTOLIC BLOOD PRESSURE: 70 MMHG

## 2023-12-12 DIAGNOSIS — Z00.00 HEALTHCARE MAINTENANCE: ICD-10-CM

## 2023-12-12 DIAGNOSIS — Z00.00 VISIT FOR WELL MAN HEALTH CHECK: Primary | ICD-10-CM

## 2023-12-12 PROCEDURE — 99396 PREV VISIT EST AGE 40-64: CPT | Performed by: FAMILY MEDICINE

## 2023-12-12 NOTE — PROGRESS NOTES
Brandi Munoz is a 58 y.o. male and is here for a comprehensive physical exam. The patient reports no problems.            Social History:   Social History     Socioeconomic History    Marital status:      Spouse name: Jyoti   Tobacco Use    Smoking status: Never     Passive exposure: Never    Smokeless tobacco: Never   Vaping Use    Vaping Use: Never used   Substance and Sexual Activity    Alcohol use: Not Currently     Types: 1 Cans of beer, 1 Shots of liquor per week     Comment: Caffeine use: 1 cup daily    Drug use: Never    Sexual activity: Defer       Family History:   Family History   Problem Relation Age of Onset    Hypertension Mother        Past Medical History:   Past Medical History:   Diagnosis Date    Cardiomyopathy     Colon polyp     Cortical senile cataract 4/1/2019    Diabetes mellitus     type 2    Hyperlipidemia     Hypertension     Renal calculi     Followed by Dr. Chris Carreon urologist    Renal infarct 07/2020    Left; noted on CT scan-followed by Dr. Chris Carreon urology    Sleep apnea     CPAP nightly       The following portions of the patient's history were reviewed and updated as appropriate: allergies, current medications, past family history, past medical history, past social history, past surgical history and problem list.    Review of Systems    Review of Systems   Constitutional:  Negative for chills and fever.   HENT:  Negative for congestion, rhinorrhea, sinus pain and sore throat.    Eyes:  Negative for photophobia and visual disturbance.   Respiratory:  Negative for cough, chest tightness and shortness of breath.    Cardiovascular:  Negative for chest pain and palpitations.   Gastrointestinal:  Negative for diarrhea, nausea and vomiting.   Genitourinary:  Negative for dysuria, frequency and urgency.   Skin:  Negative for rash and wound.   Neurological:  Negative for dizziness and syncope.   Psychiatric/Behavioral:  Negative for behavioral problems and  confusion.        Objective   Physical Exam  Vitals and nursing note reviewed.   Constitutional:       Appearance: He is well-developed.   HENT:      Head: Normocephalic and atraumatic.      Right Ear: External ear normal.      Left Ear: External ear normal.   Cardiovascular:      Rate and Rhythm: Normal rate and regular rhythm.      Heart sounds: Normal heart sounds.   Pulmonary:      Effort: Pulmonary effort is normal. No respiratory distress.      Breath sounds: Normal breath sounds.   Abdominal:      Palpations: Abdomen is soft.      Tenderness: There is no abdominal tenderness. There is no guarding.   Musculoskeletal:         General: Normal range of motion.      Cervical back: Normal range of motion and neck supple.   Lymphadenopathy:      Cervical: No cervical adenopathy.   Skin:     General: Skin is warm.   Neurological:      Mental Status: He is alert and oriented to person, place, and time.   Psychiatric:         Behavior: Behavior normal.         Vitals:    12/12/23 0938   BP: 112/70   Pulse: 75   Resp: 18   Temp: 96.5 °F (35.8 °C)   SpO2: 95%     Body mass index is 33.15 kg/m².    Medications:   Current Outpatient Medications:     aspirin 81 MG chewable tablet, Chew 1 tablet Daily., Disp: , Rfl:     carvedilol (COREG) 25 MG tablet, Take 1 tablet by mouth 2 (Two) Times a Day., Disp: 180 tablet, Rfl: 3    empagliflozin (Jardiance) 25 MG tablet tablet, Take 1 tablet by mouth Daily., Disp: 90 tablet, Rfl: 1    Entresto 49-51 MG tablet, TAKE 1 TABLET BY MOUTH TWICE A DAY, Disp: 180 tablet, Rfl: 1    esomeprazole (nexIUM) 40 MG capsule, Take 1 capsule by mouth Every Morning Before Breakfast. prn, Disp: 30 capsule, Rfl: 12    levothyroxine (SYNTHROID, LEVOTHROID) 25 MCG tablet, Take 1 tablet by mouth Daily., Disp: 90 tablet, Rfl: 2    rosuvastatin (CRESTOR) 10 MG tablet, Take 1 tablet by mouth Daily., Disp: 90 tablet, Rfl: 3    Semaglutide,0.25 or 0.5MG/DOS, (Ozempic, 0.25 or 0.5 MG/DOSE,) 2 MG/1.5ML solution  pen-injector, Inject 0.5 mg under the skin into the appropriate area as directed 1 (One) Time Per Week., Disp: 6 mL, Rfl: 1    spironolactone (ALDACTONE) 25 MG tablet, 1 in the am and 1/2 in pm, Disp: 135 tablet, Rfl: 3    Semaglutide,0.25 or 0.5MG/DOS, (Ozempic, 0.25 or 0.5 MG/DOSE,) 2 MG/3ML solution pen-injector, INJECT 0.5 MG UNDER THE SKIN INTO THE APPROPRIATE AREA AS DIRECTED 1 (ONE) TIME PER WEEK. (Patient not taking: Reported on 12/12/2023), Disp: 3 mL, Rfl: 3       Assessment & Plan   Healthy male exam.      1. Healthcare Maintenance:  2. Patient Counseling:  --Nutrition: Stressed importance of moderation in sodium/caffeine intake, saturated fat and cholesterol, caloric balance, sufficient intake of fresh fruits, vegetables, fiber, calcium and vit D  --Exercise: Recommended 30 minutes of exercise daily.   --Immunizations reviewed.  --Discussed benefits of screening colonoscopy.    Diagnoses and all orders for this visit:    Visit for [facility] health check    Healthcare maintenance  -     CBC & Differential  -     Comprehensive Metabolic Panel  -     Hemoglobin A1c  -     Thyroid Panel With TSH  -     Lipid Panel With LDL / HDL Ratio  -     Microalbumin / Creatinine Urine Ratio - Urine, Clean Catch        No follow-ups on file.           Dictated utilizing Dragon Voice Recognition Software

## 2023-12-13 LAB
ALBUMIN SERPL-MCNC: 4.4 G/DL (ref 3.5–5.2)
ALBUMIN/CREAT UR: 6 MG/G CREAT (ref 0–29)
ALBUMIN/GLOB SERPL: 1.6 G/DL
ALP SERPL-CCNC: 64 U/L (ref 39–117)
ALT SERPL-CCNC: 24 U/L (ref 1–41)
AST SERPL-CCNC: 25 U/L (ref 1–40)
BASOPHILS # BLD AUTO: 0.02 10*3/MM3 (ref 0–0.2)
BASOPHILS NFR BLD AUTO: 0.3 % (ref 0–1.5)
BILIRUB SERPL-MCNC: 0.6 MG/DL (ref 0–1.2)
BUN SERPL-MCNC: 19 MG/DL (ref 8–23)
BUN/CREAT SERPL: 14 (ref 7–25)
CALCIUM SERPL-MCNC: 9.5 MG/DL (ref 8.6–10.5)
CHLORIDE SERPL-SCNC: 105 MMOL/L (ref 98–107)
CHOLEST SERPL-MCNC: 113 MG/DL (ref 0–200)
CO2 SERPL-SCNC: 25.8 MMOL/L (ref 22–29)
CREAT SERPL-MCNC: 1.36 MG/DL (ref 0.76–1.27)
CREAT UR-MCNC: 93.2 MG/DL
EGFRCR SERPLBLD CKD-EPI 2021: 59.6 ML/MIN/1.73
EOSINOPHIL # BLD AUTO: 0.17 10*3/MM3 (ref 0–0.4)
EOSINOPHIL NFR BLD AUTO: 2.4 % (ref 0.3–6.2)
ERYTHROCYTE [DISTWIDTH] IN BLOOD BY AUTOMATED COUNT: 12.7 % (ref 12.3–15.4)
FT4I SERPL CALC-MCNC: 1.7 (ref 1.2–4.9)
GLOBULIN SER CALC-MCNC: 2.7 GM/DL
GLUCOSE SERPL-MCNC: 97 MG/DL (ref 65–99)
HBA1C MFR BLD: 6.1 % (ref 4.8–5.6)
HCT VFR BLD AUTO: 43.3 % (ref 37.5–51)
HDLC SERPL-MCNC: 56 MG/DL (ref 40–60)
HGB BLD-MCNC: 14.6 G/DL (ref 13–17.7)
IMM GRANULOCYTES # BLD AUTO: 0.03 10*3/MM3 (ref 0–0.05)
IMM GRANULOCYTES NFR BLD AUTO: 0.4 % (ref 0–0.5)
LDLC SERPL CALC-MCNC: 42 MG/DL (ref 0–100)
LDLC/HDLC SERPL: 0.76 {RATIO}
LYMPHOCYTES # BLD AUTO: 2.04 10*3/MM3 (ref 0.7–3.1)
LYMPHOCYTES NFR BLD AUTO: 29.2 % (ref 19.6–45.3)
MCH RBC QN AUTO: 30.8 PG (ref 26.6–33)
MCHC RBC AUTO-ENTMCNC: 33.7 G/DL (ref 31.5–35.7)
MCV RBC AUTO: 91.4 FL (ref 79–97)
MICROALBUMIN UR-MCNC: 5.9 UG/ML
MONOCYTES # BLD AUTO: 0.55 10*3/MM3 (ref 0.1–0.9)
MONOCYTES NFR BLD AUTO: 7.9 % (ref 5–12)
NEUTROPHILS # BLD AUTO: 4.18 10*3/MM3 (ref 1.7–7)
NEUTROPHILS NFR BLD AUTO: 59.8 % (ref 42.7–76)
NRBC BLD AUTO-RTO: 0 /100 WBC (ref 0–0.2)
PLATELET # BLD AUTO: 197 10*3/MM3 (ref 140–450)
POTASSIUM SERPL-SCNC: 4.8 MMOL/L (ref 3.5–5.2)
PROT SERPL-MCNC: 7.1 G/DL (ref 6–8.5)
RBC # BLD AUTO: 4.74 10*6/MM3 (ref 4.14–5.8)
SODIUM SERPL-SCNC: 140 MMOL/L (ref 136–145)
T3RU NFR SERPL: 29 % (ref 24–39)
T4 SERPL-MCNC: 6 UG/DL (ref 4.5–12)
TRIGL SERPL-MCNC: 71 MG/DL (ref 0–150)
TSH SERPL DL<=0.005 MIU/L-ACNC: 1.89 UIU/ML (ref 0.45–4.5)
VLDLC SERPL CALC-MCNC: 15 MG/DL (ref 5–40)
WBC # BLD AUTO: 6.99 10*3/MM3 (ref 3.4–10.8)

## 2023-12-18 NOTE — PROGRESS NOTES
Please inform the patient of the following abnormal results. Labs are stable, will continue to monitor.

## 2024-01-23 ENCOUNTER — TELEPHONE (OUTPATIENT)
Dept: CARDIOLOGY | Facility: CLINIC | Age: 59
End: 2024-01-23

## 2024-01-23 RX ORDER — SPIRONOLACTONE 25 MG/1
25 TABLET ORAL DAILY
Start: 2024-01-23

## 2024-01-23 NOTE — TELEPHONE ENCOUNTER
Have him reduce spironolactone to just 25 mg once daily.  Can he come in for orthostatic BP check sometime this week or next?    Thanks!  TERRI Velazquez

## 2024-01-23 NOTE — TELEPHONE ENCOUNTER
Caller: Jhon Munoz    Relationship: Self    Best call back number: [phone] (home)     Which medication are you concerned about: ROSUVASTATIN 10MG    Who prescribed you this medication: MALCOLM GONZALEZ    When did you start taking this medication: 11.14.24    What are your concerns: PATIENT STATES HE HAS BEEN GETTING MORE LIGHTHEADED. NOTICED IT MORE WHEN HE'S BENDING DOWN AND STANDING BACK UP AND HAS CONCERNS WHERE HE IS TAKING CARE OF HIS SON AND ITS STILL CAUSING THE SAME ISSUES. PLEASE CALL PATIENT BACK REGARDING MEDICATION, THANK YOU    How long have you had these concerns: ABOUT THE LAST FEW WEEKS

## 2024-01-23 NOTE — TELEPHONE ENCOUNTER
I spoke with pt.  He states that his [facility], and is now living with pt and is total 24/7 care.  He is concerned because he is having episodes where he gets a little bit dizzy/lightheaded when he stands up after bending over (which he is having to do often in caring for his son).  He states that this seems to happen more often in the morning, and he takes rosuvastatin in the evening and is concerned that it may be related to this (also takes carvedilol and spironolactone BID).  He has not checked BP when this happens, but I did request that he start to monitor BP once a day, and when he has this dizziness.  Pt does state that at his last office visit with PCP, BP was in the 110's SBP..  Pt denies any CP/SOA/palpitations or other symptoms at this time.      Recommendations?    Thanks so much,  Silvia Vega, RN  Triage RN  01/23/24 14:42 EST

## 2024-01-24 NOTE — TELEPHONE ENCOUNTER
Pt called back in.  Reviewed recommendations with patient, verbalized understanding, will call with any further questions or complaints.  Pt states he will decrease spironolactone to 25mg daily.  I scheduled him for orthostatic BP check on Friday.    Salima- pt's BP this AM was 112/71.  Do you want any hold parameters for spironolactone or carvedilol?    Silvia Vega RN  Triage Nurse  01/24/24 08:30 EST

## 2024-01-24 NOTE — TELEPHONE ENCOUNTER
Called and left VM, will continue to try to reach pt.    HUB- please put patient straight through to triage    Silvia Vega, RN  Triage RN  01/24/24 08:20 EST

## 2024-01-26 ENCOUNTER — TELEPHONE (OUTPATIENT)
Dept: CARDIOLOGY | Facility: CLINIC | Age: 59
End: 2024-01-26
Payer: COMMERCIAL

## 2024-01-26 ENCOUNTER — CLINICAL SUPPORT (OUTPATIENT)
Dept: CARDIOLOGY | Facility: CLINIC | Age: 59
End: 2024-01-26
Payer: COMMERCIAL

## 2024-01-26 NOTE — PROGRESS NOTES
Patient here for BP orthostatic check    Patient is taking coreg 25mng 1 po bid  Spironolactone 25mg 1 tablet in am and 1/2 tablet pm  Started crestor  - states he is not have any aches or pains with this so far.    Patient states BP at home has been running 117/60-70 and 121/72 pulse 72-72  With his pluse running 67-72-73      Patient Lying /9- pulse 67  Setting 120/78 pulse 70  Standing 100/80 pulse 83    Says he can feel when lying to a setting position being light headed will have to set a while before getting up.      Also has a 40 year old son that Jhon Munoz Jr. Just had a edgar injury Nov 18th [employer] until Dec 22nd  [facility] Jan 18[facility]  Parents are taking care of him.      Patient can be reached at  [phone]      Blood Work up is in Epic from Dec. 18th 2023 by Dr. Decker

## 2024-01-26 NOTE — TELEPHONE ENCOUNTER
I called him and told him to remain on for lactone 25 mg once daily, decrease carvedilol to 12.5 mg in the morning remain on 25 mg nightly, remain on Entresto 49/51 twice daily.  He will continue to check his blood pressure at home.

## 2024-01-29 NOTE — PROGRESS NOTES
Dr. Graff spoke with patient on Friday and reduced carvedilol to 12.5 mg daily and 25 mg nightly.    Thanks!  Salima Guido, APRN

## 2024-01-31 ENCOUNTER — OFFICE VISIT (OUTPATIENT)
Dept: INTERNAL MEDICINE | Facility: CLINIC | Age: 59
End: 2024-01-31
Payer: COMMERCIAL

## 2024-01-31 VITALS
TEMPERATURE: 97 F | HEART RATE: 57 BPM | SYSTOLIC BLOOD PRESSURE: 128 MMHG | HEIGHT: 70 IN | RESPIRATION RATE: 16 BRPM | OXYGEN SATURATION: 99 % | DIASTOLIC BLOOD PRESSURE: 76 MMHG | WEIGHT: 227 LBS | BODY MASS INDEX: 32.5 KG/M2

## 2024-01-31 DIAGNOSIS — E11.9 TYPE 2 DIABETES MELLITUS WITHOUT COMPLICATION, WITHOUT LONG-TERM CURRENT USE OF INSULIN: ICD-10-CM

## 2024-01-31 DIAGNOSIS — M67.40 GANGLION CYST: ICD-10-CM

## 2024-01-31 DIAGNOSIS — R79.89 ELEVATED SERUM CREATININE: Primary | ICD-10-CM

## 2024-02-01 NOTE — PROGRESS NOTES
"Chief Complaint  Follow-up    Subjective        Jhon Munoz presents to [facility]  History of Present Illness    Patient has a history of type 2 diabetes.  He is currently taking Jardiance 25 mg daily as well as Ozempic 0.5 mg weekly.  Denies any side effects of the medication.    He also has a ganglion cyst on his right wrist.  However he seems to be okay with this currently.    He also does have elevated serum creatinine level.  We did discuss about him not drinking enough water.    Objective   Vital Signs:  /76   Pulse 57   Temp 97 °F (36.1 °C) (Temporal)   Resp 16   Ht 177.8 cm (70\")   Wt 103 kg (227 lb)   SpO2 99%   BMI 32.57 kg/m²   Estimated body mass index is 32.57 kg/m² as calculated from the following:    Height as of this encounter: 177.8 cm (70\").    Weight as of this encounter: 103 kg (227 lb).               Physical Exam  Vitals and nursing note reviewed.   Constitutional:       Appearance: He is well-developed.   HENT:      Head: Normocephalic and atraumatic.   Musculoskeletal:      Cervical back: Normal range of motion and neck supple.   Neurological:      Mental Status: He is alert and oriented to person, place, and time.   Psychiatric:         Behavior: Behavior normal.        Result Review :                     Assessment and Plan     Diagnoses and all orders for this visit:    1. Elevated serum creatinine (Primary)    2. Type 2 diabetes mellitus without complication, without long-term current use of insulin  -     Lipid Panel With LDL / HDL Ratio; Future  -     Hemoglobin A1c; Future  -     Microalbumin / Creatinine Urine Ratio - Urine, Clean Catch; Future  -     CBC & Differential; Future  -     Comprehensive Metabolic Panel; Future    3. Ganglion cyst    He was to hold off on any therapy for the ganglion cyst.  I did discuss with him and counseled him about this.  For the elevated serum creatinine level with a continued tone to drink more water.  " For type 2 diabetes we will continue him on Ozempic, I will check several labs.         Follow Up     No follow-ups on file.  Patient was given instructions and counseling regarding his condition or for health maintenance advice. Please see specific information pulled into the AVS if appropriate.

## 2024-02-16 ENCOUNTER — APPOINTMENT (OUTPATIENT)
Dept: INTERVENTIONAL RADIOLOGY/VASCULAR | Facility: HOSPITAL | Age: 59
DRG: 024 | End: 2024-02-16
Payer: COMMERCIAL

## 2024-02-16 ENCOUNTER — APPOINTMENT (OUTPATIENT)
Dept: CT IMAGING | Facility: HOSPITAL | Age: 59
DRG: 024 | End: 2024-02-16
Payer: COMMERCIAL

## 2024-02-16 ENCOUNTER — HOSPITAL ENCOUNTER (INPATIENT)
Facility: HOSPITAL | Age: 59
LOS: 4 days | Discharge: HOME | DRG: 024 | End: 2024-02-20
Attending: EMERGENCY MEDICINE | Admitting: INTERNAL MEDICINE
Payer: COMMERCIAL

## 2024-02-16 ENCOUNTER — APPOINTMENT (OUTPATIENT)
Dept: CARDIOLOGY | Facility: HOSPITAL | Age: 59
DRG: 024 | End: 2024-02-16
Payer: COMMERCIAL

## 2024-02-16 ENCOUNTER — APPOINTMENT (OUTPATIENT)
Dept: GENERAL RADIOLOGY | Facility: HOSPITAL | Age: 59
DRG: 024 | End: 2024-02-16
Payer: COMMERCIAL

## 2024-02-16 ENCOUNTER — ANESTHESIA (OUTPATIENT)
Dept: INTERVENTIONAL RADIOLOGY/VASCULAR | Facility: HOSPITAL | Age: 59
End: 2024-02-16
Payer: COMMERCIAL

## 2024-02-16 ENCOUNTER — HOME HEALTH ADMISSION (OUTPATIENT)
Dept: HOME HEALTH SERVICES | Facility: HOME HEALTHCARE | Age: 59
End: 2024-02-16
Payer: COMMERCIAL

## 2024-02-16 ENCOUNTER — APPOINTMENT (OUTPATIENT)
Dept: MRI IMAGING | Facility: HOSPITAL | Age: 59
DRG: 024 | End: 2024-02-16
Payer: COMMERCIAL

## 2024-02-16 DIAGNOSIS — I63.9 ACUTE EMBOLIC STROKE: Primary | ICD-10-CM

## 2024-02-16 LAB
ABO GROUP BLD: NORMAL
ALBUMIN SERPL-MCNC: 3.9 G/DL (ref 3.5–5.2)
ALBUMIN/GLOB SERPL: 1.6 G/DL
ALP SERPL-CCNC: 51 U/L (ref 39–117)
ALT SERPL W P-5'-P-CCNC: 18 U/L (ref 1–41)
ANION GAP SERPL CALCULATED.3IONS-SCNC: 13.7 MMOL/L (ref 5–15)
APTT PPP: 25.3 SECONDS (ref 22.7–35.4)
ASCENDING AORTA: 3.1 CM
AST SERPL-CCNC: 23 U/L (ref 1–40)
AT III PPP CHRO-ACNC: 100 % (ref 90–134)
BASOPHILS # BLD AUTO: 0.04 10*3/MM3 (ref 0–0.2)
BASOPHILS NFR BLD AUTO: 0.8 % (ref 0–1.5)
BH CV ECHO MEAS - ACS: 1.98 CM
BH CV ECHO MEAS - AO MAX PG: 4.9 MMHG
BH CV ECHO MEAS - AO MEAN PG: 3 MMHG
BH CV ECHO MEAS - AO ROOT DIAM: 3.6 CM
BH CV ECHO MEAS - AO V2 MAX: 110.6 CM/SEC
BH CV ECHO MEAS - AO V2 VTI: 17.4 CM
BH CV ECHO MEAS - AVA(I,D): 2.9 CM2
BH CV ECHO MEAS - EDV(CUBED): 137.3 ML
BH CV ECHO MEAS - EDV(MOD-SP2): 116 ML
BH CV ECHO MEAS - EDV(MOD-SP4): 210 ML
BH CV ECHO MEAS - EF(MOD-BP): 39.3 %
BH CV ECHO MEAS - EF(MOD-SP2): 40.5 %
BH CV ECHO MEAS - EF(MOD-SP4): 37.1 %
BH CV ECHO MEAS - ESV(CUBED): 64.2 ML
BH CV ECHO MEAS - ESV(MOD-SP2): 69 ML
BH CV ECHO MEAS - ESV(MOD-SP4): 132 ML
BH CV ECHO MEAS - FS: 22.4 %
BH CV ECHO MEAS - IVS/LVPW: 1.07 CM
BH CV ECHO MEAS - IVSD: 1.15 CM
BH CV ECHO MEAS - LAT PEAK E' VEL: 13.1 CM/SEC
BH CV ECHO MEAS - LV MASS(C)D: 220.7 GRAMS
BH CV ECHO MEAS - LV MAX PG: 2.23 MMHG
BH CV ECHO MEAS - LV MEAN PG: 1.16 MMHG
BH CV ECHO MEAS - LV V1 MAX: 74.7 CM/SEC
BH CV ECHO MEAS - LV V1 VTI: 12.7 CM
BH CV ECHO MEAS - LVIDD: 5.2 CM
BH CV ECHO MEAS - LVIDS: 4 CM
BH CV ECHO MEAS - LVOT AREA: 3.9 CM2
BH CV ECHO MEAS - LVOT DIAM: 2.24 CM
BH CV ECHO MEAS - LVPWD: 1.07 CM
BH CV ECHO MEAS - MED PEAK E' VEL: 7.3 CM/SEC
BH CV ECHO MEAS - MV A DUR: 0.13 SEC
BH CV ECHO MEAS - MV A MAX VEL: 52.7 CM/SEC
BH CV ECHO MEAS - MV DEC SLOPE: 217.9 CM/SEC2
BH CV ECHO MEAS - MV DEC TIME: 0.29 SEC
BH CV ECHO MEAS - MV E MAX VEL: 36.8 CM/SEC
BH CV ECHO MEAS - MV E/A: 0.7
BH CV ECHO MEAS - MV MAX PG: 2.39 MMHG
BH CV ECHO MEAS - MV MEAN PG: 0.95 MMHG
BH CV ECHO MEAS - MV P1/2T: 70.2 MSEC
BH CV ECHO MEAS - MV V2 VTI: 14.6 CM
BH CV ECHO MEAS - MVA(P1/2T): 3.1 CM2
BH CV ECHO MEAS - MVA(VTI): 3.4 CM2
BH CV ECHO MEAS - PA ACC TIME: 0.12 SEC
BH CV ECHO MEAS - PA V2 MAX: 99.9 CM/SEC
BH CV ECHO MEAS - PULM A REVS DUR: 0.1 SEC
BH CV ECHO MEAS - PULM A REVS VEL: 27.2 CM/SEC
BH CV ECHO MEAS - PULM DIAS VEL: 24.7 CM/SEC
BH CV ECHO MEAS - PULM S/D: 2.26
BH CV ECHO MEAS - PULM SYS VEL: 55.9 CM/SEC
BH CV ECHO MEAS - RV MAX PG: 3.6 MMHG
BH CV ECHO MEAS - RV V1 MAX: 94.9 CM/SEC
BH CV ECHO MEAS - RV V1 VTI: 15.1 CM
BH CV ECHO MEAS - SV(LVOT): 50 ML
BH CV ECHO MEAS - SV(MOD-SP2): 47 ML
BH CV ECHO MEAS - SV(MOD-SP4): 78 ML
BH CV ECHO MEAS - TAPSE (>1.6): 2.03 CM
BH CV ECHO MEASUREMENTS AVERAGE E/E' RATIO: 3.61
BH CV XLRA - RV BASE: 3.5 CM
BH CV XLRA - RV LENGTH: 6.9 CM
BH CV XLRA - RV MID: 2.7 CM
BH CV XLRA - TDI S': 17.7 CM/SEC
BILIRUB SERPL-MCNC: 0.3 MG/DL (ref 0–1.2)
BLD GP AB SCN SERPL QL: NEGATIVE
BUN SERPL-MCNC: 15 MG/DL (ref 8–23)
BUN/CREAT SERPL: 14 (ref 7–25)
CALCIUM SPEC-SCNC: 8.4 MG/DL (ref 8.6–10.5)
CHLORIDE SERPL-SCNC: 108 MMOL/L (ref 98–107)
CO2 SERPL-SCNC: 19.3 MMOL/L (ref 22–29)
CREAT SERPL-MCNC: 1.07 MG/DL (ref 0.76–1.27)
D DIMER PPP FEU-MCNC: 0.89 MCGFEU/ML (ref 0–0.6)
DEPRECATED RDW RBC AUTO: 43.9 FL (ref 37–54)
EGFRCR SERPLBLD CKD-EPI 2021: 79.4 ML/MIN/1.73
EOSINOPHIL # BLD AUTO: 0.18 10*3/MM3 (ref 0–0.4)
EOSINOPHIL NFR BLD AUTO: 3.4 % (ref 0.3–6.2)
ERYTHROCYTE [DISTWIDTH] IN BLOOD BY AUTOMATED COUNT: 12.9 % (ref 12.3–15.4)
F5 GENE MUT ANL BLD/T: NORMAL
FACTOR II, DNA ANALYSIS: NORMAL
GLOBULIN UR ELPH-MCNC: 2.5 GM/DL
GLUCOSE BLDC GLUCOMTR-MCNC: 107 MG/DL (ref 70–130)
GLUCOSE BLDC GLUCOMTR-MCNC: 121 MG/DL (ref 70–130)
GLUCOSE BLDC GLUCOMTR-MCNC: 72 MG/DL (ref 70–130)
GLUCOSE BLDC GLUCOMTR-MCNC: 76 MG/DL (ref 70–130)
GLUCOSE BLDC GLUCOMTR-MCNC: 82 MG/DL (ref 70–130)
GLUCOSE SERPL-MCNC: 92 MG/DL (ref 65–99)
HCT VFR BLD AUTO: 40.7 % (ref 37.5–51)
HCYS SERPL-MCNC: 12.6 UMOL/L (ref 0–15)
HGB BLD-MCNC: 13.2 G/DL (ref 13–17.7)
HOLD SPECIMEN: NORMAL
HOLD SPECIMEN: NORMAL
IMM GRANULOCYTES # BLD AUTO: 0.01 10*3/MM3 (ref 0–0.05)
IMM GRANULOCYTES NFR BLD AUTO: 0.2 % (ref 0–0.5)
INR PPP: 1.03 (ref 0.9–1.1)
LEFT ATRIUM VOLUME INDEX: 18.6 ML/M2
LYMPHOCYTES # BLD AUTO: 2.32 10*3/MM3 (ref 0.7–3.1)
LYMPHOCYTES NFR BLD AUTO: 44.1 % (ref 19.6–45.3)
MCH RBC QN AUTO: 30.1 PG (ref 26.6–33)
MCHC RBC AUTO-ENTMCNC: 32.4 G/DL (ref 31.5–35.7)
MCV RBC AUTO: 92.9 FL (ref 79–97)
MONOCYTES # BLD AUTO: 0.45 10*3/MM3 (ref 0.1–0.9)
MONOCYTES NFR BLD AUTO: 8.6 % (ref 5–12)
NEUTROPHILS NFR BLD AUTO: 2.26 10*3/MM3 (ref 1.7–7)
NEUTROPHILS NFR BLD AUTO: 42.9 % (ref 42.7–76)
NRBC BLD AUTO-RTO: 0 /100 WBC (ref 0–0.2)
PLATELET # BLD AUTO: 183 10*3/MM3 (ref 140–450)
PMV BLD AUTO: 11 FL (ref 6–12)
POTASSIUM SERPL-SCNC: 4.2 MMOL/L (ref 3.5–5.2)
PROT C ACT/NOR PPP: 119 % (ref 86–163)
PROT S ACT/NOR PPP: 93 % (ref 70–127)
PROT S FREE PPP-ACNC: 100 % (ref 49–138)
PROT SERPL-MCNC: 6.4 G/DL (ref 6–8.5)
PROTHROMBIN TIME: 13.7 SECONDS (ref 11.7–14.2)
QT INTERVAL: 414 MS
QTC INTERVAL: 478 MS
RBC # BLD AUTO: 4.38 10*6/MM3 (ref 4.14–5.8)
RH BLD: POSITIVE
SINUS: 3.2 CM
SODIUM SERPL-SCNC: 141 MMOL/L (ref 136–145)
STJ: 3.1 CM
T&S EXPIRATION DATE: NORMAL
TROPONIN T SERPL HS-MCNC: 13 NG/L
WBC NRBC COR # BLD AUTO: 5.26 10*3/MM3 (ref 3.4–10.8)
WHOLE BLOOD HOLD COAG: NORMAL
WHOLE BLOOD HOLD SPECIMEN: NORMAL

## 2024-02-16 PROCEDURE — 85379 FIBRIN DEGRADATION QUANT: CPT | Performed by: STUDENT IN AN ORGANIZED HEALTH CARE EDUCATION/TRAINING PROGRAM

## 2024-02-16 PROCEDURE — 82565 ASSAY OF CREATININE: CPT

## 2024-02-16 PROCEDURE — 85220 BLOOC CLOT FACTOR V TEST: CPT | Performed by: STUDENT IN AN ORGANIZED HEALTH CARE EDUCATION/TRAINING PROGRAM

## 2024-02-16 PROCEDURE — 83090 ASSAY OF HOMOCYSTEINE: CPT | Performed by: STUDENT IN AN ORGANIZED HEALTH CARE EDUCATION/TRAINING PROGRAM

## 2024-02-16 PROCEDURE — 85670 THROMBIN TIME PLASMA: CPT | Performed by: STUDENT IN AN ORGANIZED HEALTH CARE EDUCATION/TRAINING PROGRAM

## 2024-02-16 PROCEDURE — 85303 CLOT INHIBIT PROT C ACTIVITY: CPT | Performed by: STUDENT IN AN ORGANIZED HEALTH CARE EDUCATION/TRAINING PROGRAM

## 2024-02-16 PROCEDURE — 94760 N-INVAS EAR/PLS OXIMETRY 1: CPT

## 2024-02-16 PROCEDURE — 85306 CLOT INHIBIT PROT S FREE: CPT | Performed by: STUDENT IN AN ORGANIZED HEALTH CARE EDUCATION/TRAINING PROGRAM

## 2024-02-16 PROCEDURE — 25810000003 LACTATED RINGERS PER 1000 ML: Performed by: NURSE ANESTHETIST, CERTIFIED REGISTERED

## 2024-02-16 PROCEDURE — 61645 PERQ ART M-THROMBECT &/NFS: CPT

## 2024-02-16 PROCEDURE — 0 IODIXANOL PER 1 ML: Performed by: EMERGENCY MEDICINE

## 2024-02-16 PROCEDURE — 93005 ELECTROCARDIOGRAM TRACING: CPT | Performed by: EMERGENCY MEDICINE

## 2024-02-16 PROCEDURE — 86850 RBC ANTIBODY SCREEN: CPT | Performed by: EMERGENCY MEDICINE

## 2024-02-16 PROCEDURE — 85610 PROTHROMBIN TIME: CPT | Performed by: EMERGENCY MEDICINE

## 2024-02-16 PROCEDURE — 0042T HC CT CEREBRAL PERFUSION W/WO CONTRAST: CPT

## 2024-02-16 PROCEDURE — 25010000002 ONDANSETRON PER 1 MG: Performed by: NURSE ANESTHETIST, CERTIFIED REGISTERED

## 2024-02-16 PROCEDURE — 25010000002 HEPARIN (PORCINE) PER 1000 UNITS: Performed by: RADIOLOGY

## 2024-02-16 PROCEDURE — 82948 REAGENT STRIP/BLOOD GLUCOSE: CPT

## 2024-02-16 PROCEDURE — 86038 ANTINUCLEAR ANTIBODIES: CPT | Performed by: STUDENT IN AN ORGANIZED HEALTH CARE EDUCATION/TRAINING PROGRAM

## 2024-02-16 PROCEDURE — 93306 TTE W/DOPPLER COMPLETE: CPT

## 2024-02-16 PROCEDURE — 25010000002 SUGAMMADEX 200 MG/2ML SOLUTION: Performed by: NURSE ANESTHETIST, CERTIFIED REGISTERED

## 2024-02-16 PROCEDURE — 25510000001 IOPAMIDOL PER 1 ML: Performed by: EMERGENCY MEDICINE

## 2024-02-16 PROCEDURE — 70450 CT HEAD/BRAIN W/O DYE: CPT

## 2024-02-16 PROCEDURE — 86148 ANTI-PHOSPHOLIPID ANTIBODY: CPT | Performed by: STUDENT IN AN ORGANIZED HEALTH CARE EDUCATION/TRAINING PROGRAM

## 2024-02-16 PROCEDURE — 85705 THROMBOPLASTIN INHIBITION: CPT | Performed by: STUDENT IN AN ORGANIZED HEALTH CARE EDUCATION/TRAINING PROGRAM

## 2024-02-16 PROCEDURE — C1769 GUIDE WIRE: HCPCS

## 2024-02-16 PROCEDURE — 85732 THROMBOPLASTIN TIME PARTIAL: CPT | Performed by: STUDENT IN AN ORGANIZED HEALTH CARE EDUCATION/TRAINING PROGRAM

## 2024-02-16 PROCEDURE — B3171ZZ FLUOROSCOPY OF LEFT INTERNAL CAROTID ARTERY USING LOW OSMOLAR CONTRAST: ICD-10-PCS | Performed by: RADIOLOGY

## 2024-02-16 PROCEDURE — 71045 X-RAY EXAM CHEST 1 VIEW: CPT

## 2024-02-16 PROCEDURE — 81241 F5 GENE: CPT | Performed by: STUDENT IN AN ORGANIZED HEALTH CARE EDUCATION/TRAINING PROGRAM

## 2024-02-16 PROCEDURE — 85300 ANTITHROMBIN III ACTIVITY: CPT | Performed by: STUDENT IN AN ORGANIZED HEALTH CARE EDUCATION/TRAINING PROGRAM

## 2024-02-16 PROCEDURE — 93306 TTE W/DOPPLER COMPLETE: CPT | Performed by: INTERNAL MEDICINE

## 2024-02-16 PROCEDURE — 94799 UNLISTED PULMONARY SVC/PX: CPT

## 2024-02-16 PROCEDURE — 85305 CLOT INHIBIT PROT S TOTAL: CPT | Performed by: STUDENT IN AN ORGANIZED HEALTH CARE EDUCATION/TRAINING PROGRAM

## 2024-02-16 PROCEDURE — 93010 ELECTROCARDIOGRAM REPORT: CPT | Performed by: INTERNAL MEDICINE

## 2024-02-16 PROCEDURE — 25010000002 GLYCOPYRROLATE 1 MG/5ML SOLUTION: Performed by: NURSE ANESTHETIST, CERTIFIED REGISTERED

## 2024-02-16 PROCEDURE — 86901 BLOOD TYPING SEROLOGIC RH(D): CPT | Performed by: EMERGENCY MEDICINE

## 2024-02-16 PROCEDURE — 70551 MRI BRAIN STEM W/O DYE: CPT

## 2024-02-16 PROCEDURE — 70496 CT ANGIOGRAPHY HEAD: CPT

## 2024-02-16 PROCEDURE — C1887 CATHETER, GUIDING: HCPCS

## 2024-02-16 PROCEDURE — 86146 BETA-2 GLYCOPROTEIN ANTIBODY: CPT | Performed by: STUDENT IN AN ORGANIZED HEALTH CARE EDUCATION/TRAINING PROGRAM

## 2024-02-16 PROCEDURE — 85613 RUSSELL VIPER VENOM DILUTED: CPT | Performed by: STUDENT IN AN ORGANIZED HEALTH CARE EDUCATION/TRAINING PROGRAM

## 2024-02-16 PROCEDURE — 92610 EVALUATE SWALLOWING FUNCTION: CPT

## 2024-02-16 PROCEDURE — 99223 1ST HOSP IP/OBS HIGH 75: CPT | Performed by: STUDENT IN AN ORGANIZED HEALTH CARE EDUCATION/TRAINING PROGRAM

## 2024-02-16 PROCEDURE — 25010000002 PHENYLEPHRINE 10 MG/ML SOLUTION 5 ML VIAL: Performed by: NURSE ANESTHETIST, CERTIFIED REGISTERED

## 2024-02-16 PROCEDURE — C1894 INTRO/SHEATH, NON-LASER: HCPCS

## 2024-02-16 PROCEDURE — 86900 BLOOD TYPING SEROLOGIC ABO: CPT | Performed by: EMERGENCY MEDICINE

## 2024-02-16 PROCEDURE — 03CG3ZZ EXTIRPATION OF MATTER FROM INTRACRANIAL ARTERY, PERCUTANEOUS APPROACH: ICD-10-PCS | Performed by: RADIOLOGY

## 2024-02-16 PROCEDURE — 25010000002 PROPOFOL 200 MG/20ML EMULSION: Performed by: NURSE ANESTHETIST, CERTIFIED REGISTERED

## 2024-02-16 PROCEDURE — 81240 F2 GENE: CPT | Performed by: STUDENT IN AN ORGANIZED HEALTH CARE EDUCATION/TRAINING PROGRAM

## 2024-02-16 PROCEDURE — 94761 N-INVAS EAR/PLS OXIMETRY MLT: CPT

## 2024-02-16 PROCEDURE — 80053 COMPREHEN METABOLIC PANEL: CPT | Performed by: EMERGENCY MEDICINE

## 2024-02-16 PROCEDURE — 25810000003 SODIUM CHLORIDE 0.9 % SOLUTION 250 ML FLEX CONT: Performed by: NURSE ANESTHETIST, CERTIFIED REGISTERED

## 2024-02-16 PROCEDURE — 61645 PERQ ART M-THROMBECT &/NFS: CPT | Performed by: RADIOLOGY

## 2024-02-16 PROCEDURE — 25010000002 PHENYLEPHRINE 10 MG/ML SOLUTION: Performed by: NURSE ANESTHETIST, CERTIFIED REGISTERED

## 2024-02-16 PROCEDURE — 99291 CRITICAL CARE FIRST HOUR: CPT

## 2024-02-16 PROCEDURE — C1760 CLOSURE DEV, VASC: HCPCS

## 2024-02-16 PROCEDURE — 85025 COMPLETE CBC W/AUTO DIFF WBC: CPT | Performed by: EMERGENCY MEDICINE

## 2024-02-16 PROCEDURE — 85302 CLOT INHIBIT PROT C ANTIGEN: CPT | Performed by: STUDENT IN AN ORGANIZED HEALTH CARE EDUCATION/TRAINING PROGRAM

## 2024-02-16 PROCEDURE — 70498 CT ANGIOGRAPHY NECK: CPT

## 2024-02-16 PROCEDURE — 85730 THROMBOPLASTIN TIME PARTIAL: CPT | Performed by: EMERGENCY MEDICINE

## 2024-02-16 PROCEDURE — C1757 CATH, THROMBECTOMY/EMBOLECT: HCPCS

## 2024-02-16 PROCEDURE — 25510000001 PERFLUTREN (DEFINITY) 8.476 MG IN SODIUM CHLORIDE (PF) 0.9 % 10 ML INJECTION: Performed by: STUDENT IN AN ORGANIZED HEALTH CARE EDUCATION/TRAINING PROGRAM

## 2024-02-16 PROCEDURE — 84484 ASSAY OF TROPONIN QUANT: CPT | Performed by: EMERGENCY MEDICINE

## 2024-02-16 PROCEDURE — 86147 CARDIOLIPIN ANTIBODY EA IG: CPT | Performed by: STUDENT IN AN ORGANIZED HEALTH CARE EDUCATION/TRAINING PROGRAM

## 2024-02-16 RX ORDER — SODIUM CHLORIDE 0.9 % (FLUSH) 0.9 %
10 SYRINGE (ML) INJECTION AS NEEDED
Status: DISCONTINUED | OUTPATIENT
Start: 2024-02-16 | End: 2024-02-20 | Stop reason: HOSPADM

## 2024-02-16 RX ORDER — ACETAMINOPHEN 650 MG/1
650 SUPPOSITORY RECTAL EVERY 4 HOURS PRN
Status: DISCONTINUED | OUTPATIENT
Start: 2024-02-16 | End: 2024-02-20 | Stop reason: HOSPADM

## 2024-02-16 RX ORDER — ONDANSETRON 2 MG/ML
4 INJECTION INTRAMUSCULAR; INTRAVENOUS EVERY 6 HOURS PRN
Status: DISCONTINUED | OUTPATIENT
Start: 2024-02-16 | End: 2024-02-20 | Stop reason: HOSPADM

## 2024-02-16 RX ORDER — POLYETHYLENE GLYCOL 3350 17 G/17G
17 POWDER, FOR SOLUTION ORAL DAILY PRN
Status: DISCONTINUED | OUTPATIENT
Start: 2024-02-16 | End: 2024-02-20 | Stop reason: HOSPADM

## 2024-02-16 RX ORDER — SUCCINYLCHOLINE/SOD CL,ISO/PF 200MG/10ML
SYRINGE (ML) INTRAVENOUS AS NEEDED
Status: DISCONTINUED | OUTPATIENT
Start: 2024-02-16 | End: 2024-02-16 | Stop reason: SURG

## 2024-02-16 RX ORDER — IODIXANOL 320 MG/ML
100 INJECTION, SOLUTION INTRAVASCULAR
Status: COMPLETED | OUTPATIENT
Start: 2024-02-16 | End: 2024-02-16

## 2024-02-16 RX ORDER — NITROGLYCERIN 0.4 MG/1
0.4 TABLET SUBLINGUAL
Status: DISCONTINUED | OUTPATIENT
Start: 2024-02-16 | End: 2024-02-20 | Stop reason: HOSPADM

## 2024-02-16 RX ORDER — LABETALOL HYDROCHLORIDE 5 MG/ML
20 INJECTION, SOLUTION INTRAVENOUS
Status: DISCONTINUED | OUTPATIENT
Start: 2024-02-16 | End: 2024-02-20 | Stop reason: HOSPADM

## 2024-02-16 RX ORDER — PROPOFOL 10 MG/ML
INJECTION, EMULSION INTRAVENOUS AS NEEDED
Status: DISCONTINUED | OUTPATIENT
Start: 2024-02-16 | End: 2024-02-16 | Stop reason: SURG

## 2024-02-16 RX ORDER — ATORVASTATIN CALCIUM 80 MG/1
80 TABLET, FILM COATED ORAL NIGHTLY
Status: DISCONTINUED | OUTPATIENT
Start: 2024-02-16 | End: 2024-02-19

## 2024-02-16 RX ORDER — BISACODYL 10 MG
10 SUPPOSITORY, RECTAL RECTAL DAILY PRN
Status: DISCONTINUED | OUTPATIENT
Start: 2024-02-16 | End: 2024-02-20 | Stop reason: HOSPADM

## 2024-02-16 RX ORDER — ASPIRIN 81 MG/1
81 TABLET, CHEWABLE ORAL DAILY
Status: DISCONTINUED | OUTPATIENT
Start: 2024-02-16 | End: 2024-02-20 | Stop reason: HOSPADM

## 2024-02-16 RX ORDER — AMOXICILLIN 250 MG
2 CAPSULE ORAL 2 TIMES DAILY
Status: DISCONTINUED | OUTPATIENT
Start: 2024-02-16 | End: 2024-02-20 | Stop reason: HOSPADM

## 2024-02-16 RX ORDER — SODIUM CHLORIDE, SODIUM LACTATE, POTASSIUM CHLORIDE, CALCIUM CHLORIDE 600; 310; 30; 20 MG/100ML; MG/100ML; MG/100ML; MG/100ML
INJECTION, SOLUTION INTRAVENOUS CONTINUOUS PRN
Status: DISCONTINUED | OUTPATIENT
Start: 2024-02-16 | End: 2024-02-16 | Stop reason: SURG

## 2024-02-16 RX ORDER — ONDANSETRON 2 MG/ML
INJECTION INTRAMUSCULAR; INTRAVENOUS AS NEEDED
Status: DISCONTINUED | OUTPATIENT
Start: 2024-02-16 | End: 2024-02-16 | Stop reason: SURG

## 2024-02-16 RX ORDER — BISACODYL 5 MG/1
5 TABLET, DELAYED RELEASE ORAL DAILY PRN
Status: DISCONTINUED | OUTPATIENT
Start: 2024-02-16 | End: 2024-02-20 | Stop reason: HOSPADM

## 2024-02-16 RX ORDER — SODIUM CHLORIDE 9 MG/ML
40 INJECTION, SOLUTION INTRAVENOUS AS NEEDED
Status: DISCONTINUED | OUTPATIENT
Start: 2024-02-16 | End: 2024-02-20 | Stop reason: HOSPADM

## 2024-02-16 RX ORDER — ROCURONIUM BROMIDE 10 MG/ML
INJECTION, SOLUTION INTRAVENOUS AS NEEDED
Status: DISCONTINUED | OUTPATIENT
Start: 2024-02-16 | End: 2024-02-16 | Stop reason: SURG

## 2024-02-16 RX ORDER — ASPIRIN 300 MG/1
300 SUPPOSITORY RECTAL DAILY
Status: DISCONTINUED | OUTPATIENT
Start: 2024-02-16 | End: 2024-02-16

## 2024-02-16 RX ORDER — GLYCOPYRROLATE 0.2 MG/ML
INJECTION INTRAMUSCULAR; INTRAVENOUS AS NEEDED
Status: DISCONTINUED | OUTPATIENT
Start: 2024-02-16 | End: 2024-02-16 | Stop reason: SURG

## 2024-02-16 RX ORDER — PHENYLEPHRINE HYDROCHLORIDE 10 MG/ML
INJECTION INTRAVENOUS AS NEEDED
Status: DISCONTINUED | OUTPATIENT
Start: 2024-02-16 | End: 2024-02-16 | Stop reason: SURG

## 2024-02-16 RX ORDER — ACETAMINOPHEN 325 MG/1
650 TABLET ORAL EVERY 4 HOURS PRN
Status: DISCONTINUED | OUTPATIENT
Start: 2024-02-16 | End: 2024-02-20 | Stop reason: HOSPADM

## 2024-02-16 RX ORDER — SODIUM CHLORIDE 0.9 % (FLUSH) 0.9 %
10 SYRINGE (ML) INJECTION EVERY 12 HOURS SCHEDULED
Status: DISCONTINUED | OUTPATIENT
Start: 2024-02-16 | End: 2024-02-20 | Stop reason: HOSPADM

## 2024-02-16 RX ORDER — CLOPIDOGREL BISULFATE 75 MG/1
75 TABLET ORAL DAILY
Status: DISCONTINUED | OUTPATIENT
Start: 2024-02-16 | End: 2024-02-16

## 2024-02-16 RX ADMIN — HEPARIN SODIUM: 1000 INJECTION INTRAVENOUS; SUBCUTANEOUS at 06:04

## 2024-02-16 RX ADMIN — PHENYLEPHRINE HYDROCHLORIDE 0.5 MCG/KG/MIN: 10 INJECTION, SOLUTION INTRAVENOUS at 05:53

## 2024-02-16 RX ADMIN — Medication 140 MG: at 05:50

## 2024-02-16 RX ADMIN — DOCUSATE SODIUM 50MG AND SENNOSIDES 8.6MG 2 TABLET: 8.6; 5 TABLET, FILM COATED ORAL at 20:14

## 2024-02-16 RX ADMIN — Medication 10 ML: at 20:15

## 2024-02-16 RX ADMIN — HEPARIN SODIUM: 1000 INJECTION INTRAVENOUS; SUBCUTANEOUS at 06:06

## 2024-02-16 RX ADMIN — IODIXANOL 40 ML: 320 INJECTION, SOLUTION INTRAVASCULAR at 06:00

## 2024-02-16 RX ADMIN — Medication 10 ML: at 09:29

## 2024-02-16 RX ADMIN — ONDANSETRON 4 MG: 2 INJECTION INTRAMUSCULAR; INTRAVENOUS at 06:25

## 2024-02-16 RX ADMIN — PHENYLEPHRINE HYDROCHLORIDE 100 MCG: 50 INJECTION INTRAVENOUS at 05:50

## 2024-02-16 RX ADMIN — PERFLUTREN 2 ML: 6.52 INJECTION, SUSPENSION INTRAVENOUS at 14:08

## 2024-02-16 RX ADMIN — ROCURONIUM BROMIDE 10 MG: 10 INJECTION, SOLUTION INTRAVENOUS at 05:50

## 2024-02-16 RX ADMIN — IOPAMIDOL 150 ML: 755 INJECTION, SOLUTION INTRAVENOUS at 04:41

## 2024-02-16 RX ADMIN — PROPOFOL 200 MG: 10 INJECTION, EMULSION INTRAVENOUS at 05:50

## 2024-02-16 RX ADMIN — ASPIRIN 81 MG: 81 TABLET, CHEWABLE ORAL at 11:38

## 2024-02-16 RX ADMIN — GLYCOPYRROLATE 0.1 MG: 0.2 INJECTION, SOLUTION INTRAMUSCULAR; INTRAVENOUS at 06:02

## 2024-02-16 RX ADMIN — ATORVASTATIN CALCIUM 80 MG: 80 TABLET, FILM COATED ORAL at 20:14

## 2024-02-16 RX ADMIN — ROCURONIUM BROMIDE 40 MG: 10 INJECTION, SOLUTION INTRAVENOUS at 06:05

## 2024-02-16 RX ADMIN — SUGAMMADEX 200 MG: 100 INJECTION, SOLUTION INTRAVENOUS at 06:35

## 2024-02-16 RX ADMIN — HEPARIN SODIUM: 1000 INJECTION INTRAVENOUS; SUBCUTANEOUS at 06:05

## 2024-02-16 RX ADMIN — SODIUM CHLORIDE, POTASSIUM CHLORIDE, SODIUM LACTATE AND CALCIUM CHLORIDE: 600; 310; 30; 20 INJECTION, SOLUTION INTRAVENOUS at 05:43

## 2024-02-16 NOTE — CONSULTS
"Neurology Consult Note    Consult Date: 2/16/2024    Referring MD: No ref. provider found    Reason for Consult I have been asked to see the patient in neurological consultation to render advice and opinion regarding right-sided weakness and neglect    Jhon ORNELAS Alexander is a 58 y.o. male with past medical history of diabetes, systolic heart failure with reduced ejection fraction, hypertension, hyperlipidemia, hypothyroidism, sleep apnea compliant with CPAP, last known normal 2330.  At around 4 AM wife found him going to the restroom and heard a loud noise.  Later on found him to have right-sided neck [place] and right-sided weakness.  By time patient presented to the ER at around 4:30 AM he was out of the window for thrombolysis.  CT head and neck was done which showed left A2 occlusion and was taken for thrombectomy by Dr. Murray, he had Tici 3.    Past Medical History:   Diagnosis Date    Cardiomyopathy     Colon polyp     Cortical senile cataract 04/01/2019    Diabetes mellitus     type 2    Disease of thyroid gland     Hyperlipidemia     Hypertension     Renal calculi     Followed by Dr. Chris Carreon urologist    Renal infarct 07/2020    Left; noted on CT scan-followed by Dr. Chris Carreon urology    Sleep apnea     CPAP nightly       Exam  /86   Pulse 90   Temp 97.8 °F (36.6 °C) (Oral)   Resp 10   Ht 177.8 cm (70\")   Wt 103 kg (227 lb 11.8 oz)   SpO2 96%   BMI 32.68 kg/m²   Gen: NAD, vitals reviewed  MS: oriented x3, normal comprehension repetition and fluency  CN: visual acuity grossly normal, PERRL, EOMI, no facial droop, no dysarthria  Motor: Right lower leg 4 -/5 but rest of the extremities 5/5    DATA:    Lab Results   Component Value Date    GLUCOSE 92 02/16/2024    CALCIUM 8.4 (L) 02/16/2024     02/16/2024    K 4.2 02/16/2024    CO2 19.3 (L) 02/16/2024     (H) 02/16/2024    BUN 15 02/16/2024    CREATININE 1.07 02/16/2024    EGFRIFAFRI 65 01/04/2022    BCR 14.0 02/16/2024    ANIONGAP " 13.7 02/16/2024     Lab Results   Component Value Date    WBC 5.26 02/16/2024    HGB 13.2 02/16/2024    HCT 40.7 02/16/2024    MCV 92.9 02/16/2024     02/16/2024       Lab review:   Sodium 141  Creatinine 1.07  Normal LFTs    A1c 6.1  LDL 42    WBC 5.26  Hemoglobin 13.2 and platelets 183      Imaging review:   CT head right frontal and right parietal lobe hypodensity  CTA head and neck left A2 stenosis  CT perfusion showed a core of 17 normal and a perfusion deficit of 57 mL    Diagnoses:  Left NIRMAL stroke with left M2 occlusion s/p mechanical thrombectomy with TICI 3    Other medical problems  Hypertension  Hyperlipidemia  Type 2 diabetes mellitus  Sleep apnea    Pre-stroke MRS: 0  NIHSS: NIHSS:    Baseline  0-->Alert: keenly responsive  0-->Answers both questions correctly  0-->Performs both tasks correctly  0=normal  0=No visual loss  0=Normal symmetric movement  0-->No drift: limb holds 90 (or 45) degrees for full 10 secs  0-->No drift: limb holds 90 (or 45) degrees for full 10 secs  0-->No drift: limb holds 90 (or 45) degrees for full 10 secs  0-->No drift: limb holds 90 (or 45) degrees for full 10 secs  0=Absent  0=Normal; no sensory loss  0=No aphasia, normal  0=Normal  0=No abnormality    Total score: 0        1.Left NIRMAL stroke with left M2 occlusion s/p mechanical thrombectomy with TICI 3  Etiology of the stroke most likely embolic stroke from undetermined source  Systolic blood pressure goal less than 150  Start aspirin 81 mg daily and Plavix 75 mg daily for 21 days(After MRI)  followed by aspirin 325 daily  Continue Lipitor 80 mg daily  Patient has several chronic strokes right frontal right parietal lobe, he has mild atheroright ICA but no significant intracranial atherosclerosis, I suspect strongly that this might be cardioembolic  He will need transthoracic echo today but likely proceed to HEDY too  We will order hypercoag panel including APLS labs  Possibly need Zio patch on discharge  Monitor  for 24 hours and isolated tomorrow morning can be transferred to [facility] for further workup.  Anticipate discharge over the weekend    Spoke to wife and answered all her questions      MDM   Reviewed: Previous charts, nursing notes and vitals   Reviewed: Previous labs and CT scan    Interpretation: Labs and CT scan   Total time providing critical care is :30-74 minutes. This excluded time spent performing separately reportable procedures and services  Consults :Neurology/Stroke    Please note that portions of this note were completed with a voice recognition program.     Andreas Crowley MD  Neuro Hospitalist /Vascular Neurology.

## 2024-02-16 NOTE — THERAPY EVALUATION
Acute Care - Speech Language Pathology   Swallow Initial Evaluation [facility]     Patient Name: Jhon Munoz  : 1965  MRN: [id number]  Today's Date: 2024               Admit Date: 2024    Visit Dx:     ICD-10-CM ICD-9-CM   1. Acute embolic stroke  I63.9 434.11     Patient Active Problem List   Diagnosis    Essential hypertension    Mixed hyperlipidemia    Low libido    Nonischemic cardiomyopathy    Type 2 diabetes mellitus without complication, without long-term current use of insulin    Renal infarct    Renal calculi    Clinical diagnosis of COVID-19    Diabetes mellitus without complication    Colon polyps    Stage 3a chronic kidney disease    Syncope and collapse    Ischemic stroke     Past Medical History:   Diagnosis Date    Cardiomyopathy     Colon polyp     Cortical senile cataract 2019    Diabetes mellitus     type 2    Disease of thyroid gland     Hyperlipidemia     Hypertension     Renal calculi     Followed by Dr. Chris Carreon urologist    Renal infarct 2020    Left; noted on CT scan-followed by Dr. Chris Carreon urology    Sleep apnea     CPAP nightly     Past Surgical History:   Procedure Laterality Date    CARDIAC CATHETERIZATION N/A 2020    Procedure: Coronary angiography;  Surgeon: Robert Deluna MD;  Location: [facility] CATH INVASIVE LOCATION;  Service: Cardiovascular;  Laterality: N/A;    CARDIAC CATHETERIZATION N/A 2020    Procedure: Left Heart Cath;  Surgeon: Robert Deluna MD;  Location: [facility] CATH INVASIVE LOCATION;  Service: Cardiovascular;  Laterality: N/A;    COLONOSCOPY      COLONOSCOPY N/A 10/18/2022    Procedure: COLONOSCOPY TO CECUM WITH COLD BIOPSY POLYPECTOMY;  Surgeon: Robert Evans MD;  Location: [facility] ENDOSCOPY;  Service: Gastroenterology;  Laterality: N/A;  PRE: HX COLON POLYPS  POST: POLYP, DIVERTICULOSIS, HEMORRHOIDS    KNEE SURGERY      meniscus        SLP Recommendation and Plan  SLP Swallowing Diagnosis: swallow WFL/no  suspected pharyngeal impairment (02/16/24 1100)  SLP Diet Recommendation: regular textures, thin liquids (02/16/24 1100)  Recommended Precautions and Strategies: upright posture during/after eating, small bites of food and sips of liquid, general aspiration precautions (02/16/24 1100)  SLP Rec. for Method of Medication Administration: meds whole, with thin liquids (02/16/24 1100)     Monitor for Signs of Aspiration: yes, notify SLP if any concerns (02/16/24 1100)     Swallow Criteria for Skilled Therapeutic Interventions Met: demonstrates skilled criteria (02/16/24 1100)  Anticipated Discharge Disposition (SLP): unknown (02/16/24 1100)  Rehab Potential/Prognosis, Swallowing: good, to achieve stated therapy goals (02/16/24 1100)  Therapy Frequency (Swallow): evaluation only (02/16/24 1100)  Predicted Duration Therapy Intervention (Days): until discharge (02/16/24 1100)  Oral Care Recommendations: Oral Care BID/PRN (02/16/24 1100)  Demonstrates Need for Referral to Another Service: other (see comments) (denies need for speech/language/cog) (02/16/24 1100)                                     Plan of Care Reviewed With: patient  Outcome Evaluation: Clinical swallow eval completed. Recommend regular textures and thin liquids, meds whole with thin/puree. Recommend upright, slow rate, general aspiration precautions. Patient reports baseline, denies need for speech/language/cognitive evals. SLP to s/o, please re-consult was warranted.      SWALLOW EVALUATION (last 72 hours)       SLP Adult Swallow Evaluation       Row Name 02/16/24 1100                   Rehab Evaluation    Document Type evaluation  -OC        Subjective Information no complaints  -OC        Patient Observations alert;agree to therapy;cooperative  -OC        Patient Effort good  -OC        Symptoms Noted During/After Treatment none  -OC           General Information    Patient Profile Reviewed yes  -OC        Pertinent History Of Current Problem Patient  admitted s/p L NIRMAL CVA M2 occlusion mechanical thrombecty.  -OC        Current Method of Nutrition NPO  -OC        Precautions/Limitations, Vision WFL with corrective lenses  -OC        Precautions/Limitations, Hearing WFL  -OC        Prior Level of Function-Communication WFL  -OC        Prior Level of Function-Swallowing no diet consistency restrictions  -OC        Plans/Goals Discussed with patient;agreed upon  -OC        Barriers to Rehab none identified  -OC           Pain    Additional Documentation Pain Scale: Numbers Pre/Post-Treatment (Group)  -OC           Pain Scale: Numbers Pre/Post-Treatment    Pretreatment Pain Rating 0/10 - no pain  -OC        Posttreatment Pain Rating 0/10 - no pain  -OC           Oral Motor Structure and Function    Dentition Assessment natural, present and adequate  -OC        Secretion Management WNL/WFL  -OC        Volitional Swallow WFL  -OC           Oral Musculature and Cranial Nerve Assessment    Oral Motor General Assessment WFL  -OC           Clinical Swallow Eval    Clinical Swallow Evaluation Summary Patient presents with functional swallow. No overt s/s aspiration with ice chips, thin via cup/straw, puree, mechanical soft, and regular textures. Clear vocal quality s/p all trials. adequate self feeding.  -OC           SLP Evaluation Clinical Impression    SLP Swallowing Diagnosis swallow WFL/no suspected pharyngeal impairment  -OC        Functional Impact no impact on function  -OC        Rehab Potential/Prognosis, Swallowing good, to achieve stated therapy goals  -OC        Swallow Criteria for Skilled Therapeutic Interventions Met demonstrates skilled criteria  -OC           Recommendations    Therapy Frequency (Swallow) evaluation only  -OC        Predicted Duration Therapy Intervention (Days) until discharge  -OC        SLP Diet Recommendation regular textures;thin liquids  -OC        Recommended Precautions and Strategies upright posture during/after eating;small  bites of food and sips of liquid;general aspiration precautions  -OC        Oral Care Recommendations Oral Care BID/PRN  -OC        SLP Rec. for Method of Medication Administration meds whole;with thin liquids  -OC        Monitor for Signs of Aspiration yes;notify SLP if any concerns  -OC        Anticipated Discharge Disposition (SLP) unknown  -OC        Demonstrates Need for Referral to Another Service other (see comments)  denies need for speech/language/cog  -OC                  User Key  (r) = Recorded By, (t) = Taken By, (c) = Cosigned By      Initials Name Effective Dates    Ana Lilia Hector SLP 08/28/23 -                     EDUCATION  The patient has been educated in the following areas:   Dysphagia (Swallowing Impairment).              Time Calculation:    Time Calculation- SLP       Row Name 02/16/24 1431             Time Calculation- SLP    SLP Start Time 1100  -OC      SLP Received On 02/16/24  -OC         Untimed Charges    SLP Eval/Re-eval  ST Eval Oral Pharyng Swallow - [id number]  -OC      [id number] Eval Oral Pharyng Swallow Minutes 60  -OC         Total Minutes    Untimed Charges Total Minutes 60  -OC       Total Minutes 60  -OC                User Key  (r) = Recorded By, (t) = Taken By, (c) = Cosigned By      Initials Name Provider Type    Ana Lilia Hector SLP Speech and Language Pathologist                    Therapy Charges for Today       Code Description Service Date Service Provider Modifiers Qty    [id number] HC ST EVAL ORAL PHARYNG SWALLOW 4 2/16/2024 Ana Lilia Torres SLP GN 1                 LAURENT Monteiro  2/16/2024

## 2024-02-16 NOTE — H&P
Group: [facility]        HISTORY AND PHYSICAL    Patient Identification:  Jhon Munoz  58 y.o.  male  1965  [id number]            CC: Right sided neglect    History of Present Illness:  Jhon Munoz is a 58 year old male with a past medical history of nonischemic cardiomyopathy, type 2 diabetes mellitus, hypertension, hyperlipidemia, and sleep apnea, compliant with CPAP.    Patient notes the emergency department on 2/16/2024 from home with strokelike symptoms.  According to EMS reports, patient was last seen normal at 2330, patient got up to use the restroom around 4 AM.  Patient's significant other heard him go to the bathroom and heard some commotion, upon further investigation patient was found sitting on the floor and he was minimally responsive with some right-sided neglect.  Patient's baseline is ambulatory and fully functional.  Team D imaging was initiated upon arrival to the emergency department.  Patient is out of the window for thrombolytics.    NIHSS 20 on arrival.    ED evaluation revealed high-grade stenosis of the distal left A2 segment with diminished enhancement of the distal vessels concerning for acute thrombolytic disease.    Interventional neurology was called and patient was taken to IR for mechanical thrombectomy.    Review of System:  CONSTITUTIONAL:  Denies fevers or chills  EYE:  No new vision changes  EAR:  No change in hearing  CARDIAC:  No chest pain  PULMONARY:  No productive cough or shortness of breath  GI:  No diarrhea, hematemesis or hematochezia,  RENAL:  No dysuria or urinary frequency  MUSCULOSKELETAL:  No musculoskeletal complaints  ENDOCRINE:  No heat or cold intolerance  INTEGUMENTARY: No skin rashes  NEUROLOGICAL:  No dizziness or confusion.  No seizure activity  PSYCHIATRIC:  No new anxiety or depression  12 system review of systems performed and all else negative     Past Medical History:  Past Medical History:   Diagnosis Date    Cardiomyopathy      "Colon polyp     Cortical senile cataract 4/1/2019    Diabetes mellitus     type 2    Hyperlipidemia     Hypertension     Renal calculi     Followed by Dr. Chris Carreon urologist    Renal infarct 07/2020    Left; noted on CT scan-followed by Dr. Chris Carreon urology    Sleep apnea     CPAP nightly       Past Surgical History:  Past Surgical History:   Procedure Laterality Date    CARDIAC CATHETERIZATION N/A 07/24/2020    Procedure: Coronary angiography;  Surgeon: Robert Deluna MD;  Location:  [facility] CATH INVASIVE LOCATION;  Service: Cardiovascular;  Laterality: N/A;    CARDIAC CATHETERIZATION N/A 07/24/2020    Procedure: Left Heart Cath;  Surgeon: Robert Deluna MD;  Location:  [facility] CATH INVASIVE LOCATION;  Service: Cardiovascular;  Laterality: N/A;    COLONOSCOPY  2020    COLONOSCOPY N/A 10/18/2022    Procedure: COLONOSCOPY TO CECUM WITH COLD BIOPSY POLYPECTOMY;  Surgeon: Robert Evans MD;  Location:  [facility] ENDOSCOPY;  Service: Gastroenterology;  Laterality: N/A;  PRE: HX COLON POLYPS  POST: POLYP, DIVERTICULOSIS, HEMORRHOIDS    KNEE SURGERY      meniscus         Home Meds:  (Not in a hospital admission)      Allergies:  No Known Allergies    Social History:   Social History     Socioeconomic History    Marital status:      Spouse name: Jyoti   Tobacco Use    Smoking status: Never     Passive exposure: Never    Smokeless tobacco: Never   Vaping Use    Vaping Use: Never used   Substance and Sexual Activity    Alcohol use: Not Currently     Types: 1 Cans of beer, 1 Shots of liquor per week     Comment: Caffeine use: 1 cup daily    Drug use: Never    Sexual activity: Defer       Family History:  Family History   Problem Relation Age of Onset    Hypertension Mother        Physical Exam:  /82   Pulse 79   Temp 97.4 °F (36.3 °C) (Tympanic)   Resp 18   Ht 170.2 cm (67\")   Wt 103 kg (227 lb 11.8 oz)   SpO2 100%   BMI 35.67 kg/m²  Body mass index is 35.67 kg/m². 100% 103 kg (227 lb 11.8 " oz)    Constitutional: Middle aged pt in bed, No acute respiratory distress, no accessory muscle use  Head: - NCAT  Eyes: No pallor, Anicteric conjunctiva, EOMI.  ENMT:  Mallampati 3, no oral thrush. Dry MM.   NECK: Trachea midline, No thyromegaly, no palpable cervical lymphadenopathy  Heart: RRR, no murmur. No pedal edema   Lungs: ANGIE +, No wheezes/ crackles heard    Abdomen: Soft. No tenderness, guarding or rigidity. No palpable masses  Extremities: Extremities warm and well perfused. No cyanosis/ clubbing  Neuro: Conscious, answers appropriately, strength 5 out of 5 in bilateral upper and lower extremities, denies sensory deficits, no evidence of ataxia, patient is alert to person, time, place, slightly disoriented about situation  Psych: Mood and affect appropriate    PPE recommended per [facility] infectious disease Isolation protocol for the current clinical scenario(as mentioned below) was followed.      LABS:  COVID19   Date Value Ref Range Status   07/22/2020 Not Detected Not Detected - Ref. Range Final       Lab Results   Component Value Date    CALCIUM 8.4 (L) 02/16/2024     Results from last 7 days   Lab Units 02/16/24  0432   SODIUM mmol/L 141   POTASSIUM mmol/L 4.2   CHLORIDE mmol/L 108*   CO2 mmol/L 19.3*   BUN mg/dL 15   CREATININE mg/dL 1.07   GLUCOSE mg/dL 92   CALCIUM mg/dL 8.4*   WBC 10*3/mm3 5.26   HEMOGLOBIN g/dL 13.2   PLATELETS 10*3/mm3 183   ALT (SGPT) U/L 18   AST (SGOT) U/L 23     Lab Results   Component Value Date    TROPONINT 13 02/16/2024     Results from last 7 days   Lab Units 02/16/24  0432   HSTROP T ng/L 13                     Results from last 7 days   Lab Units 02/16/24  0432   INR  1.03         Lab Results   Component Value Date    TSH 1.890 12/12/2023     Estimated Creatinine Clearance: 84 mL/min (by C-G formula based on SCr of 1.07 mg/dL).         Imaging: I personally visualized the images of scans/x-rays performed within last 3 days.      Assessment:  Acute  stroke  Right-sided neglect  Nonischemic cardiomyopathy, LVEF 44.1%  Type 2 diabetes mellitus  Hypertension  Hyperlipidemia  Hypothyroidism  Sleep apnea, compliant with CPAP    Recommendations:  Acute stroke  Right-sided neglect  Status post mechanical thrombectomy (2/16/2024)  Patient right-sided weakness/neglect has almost completely resolved.  Strength 5 out of 5 in bilateral upper and lower extremities, no ataxia, denies sensory deficits.  Patient outside of the window for thrombolytic therapy.  Aspirin/atorvastatin per ischemic protocol.  Neurology consulted.  PT/OT/SLP consulted.  Goal SBP less than 140-as needed labetalol injection and nicardipine available.  Repeat CT head ordered 2/16/2024 at 1800.    Nonischemic cardiomyopathy, LVEF 44.1%  Denies chest pain.  EKG performed showing sinus rhythm, no evidence of ischemia.  Home medication: Entresto, carvedilol-currently on hold given NPO status.    Type 2 diabetes mellitus  Home medication: Semaglutide-currently on hold.  Glucose has been within normal limits between , monitor.    Hypertension  Home medication: Carvedilol, spironolactone-hold.    Hyperlipidemia  Home medication: Rosuvastatin, modified to atorvastatin.  Home    Hypothyroidism  Medication: Levothyroxine, restart when able.    Sleep apnea, compliant with CPAP  Okay to use home CPAP if available.    Full code  N.p.o.  SCD    Patient was placed in face mask upon entering room and kept mask on throughout our encounter. I wore full protective equipment throughout this patient encounter including a face mask, gown and gloves. Hand hygiene was performed before donning protective equipment and after removal when leaving the room.    Amarilis Avery, APRN  2/16/2024  06:11 EST      Much of this encounter note is an electronic transcription/translation of spoken language to printed text using Dragon Software.

## 2024-02-16 NOTE — SIGNIFICANT NOTE
02/16/24 1000   OTHER   Discipline occupational therapist   Rehab Time/Intention   Session Not Performed other (see comments)  (RN reports thrombectomy completed in AM, defer eval until next day. OT to f/u)   Recommendation   OT - Next Appointment 02/17/24

## 2024-02-16 NOTE — CASE MANAGEMENT/SOCIAL WORK
Discharge Planning Assessment  [facility]     Patient Name: Jhon Munoz  MRN: [id number]  Today's Date: 2/16/2024    Admit Date: 2/16/2024    Plan: Home with [facility] (pending acceptance)   Discharge Needs Assessment       Row Name 02/16/24 1557       Living Environment    People in Home child(santiago), adult;spouse    Name(s) of People in Home wife: Jyoti and disabled adult son    Current Living Arrangements home    Potentially Unsafe Housing Conditions none    Primary Care Provided by self    Provides Primary Care For child(santiago)    Caregiving Concerns disabled adult son    Family Caregiver if Needed spouse    Family Caregiver Names wife: Jyoti    Quality of Family Relationships supportive       Resource/Environmental Concerns    Resource/Environmental Concerns none    Transportation Concerns none       Transition Planning    Patient/Family Anticipates Transition to home with family    Patient/Family Anticipated Services at Transition home health care    Transportation Anticipated family or friend will provide       Discharge Needs Assessment    Readmission Within the Last 30 Days no previous admission in last 30 days    Equipment Currently Used at Home grab bar    Concerns to be Addressed adjustment to diagnosis/illness;basic needs;discharge planning    Anticipated Changes Related to Illness other (see comments)  unknown at this time, PT eval pending    Equipment Needed After Discharge other (see comments)  unknown at this time, PT eval pending    Outpatient/Agency/Support Group Needs homecare agency                   Discharge Plan       Row Name 02/16/24 1611       Plan    Plan Home with [facility] (pending acceptance)      Plan Comments After speaking with Haylie with [facility] and can not accept due to out of network, I updated pt's wife, I advised I could make a referral to all home health agencies to see who is in network, she prefered selected [facility], I spoke with  "Zulema with [facility], she transferred me [facility].  Dede Goodwin RN      Row Name 02/16/24 2269       Plan    Plan Comments I spoke with pt, his friend Lars Brand was at bedside and pt said okay to speak with Lars present.  Pt confirmed the address, PCP and [facility] [place] are correct. Pt said he, his wife Jyoti and their disabled son Jhon Blevins (had CVA) live in a single level home with 3 steps to enter, there is a rail at the steps, pt said he has grab bars in the bathroom and no other DME, he said his wife will provide his transportation.  Pt said he has never had home health and would want his wife to select.  I spoke with pt's wife Jyoti by phone and she selected [facility] \"since [facility] does not have home health\", she confirmed she will provide pt with transportation at discharge.   Dede Goodwin RN      Row Name 02/16/24 1929       Plan    Plan Home with [facility] (pending acceptance) ~ declined OON    Patient/Family in Agreement with Plan yes  I spoke with pt's wife Jyoti by phone    Provided Post Acute Provider List? Yes    Post Acute Provider List Nursing Home;Home Health    Delivered To Patient    Method of Delivery In person                  Continued Care and Services - Admitted Since 2/16/2024       Home Medical Care       Service Provider Request Status Selected Services Address Phone Fax Patient Preferred    [facility] Pending - No Request Sent N/A [address] [id number] [phone] --    [facility] Declined  Out of network N/A [address] [id number] [phone] --                     Demographic Summary       Row Name 02/16/24 1551       General Information    Admission Type inpatient    Arrived From home    Referral Source admission list    Reason for Consult discharge planning "    Preferred Language English       Contact Information    Permission Granted to Share Info With family/designee                   Functional Status       Row Name 02/16/24 1556       Functional Status, IADL    Medications independent    Meal Preparation independent    Housekeeping independent    Laundry independent    Shopping independent    IADL Comments lives with wife Mary who assist if needed, he and his wife are care giver for adult son who had a CVA                  Dede Goodwin RN

## 2024-02-16 NOTE — BRIEF OP NOTE
Progress Note    Jhon B Alexander      Pre-op Diagnosis:   Acute Left NIRMAL Stroke       Post-Op Diagnosis Codes:  Same    Procedure/CPT® Codes:    Cerebral Mechanical Thrombectomy    Anesthesia: General ETT Anesthesia    Staff:   Radiology Nurse: Elvira Downing RN  Radiologist: Andrew Murray MD  Vascular Radiology Technician: Barbara Quinn         Estimated Blood Loss: 100ml    Urine Voided: * No values recorded between 2/16/2024 12:00 AM and 2/16/2024  6:29 AM *    Specimens:                None          Drains: * No LDAs found *    Findings: Occluded left A2 segment with two suction passes and TICI 3 flow. No underlying stenosis. Official report to follow.      Complications: None encountered          Andrew Murray MD     Date: 2/16/2024  Time: 06:29 EST

## 2024-02-16 NOTE — PLAN OF CARE
Goal Outcome Evaluation:  Plan of Care Reviewed With: patient           Outcome Evaluation: Clinical swallow eval completed. Recommend regular textures and thin liquids, meds whole with thin/puree. Recommend upright, slow rate, general aspiration precautions. Patient reports baseline, denies need for speech/language/cognitive evals. SLP to s/o, please re-consult was warranted.      Anticipated Discharge Disposition (SLP): unknown          SLP Swallowing Diagnosis: swallow WFL/no suspected pharyngeal impairment (02/16/24 1100)

## 2024-02-16 NOTE — PROGRESS NOTES
Received request for stroke screening per [facility] Stroke Order set.  Rehab Adm Nurses will review periodically to see if full acute Rehab evaluation is appropriate.  If acute care staff feel patient is appropriate for full acute Rehab evaluation now--please call referral to [phone].   Thank you--Fany Cohen,  Rehab Adm. Coordinator

## 2024-02-16 NOTE — NURSING NOTE
Report called previously to Salima KING. Patient transferred via bed to ICU bed 389 by this RN and CRNA on cardiac and SpO2 monitors. VSS, patient arousable but still unable to speak, moving right hand slightly but unable to grasp. Right groin puncture site soft, dressing clean, dry and intact. Bilateral pedal pulses 2+ palpable. Upon arriving to ICU patient was able to say his name and birthdate correctly, moving all extremities, sensation intact to all extremities. Spoke with ER charge nurse who stated no family had arrived yet. Patient belongings taken to patient room and handed off to RN.

## 2024-02-16 NOTE — NURSING NOTE
Pre-procedure NIH: 20  Decision time: 0459  In room: 0544  Procedure start: 0556  Arterial access puncture time: 0556  Guide catheter placement time: 0602  First thrombectomy device placement time (first pass): 0610  Subsequent device placement time: 2nd- 0614  Time of recanalization: 0614  Final TICI Flow: 3  Procedure end time: 0624    **These values were verbally verified with physician performing procedure.**

## 2024-02-16 NOTE — DISCHARGE PLACEMENT REQUEST
"Jhon Perez (58 y.o. Male)       Date of Birth   1965    Social Security Number       Address   [address] [place] [address]    Home Phone   [phone]    MRN   [id number]       [facility]    Marital Status                               Admission Date   2/16/24    Admission Type   Emergency    Admitting Provider   Erickson Suárez MD    Attending Provider   Erickson Suárez MD    Department, Room/Bed   [facility] INTENSIVE CARE, I389/1       Discharge Date       Discharge Disposition       Discharge Destination                                 Attending Provider: Erickson Suárez MD    Allergies: No Known Allergies    Isolation: None   Infection: None   Code Status: CPR    Ht: 177.8 cm (70\")   Wt: 108 kg (238 lb)    Admission Cmt: None   Principal Problem: Ischemic stroke [I63.9]                   Active Insurance as of 2/16/2024       Primary Coverage       Payor Plan Insurance Group Employer/Plan Group    [employer]CIAL A[employer] [id number]       Payor Plan Address Payor Plan Phone Number Payor Plan Fax Number Effective Dates    [address] [phone]  1/1/2019 - None Entered    [facility] [address]         Subscriber Name Subscriber Birth Date Member ID       JHON PEREZ 1965 [id number]                     Emergency Contacts        (Rel.) Home Phone Work Phone Mobile Phone    AlexanderJyoti (Spouse) [phone] -- [phone]                "

## 2024-02-16 NOTE — ED TRIAGE NOTES
Pt to ed via ems,   Pt LKN was approx 2330 when pt was seen by wife last. Prior to arrival wife found pt non responsive on bathroom floor after she believed she heard him fall. Pt non responsive to questions and commands and demonstrating neglect  of right side per ems.

## 2024-02-16 NOTE — CONSULTS
Nutrition Services    Patient Name:  Jhon Munoz  YOB: 1965  MRN: [id number]  Admit Date:  2/16/2024    Assessment Date:  02/16/24    Summary: KARLOS    Pt is a 58 y.o. male adm for ischemic stroke. H/o nonischemic cardiomyopathy, HTN, HLD, T2DM, stage 3a CKD, sleep apnea requiring CPAP. Nutrition assessment completed. S/P cerebral mechanical thrombectomy today. Possible transfer to  after monitoring the next 24 hours and anticipated discharge over the weekend per neurology MD note. Advanced to cardiac HH and consistent CHO diet. RN endorsed pt wife has noticed her  has had decreased appetite. Pt endorses his appetite is fine. Of note, he is on Ozempic. 5 lb recent wt loss noted per wt hx. Agreeable to Boost GC once daily with dinner.   Labs: Cl 108  Meds: cardene (not given), pericolace (not given), aspirin, lipitor     REC:  Boost GC chocolate once daily with dinner  Will continue to encourage and monitor PO intake     RD to follow per protocol.    CLINICAL NUTRITION ASSESSMENT      Reason for Assessment Nurse Admission Screen     Diagnosis/Problem   Ischemic stroke    Medical/Surgical History Past Medical History:   Diagnosis Date    Cardiomyopathy     Colon polyp     Cortical senile cataract 04/01/2019    Diabetes mellitus     type 2    Disease of thyroid gland     Hyperlipidemia     Hypertension     Renal calculi     Followed by Dr. Chris Carreon urologist    Renal infarct 07/2020    Left; noted on CT scan-followed by Dr. Chris Carreon urology    Sleep apnea     CPAP nightly       Past Surgical History:   Procedure Laterality Date    CARDIAC CATHETERIZATION N/A 07/24/2020    Procedure: Coronary angiography;  Surgeon: Robert Deluna MD;  Location:  [facility] CATH INVASIVE LOCATION;  Service: Cardiovascular;  Laterality: N/A;    CARDIAC CATHETERIZATION N/A 07/24/2020    Procedure: Left Heart Cath;  Surgeon: Robert Deluna MD;  Location:  [facility] CATH [facility]VASIVE LOCATION;  Service:  "Cardiovascular;  Laterality: N/A;    COLONOSCOPY  2020    COLONOSCOPY N/A 10/18/2022    Procedure: COLONOSCOPY TO CECUM WITH COLD BIOPSY POLYPECTOMY;  Surgeon: Robert Evans MD;  Location: [facility] ENDOSCOPY;  Service: Gastroenterology;  Laterality: N/A;  PRE: HX COLON POLYPS  POST: POLYP, DIVERTICULOSIS, HEMORRHOIDS    KNEE SURGERY      meniscus         Anthropometrics        Current Height  Current Weight  BMI kg/m2 Height: 177.8 cm (70\")  Weight: 108 kg (238 lb 15.7 oz) (02/16/24 1051)  Body mass index is 34.29 kg/m².   Adjusted BMI (if applicable)    BMI Category Obese, Class I (30 - 34.9)   Ideal Body Weight (IBW) 73 kg    Usual Body Weight (UBW) See wt hx below    Weight Trend Loss ~5 lb in past 2 mo - pt on Ozempic    Weight History Wt Readings from Last 30 Encounters:   02/16/24 1051 108 kg (238 lb 15.7 oz)   02/16/24 0656 103 kg (227 lb 11.8 oz)   02/16/24 0507 103 kg (227 lb 11.8 oz)   02/16/24 0427 103 kg (227 lb 11.8 oz)   01/31/24 1006 103 kg (227 lb)   12/12/23 0938 105 kg (231 lb)   11/01/23 0859 107 kg (235 lb)   09/19/23 1119 106 kg (233 lb)   06/19/23 1324 105 kg (230 lb 8 oz)   04/26/23 0949 105 kg (231 lb)   02/09/23 0836 105 kg (231 lb)   12/14/22 1048 105 kg (231 lb)   10/18/22 1040 101 kg (223 lb)   10/17/22 0729 105 kg (231 lb)   09/13/22 1024 106 kg (233 lb)   08/23/22 1301 106 kg (232 lb 12.8 oz)   06/30/22 0735 105 kg (231 lb)   06/15/22 1112 106 kg (234 lb 8 oz)   04/05/22 0754 105 kg (232 lb)   03/11/22 0914 108 kg (238 lb)   02/28/22 0831 108 kg (238 lb)   01/04/22 0756 108 kg (239 lb)   10/04/21 0847 113 kg (250 lb)   08/24/21 0841 115 kg (253 lb)   06/16/21 1106 111 kg (245 lb)   05/05/21 0938 114 kg (252 lb 1.6 oz)   02/09/21 0759 114 kg (251 lb 12.8 oz)   11/23/20 1107 113 kg (249 lb 12.8 oz)   11/14/20 0639 114 kg (251 lb)   11/13/20 0747 113 kg (249 lb 12.8 oz)   11/03/20 0818 114 kg (251 lb)   10/30/20 1040 115 kg (253 lb 8 oz)   10/28/20 0739 116 kg (256 lb)        Estimated " Requirements         Weight used  108 kg     Calories  3084-2040 (20 kcal/kg, 22 kcal/kg)    Protein  108-130 (1.0 - 1.2 gm/kg)    Fluid  8696-9599 (1 mL/kcal)     Labs       Pertinent Labs    Results from last 7 days   Lab Units 02/16/24  0432   SODIUM mmol/L 141   POTASSIUM mmol/L 4.2   CHLORIDE mmol/L 108*   CO2 mmol/L 19.3*   BUN mg/dL 15   CREATININE mg/dL 1.07   CALCIUM mg/dL 8.4*   BILIRUBIN mg/dL 0.3   ALK PHOS U/L 51   ALT (SGPT) U/L 18   AST (SGOT) U/L 23   GLUCOSE mg/dL 92     Results from last 7 days   Lab Units 02/16/24  0432   HEMOGLOBIN g/dL 13.2   HEMATOCRIT % 40.7   WBC 10*3/mm3 5.26   ALBUMIN g/dL 3.9     Results from last 7 days   Lab Units 02/16/24  0432   INR  1.03   APTT seconds 25.3   PLATELETS 10*3/mm3 183     COVID19   Date Value Ref Range Status   07/22/2020 Not Detected Not Detected - Ref. Range Final     Lab Results   Component Value Date    HGBA1C 6.10 (H) 12/12/2023          Medications           Scheduled Medications aspirin, 81 mg, Oral, Daily  atorvastatin, 80 mg, Oral, Nightly  senna-docusate sodium, 2 tablet, Oral, BID  sodium chloride, 10 mL, Intravenous, Q12H       Infusions niCARdipine, 5-15 mg/hr       PRN Medications   acetaminophen **OR** acetaminophen    senna-docusate sodium **AND** polyethylene glycol **AND** bisacodyl **AND** bisacodyl    Calcium Replacement - Follow Nurse / BPA Driven Protocol    influenza vaccine    labetalol    Magnesium Standard Dose Replacement - Follow Nurse / BPA Driven Protocol    nitroglycerin    ondansetron    Phosphorus Replacement - Follow Nurse / BPA Driven Protocol    Potassium Replacement - Follow Nurse / BPA Driven Protocol    sodium chloride    sodium chloride    sodium chloride     Physical Findings          General Findings obese, room air   Oral/Mouth Cavity WDL   Edema  lower extremity , 1+ (trace)   Gastrointestinal WDL, last bowel movement: 2/15   Skin  skin intact   Tubes/Drains/Lines none   NFPE Not indicated at this time    --  Current Nutrition Orders & Evaluation of Intake       Oral Nutrition     Food Allergies NKFA   Current PO Diet Diet: Cardiac Diets, Diabetic Diets; Healthy Heart (2-3 Na+); Consistent Carbohydrate; Texture: Regular Texture (IDDSI 7); Fluid Consistency: Thin (IDDSI 0)   Supplement n/a   PO Evaluation     % PO Intake Not yet documented     Factors Affecting Intake: No factors at this time   --  PES STATEMENT / NUTRITION DIAGNOSIS      Nutrition Dx Problem  Problem: No Nutrition Diagnosis at this Time     NUTRITION INTERVENTION / PLAN OF CARE      Intervention Goal(s) Maintain nutrition status, Meet estimated needs, Disease management/therapy, Establish PO intake, Tolerate PO , Accepts oral nutrition supplement, Appropriate weight loss, and PO intake goal %: 75%         RD Intervention/Action Interview for preferences, Encourage intake, Continue to monitor, Care plan reviewed, and Recommend/order: ONS   --      Prescription/Orders:       PO Diet       Supplements Boost GC chocolate once daily with dinner       Enteral Nutrition       Parenteral Nutrition    New Prescription Ordered? Yes   --      Monitor/Evaluation Per protocol   Discharge Plan/Needs Pending clinical course   --    RD to follow per protocol.      Electronically signed by:  Elisabeth Coreas RDN, LD  02/16/24 11:19 EST

## 2024-02-16 NOTE — ED PROVIDER NOTES
EMERGENCY DEPARTMENT ENCOUNTER  Room Number:  GALIAN/GALINA  PCP: Oneil Decker MD  Independent Historians: EMS      HPI:  Chief Complaint: had concerns including Neuro Deficit(s).     A complete HPI/ROS/PMH/PSH/SH/FH are unobtainable due to: Altered Mental Status    Chronic or social conditions impacting patient care (Social Determinants of Health): None      Context: Jhon Munoz is a 58 y.o. male with a medical history of diabetes who presents to the ED c/o acute stroke.  EMS reports that he was last seen normal at 2330.  Wife heard him get up to go to the bathroom just prior to arrival.  She heard some commotion in the bathroom and found him sitting on the floor.  He would look at it but he would not respond.  He had a normal blood sugar in route.  EMS notes right-sided neglect.  They report right arm and right leg severe weakness.  EMS reports that his baseline is ambulatory and fully functional.      Review of prior external notes (non-ED) -and- Review of prior external test results outside of this encounter:  Primary care note dated 1/31/2024 with type 2 diabetes and elevated creatinine.  Creatinine on 12/12/2023 was 1.36    Prescription drug monitoring program review:         PAST MEDICAL HISTORY  Active Ambulatory Problems     Diagnosis Date Noted    Essential hypertension 08/29/2018    Mixed hyperlipidemia 08/29/2018    Low libido 12/31/2018    Nonischemic cardiomyopathy 07/21/2020    Type 2 diabetes mellitus without complication, without long-term current use of insulin 07/21/2020    Renal infarct 09/01/2020    Renal calculi     Clinical diagnosis of COVID-19 09/09/2021    Diabetes mellitus without complication 04/01/2019    Colon polyps 07/08/2022    Stage 3a chronic kidney disease 12/14/2022    Syncope and collapse 12/14/2022     Resolved Ambulatory Problems     Diagnosis Date Noted    Diabetes mellitus 08/29/2018    Class 2 severe obesity with serious comorbidity and body mass index (BMI) of 36.0 to  36.9 in adult 08/31/2020    Acute pain of right shoulder 05/05/2021    Cortical senile cataract 04/01/2019     Past Medical History:   Diagnosis Date    Cardiomyopathy     Colon polyp     Hyperlipidemia     Hypertension     Sleep apnea          PAST SURGICAL HISTORY  Past Surgical History:   Procedure Laterality Date    CARDIAC CATHETERIZATION N/A 07/24/2020    Procedure: Coronary angiography;  Surgeon: Robert Deluna MD;  Location:  [facility];  Service: Cardiovascular;  Laterality: N/A;    CARDIAC CATHETERIZATION N/A 07/24/2020    Procedure: Left Heart Cath;  Surgeon: Robert Deluna MD;  Location:  [facility];  Service: Cardiovascular;  Laterality: N/A;    COLONOSCOPY  2020    COLONOSCOPY N/A 10/18/2022    Procedure: COLONOSCOPY TO CECUM WITH COLD BIOPSY POLYPECTOMY;  Surgeon: Robert Evans MD;  Location: [facility] ENDOSCOPY;  Service: Gastroenterology;  Laterality: N/A;  PRE: HX COLON POLYPS  POST: POLYP, DIVERTICULOSIS, HEMORRHOIDS    KNEE SURGERY      meniscus          FAMILY HISTORY  Family History   Problem Relation Age of Onset    Hypertension Mother          SOCIAL HISTORY  Social History     Socioeconomic History    Marital status:      Spouse name: Jyoti   Tobacco Use    Smoking status: Never     Passive exposure: Never    Smokeless tobacco: Never   Vaping Use    Vaping Use: Never used   Substance and Sexual Activity    Alcohol use: Not Currently     Types: 1 Cans of beer, 1 Shots of liquor per week     Comment: Caffeine use: 1 cup daily    Drug use: Never    Sexual activity: Defer         ALLERGIES  Patient has no known allergies.      REVIEW OF SYSTEMS  Review of Systems  Included in HPI  All systems reviewed and negative except for those discussed in HPI.      PHYSICAL EXAM    I have reviewed the triage vital signs and nursing notes.    ED Triage Vitals [02/16/24 0427]   Temp Heart Rate Resp BP SpO2   -- 77 18 124/85 98 %      Temp src Heart Rate  Source Patient Position BP Location FiO2 (%)   -- -- -- -- --       Physical Exam  GENERAL: Awake, alert, no acute distress  SKIN: Warm, dry  HENT: Normocephalic, atraumatic  EYES: no scleral icterus  CV: regular rhythm, regular rate  RESPIRATORY: normal effort, lungs clear  ABDOMEN: soft, nontender, nondistended  MUSCULOSKELETAL: no deformity  NEURO: alert, right arm and right leg paresis.  Right sided neglect.  Left leg drift.  Nonverbal.            LAB RESULTS  Recent Results (from the past 24 hour(s))   POC Glucose Once    Collection Time: 02/16/24  4:27 AM    Specimen: Blood   Result Value Ref Range    Glucose 82 70 - 130 mg/dL   Comprehensive Metabolic Panel    Collection Time: 02/16/24  4:32 AM    Specimen: Blood   Result Value Ref Range    Glucose 92 65 - 99 mg/dL    BUN 15 8 - 23 mg/dL    Creatinine 1.07 0.76 - 1.27 mg/dL    Sodium 141 136 - 145 mmol/L    Potassium 4.2 3.5 - 5.2 mmol/L    Chloride 108 (H) 98 - 107 mmol/L    CO2 19.3 (L) 22.0 - 29.0 mmol/L    Calcium 8.4 (L) 8.6 - 10.5 mg/dL    Total Protein 6.4 6.0 - 8.5 g/dL    Albumin 3.9 3.5 - 5.2 g/dL    ALT (SGPT) 18 1 - 41 U/L    AST (SGOT) 23 1 - 40 U/L    Alkaline Phosphatase 51 39 - 117 U/L    Total Bilirubin 0.3 0.0 - 1.2 mg/dL    Globulin 2.5 gm/dL    A/G Ratio 1.6 g/dL    BUN/Creatinine Ratio 14.0 7.0 - 25.0    Anion Gap 13.7 5.0 - 15.0 mmol/L    eGFR 79.4 >60.0 mL/min/1.73   Protime-INR    Collection Time: 02/16/24  4:32 AM    Specimen: Blood   Result Value Ref Range    Protime 13.7 11.7 - 14.2 Seconds    INR 1.03 0.90 - 1.10   aPTT    Collection Time: 02/16/24  4:32 AM    Specimen: Blood   Result Value Ref Range    PTT 25.3 22.7 - 35.4 seconds   Single High Sensitivity Troponin T    Collection Time: 02/16/24  4:32 AM    Specimen: Blood   Result Value Ref Range    HS Troponin T 13 <22 ng/L   Type & Screen    Collection Time: 02/16/24  4:32 AM    Specimen: Blood   Result Value Ref Range    ABO Type A     RH type Positive     Antibody Screen  Negative     T&S Expiration Date 2/19/2024 11:59:59 PM    Green Top (Gel)    Collection Time: 02/16/24  4:32 AM   Result Value Ref Range    Extra Tube Hold for add-ons.    Lavender Top    Collection Time: 02/16/24  4:32 AM   Result Value Ref Range    Extra Tube hold for add-on    Gold Top - SST    Collection Time: 02/16/24  4:32 AM   Result Value Ref Range    Extra Tube Hold for add-ons.    Light Blue Top    Collection Time: 02/16/24  4:32 AM   Result Value Ref Range    Extra Tube Hold for add-ons.    CBC Auto Differential    Collection Time: 02/16/24  4:32 AM    Specimen: Blood   Result Value Ref Range    WBC 5.26 3.40 - 10.80 10*3/mm3    RBC 4.38 4.14 - 5.80 10*6/mm3    Hemoglobin 13.2 13.0 - 17.7 g/dL    Hematocrit 40.7 37.5 - 51.0 %    MCV 92.9 79.0 - 97.0 fL    MCH 30.1 26.6 - 33.0 pg    MCHC 32.4 31.5 - 35.7 g/dL    RDW 12.9 12.3 - 15.4 %    RDW-SD 43.9 37.0 - 54.0 fl    MPV 11.0 6.0 - 12.0 fL    Platelets 183 140 - 450 10*3/mm3    Neutrophil % 42.9 42.7 - 76.0 %    Lymphocyte % 44.1 19.6 - 45.3 %    Monocyte % 8.6 5.0 - 12.0 %    Eosinophil % 3.4 0.3 - 6.2 %    Basophil % 0.8 0.0 - 1.5 %    Immature Grans % 0.2 0.0 - 0.5 %    Neutrophils, Absolute 2.26 1.70 - 7.00 10*3/mm3    Lymphocytes, Absolute 2.32 0.70 - 3.10 10*3/mm3    Monocytes, Absolute 0.45 0.10 - 0.90 10*3/mm3    Eosinophils, Absolute 0.18 0.00 - 0.40 10*3/mm3    Basophils, Absolute 0.04 0.00 - 0.20 10*3/mm3    Immature Grans, Absolute 0.01 0.00 - 0.05 10*3/mm3    nRBC 0.0 0.0 - 0.2 /100 WBC   ECG 12 Lead Stroke Evaluation    Collection Time: 02/16/24  4:58 AM   Result Value Ref Range    QT Interval 414 ms    QTC Interval 478 ms         RADIOLOGY  CT Angiogram Head w AI Analysis of LVO    Addendum Date: 2/16/2024    ADDENDUM: 02 16 24 05:35 Call Doctor Regarding Stroke, called Verified receipt with  in ER    on 02 16 05:35 (-05:00)     Addendum Date: 2/16/2024    ADDENDUM: 02 16 24 05:35 Call Doctor Regarding Stroke, called Verified  receipt with  in ER    on 02 16 05:35 (-05:00)     Result Date: 2/16/2024  CR Patient: FERNANDO PEREZ  Time Out: 05:28 Exam(s): CTA HEAD EXAM:   CT Angiography Head Without and With Intravenous Contrast CLINICAL HISTORY:    Reason for exam: Stroke, follow up. Neuro deficit, acute stroke suspected. Acute Stroke. TECHNIQUE: AI analysis of LVO was utilized   Axial computed tomographic angiography images of the head without and with intravenous contrast.  CTDI is 51.22 mGy and DLP is 1959.7 mGy-cm.  This CT exam was performed according to the principle of ALARA (As Low As Reasonably Achievable) by using one or more of the following dose reduction techniques: automated exposure control, adjustment of the mA and or kV according to patient size, and or use of iterative reconstruction technique.   MIP reconstructed images were created and reviewed. COMPARISON:   No relevant prior studies available. FINDINGS:  VASCULATURE: The dural venous sinuses are patent.   Right internal carotid artery:  No acute findings.  Intracranial segment is patent with no significant stenosis.  No aneurysm.   Right anterior cerebral artery:  Unremarkable.  No occlusion or significant stenosis.  No aneurysm.   Right middle cerebral artery:  Unremarkable.  No occlusion or significant stenosis.  No aneurysm.   Right posterior cerebral artery:  Unremarkable.  No occlusion or significant stenosis.  No aneurysm.   Right vertebral artery:  Unremarkable as visualized.   Left internal carotid artery:  No acute findings.  Intracranial segment is patent with no significant stenosis.  No aneurysm.   Left anterior cerebral artery: There is an abrupt high-grade stenosis of the distal left A2 segment with severely diminished enhancement of the distal vessels.  No aneurysm.   Left middle cerebral artery:  Unremarkable.  No occlusion or significant stenosis.  No aneurysm.   Left posterior cerebral artery:  Unremarkable.  No occlusion or significant  stenosis.  No aneurysm.   Left vertebral artery: Terminates in PICA.   Basilar artery:  Unremarkable.  No occlusion or significant stenosis.  No aneurysm.  HEAD:   Brain:  Advanced nonspecific white matter changes.  Remote ischemic injury of the right frontal and parietal lobes with encephalomalacia and gliosis.  Bilateral cerebellar tonsillar ectopia.  No acute findings.  No hemorrhage.  No edema.  Normal enhancement.   Ventricles:  Unremarkable.  No ventriculomegaly.   Bones joints:  No acute fracture.   Soft tissues:  Unremarkable.   Sinuses:  Unremarkable as visualized.  No acute sinusitis.   Mastoid air cells:  Unremarkable as visualized.  No mastoid effusion. IMPRESSION:     Abrupt high-grade stenosis of the distal left A2 segment with diminished enhancement in the distal vessels concerning for acute thromboembolic disease.     Communications:  Verify Receipt  Call Doctor Stroke Electronically signed by Gail Solorio MD on 02-16-24 at 0528    CT CEREBRAL PERFUSION WITH & WITHOUT CONTRAST    Addendum Date: 2/16/2024    ADDENDUM: 02 16 24 05:26 Call Doctor Regarding Stroke, called Neurologist, STROKE @ [phone] (Pager))   on 02 16 05:26 (-05:00)     Result Date: 2/16/2024  CR Patient: FERNANDO PEREZ  Time Out: 05:21 Exam(s): CT PERFUSION EXAM:   CT Brain Perfusion With Intravenous Contrast CLINICAL HISTORY:    Reason for exam: Neuro deficit, acute, stroke suspected. TECHNIQUE:   Routine CT brain cerebral perfusion study was performed using IV contrast.  Reformats and postprocessing were performed by the technologist.  CTDI is 18.74 mGy and DLP is 221 mGy-cm.  This CT exam was performed according to the principle of ALARA (As Low As Reasonably Achievable) by using one or more of the following dose reduction techniques: automated exposure control, adjustment of the mA and or kV according to patient size, and or use of iterative reconstruction technique. COMPARISON:   None. FINDINGS:   Core infarct:  Findings concerning for a small core infarct in the medial left frontal lobe with volume measuring 17 mm.   Reversible ischemia: There is a moderate sized acute perfusion defect in the medial left frontal lobe with area measuring 57 mL.   Brain and extra-axial spaces:  No intra- or extra-axial hemorrhage. IMPRESSION:     Acute perfusion defect in the medial left frontal lobe with smaller core infarct and mismatch pressure of 3.4.     Communications:  Call Doctor Stroke; Neurologist, STROKE @ [phone] (Pager) Electronically signed by Gail Solorio MD on 02-16-24 at 0521    XR Chest 1 View    Result Date: 2/16/2024  Patient: FERNANDO PEREZ  Time Out: 05:20 Exam(s): XR CXR 1 VIEW EXAM:   XR Chest, 1 View CLINICAL HISTORY:    Reason for exam: Acute Stroke Protocol (onset < 12 hrs). TECHNIQUE:   Frontal view of the chest. COMPARISON:   No relevant prior studies available. FINDINGS:   Lungs:  Hypoventilation, which limits evaluation.  Probable left basilar atelectasis, although infiltrates cannot be definitively excluded.   Pleural space:  Unremarkable.  No pneumothorax.   Heart:  Cardiomegaly with pulmonary vascular congestion, suboptimally evaluated secondary to hypoventilation.   Mediastinum:  Probable hiatal hernia.   Bones joints:  Unremarkable.  No acute fracture. IMPRESSION:     1.  Hypoventilation, which limits evaluation.  Probable left basilar atelectasis, although infiltrates cannot be definitively excluded.  PA and lateral views of the chest recommended for further evaluation. 2.  Cardiomegaly with pulmonary vascular congestion, suboptimally evaluated secondary to hypoventilation. 3.  Probable hiatal hernia.     Electronically signed by Arturo Reeves MD on 02-16-24 at 0520    CT Angiogram Neck    Addendum Date: 2/16/2024    ADDENDUM: 02 16 24 05:26 Call Doctor Regarding Stroke, called )   on 02 16 05:26 (-05:00)     Addendum Date: 2/16/2024    ADDENDUM: 02 16 24 05:26 Call Doctor  Regarding Stroke, called )   on 02 16 05:26 (-05:00)     Result Date: 2/16/2024  CR Patient: FERNANDO PEREZ  Time Out: 05:19 Exam(s): CTA NECK EXAM:   CT Angiography Neck With Intravenous Contrast CLINICAL HISTORY:    Reason for exam: Stroke, follow up. Neuro deficit, acute stroke suspected. TECHNIQUE:   Routine carotid CT angiography protocol was performed with intravenous contrast.  NASCET criteria using the distal ICAs for comparison were used for evaluation of stenoses.  CTDI is 51.22 mGy and DLP is 1959.7 mGy-cm.  This CT exam was performed according to the principle of ALARA (As Low As Reasonably Achievable) by using one or more of the following dose reduction techniques: automated exposure control, adjustment of the mA and or kV according to patient size, and or use of iterative reconstruction technique.   3D and MIP reconstructed images were created and reviewed. COMPARISON:   None. FINDINGS:  VASCULATURE:   Right common carotid artery:  Unremarkable.  No occlusion or significant stenosis.  No dissection.   Right internal carotid artery:  Unremarkable.  Extracranial segment is patent with no occlusion or significant stenosis.  No dissection.   Right external carotid artery:  Unremarkable.  No occlusion.   Right vertebral artery:  Unremarkable.  No occlusion or significant stenosis.  No dissection.   Left common carotid artery:  Unremarkable.  No occlusion or significant stenosis.  No dissection.   Left internal carotid artery:  Unremarkable.  Extracranial segment is patent with no occlusion or significant stenosis.  No dissection.   Left external carotid artery:  Unremarkable.  No occlusion.   Left vertebral artery:  Unremarkable.  No occlusion or significant stenosis.  No dissection.  NECK:   Bones joints:  Unremarkable.  No acute fracture.   Soft tissues: Prominent mediastinal and hilar lymph nodes.  Prominent cervical lymph nodes.   Lung apices: Findings concerning for bronchitis with pneumonitis,  which may be of infectious or inflammatory etiologies.  CAROTID STENOSIS REFERENCE USING NASCET CRITERIA:   % ICA stenosis = (1 - narrowest ICA diameter diameter of distal cervical ICA) x 100.   Mild - <50% stenosis.   Moderate - 50-69% stenosis.   Severe - 70-94% stenosis.   Near occlusion - 95-99% stenosis.   Occluded - 100% stenosis. IMPRESSION:       Negative CTA neck.     Communications:  Verify Receipt  Call Doctor Stroke Electronically signed by Gail Solorio MD on 02-16-24 at 0519       MEDICATIONS GIVEN IN ER  Medications   sodium chloride 0.9 % flush 10 mL (has no administration in time range)   sodium chloride 1,000 mL with heparin (porcine) 2,000 Units mixture ( Intracatheter Given 2/16/24 0606)   iopamidol (ISOVUE-370) 76 % injection 150 mL (150 mL Intravenous Given 2/16/24 0441)         ORDERS PLACED DURING THIS VISIT:  Orders Placed This Encounter   Procedures    CT Angiogram Head w AI Analysis of LVO    CT Angiogram Neck    CT CEREBRAL PERFUSION WITH & WITHOUT CONTRAST    XR Chest 1 View    IR Perc Mech Thromb Prim Nonc Art Ini    [place] Draw    Comprehensive Metabolic Panel    Protime-INR    aPTT    Single High Sensitivity Troponin T    CBC Auto Differential    NPO Diet NPO Type: Strict NPO    Initiate Department's Acute Stroke Process (Team D, Code 19, etc.)    Perform NIH Stroke Scale    Measure Actual Weight    Notify MD for SBP < 80 or > 200    Notify Provider for SBP greater than 140 if hemorrhagic stroke    Head of Bed 30 Degrees or Less    Undress and Gown    Continuous Pulse Oximetry    Vital Signs    Neuro Checks    No Hypotonic Fluids    Nursing Dysphagia Screening (Complete Prior to Giving anything PO)    RN to Place Order SLP Consult (IF swallow screen failed) - Eval & Treat Choosing Reason of RN Dysphagia Screen Failed    Inpatient Neurology Consult Stroke    Inpatient Neurology Consult Stroke    Oxygen Therapy- Nasal Cannula; Titrate 1-6 LPM Per SpO2; 90 - 95%    POC Glucose Once     POC Glucose Once    ECG 12 Lead Stroke Evaluation    Type & Screen    Insert Large-Bore Peripheral IV - RIGHT AC Preferred    CBC & Differential    Green Top (Gel)    Lavender Top    Gold Top - SST    Light Blue Top         OUTPATIENT MEDICATION MANAGEMENT:  Current Facility-Administered Medications Ordered in Epic   Medication Dose Route Frequency Provider Last Rate Last Admin    glycopyrrolate (ROBINUL) injection   Intravenous PRN Arash Hayward CRNA   0.1 mg at 02/16/24 0602    lactated ringers infusion   Intravenous Continuous PRN Arash Hayward CRNA   New Bag at 02/16/24 0543    phenylephrine (HILARIA-SYNEPHRINE) 50 mg in sodium chloride 0.9 % 250 mL infusion   Intravenous Continuous PRN Arash Hayward, CRNA   Stopped at 02/16/24 0619    phenylephrine (HILARIA-SYNEPHRINE) injection   Intravenous PRN Arash Hayward, CRNA   100 mcg at 02/16/24 0550    Propofol (DIPRIVAN) injection   Intravenous PRN Arash Hayward, CRNA   200 mg at 02/16/24 0550    rocuronium (ZEMURON) injection   Intravenous PRN Arash Hayward, CRNA   40 mg at 02/16/24 0605    sodium chloride 0.9 % flush 10 mL  10 mL Intravenous PRN Carl Overton MD        sodium chloride 1,000 mL with heparin (porcine) 2,000 Units mixture    PRN Andrew Murray MD   Given at 02/16/24 0606    Succinylcholine Chloride (ANECTINE) injection   Intravenous PRN Arash Hayward CRNA   140 mg at 02/16/24 0550     Current Outpatient Medications Ordered in Epic   Medication Sig Dispense Refill    aspirin 81 MG chewable tablet Chew 1 tablet Daily.      carvedilol (COREG) 25 MG tablet Take 1 tablet by mouth 2 (Two) Times a Day. 180 tablet 3    empagliflozin (Jardiance) 25 MG tablet tablet Take 1 tablet by mouth Daily. 90 tablet 1    Entresto 49-51 MG tablet TAKE 1 TABLET BY MOUTH TWICE A  tablet 1    esomeprazole (nexIUM) 40 MG capsule Take 1 capsule by mouth Every Morning Before Breakfast. prn 30 capsule 12    levothyroxine (SYNTHROID, LEVOTHROID) 25 MCG tablet Take 1  tablet by mouth Daily. 90 tablet 2    rosuvastatin (CRESTOR) 10 MG tablet Take 1 tablet by mouth Daily. 90 tablet 3    Semaglutide,0.25 or 0.5MG/DOS, (Ozempic, 0.25 or 0.5 MG/DOSE,) 2 MG/1.5ML solution pen-injector Inject 0.5 mg under the skin into the appropriate area as directed 1 (One) Time Per Week. 6 mL 1    spironolactone (ALDACTONE) 25 MG tablet Take 1 tablet by mouth Daily. 1 in the am and 1/2 in pm           PROCEDURES  Procedures      Critical care provider statement:    Critical care time (minutes): 30-74.   Critical care time was exclusive of:  Separately billable procedures and treating other patients   Critical care was necessary to treat or prevent imminent or life-threatening deterioration of the following conditions:  CNS Failure   Critical care was time spent personally by me on the following activities:  Development of treatment plan with patient or surrogate, discussions with consultants, evaluation of patient's response to treatment, examination of patient, obtaining history from patient or surrogate, ordering and performing treatments and interventions, ordering and review of laboratory studies, ordering and review of radiographic studies, pulse oximetry, re-evaluation of patient's condition and review of old charts. Critical Care indicators:        PROGRESS, DATA ANALYSIS, CONSULTS, AND MEDICAL DECISION MAKING  All labs have been independently interpreted by me.  All radiology studies have been reviewed by me. All EKG's have been independently viewed and interpreted by me.  Discussion below represents my analysis of pertinent findings related to patient's condition, differential diagnosis, treatment plan and final disposition.    Differential diagnosis includes but is not limited to intracranial hemorrhage, stroke, TIA, seizure, hypoglycemia, intoxication, altered mental status.    Clinical Scores:            Total (NIH Stroke Scale): 19      ED Course as of 02/16/24 0624   Fri Feb 16, 2024    0427 Discussing with Dr. Lorenzana with stroke neurology.  Team D initiated. [TR]   0430 The patient is outside the tPA window since he was last seen normal at 2330 [TR]   0501 Emergent imaging shows left A2 occlusion.  Neurology is working with interventional radiology to arrange for the patient to go to the OR for thrombectomy. [TR]   0502 Discussing with Dr. Lorenzana [TR]   0505 I discussed with the patient's wife by phone.  I updated her on the current status.  She is agreeable to send him to the operating room for thrombectomy. [TR]   0506 EKG          EKG time: 458  Rhythm/Rate: Normal sinus, rate 80  P waves and WA: Normal P, Normal WA  QRS, axis: Narrow QRS, Normal axis  ST and T waves: No acute changes    Independently Interpreted by me  Not significantly changed compared to prior 11/14/2020   [TR]      ED Course User Index  [TR] Carl Overton MD             AS OF 06:24 EST VITALS:    BP - 117/82  HR - 79  TEMP - 97.4 °F (36.3 °C) (Tympanic)  O2 SATS - 100%    COMPLEXITY OF CARE  The patient requires admission.      DIAGNOSIS  Final diagnoses:   Acute embolic stroke         DISPOSITION  ED Disposition       ED Disposition   Send to OR    Condition   --    Comment   --                Please note that portions of this document were completed with a voice recognition program.    Note Disclaimer: At [facility], we believe that sharing information builds trust and better relationships. You are receiving this note because you recently visited [facility]. It is possible you will see health information before a provider has talked with you about it. This kind of information can be easy to misunderstand. To help you fully understand what it means for your health, we urge you to discuss this note with your provider.         Carl Overton MD  02/16/24 2074       Carl Overton MD  02/16/24 0217

## 2024-02-17 PROBLEM — G47.33 OSA ON CPAP: Status: ACTIVE | Noted: 2024-02-17

## 2024-02-17 PROBLEM — I63.512 ACUTE ISCHEMIC LEFT MIDDLE CEREBRAL ARTERY (MCA) STROKE: Status: ACTIVE | Noted: 2024-02-16

## 2024-02-17 LAB
ANION GAP SERPL CALCULATED.3IONS-SCNC: 8 MMOL/L (ref 5–15)
BASOPHILS # BLD AUTO: 0.03 10*3/MM3 (ref 0–0.2)
BASOPHILS NFR BLD AUTO: 0.5 % (ref 0–1.5)
BUN SERPL-MCNC: 15 MG/DL (ref 8–23)
BUN/CREAT SERPL: 10.7 (ref 7–25)
CALCIUM SPEC-SCNC: 8.9 MG/DL (ref 8.6–10.5)
CHLORIDE SERPL-SCNC: 104 MMOL/L (ref 98–107)
CHOLEST SERPL-MCNC: 109 MG/DL (ref 0–200)
CO2 SERPL-SCNC: 25 MMOL/L (ref 22–29)
CREAT SERPL-MCNC: 1.4 MG/DL (ref 0.76–1.27)
DEPRECATED RDW RBC AUTO: 44.7 FL (ref 37–54)
EGFRCR SERPLBLD CKD-EPI 2021: 57.5 ML/MIN/1.73
EOSINOPHIL # BLD AUTO: 0.21 10*3/MM3 (ref 0–0.4)
EOSINOPHIL NFR BLD AUTO: 3.2 % (ref 0.3–6.2)
ERYTHROCYTE [DISTWIDTH] IN BLOOD BY AUTOMATED COUNT: 13.4 % (ref 12.3–15.4)
GLUCOSE BLDC GLUCOMTR-MCNC: 101 MG/DL (ref 70–130)
GLUCOSE BLDC GLUCOMTR-MCNC: 107 MG/DL (ref 70–130)
GLUCOSE BLDC GLUCOMTR-MCNC: 115 MG/DL (ref 70–130)
GLUCOSE BLDC GLUCOMTR-MCNC: 85 MG/DL (ref 70–130)
GLUCOSE BLDC GLUCOMTR-MCNC: 92 MG/DL (ref 70–130)
GLUCOSE SERPL-MCNC: 90 MG/DL (ref 65–99)
HBA1C MFR BLD: 5.9 % (ref 4.8–5.6)
HCT VFR BLD AUTO: 39.1 % (ref 37.5–51)
HDLC SERPL-MCNC: 52 MG/DL (ref 40–60)
HGB BLD-MCNC: 12.6 G/DL (ref 13–17.7)
IMM GRANULOCYTES # BLD AUTO: 0.03 10*3/MM3 (ref 0–0.05)
IMM GRANULOCYTES NFR BLD AUTO: 0.5 % (ref 0–0.5)
LDLC SERPL CALC-MCNC: 39 MG/DL (ref 0–100)
LDLC/HDLC SERPL: 0.72 {RATIO}
LYMPHOCYTES # BLD AUTO: 2.06 10*3/MM3 (ref 0.7–3.1)
LYMPHOCYTES NFR BLD AUTO: 31.1 % (ref 19.6–45.3)
MAGNESIUM SERPL-MCNC: 2.1 MG/DL (ref 1.6–2.4)
MCH RBC QN AUTO: 29.7 PG (ref 26.6–33)
MCHC RBC AUTO-ENTMCNC: 32.2 G/DL (ref 31.5–35.7)
MCV RBC AUTO: 92.2 FL (ref 79–97)
MONOCYTES # BLD AUTO: 0.62 10*3/MM3 (ref 0.1–0.9)
MONOCYTES NFR BLD AUTO: 9.4 % (ref 5–12)
NEUTROPHILS NFR BLD AUTO: 3.68 10*3/MM3 (ref 1.7–7)
NEUTROPHILS NFR BLD AUTO: 55.3 % (ref 42.7–76)
NRBC BLD AUTO-RTO: 0 /100 WBC (ref 0–0.2)
PLATELET # BLD AUTO: 183 10*3/MM3 (ref 140–450)
PMV BLD AUTO: 11 FL (ref 6–12)
POTASSIUM SERPL-SCNC: 4.1 MMOL/L (ref 3.5–5.2)
PROT S AG ACT/NOR PPP IA: 83 % (ref 60–150)
RBC # BLD AUTO: 4.24 10*6/MM3 (ref 4.14–5.8)
SODIUM SERPL-SCNC: 137 MMOL/L (ref 136–145)
TRIGL SERPL-MCNC: 97 MG/DL (ref 0–150)
VLDLC SERPL-MCNC: 18 MG/DL (ref 5–40)
WBC NRBC COR # BLD AUTO: 6.63 10*3/MM3 (ref 3.4–10.8)

## 2024-02-17 PROCEDURE — 80061 LIPID PANEL: CPT

## 2024-02-17 PROCEDURE — 99233 SBSQ HOSP IP/OBS HIGH 50: CPT | Performed by: PSYCHIATRY & NEUROLOGY

## 2024-02-17 PROCEDURE — 97535 SELF CARE MNGMENT TRAINING: CPT

## 2024-02-17 PROCEDURE — 97166 OT EVAL MOD COMPLEX 45 MIN: CPT

## 2024-02-17 PROCEDURE — 99231 SBSQ HOSP IP/OBS SF/LOW 25: CPT

## 2024-02-17 PROCEDURE — 97162 PT EVAL MOD COMPLEX 30 MIN: CPT | Performed by: PHYSICAL THERAPIST

## 2024-02-17 PROCEDURE — 82948 REAGENT STRIP/BLOOD GLUCOSE: CPT

## 2024-02-17 PROCEDURE — 83735 ASSAY OF MAGNESIUM: CPT

## 2024-02-17 PROCEDURE — 83036 HEMOGLOBIN GLYCOSYLATED A1C: CPT

## 2024-02-17 PROCEDURE — 80048 BASIC METABOLIC PNL TOTAL CA: CPT

## 2024-02-17 PROCEDURE — 85025 COMPLETE CBC W/AUTO DIFF WBC: CPT

## 2024-02-17 RX ORDER — DEXTROSE MONOHYDRATE 25 G/50ML
25 INJECTION, SOLUTION INTRAVENOUS
Status: DISCONTINUED | OUTPATIENT
Start: 2024-02-17 | End: 2024-02-20 | Stop reason: HOSPADM

## 2024-02-17 RX ORDER — INSULIN LISPRO 100 [IU]/ML
2-7 INJECTION, SOLUTION INTRAVENOUS; SUBCUTANEOUS
Status: DISCONTINUED | OUTPATIENT
Start: 2024-02-17 | End: 2024-02-20 | Stop reason: HOSPADM

## 2024-02-17 RX ORDER — IBUPROFEN 600 MG/1
1 TABLET ORAL
Status: DISCONTINUED | OUTPATIENT
Start: 2024-02-17 | End: 2024-02-20 | Stop reason: HOSPADM

## 2024-02-17 RX ORDER — LEVOTHYROXINE SODIUM 0.03 MG/1
25 TABLET ORAL DAILY
Status: DISCONTINUED | OUTPATIENT
Start: 2024-02-17 | End: 2024-02-20 | Stop reason: HOSPADM

## 2024-02-17 RX ORDER — NICOTINE POLACRILEX 4 MG
15 LOZENGE BUCCAL
Status: DISCONTINUED | OUTPATIENT
Start: 2024-02-17 | End: 2024-02-20 | Stop reason: HOSPADM

## 2024-02-17 RX ADMIN — Medication 10 ML: at 20:48

## 2024-02-17 RX ADMIN — DOCUSATE SODIUM 50MG AND SENNOSIDES 8.6MG 2 TABLET: 8.6; 5 TABLET, FILM COATED ORAL at 20:48

## 2024-02-17 RX ADMIN — ATORVASTATIN CALCIUM 80 MG: 80 TABLET, FILM COATED ORAL at 20:47

## 2024-02-17 RX ADMIN — Medication 10 ML: at 08:24

## 2024-02-17 RX ADMIN — LEVOTHYROXINE SODIUM 25 MCG: 25 TABLET ORAL at 08:22

## 2024-02-17 RX ADMIN — ASPIRIN 81 MG: 81 TABLET, CHEWABLE ORAL at 08:22

## 2024-02-17 RX ADMIN — DOCUSATE SODIUM 50MG AND SENNOSIDES 8.6MG 2 TABLET: 8.6; 5 TABLET, FILM COATED ORAL at 08:22

## 2024-02-17 NOTE — PROGRESS NOTES
"  Daily Progress Note.   [facility] INTENSIVE CARE  2/17/2024    Patient:  Name:  Jhon Munoz  MRN:  [id number]  1965  58 y.o.  male         CC: Stroke status post mechanical thrombectomy    Interval History:  Afebrile patient on CPAP blood pressure stable 116/80  Heart rate 62    Feels much better without any shortness of breath cough sputum production.  Says swallowing feels great.  Strength has returned.  Coordination great on the right side.  A little bit of soreness in his chest where he fell and hit the sink.  Great cognition.  Memory intact.      Physical Exam:  /80   Pulse 62   Temp 97.9 °F (36.6 °C) (Oral)   Resp 20   Ht 177.8 cm (70\")   Wt 100 kg (220 lb 14.4 oz)   SpO2 98%   BMI 31.70 kg/m²   Body mass index is 31.7 kg/m².    Intake/Output Summary (Last 24 hours) at 2/17/2024 0756  Last data filed at 2/17/2024 0600  Gross per 24 hour   Intake 1360 ml   Output 2125 ml   Net -765 ml     General appearance: Nontoxic, conversant   Eyes: anicteric sclerae, moist conjunctivae; no lidlag;    HENT: Atraumatic; oropharynx clear with moist mucous membranes    Neck: Trachea midline;  supple   Lungs: CTA, with normal respiratory effort and no intercostal retractions  CV: RRR, no rub   Abdomen: Soft, nontender; BS+  Extremities: No peripheral edema or ext lad  Skin: WWP no diffuse visible rash  Psych: Appropriate affect, alert   Neuro: Moves all ext. CN II-XII. Speech intact.  Strength 5 out of 5    Data Review:  Notable Labs:  Results from last 7 days   Lab Units 02/17/24  0209 02/16/24  0432   WBC 10*3/mm3 6.63 5.26   HEMOGLOBIN g/dL 12.6* 13.2   PLATELETS 10*3/mm3 183 183     Results from last 7 days   Lab Units 02/17/24  0209 02/16/24  0432   SODIUM mmol/L 137 141   POTASSIUM mmol/L 4.1 4.2   CHLORIDE mmol/L 104 108*   CO2 mmol/L 25.0 19.3*   BUN mg/dL 15 15   CREATININE mg/dL 1.40* 1.07   GLUCOSE mg/dL 90 92   CALCIUM mg/dL 8.9 8.4*   MAGNESIUM mg/dL 2.1  --    Estimated " Creatinine Clearance: 66.5 mL/min (A) (by C-G formula based on SCr of 1.4 mg/dL (H)).    Results from last 7 days   Lab Units 02/17/24  0209 02/16/24  1115 02/16/24  0432   AST (SGOT) U/L  --   --  23   ALT (SGPT) U/L  --   --  18   D DIMER QUANT MCGFEU/mL  --  0.89*  --    PLATELETS 10*3/mm3 183  --  183             Imaging:  Reviewed chest images personally from past 3 days    ASSESSMENT  /  PLAN:  Acute stroke status post mechanical thrombectomy on 2/16/2024 with improvement of right-sided weakness  Right-sided neglect  Nonischemic cardiomyopathy, LVEF 44.1%  Type 2 diabetes mellitus  Hypertension  Hyperlipidemia  Hypothyroidism  Sleep apnea, compliant with CPAP  Slight elevation in creatinine continue to monitor    Continue neurological care per neurosurgery and neurology stroke team.  Systolic blood pressure goal further recommendations 140 nicardipine as ordered.  Aspirin atorvastatin  Serial neurological exams  Serial imaging per neurology's recommendations.    Otherwise medically  Sliding scale insulin as needed/glycemic control    Home medications of Coreg and spironolactone added back as patient when swallowing will allow him blood pressure/heart rate dictates    Levothyroxine restart    Home CPAP continue    Entresto Coreg when swallowing allows    Swallow eval today    All issues new to me today.  Prior hospital course, labs and imaging reviewed.    Coordinated care with Dr. Poli Burgess with moving out of ICU.    Will consult internal medicine to help manage diabetes hypertension hyperlipidemia hypothyroidism nonischemic cardiomyopathy out of the ICU.  Patient will need additional hospitalization to help evaluate cause of the stroke.      Electronically signed by Tony Suárez MD, 02/17/24, 7:56 AM [facility].  [facility]

## 2024-02-17 NOTE — CONSULTS
Name: Jhon Munoz ADMIT: 2024   : 1965  PCP: Oneil Decker MD    MRN: [id number] LOS: 1 days   AGE/SEX: 58 y.o. male  ROOM: [facility]     Referring Provider: Dr. Tony Suárez  Reason for Consultation: HTN, HLD, Hypothyroidism, Ischemic Cardiomyopathy    Subjective   Subjective   Patient admitted with left MCA and NIRMAL stroke and is s/p mechanical thrombectomy with Dr. Murray without residual deficits. He denies new complaints and is being transferred to step down unit. I was consulted for the ongoing management of his above medical issues and to assume care as attending while workup for the stroke is being conducted.    Objective   Objective   Vital Signs  Temp:  [97.9 °F (36.6 °C)-98.7 °F (37.1 °C)] 97.9 °F (36.6 °C)  Heart Rate:  [57-86] 74  Resp:  [14-20] 18  BP: (104-140)/() 116/80  SpO2:  [68 %-100 %] 91 %  on  Flow (L/min):  [2] 2;   Device (Oxygen Therapy): room air  Body mass index is 31.7 kg/m².  Physical Exam  Vitals and nursing note reviewed.   Constitutional:       General: He is not in acute distress.     Appearance: He is not toxic-appearing or diaphoretic.   HENT:      Head: Normocephalic and atraumatic.      Nose: Nose normal.      Mouth/Throat:      Mouth: Mucous membranes are moist.      Pharynx: Oropharynx is clear.   Eyes:      Conjunctiva/sclera: Conjunctivae normal.      Pupils: Pupils are equal, round, and reactive to light.   Cardiovascular:      Rate and Rhythm: Normal rate and regular rhythm.      Pulses: Normal pulses.   Pulmonary:      Effort: Pulmonary effort is normal.      Breath sounds: Normal breath sounds.   Abdominal:      General: Bowel sounds are normal.      Palpations: Abdomen is soft.      Tenderness: There is no abdominal tenderness.   Musculoskeletal:         General: No swelling or tenderness.      Cervical back: Neck supple.   Skin:     General: Skin is warm and dry.      Capillary Refill: Capillary refill takes less than 2 seconds.    Neurological:      General: No focal deficit present.      Mental Status: He is alert and oriented to person, place, and time.   Psychiatric:         Mood and Affect: Mood normal.         Behavior: Behavior normal.       Results Review     I reviewed the patient's new clinical results.  Results from last 7 days   Lab Units 02/17/24  0209 02/16/24  0432   WBC 10*3/mm3 6.63 5.26   HEMOGLOBIN g/dL 12.6* 13.2   PLATELETS 10*3/mm3 183 183     Results from last 7 days   Lab Units 02/17/24  0209 02/16/24  0432   SODIUM mmol/L 137 141   POTASSIUM mmol/L 4.1 4.2   CHLORIDE mmol/L 104 108*   CO2 mmol/L 25.0 19.3*   BUN mg/dL 15 15   CREATININE mg/dL 1.40* 1.07   GLUCOSE mg/dL 90 92   EGFR mL/min/1.73 57.5* 79.4     Results from last 7 days   Lab Units 02/16/24  0432   ALBUMIN g/dL 3.9   BILIRUBIN mg/dL 0.3   ALK PHOS U/L 51   AST (SGOT) U/L 23   ALT (SGPT) U/L 18     Results from last 7 days   Lab Units 02/17/24  0209 02/16/24  0432   CALCIUM mg/dL 8.9 8.4*   ALBUMIN g/dL  --  3.9   MAGNESIUM mg/dL 2.1  --        Hemoglobin A1C   Date/Time Value Ref Range Status   02/17/2024 0209 5.90 (H) 4.80 - 5.60 % Final     Glucose   Date/Time Value Ref Range Status   02/17/2024 1117 92 70 - 130 mg/dL Final   02/17/2024 0743 115 70 - 130 mg/dL Final   02/17/2024 0346 107 70 - 130 mg/dL Final   02/16/2024 2228 121 70 - 130 mg/dL Final   02/16/2024 1614 72 70 - 130 mg/dL Final   02/16/2024 1108 76 70 - 130 mg/dL Final   02/16/2024 0648 107 70 - 130 mg/dL Final       CT Head Without Contrast    Result Date: 2/16/2024  FINDINGS AND IMPRESSION: No findings of definite acute intracranial hemorrhage are seen; however, please note the presence of presumed intracranial contrast from recent angiogram limits evaluation. The cerebellar tonsils extend approximately 6 to 7 mm below the foramen magnum. The area of acute infarction within the left anterior cerebral artery territory seen on recent MRI are not well visualized on CT and please refer to  MRI 2/16/2024 for further information. Hypodensity within the periventricular and subcortical white matter likely represents moderate to extensive chronic ischemic small vessel degenerative changes. Area of encephalomalacia within the right parietal lobe, as before.  There is no midline shift or hydrocephalus.      This report was finalized on 2/16/2024 7:08 PM by Dr. Ivan Thacker M.D on Workstation: [id number]      MRI Brain Without Contrast    Result Date: 2/16/2024  1.  Small to moderate sized area of acute infarction within the left anterior cerebral artery territory. Moderate to extensive chronic ischemic white matter changes. Area of encephalomalacia within the right parietal lobe. 2.  Cerebellar tonsils extend approximately 6 to 7 mm below the foramen magnum. 3.  Other findings as above.    This report was finalized on 2/16/2024 6:16 PM by Dr. Ivan Thacker M.D on Workstation: [id number]      IR Perc Mech Thromb Prim Nonc Art Ini    Result Date: 2/16/2024  Acute occlusion of the left A2 segment with subsequent successful mechanical thrombectomy and TICI 3 flow achieved. No underlying stenosis seen in a cardiac source suspected.  All carotid stenosis measurements were made using NASCET criteria.    This report was finalized on 2/16/2024 4:00 PM by Dr. Andrew Murray M.D on Workstation: [id number]      CT Angiogram Head w AI Analysis of LVO    Addendum Date: 2/16/2024    ADDENDUM: 02 16 24 05:35 Call Doctor Regarding Stroke, called Verified receipt with  in ER    on 02 16 05:35 (-05:00)     Addendum Date: 2/16/2024    ADDENDUM: 02 16 24 05:35 Call Doctor Regarding Stroke, called Verified receipt with  in ER    on 02 16 05:35 (-05:00)     Result Date: 2/16/2024  Communications:  Verify Receipt  Call Doctor Stroke Electronically signed by Gail Solorio MD on 02-16-24 at 0528    CT CEREBRAL PERFUSION WITH & WITHOUT CONTRAST    Addendum Date: 2/16/2024    ADDENDUM: 02 16 24 05:26 Call Doctor  Regarding Stroke, called Neurologist, STROKE @ [phone] (Pager))   on 02 16 05:26 (-05:00)     Result Date: 2/16/2024  Communications:  Call Doctor Stroke; Neurologist, STROKE @ [phone] (Pager) Electronically signed by Gail Solorio MD on 02-16-24 at 0521    XR Chest 1 View    Result Date: 2/16/2024  Electronically signed by Arturo Reeves MD on 02-16-24 at 0520    CT Angiogram Neck    Addendum Date: 2/16/2024    ADDENDUM: 02 16 24 05:26 Call Doctor Regarding Stroke, called )   on 02 16 05:26 (-05:00)     Addendum Date: 2/16/2024    ADDENDUM: 02 16 24 05:26 Call Doctor Regarding Stroke, called )   on 02 16 05:26 (-05:00)     Result Date: 2/16/2024  Communications:  Verify Receipt  Call Doctor Stroke Electronically signed by Gail Solorio MD on 02-16-24 at 0519     I have personally reviewed all medications:  Scheduled Medications  aspirin, 81 mg, Oral, Daily  atorvastatin, 80 mg, Oral, Nightly  levothyroxine, 25 mcg, Oral, Daily  senna-docusate sodium, 2 tablet, Oral, BID  sodium chloride, 10 mL, Intravenous, Q12H    Infusions   Diet  Diet: Cardiac Diets, Diabetic Diets; Healthy Heart (2-3 Na+); Consistent Carbohydrate; Texture: Regular Texture (IDDSI 7); Fluid Consistency: Thin (IDDSI 0)    I have personally reviewed:  [x]  Laboratory   []  Microbiology   [x]  Radiology   [x]  EKG/Telemetry  [x]  Cardiology/Vascular   []  Pathology    [x]  Records    Assessment/Plan     Active Hospital Problems    Diagnosis  POA    **Acute ischemic left middle cerebral artery (MCA) stroke [I63.512]  Yes    Stage 3a chronic kidney disease [N18.31]  Yes    Nonischemic cardiomyopathy [I42.8]  Yes    Type 2 diabetes mellitus without complication, without long-term current use of insulin [E11.9]  Yes    Essential hypertension [I10]  Yes    Mixed hyperlipidemia [E78.2]  Yes      Resolved Hospital Problems   No resolved problems to display.   Acute Left MCA/NIRMAL Stroke   - s/p mechanical  thrombectomy 2/16/24 with Dr. Murray, no residual deficits  - continue on ASA and statin  - brain MRI showing acute Left NIRMAL CVA, chronic white matter changes, and area of encephalomalacia in the right parietal lobe  - mechanism suspected to be cardioembolic-TTE noted, cardiology consulted to evaluate for possible HEDY  - hypercoagulable workup sent  - appreciate neurology recs    Non-Ischemic Cardiomyopathy/HTN/HLD  - LV EF slightly lower that last echocardiogram and has LV wall motion abnormalities  - clinically no evidence of CHF  - monitor volume status, cardiology to evaluate as above, follows with Dr. Graff as an outpatient  - entresto, spironolactone, and coreg held on admission-will monitor BP and hopefully can restart these soon  - on lipitor    Type 2 DM  - on ozempic as an outpatient, A1C 5.90%  - BG are acceptable  - cover with low dose ssi/hypoglycemia protocol    Stage 3a CKD  - baseline serum creatinine is around 1.3-1.4  - monitor and avoid nephrotoxins as able    SCDs for DVT prophylaxis.  Full code.  Discussed with patient and nursing staff.  Anticipate discharge home in 1-2 days.    Thank you for this consult. I will assume care as attending.     Cuba Argueta MD  [facility]  02/17/24  13:01 EST

## 2024-02-17 NOTE — NURSING NOTE
MD Evangelista came to bedside to assess pt. MD said okay for pt to now be on q4 neurological checks. Md said okay to downgrade to the floor. Awaiting transfer orders. Md also spoke with Rn about placing cardiology consult for HEDY next week. Will continue to monitor. FERNANDO Grant

## 2024-02-17 NOTE — THERAPY EVALUATION
Patient Name: Jhon Munoz  : 1965    MRN: [id number]                              Today's Date: 2024       Admit Date: 2024    Visit Dx:     ICD-10-CM ICD-9-CM   1. Acute embolic stroke  I63.9 434.11     Patient Active Problem List   Diagnosis    Essential hypertension    Mixed hyperlipidemia    Low libido    Nonischemic cardiomyopathy    Type 2 diabetes mellitus without complication, without long-term current use of insulin    Renal infarct    Renal calculi    Clinical diagnosis of COVID-19    Diabetes mellitus without complication    Colon polyps    Stage 3a chronic kidney disease    Syncope and collapse    Acute ischemic left middle cerebral artery (MCA) stroke    OCHOA on CPAP     Past Medical History:   Diagnosis Date    Cardiomyopathy     Colon polyp     Cortical senile cataract 2019    Diabetes mellitus     type 2    Disease of thyroid gland     Hyperlipidemia     Hypertension     Renal calculi     Followed by Dr. Chris Carreon urologist    Renal infarct 2020    Left; noted on CT scan-followed by Dr. Chris Carreon urology    Sleep apnea     CPAP nightly     Past Surgical History:   Procedure Laterality Date    CARDIAC CATHETERIZATION N/A 2020    Procedure: Coronary angiography;  Surgeon: Robert Deluna MD;  Location:  [facility];  Service: Cardiovascular;  Laterality: N/A;    CARDIAC CATHETERIZATION N/A 2020    Procedure: Left Heart Cath;  Surgeon: Robert Deluna MD;  Location:  [facility];  Service: Cardiovascular;  Laterality: N/A;    COLONOSCOPY      COLONOSCOPY N/A 10/18/2022    Procedure: COLONOSCOPY TO CECUM WITH COLD BIOPSY POLYPECTOMY;  Surgeon: Robert Evans MD;  Location: [facility] ENDOSCOPY;  Service: Gastroenterology;  Laterality: N/A;  PRE: HX COLON POLYPS  POST: POLYP, DIVERTICULOSIS, HEMORRHOIDS    KNEE SURGERY      meniscus       General Information       Row Name 24 6415          Physical Therapy Time and  Intention    Document Type evaluation  -     Mode of Treatment individual therapy;physical therapy  -       Row Name 02/17/24 1646          General Information    Patient Profile Reviewed yes  -     Prior Level of Function independent:;community mobility  -     Existing Precautions/Restrictions fall  -[facility] Name 02/17/24 1646          Living Environment    People in Home child(santiago), adult;spouse  -[facility] Name 02/17/24 1646          Home Main Entrance    Number of Stairs, Main Entrance three  -     Stair Railings, Main Entrance railings safe and in good condition  -       Row Name 02/17/24 1646          Cognition    Orientation Status (Cognition) oriented x 4  -               User Key  (r) = Recorded By, (t) = Taken By, (c) = Cosigned By      Initials Name Provider Type    Mirian Delacruz, PT Physical Therapist                   Mobility       [facility] Name 02/17/24 1646          Bed Mobility    Comment, (Bed Mobility) up in chair  -[facility] Name 02/17/24 1646          Sit-Stand Transfer    Sit-Stand [place] (Transfers) contact guard  -[facility] Name 02/17/24 1646          Gait/Stairs (Locomotion)    [place] Level (Gait) contact guard  -     Distance in Feet (Gait) 200 ft  -     Deviations/Abnormal Patterns (Gait) antalgic;gait speed decreased  -     Comment, (Gait/Stairs) slight limb noted secondary to R knee pain. Pt reports knee pain after fall PTA  -               User Key  (r) = Recorded By, (t) = Taken By, (c) = Cosigned By      Initials Name Provider Type    Mirian Delacruz PT Physical Therapist                   Obj/Interventions       [facility] Name 02/17/24 1647          Range of Motion Comprehensive    Comment, General Range of Motion BLE WFL, R knee antalgic  -[facility] Name 02/17/24 1647          Strength Comprehensive (MMT)    Comment, General Manual Muscle Testing (MMT) Assessment BLE 5/5  -[facility] Name 02/17/24 1647           Balance    Static Sitting Balance independent  -KH     Static Standing Balance standby assist  -KH     Dynamic Standing Balance contact guard  -KH     Position/Device Used, Standing Balance unsupported  -               User Key  (r) = Recorded By, (t) = Taken By, (c) = Cosigned By      Initials Name Provider Type    Mirian Delacruz, PT Physical Therapist                   Goals/Plan       Row Name 02/17/24 1653          Bed Mobility Goal 1 (PT)    Activity/Assistive Device (Bed Mobility Goal 1, PT) bed mobility activities, all  -KH     [place] Level/Cues Needed (Bed Mobility Goal 1, PT) independent  -KH     Time Frame (Bed Mobility Goal 1, PT) 1 week  -       Row Name 02/17/24 1653          Transfer Goal 1 (PT)    Activity/Assistive Device (Transfer Goal 1, PT) transfers, all  -KH     [place] Level/Cues Needed (Transfer Goal 1, PT) independent  -KH     Time Frame (Transfer Goal 1, PT) 1 week  -       Row Name 02/17/24 1653          Gait Training Goal 1 (PT)    Activity/Assistive Device (Gait Training Goal 1, PT) gait (walking locomotion)  -KH     [place] Level (Gait Training Goal 1, PT) independent  -KH     Distance (Gait Training Goal 1, PT) 200 ft  -KH     Time Frame (Gait Training Goal 1, PT) 1 week  -       Row Name 02/17/24 1653          Stairs Goal 1 (PT)    Activity/Assistive Device (Stairs Goal 1, PT) stairs, all skills  -KH     [place] Level/Cues Needed (Stairs Goal 1, PT) standby assist  -KH     Number of Stairs (Stairs Goal 1, PT) 3  -KH     Time Frame (Stairs Goal 1, PT) 1 week  -       Row Name 02/17/24 1653          Therapy Assessment/Plan (PT)    Planned Therapy Interventions (PT) gait training;home exercise program;patient/family education;stair training;transfer training;ROM (range of motion)  -               User Key  (r) = Recorded By, (t) = Taken By, (c) = Cosigned By      Initials Name Provider Type    Mirian Delacruz, PT Physical Therapist                    Clinical Impression       [facility] Name 02/17/24 1648          Pain    Pretreatment Pain Rating 4/10  -     Posttreatment Pain Rating 6/10  -     Pain Location - Side/Orientation Right  -     Pain Location - knee  -     Pain Intervention(s) Rest  -[facility] Name 02/17/24 1648          Plan of Care Review    Plan of Care Reviewed With patient  -     Outcome Evaluation Pt is a 58 y.o male admitted to [facility] with acute CVA after being found down in the bathroom, minimally responsive, R sided weakness. He is s/p mechanical thrombectomy. 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 Strength appears WFL as stroke impairments have resolved. Pt presents with R knee pain and antalgic gait. pt reports knee pain is secondary to positioning of R LE under him when he fell at home. Pt plans to d/c home and will utilize  PT if needed. Pt could benefit from continued PT to address gait training, r knee ROM and ensure return to baseline mobility.  -[facility] Name 02/17/24 1648          Therapy Assessment/Plan (PT)    Patient/Family Therapy Goals Statement (PT) return home to [facility]     Criteria for Skilled Interventions Met (PT) yes  -     Therapy Frequency (PT) 5 times/wk  -[facility] Name 02/17/24 1648          Positioning and Restraints    Pre-Treatment Position sitting in chair/recliner  no alarm  -     Post Treatment Position chair  -     In Chair reclined;call light within reach;encouraged to call for assist;notified [facility]  -               User Key  (r) = Recorded By, (t) = Taken By, (c) = Cosigned By      Initials Name Provider Type    Mirian Delacruz, PT Physical Therapist                   Outcome Measures       Row Name 02/17/24 1654 02/17/24 0800       How much help from another person do you currently need...    Turning from your back to your side while in flat bed without using bedrails? 4  -KH 4  -LR    Moving from lying on back to  sitting on the side of a flat bed without bedrails? 4  -KH 4  -LR    Moving to and from a bed to a chair (including a wheelchair)? 3  -KH 3  -LR    Standing up from a chair using your arms (e.g., wheelchair, bedside chair)? 3  -KH 3  -LR    Climbing 3-5 steps with a railing? 3  -KH 3  -LR    To walk in hospital room? 3  -KH 3  -LR    AM-PAC 6 Clicks Score (PT) 20  -KH 20  -LR    Highest Level of Mobility Goal 6 --> Walk 10 steps or more  -KH 6 --> Walk 10 steps or more  -LR      Row Name 02/17/24 1302          Modified [place] Scale    Modified Wendi Scale 1 - No significant disability despite symptoms.  Able to carry out all usual duties and activities.  -       Row Name 02/17/24 1302          Functional Assessment    Outcome Measure Options AM-PAC 6 Clicks Daily Activity (OT);Modified [place]  -               User Key  (r) = Recorded By, (t) = Taken By, (c) = Cosigned By      Initials Name Provider Type    Mirian Delacruz, PT Physical Therapist    Ana Rosa Rey, OT Occupational Therapist    Josephine Bonilla, RN Registered Nurse                                 Physical Therapy Education       Title: PT OT SLP Therapies (In Progress)       Topic: Physical Therapy (Not Started)       Point: Mobility training (Not Started)       Learner Progress:  Not documented in this visit.              Point: Home exercise program (Not Started)       Learner Progress:  Not documented in this visit.              Point: Body mechanics (Not Started)       Learner Progress:  Not documented in this visit.              Point: Precautions (Not Started)       Learner Progress:  Not documented in this visit.                                  PT Recommendation and Plan  Planned Therapy Interventions (PT): gait training, home exercise program, patient/family education, stair training, transfer training, ROM (range of motion)  Plan of Care Reviewed With: patient  Outcome Evaluation: Pt is a 58 y.o male admitted to [facility]  with acute CVA after being found down in the bathroom, minimally responsive, R sided weakness. He is s/p mechanical thrombectomy. 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 Strength appears WFL as stroke impairments have resolved. Pt presents with R knee pain and antalgic gait. pt reports knee pain is secondary to positioning of R LE under him when he fell at home. Pt plans to d/c home and will utilize  PT if needed. Pt could benefit from continued PT to address gait training, r knee ROM and ensure return to baseline mobility.     Time Calculation:         PT Charges       Row Name 02/17/24 1438             Time Calculation    Start Time 1100  -KH      Stop Time 1115  -KH      Time Calculation (min) 15 min  -KH      PT Received On 02/17/24  -      PT - Next Appointment 02/19/24  -      PT Goal Re-Cert Due Date 02/24/24  -         Untimed Charges    PT Eval/Re-eval Minutes 15  -KH         Total Minutes    Untimed Charges Total Minutes 15  -KH       Total Minutes 15  -KH                User Key  (r) = Recorded By, (t) = Taken By, (c) = Cosigned By      Initials Name Provider Type    Mirian Delacruz, PT Physical Therapist                  Therapy Charges for Today       Code Description Service Date Service Provider Modifiers Qty    [id number] HC PT EVAL MOD COMPLEXITY 2 2/17/2024 Mirian Medellin, PT GP 1            PT G-Codes  Outcome Measure Options: AM-PAC 6 Clicks Daily Activity (OT), Modified [place]  AM-PAC 6 Clicks Score (PT): 20  AM-PAC 6 Clicks Score (OT): 19  Modified Wendi Scale: 1 - No significant disability despite symptoms.  Able to carry out all usual duties and activities.  PT Discharge Summary  Anticipated Discharge Disposition (PT): home with assist, home with home health    Mirian Medellin, PT  2/17/2024

## 2024-02-17 NOTE — PROGRESS NOTES
"DOS: 2024  NAME: Jhon Munoz   : 1965  PCP: Oneil Decker MD  Chief Complaint   Patient presents with    Neuro Deficit(s)       Chief complaint: stroke  Subjective: Patient new to me.  This is a 58-year-old man with a history of CHF who was brought to the hospital with left NIRMAL and MCA infarcts.  He underwent successful thrombectomy with Dr. Murray.  Today he is mostly back to his neurologic baseline without new complaints.  No previous history of stroke.  No prior history of unprovoked DVT or PE.  No history of A-fib or recent palpitations.    Objective:  Vital signs: /66   Pulse 58   Temp 97.9 °F (36.6 °C) (Oral)   Resp 18   Ht 177.8 cm (70\")   Wt 100 kg (220 lb 14.4 oz)   SpO2 96%   BMI 31.70 kg/m²    Gen: NAD, vitals reviewed  MS: oriented x3, recent/remote memory intact, normal attention/concentration, language intact, no neglect.  CN: visual acuity grossly normal, PERRL, EOMI, no facial droop, no dysarthria  Motor: 5/5 throughout upper and lower extremities, normal tone  Sensory: intact to light touch all 4 ext.    Laboratory results:  Lab Results   Component Value Date    GLUCOSE 90 2024    CALCIUM 8.9 2024     2024    K 4.1 2024    CO2 25.0 2024     2024    BUN 15 2024    CREATININE 1.40 (H) 2024    EGFRIFAFRI 65 2022    BCR 10.7 2024    ANIONGAP 8.0 2024     Lab Results   Component Value Date    WBC 6.63 2024    HGB 12.6 (L) 2024    HCT 39.1 2024    MCV 92.2 2024     2024     Lab Results   Component Value Date    LDL 39 2024    LDL 42 2023    LDL 62 2023         Lab 24  0209   HEMOGLOBIN A1C 5.90*        Review of labs: Creatinine 1.4, hemoglobin 12.6, hemoglobin A1c 5.9%, LDL 39    Review and interpretation of imaging: I personally reviewed his postop MRI which shows very small areas of stroke in the left MCA and NIRMAL territories.  " Radiology reports reviewed.    2D echo showed EF of 40%    CTA showed no evidence of carotid stenosis on the left.    Diagnoses:  Stroke, left MCA and NIRMAL, embolic  Cryptogenic stroke  Nonischemic cardiomyopathy, EF 40%    Comment: Cryptogenic embolic strokes, excellent outcome after thrombectomy.    Plan:  1.  Aspirin, statin  2.  Cardiology consult for possible HEDY.  May need cardiac monitor as well  3.  Q4 neurochecks, downgrade to the floor    Management discussed with Dr. Suárez

## 2024-02-17 NOTE — PLAN OF CARE
Goal Outcome Evaluation:  Plan of Care Reviewed With: patient           Outcome Evaluation: Pt is a 58 y.o male admitted to [facility] with acute CVA after being found down in the bathroom, minimally responsive, R sided weakness. He is s/p mechanical thrombectomy. 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 Strength appears WFL as stroke impairments have resolved. Pt presents with R knee pain and antalgic gait. pt reports knee pain is secondary to positioning of R LE under him when he fell at home. Pt plans to d/c home and will utilize  PT if needed. Pt could benefit from continued PT to address gait training, r knee ROM and ensure return to baseline mobility.      Anticipated Discharge Disposition (PT): home with assist, home with home health

## 2024-02-17 NOTE — PLAN OF CARE
Goal Outcome Evaluation:           Progress: improving       Pt NIH remains a 0 and neuro status remains unchanged. Right groin thrombectomy site clean/dry/intact and no s/s of abnormality. Neurovascular checks stable & unchanged. Pt tolerating room air when awake and home CPAP settings while sleeping. Pt meeting SBP goal <150. Pt tolerating PO intake. No events over PM.

## 2024-02-17 NOTE — PLAN OF CARE
"Goal Outcome Evaluation:  Plan of Care Reviewed With: patient           Outcome Evaluation: Pt is a 58 y.o male admitted to [facility] with acute CVA after being found down in the bathroom, minimally responsive, R sided weakness. He is s/p mechanical thrombectomy. He is typically independent. 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 Pt reports he had a fall \"feels like it was related this this episode (stroke) and he hit his L shoulder. He has had impaired ROM since and can only raise LUE to shoulder level. He is moving RUE without issues today to engage in ADL tasks. He is R handed. When mobilizing around the room pt has difficulty with bearing weight to RLE and reports it is due to his R knee being twisted behind him while he was on the bathroom floor. RN notified. No imaging noted in chart. Pt reports he has also not been to the doctor in regards to his L shoulder. PROM WFL without pain. Pt may need to see ortho MD. Stroke deficits appear to have resolved. Pt may benefit from continued OT while admitted to progress ADL independence/safety in prep to dc home.      Anticipated Discharge Disposition (OT): home                        "

## 2024-02-17 NOTE — THERAPY EVALUATION
Patient Name: Jhon Munoz  : 1965    MRN: [id number]                              Today's Date: 2024       Admit Date: 2024    Visit Dx:     ICD-10-CM ICD-9-CM   1. Acute embolic stroke  I63.9 434.11     Patient Active Problem List   Diagnosis    Essential hypertension    Mixed hyperlipidemia    Low libido    Nonischemic cardiomyopathy    Type 2 diabetes mellitus without complication, without long-term current use of insulin    Renal infarct    Renal calculi    Clinical diagnosis of COVID-19    Diabetes mellitus without complication    Colon polyps    Stage 3a chronic kidney disease    Syncope and collapse    Acute ischemic left middle cerebral artery (MCA) stroke     Past Medical History:   Diagnosis Date    Cardiomyopathy     Colon polyp     Cortical senile cataract 2019    Diabetes mellitus     type 2    Disease of thyroid gland     Hyperlipidemia     Hypertension     Renal calculi     Followed by Dr. Chris Carreon urologist    Renal infarct 2020    Left; noted on CT scan-followed by Dr. Chris Carreon urology    Sleep apnea     CPAP nightly     Past Surgical History:   Procedure Laterality Date    CARDIAC CATHETERIZATION N/A 2020    Procedure: Coronary angiography;  Surgeon: Robert Deluna MD;  Location:  [facility];  Service: Cardiovascular;  Laterality: N/A;    CARDIAC CATHETERIZATION N/A 2020    Procedure: Left Heart Cath;  Surgeon: Robert Deluna MD;  Location:  [facility];  Service: Cardiovascular;  Laterality: N/A;    COLONOSCOPY      COLONOSCOPY N/A 10/18/2022    Procedure: COLONOSCOPY TO CECUM WITH COLD BIOPSY POLYPECTOMY;  Surgeon: Robert Evans MD;  [facility];  Service: Gastroenterology;  Laterality: N/A;  PRE: HX COLON POLYPS  POST: POLYP, DIVERTICULOSIS, HEMORRHOIDS    KNEE SURGERY      meniscus       General Information       Row Name 24 1249          OT Time and Intention    Document Type  evaluation  -     Mode of Treatment occupational therapy;individual therapy  -       Row Name 02/17/24 1249          General Information    Patient Profile Reviewed yes  -     Prior Level of Function independent:;ADL's;all household mobility  -     Existing Precautions/Restrictions fall  -       Row Name 02/17/24 1249          Occupational Profile    Environmental Supports and Barriers (Occupational Profile) Grab bars in [place], no other DME  -       Row Name 02/17/24 1249          Living Environment    People in Home child(santiago), adult;spouse  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  -       Row Name 02/17/24 1249          Home Main Entrance    Number of Stairs, Main Entrance three  -       Row Name 02/17/24 1249          Stairs Within Home, Primary    Number of Stairs, Within Home, Primary none  -       Row Name 02/17/24 1249          Cognition    Orientation Status (Cognition) oriented x 4  -       Row Name 02/17/24 1249          Safety Issues, Functional Mobility    Impairments Affecting Function (Mobility) pain;balance;range of motion (ROM)  -               User Key  (r) = Recorded By, (t) = Taken By, (c) = Cosigned By      Initials Name Provider Type     Ana Rosa Galvez OT Occupational Therapist                     Mobility/ADL's       Row Name 02/17/24 1251          Bed Mobility    Bed Mobility supine-sit  -     Supine-Sit Rowan (Bed Mobility) standby assist  -[facility] Name 02/17/24 1251          Transfers    Transfers sit-stand transfer  -       Row Name 02/17/24 1251          Sit-Stand Transfer    Sit-Stand Rowan (Transfers) contact guard  -[facility] Name 02/17/24 1251          Functional Mobility    Functional Mobility- Ind. Level contact guard assist  -     Functional Mobility- Comment around room to sink and up to bathroom, limping due to R knee pain. Pt declines using rwx for ADLs to assist with balance.  -       Row Name  02/17/24 1251          Activities of Daily Living    BADL Assessment/Intervention lower body dressing;toileting;grooming;feeding  -SM       Row Name 02/17/24 1251          Lower Body Dressing Assessment/Training    [place] Level (Lower Body Dressing) don;socks;standby assist  -     Position (Lower Body Dressing) edge of bed sitting  -SM       Row Name 02/17/24 1251          Toileting Assessment/Training    [place] Level (Toileting) standby assist  -     Assistive Devices (Toileting) grab bar/safety frame  -     Position (Toileting) supported standing  -SM       Row Name 02/17/24 1251          Grooming Assessment/Training    [place] Level (Grooming) oral care regimen;standby assist  -     Position (Grooming) unsupported standing  -SM       Row Name 02/17/24 1251          Self-Feeding Assessment/Training    [place] Level (Feeding) independent  -     Position (Self-Feeding) sitting up in bed  -               User Key  (r) = Recorded By, (t) = Taken By, (c) = Cosigned By      Initials Name Provider Type    SM Ana Rosa Galvez OT Occupational Therapist                   Obj/Interventions       Row Name 02/17/24 1253          Sensory Assessment (Somatosensory)    Sensory Assessment (Somatosensory) UE sensation intact  -SM       Row Name 02/17/24 1253          Vision Assessment/Intervention    Visual Impairment/Limitations WFL  -SM       Row Name 02/17/24 1253          Range of Motion Comprehensive    Comment, General Range of Motion L shoulder AROM limited ~50% flexion, abduction, RUE AROM WFL  -SM       Row Name 02/17/24 1253          Strength Comprehensive (MMT)    Comment, General Manual Muscle Testing (MMT) Assessment L shoulder 2/5, distal L UE 5/5 RUE 5/5  -[facility] 02/17/24 1253          Motor Skills    Motor Skills coordination  -     Coordination WFL  -SM       Row Name 02/17/24 1253          Balance    Balance Assessment sitting static balance;standing static  balance;standing dynamic balance  -SM     Static Sitting Balance independent  -SM     Static Standing Balance standby assist  -SM     Dynamic Standing Balance contact guard  -SM     Position/Device Used, Standing Balance unsupported  -SM               User Key  (r) = Recorded By, (t) = Taken By, (c) = Cosigned By      Initials Name Provider Type    Ana Rosa Rey OT Occupational Therapist                   Goals/Plan       Row Name 02/17/24 1301          Transfer Goal 1 (OT)    Activity/Assistive Device (Transfer Goal 1, OT) toilet  -SM     [place] Level/Cues Needed (Transfer Goal 1, OT) modified independence  -SM     Time Frame (Transfer Goal 1, OT) short term goal (STG);2 weeks  -SM     Progress/Outcome (Transfer Goal 1, OT) goal ongoing  -       Row Name 02/17/24 1301          Bathing Goal 1 (OT)    Activity/Device (Bathing Goal 1, OT) bathing skills, all  -SM     [place] Level/Cues Needed (Bathing Goal 1, OT) modified independence  -SM     Time Frame (Bathing Goal 1, OT) short term goal (STG);2 weeks  -SM     Strategies/Barriers (Bathing Goal 1, OT) standing in shower  -SM     Progress/Outcomes (Bathing Goal 1, OT) goal ongoing  -       Row Name 02/17/24 1301          Strength Goal 1 (OT)    Strength Goal 1 (OT) L shoulder AAROM stretching HEP to increase reach for ADLs.  -SM     Time Frame (Strength Goal 1, OT) short term goal (STG);2 weeks  -SM     Progress/Outcome (Strength Goal 1, OT) goal ongoing  -       Row Name 02/17/24 1301          Therapy Assessment/Plan (OT)    Planned Therapy Interventions (OT) activity tolerance training;functional balance retraining;occupation/activity based interventions;ROM/therapeutic exercise;transfer/mobility retraining;patient/caregiver education/training  -SM               User Key  (r) = Recorded By, (t) = Taken By, (c) = Cosigned By      Initials Name Provider Type    Ana Rosa Rey OT Occupational Therapist                   Clinical  "Impression       Row Name 02/17/24 1257          Pain Assessment    Pretreatment Pain Rating 5/10  -     Posttreatment Pain Rating 5/10  -     Pain Location - Side/Orientation Right  -     Pain Location - knee  -     Pre/Posttreatment Pain Comment encouraged ice pack, pt declines  -     Pain Intervention(s) Repositioned;Nursing Notified  -       Row Name 02/17/24 1250          Plan of Care Review    Plan of Care Reviewed With patient  -     Outcome Evaluation Pt is a 58 y.o male admitted to [facility] with acute CVA after being found down in the bathroom, minimally responsive, R sided weakness. He is s/p mechanical thrombectomy. He is typically independent. 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 Pt reports he had a fall \"feels like it was related this this episode (stroke) and he hit his L shoulder. He has had impaired ROM since and can only raise LUE to shoulder level. He is moving RUE without issues today to engage in ADL tasks. He is R handed. When mobilizing around the room pt has difficulty with bearing weight to RLE and reports it is due to his R knee being twisted behind him while he was on the bathroom floor. RN notified. No imaging noted in chart. Pt reports he has also not been to the doctor in regards to his L shoulder. PROM WFL without pain. Pt may need to see ortho MD. Stroke deficits appear to have resolved. Pt may benefit from continued OT while admitted to progress ADL independence/safety in prep to dc home.  -       Row Name 02/17/24 1251          Therapy Assessment/Plan (OT)    Rehab Potential (OT) good, to achieve stated therapy goals  -     Criteria for Skilled Therapeutic Interventions Met (OT) yes;skilled treatment is necessary  -     Therapy Frequency (OT) 3 times/wk  -       Row Name 02/17/24 1252          Therapy Plan Review/Discharge Plan (OT)    Anticipated Discharge Disposition (OT) home  -       Row Name 02/17/24 " 1254          Positioning and Restraints    Pre-Treatment Position in bed  -SM     Post Treatment Position chair  -SM     In Chair sitting;call light within reach;with family/caregiver;not[place] [facility]  -               User Key  (r) = Recorded By, (t) = Taken By, (c) = Cosigned By      Initials Name Provider Type    Ana Rosa Rey, OT Occupational Therapist                   Outcome Measures       Row Name 02/17/24 1302          How much help from another is currently needed...    Putting on and taking off regular lower body clothing? 3  -SM     Bathing (including washing, rinsing, and drying) 3  -SM     Toileting (which includes using toilet bed pan or urinal) 3  -SM     Putting on and taking off regular upper body clothing 3  -SM     Taking care of personal grooming (such as brushing teeth) 3  -SM     Eating meals 4  -SM     AM-PAC 6 Clicks Score (OT) 19  -       Row Name 02/17/24 0800 02/17/24 0400       How much help from another person do you currently need...    Turning from your back to your side while in flat bed without using bedrails? 4  -LR 4  -KM    Moving from lying on back to sitting on the side of a flat bed without bedrails? 4  -LR 4  -KM    Moving to and from a bed to a chair (including a wheelchair)? 3  -LR 3  -KM    Standing up from a chair using your arms (e.g., wheelchair, bedside chair)? 3  -LR 3  -KM    Climbing 3-5 steps with a railing? 3  -LR 3  -KM    To walk in hospital room? 3  -LR 3  -KM    AM-PAC 6 Clicks Score (PT) 20  -LR 20  -KM    Highest Level of Mobility Goal 6 --> Walk 10 steps or more  -LR 6 --> Walk 10 steps or more  -KM      Row Name 02/17/24 1302          Modified McClure Scale    Modified McClure Scale 1 - No significant disability despite symptoms.  Able to carry out all usual duties and activities.  -       Row Name 02/17/24 1302          Functional Assessment    Outcome Measure Options AM-PAC 6 Clicks Daily Activity (OT);Modified McClure  -               User Michelle   (r) = Recorded By, (t) = Taken By, (c) = Cosigned By      Initials Name Provider Type     Ana Rosa Galvez OT Occupational Therapist    Josephine Bonilla, RN Registered Nurse    Mirian Ceron RN Registered Nurse                    Occupational Therapy Education       Title: PT OT SLP Therapies (In Progress)       Topic: Occupational Therapy (In Progress)       Point: ADL training (Not Started)       Description:   Instruct learner(s) on proper safety adaptation and remediation techniques during self care or transfers.   Instruct in proper use of assistive devices.                  Learner Progress:  Not documented in this visit.              Point: Home exercise program (Done)       Description:   Instruct learner(s) on appropriate technique for monitoring, assisting and/or progressing therapeutic exercises/activities.                  Learning Progress Summary             Patient Acceptance, E,TB, VU by  at 2/17/2024 1304    Comment: a few basic stretches ROM for L shoulder                         Point: Precautions (Not Started)       Description:   Instruct learner(s) on prescribed precautions during self-care and functional transfers.                  Learner Progress:  Not documented in this visit.              Point: Body mechanics (Not Started)       Description:   Instruct learner(s) on proper positioning and spine alignment during self-care, functional mobility activities and/or exercises.                  Learner Progress:  Not documented in this visit.                              User Key       Initials Effective Dates Name Provider Type Discipline     04/02/20 -  Ana Rosa Galvez OT Occupational Therapist JULIAN                  OT Recommendation and Plan  Planned Therapy Interventions (OT): activity tolerance training, functional balance retraining, occupation/activity based interventions, ROM/therapeutic exercise, transfer/mobility retraining, patient/caregiver education/training  Therapy  "Frequency (OT): 3 times/wk  Plan of Care Review  Plan of Care Reviewed With: patient  Outcome Evaluation: Pt is a 58 y.o male admitted to [facility] with acute CVA after being found down in the bathroom, minimally responsive, R sided weakness. He is s/p mechanical thrombectomy. He is typically independent. 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 Pt reports he had a fall \"feels like it was related this this episode (stroke) and he hit his L shoulder. He has had impaired ROM since and can only raise LUE to shoulder level. He is moving RUE without issues today to engage in ADL tasks. He is R handed. When mobilizing around the room pt has difficulty with bearing weight to RLE and reports it is due to his R knee being twisted behind him while he was on the bathroom floor. RN notified. No imaging noted in chart. Pt reports he has also not been to the doctor in regards to his L shoulder. PROM WFL without pain. Pt may need to see ortho MD. Stroke deficits appear to have resolved. Pt may benefit from continued OT while admitted to progress ADL independence/safety in prep to MA home.     Time Calculation:   Evaluation Complexity (OT)  Review Occupational Profile/Medical/Therapy History Complexity: expanded/moderate complexity  Assessment, Occupational Performance/Identification of Deficit Complexity: 3-5 performance deficits  Clinical Decision Making Complexity (OT): detailed assessment/moderate complexity  Overall Complexity of Evaluation (OT): moderate complexity     Time Calculation- OT       Row Name 02/17/24 1308             Time Calculation- OT    OT Start Time 0820  -      OT Stop Time 0845  -      OT Time Calculation (min) 25 min  -      Total Timed Code Minutes- OT 15 minute(s)  -      OT Received On 02/17/24  -      OT - Next Appointment 02/19/24  -      OT Goal Re-Cert Due Date 03/02/24  -         Timed Charges    17270 - OT Self Care/Mgmt Minutes 15  " -SM         Untimed Charges    OT Eval/Re-eval Minutes 10  -SM         Total Minutes    Timed Charges Total Minutes 15  -SM      Untimed Charges Total Minutes 10  -SM       Total Minutes 25  -SM                User Key  (r) = Recorded By, (t) = Taken By, (c) = Cosigned By      Initials Name Provider Type     Ana Rosa Galvez OT Occupational Therapist                  Therapy Charges for Today       Code Description Service Date Service Provider Modifiers Qty    [id number] HC OT SELF CARE/MGMT/TRAIN EA 15 MIN 2/17/2024 Ana Rosa Galvez OT GO 1    [id number]  OT EVAL MOD COMPLEXITY 3 2/17/2024 Ana Rosa Galvez OT GO 1                 Ana Rosa Galvez OT  2/17/2024

## 2024-02-17 NOTE — PLAN OF CARE
Goal Outcome Evaluation:         Pt admitted this am @ approx 0700 after thrombectomy. NIHSS 0. Echo, CT head, and MRI brain done this shift. No changes in neuro status. BP remains < 140, no prn meds needed. Taking po diet without difficulty. Wife @ BSD.

## 2024-02-17 NOTE — PROGRESS NOTES
[facility] INTERVENTIONAL NEUROSURGERY PROGRESS NOTE    PATIENT IDENTIFICATION:   Name:  Jhon Munoz      MRN:  [id number]     58 y.o.  male               CC: Left NIRMAL stroke s/p mechanical thrombectomy      Subjective     Interval History: No acute distress.  Sitting in chair with wife at bedside.  Is not able to identify any deficits.  No pain or numbness around right groin incision site or right leg        Objective     Vital signs in last 24 hours:  Temp:  [97.9 °F (36.6 °C)-98.7 °F (37.1 °C)] 98.3 °F (36.8 °C)  Heart Rate:  [57-94] 77  Resp:  [10-20] 18  BP: (107-140)/(74-97) 117/74      Intake/Output this shift:  I/O this shift:  In: 250 [P.O.:250]  Out: 400 [Urine:400]      Intake/Output last 3 shifts:  I/O last 3 completed shifts:  In: 1360 [P.O.:1300; I.V.:60]  Out: 2125 [Urine:2125]    LABS:  Results from last 7 days   Lab Units 02/17/24  0209 02/16/24  0432   WBC 10*3/mm3 6.63 5.26   HEMOGLOBIN g/dL 12.6* 13.2   HEMATOCRIT % 39.1 40.7   PLATELETS 10*3/mm3 183 183      Results from last 7 days   Lab Units 02/17/24  0209 02/16/24  0432   SODIUM mmol/L 137 141   POTASSIUM mmol/L 4.1 4.2   CHLORIDE mmol/L 104 108*   CO2 mmol/L 25.0 19.3*   BUN mg/dL 15 15   CREATININE mg/dL 1.40* 1.07   GLUCOSE mg/dL 90 92   CALCIUM mg/dL 8.9 8.4*         IMAGING STUDIES:  CT head: No acute intracranial hemorrhage.  Cerebellar tonsils extend approximately 6 to 7 mm below foramen magnum as seen on prior imaging.  Area of acute infarction within the left anterior cerebellar artery territory seen on recent MRI is not well-visualized on CT.  No midline shift or hydrocephalus.    MRI brain: Small to moderate sized area of acute infarction within the left  anterior cerebral artery territory. Moderate to extensive chronic  ischemic white matter changes. Area of encephalomalacia within the right  parietal lobe. Cerebellar tonsils extend approximately 6 to 7 mm below the foramen magnum.    I personally viewed and interpreted the  patient's labs, imaging, chart with Dr. Stewart.    Meds reviewed/changed: Yes    Current Facility-Administered Medications:     acetaminophen (TYLENOL) tablet 650 mg, 650 mg, Oral, Q4H PRN **OR** acetaminophen (TYLENOL) suppository 650 mg, 650 mg, Rectal, Q4H PRN, Amarilis Avery APRN    aspirin chewable tablet 81 mg, 81 mg, Oral, Daily, 81 mg at 02/17/24 0822 **OR** [DISCONTINUED] aspirin suppository 300 mg, 300 mg, Rectal, Daily, Amarilis Avery APRN    atorvastatin (LIPITOR) tablet 80 mg, 80 mg, Oral, Nightly, Amarilis Avery APRN, 80 mg at 02/16/24 2014    sennosides-docusate (PERICOLACE) 8.6-50 MG per tablet 2 tablet, 2 tablet, Oral, BID, 2 tablet at 02/17/24 0822 **AND** polyethylene glycol (MIRALAX) packet 17 g, 17 g, Oral, Daily PRN **AND** bisacodyl (DULCOLAX) EC tablet 5 mg, 5 mg, Oral, Daily PRN **AND** bisacodyl (DULCOLAX) suppository 10 mg, 10 mg, Rectal, Daily PRN, Amarilis Avery APRN    Calcium Replacement - Follow Nurse / BPA Driven Protocol, , Does not apply, PRN, Amarilis Avery APRN    influenza vac split quad (FLUZONE,FLUARIX,AFLURIA,FLULAVAL) injection 0.5 mL, 0.5 mL, Intramuscular, During Hospitalization, Amarilis Avery APRN    labetalol (NORMODYNE,TRANDATE) injection 20 mg, 20 mg, Intravenous, Q2H PRN, Amarilis Avery APRN    levothyroxine (SYNTHROID, LEVOTHROID) tablet 25 mcg, 25 mcg, Oral, Daily, Amarilis Avery APRN, 25 mcg at 02/17/24 0822    Magnesium Standard Dose Replacement - Follow Nurse / BPA Driven Protocol, , Does not apply, PRN, Amarilis Avery APRN    nitroglycerin (NITROSTAT) SL tablet 0.4 mg, 0.4 mg, Sublingual, Q5 Min PRN, Amarilis Avery, TERRI    ondansetron (ZOFRAN) injection 4 mg, 4 mg, Intravenous, Q6H PRN, Amarilis Avery, TERRI    Phosphorus Replacement - Follow Nurse / BPA Driven Protocol, , Does not apply, PRN, Amarilis Avery, TERRI    Potassium Replacement - Follow Nurse / BPA Driven Protocol, , Does not apply, PRN, Amarilis Avery APRN    sodium  chloride 0.9 % flush 10 mL, 10 mL, Intravenous, PRN, Amarilis Avery, APRN    sodium chloride 0.9 % flush 10 mL, 10 mL, Intravenous, Q12H, Amarilis Avery APRN, 10 mL at 02/17/24 0824    sodium chloride 0.9 % flush 10 mL, 10 mL, Intravenous, PRN, Amarilis Avery, TERRI    sodium chloride 0.9 % infusion 40 mL, 40 mL, Intravenous, PRN, Amarilis Avery APRN     Physical Exam:    General:   Awake, alert, oriented x3. Speech clear with no aphasia.  No acute distress    CN II:    Visual fields full to confrontation  CN III IV VI:   Extraocular movements are full , PERRL   CN VII:   Facial movements are symmetric. No weakness.  Motor:    No drift. Normal muscle strength, bulk and tone in upper and lower extremities.  No fasciculations, rigidity, spasticity, or abnormal movements.    Sensation:   Normal to light touch; no extinction    Coordination:   Finger-to-nose and heel-to-shin test shows no dysmetria.  Rapid alternating movements are normal.  Extremities:   Wearing SCD. Groin site with CDI dressing. No hematoma or bruising. Distal pulses 2+. Skin brown, warm and dry    Assessment & Plan     ASSESSMENT: Acute NIRMAL stroke s/p mechanical thrombectomy.  Occluded left A2 segment.  TICI 3.  Patient is awake and alert without any identifiable deficits.  Follow-up imaging stable.  No further neurosurgery recommendations.  Feel free to contact us with any questions or concerns.      Ischemic stroke      PLAN:     -Okay to remove right groin pressure dressing 48 hours postop  -No outpatient follow-up  -Neurology following      Defer to neurology for stroke work-up and management      I discussed the patient's findings and my recommendations with patient, family, nursing staff, Dr. Murray, and Dr. Stewart.    During patient visit, I utilized appropriate personal protective equipment including mask and gloves.  Mask used was standard procedure mask. Appropriate PPE was worn during the entire visit.  Hand hygiene was completed  "before and after.      LOS: 1 day       Jaz Keane, APRN  2/17/2024  10:04 EST    \"Dictated utilizing Dragon dictation\".     "

## 2024-02-18 LAB
ANION GAP SERPL CALCULATED.3IONS-SCNC: 11.9 MMOL/L (ref 5–15)
BASOPHILS # BLD AUTO: 0.03 10*3/MM3 (ref 0–0.2)
BASOPHILS NFR BLD AUTO: 0.5 % (ref 0–1.5)
BUN SERPL-MCNC: 15 MG/DL (ref 8–23)
BUN/CREAT SERPL: 11.9 (ref 7–25)
CALCIUM SPEC-SCNC: 9 MG/DL (ref 8.6–10.5)
CARDIOLIPIN IGG SER IA-ACNC: <9 GPL U/ML (ref 0–14)
CARDIOLIPIN IGM SER IA-ACNC: <9 MPL U/ML (ref 0–12)
CHLORIDE SERPL-SCNC: 105 MMOL/L (ref 98–107)
CO2 SERPL-SCNC: 24.1 MMOL/L (ref 22–29)
CREAT BLDA-MCNC: 1.4 MG/DL (ref 0.6–1.3)
CREAT SERPL-MCNC: 1.26 MG/DL (ref 0.76–1.27)
DEPRECATED RDW RBC AUTO: 44.8 FL (ref 37–54)
EGFRCR SERPLBLD CKD-EPI 2021: 65.3 ML/MIN/1.73
EOSINOPHIL # BLD AUTO: 0.27 10*3/MM3 (ref 0–0.4)
EOSINOPHIL NFR BLD AUTO: 4.8 % (ref 0.3–6.2)
ERYTHROCYTE [DISTWIDTH] IN BLOOD BY AUTOMATED COUNT: 13.1 % (ref 12.3–15.4)
FACT V ACT/NOR PPP: 102 % (ref 70–150)
GLUCOSE BLDC GLUCOMTR-MCNC: 108 MG/DL (ref 70–130)
GLUCOSE BLDC GLUCOMTR-MCNC: 112 MG/DL (ref 70–130)
GLUCOSE BLDC GLUCOMTR-MCNC: 83 MG/DL (ref 70–130)
GLUCOSE BLDC GLUCOMTR-MCNC: 87 MG/DL (ref 70–130)
GLUCOSE SERPL-MCNC: 99 MG/DL (ref 65–99)
HCT VFR BLD AUTO: 41.5 % (ref 37.5–51)
HGB BLD-MCNC: 13.6 G/DL (ref 13–17.7)
IMM GRANULOCYTES # BLD AUTO: 0.01 10*3/MM3 (ref 0–0.05)
IMM GRANULOCYTES NFR BLD AUTO: 0.2 % (ref 0–0.5)
LYMPHOCYTES # BLD AUTO: 2.05 10*3/MM3 (ref 0.7–3.1)
LYMPHOCYTES NFR BLD AUTO: 36.3 % (ref 19.6–45.3)
MAGNESIUM SERPL-MCNC: 2 MG/DL (ref 1.6–2.4)
MAGNESIUM SERPL-MCNC: 2.3 MG/DL (ref 1.6–2.4)
MCH RBC QN AUTO: 30.6 PG (ref 26.6–33)
MCHC RBC AUTO-ENTMCNC: 32.8 G/DL (ref 31.5–35.7)
MCV RBC AUTO: 93.5 FL (ref 79–97)
MONOCYTES # BLD AUTO: 0.49 10*3/MM3 (ref 0.1–0.9)
MONOCYTES NFR BLD AUTO: 8.7 % (ref 5–12)
NEUTROPHILS NFR BLD AUTO: 2.79 10*3/MM3 (ref 1.7–7)
NEUTROPHILS NFR BLD AUTO: 49.5 % (ref 42.7–76)
NRBC BLD AUTO-RTO: 0 /100 WBC (ref 0–0.2)
PLATELET # BLD AUTO: 172 10*3/MM3 (ref 140–450)
PMV BLD AUTO: 11.3 FL (ref 6–12)
POTASSIUM SERPL-SCNC: 4.1 MMOL/L (ref 3.5–5.2)
RBC # BLD AUTO: 4.44 10*6/MM3 (ref 4.14–5.8)
SODIUM SERPL-SCNC: 141 MMOL/L (ref 136–145)
WBC NRBC COR # BLD AUTO: 5.64 10*3/MM3 (ref 3.4–10.8)

## 2024-02-18 PROCEDURE — 82948 REAGENT STRIP/BLOOD GLUCOSE: CPT

## 2024-02-18 PROCEDURE — 83735 ASSAY OF MAGNESIUM: CPT | Performed by: INTERNAL MEDICINE

## 2024-02-18 PROCEDURE — 85025 COMPLETE CBC W/AUTO DIFF WBC: CPT

## 2024-02-18 PROCEDURE — 83735 ASSAY OF MAGNESIUM: CPT

## 2024-02-18 PROCEDURE — 99222 1ST HOSP IP/OBS MODERATE 55: CPT | Performed by: INTERNAL MEDICINE

## 2024-02-18 PROCEDURE — 99233 SBSQ HOSP IP/OBS HIGH 50: CPT | Performed by: PHYSICIAN ASSISTANT

## 2024-02-18 PROCEDURE — 80048 BASIC METABOLIC PNL TOTAL CA: CPT

## 2024-02-18 RX ADMIN — LEVOTHYROXINE SODIUM 25 MCG: 25 TABLET ORAL at 08:32

## 2024-02-18 RX ADMIN — ATORVASTATIN CALCIUM 80 MG: 80 TABLET, FILM COATED ORAL at 22:29

## 2024-02-18 RX ADMIN — Medication 10 ML: at 08:32

## 2024-02-18 RX ADMIN — Medication 10 ML: at 22:29

## 2024-02-18 RX ADMIN — ASPIRIN 81 MG: 81 TABLET, CHEWABLE ORAL at 08:32

## 2024-02-18 NOTE — CONSULTS
Date of Hospital Visit: 2024  Date of consult: 2024  Encounter Provider: Geovanny Barrera MD  Place of Service: [facility] CARDIOLOGY  Patient Name: Jhon Munoz  :1965  Referral Provider: No ref. provider found    Chief complaint: Acute CVA    Reason for consult: Rule out cardioembolic source    History of Present Illness  Mr. Munoz was admitted on 2024 with diagnosis of acute CVA following presentation with significant right hemineglect and density hemiaplasia  treated successfully with catheter-based mechanical thrombectomy of occluded left A2 segment by Dr. Dobson.  Patient was out of the window for tPA.  Almost back to baseline neurologic status.  He denies any significant prior palpitations or change in his breathing recently.  Takes aspirin faithfully every day.    Past Medical History:   Diagnosis Date    Cardiomyopathy     Colon polyp     Cortical senile cataract 2019    Diabetes mellitus     type 2    Disease of thyroid gland     Hyperlipidemia     Hypertension     Renal calculi     Followed by Dr. Chris Carreon urologist    Renal infarct 2020    Left; noted on CT scan-followed by Dr. Crhis Carreon urology    Sleep apnea     CPAP nightly       Past Surgical History:   Procedure Laterality Date    CARDIAC CATHETERIZATION N/A 2020    Procedure: Coronary angiography;  Surgeon: Robert Deluna MD;  Location: [facility] CATH INVASIVE LOCATION;  Service: Cardiovascular;  Laterality: N/A;    CARDIAC CATHETERIZATION N/A 2020    Procedure: Left Heart Cath;  Surgeon: Robert Deluna MD;  Location: [facility] CATH INVASIVE LOCATION;  Service: Cardiovascular;  Laterality: N/A;    COLONOSCOPY      COLONOSCOPY N/A 10/18/2022    Procedure: COLONOSCOPY TO CECUM WITH COLD BIOPSY POLYPECTOMY;  Surgeon: Robert Evans MD;  Location: [facility] ENDOSCOPY;  Service: Gastroenterology;  Laterality: N/A;  PRE: HX COLON POLYPS  POST: POLYP, DIVERTICULOSIS,  HEMORRHOIDS    KNEE SURGERY      meniscus        Medications Prior to Admission   Medication Sig Dispense Refill Last Dose    aspirin 81 MG chewable tablet Chew 1 tablet Daily.       carvedilol (COREG) 25 MG tablet Take 1 tablet by mouth 2 (Two) Times a Day. 180 tablet 3     empagliflozin (Jardiance) 25 MG tablet tablet Take 1 tablet by mouth Daily. 90 tablet 1     Entresto 49-51 MG tablet TAKE 1 TABLET BY MOUTH TWICE A  tablet 1     esomeprazole (nexIUM) 40 MG capsule Take 1 capsule by mouth Every Morning Before Breakfast. prn 30 capsule 12     levothyroxine (SYNTHROID, LEVOTHROID) 25 MCG tablet Take 1 tablet by mouth Daily. 90 tablet 2     rosuvastatin (CRESTOR) 10 MG tablet Take 1 tablet by mouth Daily. 90 tablet 3     Semaglutide,0.25 or 0.5MG/DOS, (Ozempic, 0.25 or 0.5 MG/DOSE,) 2 MG/1.5ML solution pen-injector Inject 0.5 mg under the skin into the appropriate area as directed 1 (One) Time Per Week. 6 mL 1     spironolactone (ALDACTONE) 25 MG tablet Take 1 tablet by mouth Daily. 1 in the am and 1/2 in pm          Current Meds  Scheduled Meds:aspirin, 81 mg, Oral, Daily  atorvastatin, 80 mg, Oral, Nightly  insulin lispro, 2-7 Units, Subcutaneous, 4x Daily AC & at Bedtime  levothyroxine, 25 mcg, Oral, Daily  senna-docusate sodium, 2 tablet, Oral, BID  sodium chloride, 10 mL, Intravenous, Q12H      Continuous Infusions:   PRN Meds:.  acetaminophen **OR** acetaminophen    senna-docusate sodium **AND** polyethylene glycol **AND** bisacodyl **AND** bisacodyl    Calcium Replacement - Follow Nurse / BPA Driven Protocol    dextrose    dextrose    glucagon (human recombinant)    influenza vaccine    labetalol    Magnesium Standard Dose Replacement - Follow Nurse / BPA Driven Protocol    nitroglycerin    ondansetron    Phosphorus Replacement - Follow Nurse / BPA Driven Protocol    Potassium Replacement - Follow Nurse / BPA Driven Protocol    sodium chloride    sodium chloride    sodium chloride    Allergies as of  "02/16/2024    (No Known Allergies)       Social History     Socioeconomic History    Marital status:      Spouse name: Jyoti   Tobacco Use    Smoking status: Never     Passive exposure: Never    Smokeless tobacco: Never   Vaping Use    Vaping Use: Never used   Substance and Sexual Activity    Alcohol use: Yes     Types: 1 Cans of beer, 1 Shots of liquor per week     Comment: Caffeine use: 1 cup daily    Drug use: Never    Sexual activity: Defer       Family History   Problem Relation Age of Onset    Hypertension Mother        REVIEW OF SYSTEMS:   All systems reviewed and pertinent positives include in HPI otherwise negative review of systems.       Objective:   Temp:  [97.7 °F (36.5 °C)-98.4 °F (36.9 °C)] 97.7 °F (36.5 °C)  Heart Rate:  [58-98] 77  Resp:  [18] 18  BP: (101-124)/(64-87) 112/69  Body mass index is 31.7 kg/m².  Flowsheet Rows      Flowsheet Row First Filed Value   Admission Height 170.2 cm (67\") Documented at 02/16/2024 0507   Admission Weight 103 kg (227 lb 11.8 oz) Documented at 02/16/2024 0427          Vitals:    02/18/24 1010   BP: 112/69   Pulse: 77   Resp: 18   Temp: 97.7 °F (36.5 °C)   SpO2: 98%       General Appearance:    Alert, cooperative, in no acute distress   Head:    Normocephalic, without obvious abnormality, atraumatic   Eyes:            Lids and lashes normal, conjunctivae and sclerae normal, no   icterus, no pallor, corneas clear, PERRLA   Ears:    Ears appear intact with no abnormalities noted   Throat:   No oral lesions, no thrush, oral mucosa moist   Neck:   No adenopathy, supple, trachea midline, no thyromegaly, no   carotid bruit, no JVD   Back:     No kyphosis present, no scoliosis present, no skin lesions, erythema or scars, no tenderness to percussion or palpation, range of motion normal   Lungs:     Clear to auscultation,respirations regular, even and unlabored    Heart:    Regular rhythm and normal rate, normal S1 and S2, no murmur, no gallop, no rub, no click "   Chest Wall:    No abnormalities observed   Abdomen:     Normal bowel sounds, no masses, no organomegaly, soft nontender, nondistended, no guarding, no rebound  tenderness   Extremities:   Moves all extremities well, no edema, no cyanosis, no redness   Pulses:   Pulses palpable and equal bilaterally. Normal radial, carotid, femoral, dorsalis pedis and posterior tibial pulses bilaterally. Normal abdominal aorta   Skin:  Neurology:   Psychiatric:   No bleeding, bruising or rash   Normal speech and cranial nerve exam, no focal deficit   Alert and oriented x 3, normal mood and affect             Review of Data:      Results from last 7 days   Lab Units 02/18/24 0419 02/16/24 0437 02/16/24 0432   SODIUM mmol/L 141   < > 141   POTASSIUM mmol/L 4.1   < > 4.2   CHLORIDE mmol/L 105   < > 108*   CO2 mmol/L 24.1   < > 19.3*   BUN mg/dL 15   < > 15   CREATININE mg/dL 1.26   < > 1.07   CALCIUM mg/dL 9.0   < > 8.4*   BILIRUBIN mg/dL  --   --  0.3   ALK PHOS U/L  --   --  51   ALT (SGPT) U/L  --   --  18   AST (SGOT) U/L  --   --  23   GLUCOSE mg/dL 99   < > 92    < > = values in this interval not displayed.     Results from last 7 days   Lab Units 02/16/24 0432   HSTROP T ng/L 13     @LABRCNTbnp@  Results from last 7 days   Lab Units 02/18/24 0419 02/17/24  0209 02/16/24 0432   WBC 10*3/mm3 5.64 6.63 5.26   HEMOGLOBIN g/dL 13.6 12.6* 13.2   HEMATOCRIT % 41.5 39.1 40.7   PLATELETS 10*3/mm3 172 183 183     Results from last 7 days   Lab Units 02/16/24 0432   INR  1.03   APTT seconds 25.3     Results from last 7 days   Lab Units 02/18/24 0419   MAGNESIUM mg/dL 2.3     @LABRCNTIP(chol,trig,hdl,ldl)        I personally viewed and interpreted the patient's EKG/Telemetry data  )   Acute ischemic left middle cerebral artery (MCA) stroke    Essential hypertension    Mixed hyperlipidemia    Nonischemic cardiomyopathy    Type 2 diabetes mellitus without complication, without long-term current use of insulin    Stage 3a chronic  kidney disease    OCHOA on CPAP        Assessment and Plan:    Acute CVA status post mechanical thrombectomy with good results.  Suspected cardioembolic source.  Echocardiogram without LV thrombus or obvious valvular masses.  Proceed with HEDY in a.m.  History of nonischemic cardiomyopathy with left ventricular ejection fraction of 41-45%.  No acute change.  Euvolemic on exam.  Essential hypertension that is fairly controlled    HEDY in a.m.  Patient is agreeable with plan.    Geovanny Barrera MD  02/18/24  12:16 EST.      Time spent in reviewing chart, discussion and examination:

## 2024-02-18 NOTE — PROGRESS NOTES
Name: Jhon Munoz ADMIT: 2024   : 1965  PCP: Oneil Decker MD    MRN: [id number] LOS: 2 days   AGE/SEX: 58 y.o. male  ROOM: [facility]     Subjective   Subjective   No acute events. Patient denies new complaints. Taking PO. No CP/dyspnea/f/c/n/v/d.  No family at bedside.    Objective   Objective   Vital Signs  Temp:  [97.7 °F (36.5 °C)-98.4 °F (36.9 °C)] 97.7 °F (36.5 °C)  Heart Rate:  [58-98] 77  Resp:  [18] 18  BP: (101-124)/(64-87) 112/69  SpO2:  [71 %-100 %] 98 %  on   ;   Device (Oxygen Therapy): room air  Body mass index is 31.7 kg/m².  Physical Exam  Vitals and nursing note reviewed.   Constitutional:       General: He is not in acute distress.     Appearance: He is not toxic-appearing.   HENT:      Head: Normocephalic and atraumatic.      Nose: Nose normal.      Mouth/Throat:      Mouth: Mucous membranes are moist.      Pharynx: Oropharynx is clear.   Eyes:      Conjunctiva/sclera: Conjunctivae normal.      Pupils: Pupils are equal, round, and reactive to light.   Cardiovascular:      Rate and Rhythm: Normal rate and regular rhythm.      Pulses: Normal pulses.   Pulmonary:      Effort: Pulmonary effort is normal.   Abdominal:      General: Bowel sounds are normal.      Palpations: Abdomen is soft.      Tenderness: There is no abdominal tenderness.   Musculoskeletal:         General: No swelling or tenderness.      Cervical back: Neck supple.   Skin:     General: Skin is warm and dry.      Capillary Refill: Capillary refill takes less than 2 seconds.   Neurological:      General: No focal deficit present.      Mental Status: He is alert and oriented to person, place, and time.   Psychiatric:         Mood and Affect: Mood normal.         Behavior: Behavior normal.       Results Review     I reviewed the patient's new clinical results.  Results from last 7 days   Lab Units 24  0419 24  0209 24  0432   WBC 10*3/mm3 5.64 6.63 5.26   HEMOGLOBIN g/dL 13.6 12.6* 13.2   PLATELETS  10*3/mm3 172 183 183     Results from last 7 days   Lab Units 02/18/24  0419 02/17/24  0209 02/16/24  0437 02/16/24  0432   SODIUM mmol/L 141 137  --  141   POTASSIUM mmol/L 4.1 4.1  --  4.2   CHLORIDE mmol/L 105 104  --  108*   CO2 mmol/L 24.1 25.0  --  19.3*   BUN mg/dL 15 15  --  15   CREATININE mg/dL 1.26 1.40* 1.40* 1.07   GLUCOSE mg/dL 99 90  --  92   EGFR mL/min/1.73 65.3 57.5*  --  79.4     Results from last 7 days   Lab Units 02/16/24  0432   ALBUMIN g/dL 3.9   BILIRUBIN mg/dL 0.3   ALK PHOS U/L 51   AST (SGOT) U/L 23   ALT (SGPT) U/L 18     Results from last 7 days   Lab Units 02/18/24  0419 02/17/24  0209 02/16/24  0432   CALCIUM mg/dL 9.0 8.9 8.4*   ALBUMIN g/dL  --   --  3.9   MAGNESIUM mg/dL 2.3 2.1  --        Hemoglobin A1C   Date/Time Value Ref Range Status   02/17/2024 0209 5.90 (H) 4.80 - 5.60 % Final     Glucose   Date/Time Value Ref Range Status   02/18/2024 1158 87 70 - 130 mg/dL Final   02/18/2024 0758 108 70 - 130 mg/dL Final   02/17/2024 2124 101 70 - 130 mg/dL Final   02/17/2024 1532 85 70 - 130 mg/dL Final   02/17/2024 1117 92 70 - 130 mg/dL Final   02/17/2024 0743 115 70 - 130 mg/dL Final   02/17/2024 0346 107 70 - 130 mg/dL Final       CT Head Without Contrast    Result Date: 2/16/2024  FINDINGS AND IMPRESSION: No findings of definite acute intracranial hemorrhage are seen; however, please note the presence of presumed intracranial contrast from recent angiogram limits evaluation. The cerebellar tonsils extend approximately 6 to 7 mm below the foramen magnum. The area of acute infarction within the left anterior cerebral artery territory seen on recent MRI are not well visualized on CT and please refer to MRI 2/16/2024 for further information. Hypodensity within the periventricular and subcortical white matter likely represents moderate to extensive chronic ischemic small vessel degenerative changes. Area of encephalomalacia within the right parietal lobe, as before.  There is no midline  shift or hydrocephalus.      This report was finalized on 2/16/2024 7:08 PM by Dr. Ivan Thacker M.D on Workstation: [id number]      MRI Brain Without Contrast    Result Date: 2/16/2024  1.  Small to moderate sized area of acute infarction within the left anterior cerebral artery territory. Moderate to extensive chronic ischemic white matter changes. Area of encephalomalacia within the right parietal lobe. 2.  Cerebellar tonsils extend approximately 6 to 7 mm below the foramen magnum. 3.  Other findings as above.    This report was finalized on 2/16/2024 6:16 PM by Dr. Ivan Thacker M.D on Workstation: [id number]       I have personally reviewed all medications:  Scheduled Medications  aspirin, 81 mg, Oral, Daily  atorvastatin, 80 mg, Oral, Nightly  insulin lispro, 2-7 Units, Subcutaneous, 4x Daily AC & at Bedtime  levothyroxine, 25 mcg, Oral, Daily  senna-docusate sodium, 2 tablet, Oral, BID  sodium chloride, 10 mL, Intravenous, Q12H    Infusions   Diet  Diet: Cardiac Diets, Diabetic Diets; Healthy Heart (2-3 Na+); Consistent Carbohydrate; Texture: Regular Texture (IDDSI 7); Fluid Consistency: Thin (IDDSI 0)    I have personally reviewed:  [x]  Laboratory   []  Microbiology   []  Radiology   [x]  EKG/Telemetry  []  Cardiology/Vascular   []  Pathology    []  Records    Assessment/Plan     Active Hospital Problems    Diagnosis  POA    **Acute ischemic left middle cerebral artery (MCA) stroke [I63.512]  Yes    OCHOA on CPAP [G47.33]  Yes    Stage 3a chronic kidney disease [N18.31]  Yes    Nonischemic cardiomyopathy [I42.8]  Yes    Type 2 diabetes mellitus without complication, without long-term current use of insulin [E11.9]  Yes    Essential hypertension [I10]  Yes    Mixed hyperlipidemia [E78.2]  Yes      Resolved Hospital Problems   No resolved problems to display.   Acute Left MCA/NIRMAL Stroke   - s/p mechanical thrombectomy 2/16/24 with Dr. Murray, no residual deficits  - continue on ASA and statin  - brain MRI showed  acute Left NIRMAL CVA, chronic white matter changes, and area of encephalomalacia in the right parietal lobe  - mechanism suspected to be cardioembolic-TTE noted, cardiology has evaluated, HEDY planned for tomorrow morning  - hypercoagulable workup pending  - appreciate neurology and cardiology recs    Non-Ischemic Cardiomyopathy/HTN/HLD  - LV EF slightly lower that last echocardiogram no significant change, has known LV wall motion abnormalities  - clinically no evidence of CHF  - monitor volume status, cardiology to evaluate as above, follows with Dr. Graff as an outpatient  - entresto, spironolactone, and coreg held on admission-will monitor BP and hopefully can restart these soon  - on lipitor     Type 2 DM  - on ozempic as an outpatient, A1C 5.90%  - BG are acceptable  - cover with low dose ssi/hypoglycemia protocol     Stage 3a CKD  - baseline serum creatinine is around 1.3-1.4  - monitor and avoid nephrotoxins as able    Portions of this text have been copied and I have reviewed these. They are accurate as of 2/18/2024      SCDs for DVT prophylaxis.  Full code.  Discussed with patient and nursing staff.  Anticipate discharge home when cleared by consultants.      Cuba Argueta MD  [facility]  02/18/24  12:26 EST

## 2024-02-18 NOTE — PLAN OF CARE
Problem: Fall Injury Risk  Goal: Absence of Fall and Fall-Related Injury  Outcome: Ongoing, Progressing  Intervention: Identify and Manage Contributors  Recent Flowsheet Documentation  Taken 2/18/2024 1600 by Riana Zapata RN  Medication Review/Management: medications reviewed  Taken 2/18/2024 1230 by Riana Zapata RN  Medication Review/Management: medications reviewed  Taken 2/18/2024 0821 by Riaan Zapata RN  Medication Review/Management: medications reviewed  Intervention: Promote Injury-Free Environment  Recent Flowsheet Documentation  Taken 2/18/2024 1600 by Riana Zapata RN  Safety Promotion/Fall Prevention:   safety round/check completed   room organization consistent   nonskid shoes/slippers when out of bed   fall prevention program maintained   assistive device/personal items within reach   clutter free environment maintained  Taken 2/18/2024 1400 by Riana Zapata RN  Safety Promotion/Fall Prevention:   safety round/check completed   room organization consistent   nonskid shoes/slippers when out of bed   fall prevention program maintained   clutter free environment maintained   assistive device/personal items within reach  Taken 2/18/2024 1230 by Riana Zapata RN  Safety Promotion/Fall Prevention:   safety round/check completed   room organization consistent   nonskid shoes/slippers when out of bed   fall prevention program maintained   clutter free environment maintained  Taken 2/18/2024 1000 by Riana Zapata RN  Safety Promotion/Fall Prevention:   safety round/check completed   room organization consistent   nonskid shoes/slippers when out of bed   assistive device/personal items within reach   clutter free environment maintained  Taken 2/18/2024 0821 by Riana Zapata RN  Safety Promotion/Fall Prevention:   room organization consistent   safety round/check completed   nonskid shoes/slippers when out of bed   fall prevention program maintained   clutter free environment  maintained   assistive device/personal items within reach     Problem: Skin Injury Risk Increased  Goal: Skin Health and Integrity  Outcome: Ongoing, Progressing  Intervention: Optimize Skin Protection  Recent Flowsheet Documentation  Taken 2/18/2024 0821 by Riana Zapata RN  Pressure Reduction Techniques: frequent weight shift encouraged  Pressure Reduction Devices: positioning supports utilized     Problem: Adult Inpatient Plan of Care  Goal: Plan of Care Review  Outcome: Ongoing, Progressing  Flowsheets (Taken 2/18/2024 1731)  Progress: improving  Plan of Care Reviewed With: patient  Goal: Patient-Specific Goal (Individualized)  Outcome: Ongoing, Progressing  Goal: Absence of Hospital-Acquired Illness or Injury  Outcome: Ongoing, Progressing  Intervention: Identify and Manage Fall Risk  Recent Flowsheet Documentation  Taken 2/18/2024 1600 by Riana Zapata RN  Safety Promotion/Fall Prevention:   safety round/check completed   room organization consistent   nonskid shoes/slippers when out of bed   fall prevention program maintained   assistive device/personal items within reach   clutter free environment maintained  Taken 2/18/2024 1400 by Riana Zapata RN  Safety Promotion/Fall Prevention:   safety round/check completed   room organization consistent   nonskid shoes/slippers when out of bed   fall prevention program maintained   clutter free environment maintained   assistive device/personal items within reach  Taken 2/18/2024 1230 by Riana Zapata, RN  Safety Promotion/Fall Prevention:   safety round/check completed   room organization consistent   nonskid shoes/slippers when out of bed   fall prevention program maintained   clutter free environment maintained  Taken 2/18/2024 1000 by Riana Zapata, RN  Safety Promotion/Fall Prevention:   safety round/check completed   room organization consistent   nonskid shoes/slippers when out of bed   assistive device/personal items within reach   clutter  free environment maintained  Taken 2/18/2024 0821 by Riana Zapata RN  Safety Promotion/Fall Prevention:   room organization consistent   safety round/check completed   nonskid shoes/slippers when out of bed   fall prevention program maintained   clutter free environment maintained   assistive device/personal items within reach  Intervention: Prevent and Manage VTE (Venous Thromboembolism) Risk  Recent Flowsheet Documentation  Taken 2/18/2024 1400 by Riana Zapata RN  Activity Management: ambulated to bathroom  Taken 2/18/2024 1000 by Riana Zapata RN  Activity Management: up in chair  Intervention: Prevent Infection  Recent Flowsheet Documentation  Taken 2/18/2024 1600 by Riana Zapata RN  Infection Prevention:   single patient room provided   environmental surveillance performed   rest/sleep promoted  Taken 2/18/2024 1400 by Riana Zapata RN  Infection Prevention:   single patient room provided   rest/sleep promoted   environmental surveillance performed  Taken 2/18/2024 1230 by Riana Zapata RN  Infection Prevention:   single patient room provided   rest/sleep promoted   environmental surveillance performed  Taken 2/18/2024 1000 by Riana Zapata RN  Infection Prevention:   environmental surveillance performed   single patient room provided  Taken 2/18/2024 0821 by Riana Zapata RN  Infection Prevention:   environmental surveillance performed   equipment surfaces disinfected   single patient room provided   rest/sleep promoted  Goal: Optimal Comfort and Wellbeing  Outcome: Ongoing, Progressing  Intervention: Provide Person-Centered Care  Recent Flowsheet Documentation  Taken 2/18/2024 0821 by Riana Zapata RN  Trust Relationship/Rapport:   care explained   questions encouraged   questions answered   thoughts/feelings acknowledged  Goal: Readiness for Transition of Care  Outcome: Ongoing, Progressing     Goal Outcome Evaluation:  Plan of Care Reviewed With: patient         Progress: improving

## 2024-02-18 NOTE — PROGRESS NOTES
"DOS: 2024  NAME: Jhon Munoz   : 1965  PCP: Oneil Decker MD  Chief Complaint   Patient presents with    Neuro Deficit(s)       Chief complaint: fall, R leg weakness  Subjective: PT doing well, no neuro symptoms to report.  A couple of weeks before presentation he had what sounds like orthostatic syncope and EMS came to his house.  He and wife been caring for son at home    Objective:  Vital signs: /87 (BP Location: Right arm, Patient Position: Lying)   Pulse 63   Temp 98.4 °F (36.9 °C) (Oral)   Resp 18   Ht 177.8 cm (70\")   Wt 100 kg (220 lb 14.4 oz)   SpO2 98%   BMI 31.70 kg/m²      Gen: NAD, vitals reviewed  MS: oriented x3, recent/remote memory intact, normal attention/concentration, language intact, no neglect.  CN: visual acuity grossly normal, PERRL, EOMI, no facial droop, no dysarthria  Motor: 5/5 throughout upper and lower extremities, normal tone  Sensory: intact to light touch all 4 ext.    ROS:  No weakness, numbness  No fevers, chills      Laboratory results:  Lab Results   Component Value Date    GLUCOSE 99 2024    CALCIUM 9.0 2024     2024    K 4.1 2024    CO2 24.1 2024     2024    BUN 15 2024    CREATININE 1.26 2024    EGFRIFAFRI 65 2022    BCR 11.9 2024    ANIONGAP 11.9 2024     Lab Results   Component Value Date    WBC 5.64 2024    HGB 13.6 2024    HCT 41.5 2024    MCV 93.5 2024     2024     Lab Results   Component Value Date    LDL 39 2024    LDL 42 2023    LDL 62 2023         Lab 24  0209   HEMOGLOBIN A1C 5.90*        Review of labs: Factor V factor II normal,, Antithrombin III normal, protein C and S normal, HILDA and lupus anticoagulant pending.  TSH normal    Review and interpretation of imaging:   HCT, CTA h/n  IMPRESSION:       Abrupt high-grade stenosis of the distal left A2 segment with diminished   enhancement in the " distal vessels concerning for acute thromboembolic   disease.    CTP  FINDINGS:    Core infarct: Findings concerning for a small core infarct in the   medial left frontal lobe with volume measuring 17 mm.    Reversible ischemia: There is a moderate sized acute perfusion defect   in the medial left frontal lobe with area measuring 57 mL.    Brain and extra-axial spaces:  No intra- or extra-axial hemorrhage.     IMPRESSION:       Acute perfusion defect in the medial left frontal lobe with smaller core   infarct and mismatch pressure of 3.4.    MRI brain    FINDINGS:     Moderate-sized area of restricted diffusion primarily both in the cortex  of the medial and high left frontal lobe in the left anterior cerebral  artery territory is present. Area of diffusion weighted hyperintensity  within the right parietal lobe corresponds with encephalomalacia and  hyperintensity on ADC likely represents T2 shine through. Focal ADC  hypointensity within the high left parietal lobe also corresponds with  T2 shine through. The cerebellar tonsils extend approximately 6 to 7 mm  below the foramen magnum.     T2 hyperintensity within the periventricular and subcortical white  matter likely represents moderate to extensive chronic ischemic small  vessel degenerative changes. Partial opacification of the bilateral  paranasal sinuses.     No definite findings of acute intracranial hemorrhage.. No midline shift  or hydrocephalus.     IMPRESSION:  1.  Small to moderate sized area of acute infarction within the left  anterior cerebral artery territory. Moderate to extensive chronic  ischemic white matter changes. Area of encephalomalacia within the right  parietal lobe.  2.  Cerebellar tonsils extend approximately 6 to 7 mm below the foramen  magnum.  3.  Other findings as above.    Workup to date:  TTE    Left ventricular systolic function is moderately decreased. Calculated left ventricular EF = 39.3%.  There is global hypokinesis with  regional variations.    Left ventricular wall thickness is consistent with borderline concentric hypertrophy. Left ventricular diastolic function is consistent with (grade I) impaired relaxation.    RV grossly normal in size and function.    No significant valvular abnormalities.    Saline test results are negative.    Normal biatrial and IVC size.    Diagnoses:  1 left NIRMAL MCA stroke  2 status post thrombectomy  3 OCHOA    Impression: 58-year-old male with past medical history of hypertension, hyperlipidemia, nonischemic cardiomyopathy, OCHOA, previous renal infarct who came to the ER on 216 after a fall at home found to have right-sided weakness and CTA showed left A2 occlusion and was taken for thrombectomy with tici score of 3.  MRI showed acute NIRMAL stroke on L and old parietal strokes.  Etiology thought cardioembolic.  HEDY is recommended    Plan:  1 cards consult for HEDY  2 continue aspirin and high intensity statin  3 no need for permissive htn, bp looks good at this point without his home meds, avoid hypotension    We will be following along with you      Thank you for this consultation.  Discussed above plan with neuro attending, Dr. Holguin who agrees with above plan.  Neurology team is available for concerns or questions.

## 2024-02-18 NOTE — PROGRESS NOTES
Patient out of the ICU without any pulmonary critical care needs under general medicine service.  Appreciate their care for this patient.  [facility] [facility] will now sign off however any new pulmonary critical care issues arise please feel free to give us a a call.    Electronically signed by Tony Suárez MD, 02/18/24, 7:14 AM EST.

## 2024-02-19 ENCOUNTER — ANESTHESIA EVENT (OUTPATIENT)
Dept: POSTOP/PACU | Facility: HOSPITAL | Age: 59
DRG: 024 | End: 2024-02-19
Payer: COMMERCIAL

## 2024-02-19 ENCOUNTER — ANESTHESIA (OUTPATIENT)
Dept: POSTOP/PACU | Facility: HOSPITAL | Age: 59
DRG: 024 | End: 2024-02-19
Payer: COMMERCIAL

## 2024-02-19 ENCOUNTER — APPOINTMENT (OUTPATIENT)
Dept: POSTOP/PACU | Facility: HOSPITAL | Age: 59
DRG: 024 | End: 2024-02-19
Payer: COMMERCIAL

## 2024-02-19 VITALS — OXYGEN SATURATION: 100 % | SYSTOLIC BLOOD PRESSURE: 104 MMHG | DIASTOLIC BLOOD PRESSURE: 69 MMHG | HEART RATE: 82 BPM

## 2024-02-19 LAB
ANA SER QL: NEGATIVE
ANION GAP SERPL CALCULATED.3IONS-SCNC: 6 MMOL/L (ref 5–15)
APTT SCREEN TO CONFIRM RATIO: 1.17 RATIO (ref 0–1.34)
B2 GLYCOPROT1 IGA SER-ACNC: <9 GPI IGA UNITS (ref 0–25)
B2 GLYCOPROT1 IGG SER-ACNC: <9 GPI IGG UNITS (ref 0–20)
B2 GLYCOPROT1 IGM SER-ACNC: <9 GPI IGM UNITS (ref 0–32)
BASOPHILS # BLD AUTO: 0.04 10*3/MM3 (ref 0–0.2)
BASOPHILS NFR BLD AUTO: 0.8 % (ref 0–1.5)
BH CV ECHO MEAS - MR MAX PG: 86.1 MMHG
BH CV ECHO MEAS - MR MAX VEL: 464 CM/SEC
BH CV ECHO MEAS - MV A MAX VEL: 50.8 CM/SEC
BH CV ECHO MEAS - MV DEC SLOPE: 399.1 CM/SEC2
BH CV ECHO MEAS - MV DEC TIME: 0.15 SEC
BH CV ECHO MEAS - MV E MAX VEL: 57.6 CM/SEC
BH CV ECHO MEAS - MV E/A: 1.13
BH CV ECHO MEAS - RVSP: 25 MMHG
BH CV ECHO MEAS - TR MAX PG: 22 MMHG
BH CV ECHO SHUNT ASSESSMENT PERFORMED (HIDDEN SCRIPTING): 1
BUN SERPL-MCNC: 18 MG/DL (ref 8–23)
BUN/CREAT SERPL: 13.2 (ref 7–25)
CALCIUM SPEC-SCNC: 9 MG/DL (ref 8.6–10.5)
CHLORIDE SERPL-SCNC: 109 MMOL/L (ref 98–107)
CO2 SERPL-SCNC: 26 MMOL/L (ref 22–29)
CONFIRM APTT/NORMAL: 37.6 SEC (ref 0–47.6)
CREAT SERPL-MCNC: 1.36 MG/DL (ref 0.76–1.27)
DEPRECATED RDW RBC AUTO: 42 FL (ref 37–54)
EGFRCR SERPLBLD CKD-EPI 2021: 59.6 ML/MIN/1.73
EOSINOPHIL # BLD AUTO: 0.24 10*3/MM3 (ref 0–0.4)
EOSINOPHIL NFR BLD AUTO: 4.5 % (ref 0.3–6.2)
ERYTHROCYTE [DISTWIDTH] IN BLOOD BY AUTOMATED COUNT: 12.8 % (ref 12.3–15.4)
GLUCOSE BLDC GLUCOMTR-MCNC: 103 MG/DL (ref 70–130)
GLUCOSE BLDC GLUCOMTR-MCNC: 111 MG/DL (ref 70–130)
GLUCOSE BLDC GLUCOMTR-MCNC: 84 MG/DL (ref 70–130)
GLUCOSE SERPL-MCNC: 109 MG/DL (ref 65–99)
HCT VFR BLD AUTO: 37.7 % (ref 37.5–51)
HGB BLD-MCNC: 12 G/DL (ref 13–17.7)
IMM GRANULOCYTES # BLD AUTO: 0.02 10*3/MM3 (ref 0–0.05)
IMM GRANULOCYTES NFR BLD AUTO: 0.4 % (ref 0–0.5)
LA 2 SCREEN W REFLEX-IMP: NORMAL
LYMPHOCYTES # BLD AUTO: 1.94 10*3/MM3 (ref 0.7–3.1)
LYMPHOCYTES NFR BLD AUTO: 36.4 % (ref 19.6–45.3)
MAGNESIUM SERPL-MCNC: 2.1 MG/DL (ref 1.6–2.4)
MCH RBC QN AUTO: 28.9 PG (ref 26.6–33)
MCHC RBC AUTO-ENTMCNC: 31.8 G/DL (ref 31.5–35.7)
MCV RBC AUTO: 90.8 FL (ref 79–97)
MONOCYTES # BLD AUTO: 0.48 10*3/MM3 (ref 0.1–0.9)
MONOCYTES NFR BLD AUTO: 9 % (ref 5–12)
NEUTROPHILS NFR BLD AUTO: 2.61 10*3/MM3 (ref 1.7–7)
NEUTROPHILS NFR BLD AUTO: 48.9 % (ref 42.7–76)
NRBC BLD AUTO-RTO: 0 /100 WBC (ref 0–0.2)
PLATELET # BLD AUTO: 172 10*3/MM3 (ref 140–450)
PMV BLD AUTO: 10.6 FL (ref 6–12)
POTASSIUM SERPL-SCNC: 4.5 MMOL/L (ref 3.5–5.2)
RBC # BLD AUTO: 4.15 10*6/MM3 (ref 4.14–5.8)
SCREEN APTT: 32.4 SEC (ref 0–43.5)
SCREEN DRVVT: 39.6 SEC (ref 0–47)
SINUS: 3.3 CM
SODIUM SERPL-SCNC: 141 MMOL/L (ref 136–145)
THROMBIN TIME: 17.8 SEC (ref 0–23)
WBC NRBC COR # BLD AUTO: 5.33 10*3/MM3 (ref 3.4–10.8)

## 2024-02-19 PROCEDURE — 25810000003 LACTATED RINGERS PER 1000 ML: Performed by: NURSE ANESTHETIST, CERTIFIED REGISTERED

## 2024-02-19 PROCEDURE — 85025 COMPLETE CBC W/AUTO DIFF WBC: CPT

## 2024-02-19 PROCEDURE — 97116 GAIT TRAINING THERAPY: CPT

## 2024-02-19 PROCEDURE — 99232 SBSQ HOSP IP/OBS MODERATE 35: CPT | Performed by: INTERNAL MEDICINE

## 2024-02-19 PROCEDURE — 93320 DOPPLER ECHO COMPLETE: CPT

## 2024-02-19 PROCEDURE — 83735 ASSAY OF MAGNESIUM: CPT

## 2024-02-19 PROCEDURE — 93312 ECHO TRANSESOPHAGEAL: CPT

## 2024-02-19 PROCEDURE — 93325 DOPPLER ECHO COLOR FLOW MAPG: CPT | Performed by: INTERNAL MEDICINE

## 2024-02-19 PROCEDURE — 99233 SBSQ HOSP IP/OBS HIGH 50: CPT | Performed by: NURSE PRACTITIONER

## 2024-02-19 PROCEDURE — 82948 REAGENT STRIP/BLOOD GLUCOSE: CPT

## 2024-02-19 PROCEDURE — 25010000002 LIDOCAINE 1 % SOLUTION: Performed by: NURSE ANESTHETIST, CERTIFIED REGISTERED

## 2024-02-19 PROCEDURE — B24BZZ4 ULTRASONOGRAPHY OF HEART WITH AORTA, TRANSESOPHAGEAL: ICD-10-PCS | Performed by: INTERNAL MEDICINE

## 2024-02-19 PROCEDURE — 93312 ECHO TRANSESOPHAGEAL: CPT | Performed by: INTERNAL MEDICINE

## 2024-02-19 PROCEDURE — 97530 THERAPEUTIC ACTIVITIES: CPT

## 2024-02-19 PROCEDURE — 25010000002 PROPOFOL 10 MG/ML EMULSION: Performed by: NURSE ANESTHETIST, CERTIFIED REGISTERED

## 2024-02-19 PROCEDURE — 93325 DOPPLER ECHO COLOR FLOW MAPG: CPT

## 2024-02-19 PROCEDURE — 80048 BASIC METABOLIC PNL TOTAL CA: CPT

## 2024-02-19 PROCEDURE — 97535 SELF CARE MNGMENT TRAINING: CPT

## 2024-02-19 PROCEDURE — 93320 DOPPLER ECHO COMPLETE: CPT | Performed by: INTERNAL MEDICINE

## 2024-02-19 RX ORDER — PROPOFOL 10 MG/ML
VIAL (ML) INTRAVENOUS AS NEEDED
Status: DISCONTINUED | OUTPATIENT
Start: 2024-02-19 | End: 2024-02-19 | Stop reason: SURG

## 2024-02-19 RX ORDER — SODIUM CHLORIDE, SODIUM LACTATE, POTASSIUM CHLORIDE, CALCIUM CHLORIDE 600; 310; 30; 20 MG/100ML; MG/100ML; MG/100ML; MG/100ML
INJECTION, SOLUTION INTRAVENOUS CONTINUOUS PRN
Status: DISCONTINUED | OUTPATIENT
Start: 2024-02-19 | End: 2024-02-19 | Stop reason: SURG

## 2024-02-19 RX ORDER — ROSUVASTATIN CALCIUM 40 MG/1
40 TABLET, COATED ORAL NIGHTLY
Status: DISCONTINUED | OUTPATIENT
Start: 2024-02-19 | End: 2024-02-20 | Stop reason: HOSPADM

## 2024-02-19 RX ORDER — LIDOCAINE HYDROCHLORIDE 10 MG/ML
INJECTION, SOLUTION INFILTRATION; PERINEURAL AS NEEDED
Status: DISCONTINUED | OUTPATIENT
Start: 2024-02-19 | End: 2024-02-19 | Stop reason: SURG

## 2024-02-19 RX ORDER — CARVEDILOL 3.12 MG/1
3.12 TABLET ORAL 2 TIMES DAILY WITH MEALS
Status: DISCONTINUED | OUTPATIENT
Start: 2024-02-19 | End: 2024-02-20 | Stop reason: HOSPADM

## 2024-02-19 RX ADMIN — ROSUVASTATIN CALCIUM 40 MG: 40 TABLET, FILM COATED ORAL at 21:21

## 2024-02-19 RX ADMIN — SODIUM CHLORIDE, POTASSIUM CHLORIDE, SODIUM LACTATE AND CALCIUM CHLORIDE: 600; 310; 30; 20 INJECTION, SOLUTION INTRAVENOUS at 07:02

## 2024-02-19 RX ADMIN — SACUBITRIL AND VALSARTAN 1 TABLET: 24; 26 TABLET, FILM COATED ORAL at 21:21

## 2024-02-19 RX ADMIN — PROPOFOL 50 MG: 10 INJECTION, EMULSION INTRAVENOUS at 07:33

## 2024-02-19 RX ADMIN — CARVEDILOL 3.12 MG: 3.12 TABLET, FILM COATED ORAL at 18:19

## 2024-02-19 RX ADMIN — Medication 10 ML: at 21:21

## 2024-02-19 RX ADMIN — LEVOTHYROXINE SODIUM 25 MCG: 25 TABLET ORAL at 09:10

## 2024-02-19 RX ADMIN — ASPIRIN 81 MG: 81 TABLET, CHEWABLE ORAL at 09:10

## 2024-02-19 RX ADMIN — LIDOCAINE HYDROCHLORIDE 60 ML: 10 INJECTION, SOLUTION INFILTRATION; PERINEURAL at 07:33

## 2024-02-19 RX ADMIN — APIXABAN 5 MG: 5 TABLET, FILM COATED ORAL at 21:21

## 2024-02-19 RX ADMIN — PROPOFOL 180 MCG/KG/MIN: 10 INJECTION, EMULSION INTRAVENOUS at 07:37

## 2024-02-19 NOTE — THERAPY DISCHARGE NOTE
Acute Care - Occupational Therapy Discharge  [facility]    Patient Name: Jhon Munoz  : 1965    MRN: [id number]                              Today's Date: 2024       Admit Date: 2024    Visit Dx:     ICD-10-CM ICD-9-CM   1. Acute embolic stroke  I63.9 434.11     Patient Active Problem List   Diagnosis    Essential hypertension    Mixed hyperlipidemia    Low libido    Nonischemic cardiomyopathy    Type 2 diabetes mellitus without complication, without long-term current use of insulin    Renal infarct    Renal calculi    Clinical diagnosis of COVID-19    Diabetes mellitus without complication    Colon polyps    Stage 3a chronic kidney disease    Syncope and collapse    Acute ischemic left middle cerebral artery (MCA) stroke    OCHOA on CPAP     Past Medical History:   Diagnosis Date    Cardiomyopathy     Colon polyp     Cortical senile cataract 2019    Diabetes mellitus     type 2    Disease of thyroid gland     Hyperlipidemia     Hypertension     Renal calculi     Followed by Dr. Chris Carreon urologist    Renal infarct 2020    Left; noted on CT scan-followed by Dr. Chris Carreon urology    Sleep apnea     CPAP nightly     Past Surgical History:   Procedure Laterality Date    CARDIAC CATHETERIZATION N/A 2020    Procedure: Coronary angiography;  Surgeon: Robert Deluna MD;  Location: [facility] CATH INVASIVE LOCATION;  Service: Cardiovascular;  Laterality: N/A;    CARDIAC CATHETERIZATION N/A 2020    Procedure: Left Heart Cath;  Surgeon: Robert Deluna MD;  Location: [facility] CATH INVASIVE LOCATION;  Service: Cardiovascular;  Laterality: N/A;    COLONOSCOPY      COLONOSCOPY N/A 10/18/2022    Procedure: COLONOSCOPY TO CECUM WITH COLD BIOPSY POLYPECTOMY;  Surgeon: Robert Evans MD;  Location: [facility] ENDOSCOPY;  Service: Gastroenterology;  Laterality: N/A;  PRE: HX COLON POLYPS  POST: POLYP, DIVERTICULOSIS, HEMORRHOIDS    KNEE SURGERY      meniscus       General Information        Row Name 02/19/24 1217          OT Time and Intention    Document Type discharge treatment (P)   -MT     Mode of Treatment individual therapy;occupational therapy (P)   -MT       Row Name 02/19/24 1217          General Information    Patient Profile Reviewed yes (P)   -MT     Prior Level of Function independent: (P)   -MT       Row Name 02/19/24 1217          Cognition    Orientation Status (Cognition) oriented x 4 (P)   -MT       Row Name 02/19/24 1217          Safety Issues, Functional Mobility    Impairments Affecting Function (Mobility) pain (P)   -MT               User Key  (r) = Recorded By, (t) = Taken By, (c) = Cosigned By      Initials Name Provider Type    MT Sammie Dunn, OT Student OT Student                   Mobility/ADL's       Row Name 02/19/24 1220          Bed Mobility    Comment, (Bed Mobility) Pt sitting EOB with family upon arrival. (P)   -MT       [facility] Name 02/19/24 1220          Transfers    Transfers sit-stand transfer;stand-sit transfer (P)   -[facility] Name 02/19/24 1220          Sit-Stand Transfer    Sit-Stand [place] (Transfers) standby assist (P)   -MT       Row Name 02/19/24 1220          Stand-Sit Transfer    Stand-Sit [place] (Transfers) standby assist (P)   -[facility] Name 02/19/24 1220          Functional Mobility    Functional Mobility- Ind. Level standby assist (P)   -MT     Functional Mobility- Comment Pt stood and ambulated to bathroom with SBA. He reports soreness in R knee, demonstrating a slight limp. No occasions of LOB during mobility. (P)   -MT       Row Name 02/19/24 1220          Activities of Daily Living    BADL Assessment/Intervention grooming;other (see comments) (P)   Pt reports showering with s/s from [facility] staff. He completed showering tasks while standing with support from grab bars.  -MT       Row Name 02/19/24 1220          Grooming Assessment/Training    [place] Level (Grooming) oral care regimen;independent (P)   -MT     Position  (Grooming) unsupported standing;sink side (P)   -MT     Comment, (Grooming) Pt completed oral hygiene independently while standing unsupported at bathroom sink. (P)   -MT               User Key  (r) = Recorded By, (t) = Taken By, (c) = Cosigned By      Initials Name Provider Type    Sammie Turner OT Student OT Student                   Obj/Interventions       Row Name 02/19/24 1224          Sensory Assessment (Somatosensory)    Sensory Assessment (Somatosensory) sensation intact (P)   -[facility] Name 02/19/24 1224          Vision Assessment/Intervention    Visual Impairment/Limitations WFL (P)   -[facility] Name 02/19/24 1224          Range of Motion Comprehensive    Comment, General Range of Motion Chronic soreness in L shoulder. BUE WFL (P)   -[facility] Name 02/19/24 1224          Strength Comprehensive (MMT)    Comment, General Manual Muscle Testing (MMT) Assessment BUE 5/5 (P)   -MT       Row Name 02/19/24 1224          Motor Skills    Motor Skills coordination;motor control/coordination interventions (P)   -MT     Coordination WFL;bilateral;finger to nose;bimanual skills (P)   -MT     Motor Control/Coordination Interventions occupation/activity based treatment (P)   -MT       Row Name 02/19/24 1224          Balance    Balance Assessment sitting static balance;sitting dynamic balance;sit to stand dynamic balance;standing static balance;standing dynamic balance (P)   -MT     Static Sitting Balance independent (P)   -MT     Dynamic Sitting Balance independent (P)   -MT     Position, Sitting Balance unsupported;sitting edge of bed (P)   -MT     Sit to Stand Dynamic Balance standby assist (P)   -MT     Static Standing Balance standby assist (P)   -MT     Dynamic Standing Balance standby assist (P)   -MT     Position/Device Used, Standing Balance unsupported (P)   -MT     Balance Interventions sitting;standing;sit to stand;static;dynamic;weight shifting activity;dynamic reaching (P)   -MT     Comment,  Balance Pt participated in dynamic reach activity while standing. Pt able to reach overhead, below midline, and cross midline with BUEs with SBA to maintain dynamic standing balance. No LOB occasions. (P)   -MT               User Key  (r) = Recorded By, (t) = Taken By, (c) = Cosigned By      Initials Name Provider Type    Sammie Turner, OT Student OT Student                   Goals/Plan    No documentation.                  Clinical Impression       Row Name 02/19/24 1227          Pain Assessment    Pre/Posttreatment Pain Comment Reports generalized soreness in L shoulder and R knee. Denied need for treatment. (P)   -MT     Pain Intervention(s) Repositioned;Rest (P)   -MT       Row Name 02/19/24 1227          Plan of Care Review    Plan of Care Reviewed With patient (P)   -MT     Progress improving (P)   -MT     Outcome Evaluation Pt participated in OT treatment this AM. Upon arrival, pt sitting EOB with family. He stood and ambulated to bathroom with SBA. Pt participated in g/h tasks independently. Pt participated in dynamic balance exercises from standing. He reports generalized soreness in L shoulder and R LE, but denies any functional impairements. Discussed d/c planning, home safety awareness, energy conservation techniques, and proper body mechanics to utilize when caring for his son. Pt demonstrates return to baseline independence with functional mobility and ADL participation. Skilled OT services not required at this time, OT will s/o. Recommend d/c home. (P)   -MT       Row Name 02/19/24 1227          Therapy Assessment/Plan (OT)    Criteria for Skilled Therapeutic Interventions Met (OT) no;no problems identified which require skilled intervention (P)   -MT       Row Name 02/19/24 1226          Therapy Plan Review/Discharge Plan (OT)    Anticipated Discharge Disposition (OT) home (P)   -MT               User Key  (r) = Recorded By, (t) = Taken By, (c) = Cosigned By      Initials Name Provider Type     Sammie Turner, OT Student OT Student                   Outcome Measures       Row Name 02/19/24 0917          How much help from another person do you currently need...    Turning from your back to your side while in flat bed without using bedrails? 4  -EB     Moving from lying on back to sitting on the side of a flat bed without bedrails? 4  -EB     Moving to and from a bed to a chair (including a wheelchair)? 4  -EB     Standing up from a chair using your arms (e.g., wheelchair, bedside chair)? 4  -EB     Climbing 3-5 steps with a railing? 3  -EB     To walk in hospital room? 4  -EB     AM-PAC 6 Clicks Score (PT) 23  -EB     Highest Level of Mobility Goal 7 --> Walk 25 feet or more  -EB       Row Name 02/19/24 0917          Modified [place] Scale    Modified [place] Scale 1 - No significant disability despite symptoms.  Able to carry out all usual duties and activities.  -EB               User Key  (r) = Recorded By, (t) = Taken By, (c) = Cosigned By      Initials Name Provider Type    Indigo Jessica PTA Physical Therapist Assistant                  Occupational Therapy Education       Title: PT OT SLP Therapies (Done)       Topic: Occupational Therapy (Done)       Point: ADL training (Done)       Description:   Instruct learner(s) on proper safety adaptation and remediation techniques during self care or transfers.   Instruct in proper use of assistive devices.                  Learning Progress Summary             Patient Parvez, REEMA,GARLAND TAVAREZ VU,LISSY by MT at 2/19/2024 1228    Comment: d/c planning, home safety awareness, energy conservation techniques, and proper body mechanics to utilize when caring for son    REEMA Hooper D, TB, VU, DU,NR by  at 2/19/2024 0121                         Point: Home exercise program (Done)       Description:   Instruct learner(s) on appropriate technique for monitoring, assisting and/or progressing therapeutic exercises/activities.                  Learning Progress Summary              Patient Eager, E,TB,D, VU,DU by MT at 2/19/2024 1228    Comment: d/c planning, home safety awareness, energy conservation techniques, and proper body mechanics to utilize when caring for son    Acceptance, E,D,TB, VU,DU,NR by  at 2/19/2024 0121    Acceptance, E,TB, VU by  at 2/17/2024 1304    Comment: a few basic stretches ROM for L shoulder                         Point: Precautions (Done)       Description:   Instruct learner(s) on prescribed precautions during self-care and functional transfers.                  Learning Progress Summary             Patient Eager, E,TB,D, VU,DU by MT at 2/19/2024 1228    Comment: d/c planning, home safety awareness, energy conservation techniques, and proper body mechanics to utilize when caring for son    Acceptance, E,D,TB, VU,DU,NR by  at 2/19/2024 0121                         Point: Body mechanics (Done)       Description:   Instruct learner(s) on proper positioning and spine alignment during self-care, functional mobility activities and/or exercises.                  Learning Progress Summary             Patient Eager, E,TB,D, VU,DU by MT at 2/19/2024 1228    Comment: d/c planning, home safety awareness, energy conservation techniques, and proper body mechanics to utilize when caring for son    Acceptance, E,D,TB, VU,DU,NR by  at 2/19/2024 0121                                         User Key       Initials Effective Dates Name Provider Type Discipline     06/16/21 -  Alisia Garza, RN Registered Nurse Nurse     04/02/20 -  Ana Rosa Galvez OT Occupational Therapist OT    MT 01/03/24 -  Sammie Dunn OT Student OT Student OT                  OT Recommendation and Plan     Plan of Care Review  Plan of Care Reviewed With: (P) patient  Progress: (P) improving  Outcome Evaluation: (P) Pt participated in OT treatment this AM. Upon arrival, pt sitting EOB with family. He stood and ambulated to bathroom with SBA. Pt participated in g/h tasks independently. Pt  participated in dynamic balance exercises from standing. He reports generalized soreness in L shoulder and R LE, but denies any functional impairements. Discussed d/c planning, home safety awareness, energy conservation techniques, and proper body mechanics to utilize when caring for his son. Pt demonstrates return to baseline independence with functional mobility and ADL participation. Skilled OT services not required at this time, OT will s/o. Recommend d/c home.  Plan of Care Reviewed With: (P) patient  Outcome Evaluation: (P) Pt participated in OT treatment this AM. Upon arrival, pt sitting EOB with family. He stood and ambulated to bathroom with SBA. Pt participated in g/h tasks independently. Pt participated in dynamic balance exercises from standing. He reports generalized soreness in L shoulder and R LE, but denies any functional impairements. Discussed d/c planning, home safety awareness, energy conservation techniques, and proper body mechanics to utilize when caring for his son. Pt demonstrates return to baseline independence with functional mobility and ADL participation. Skilled OT services not required at this time, OT will s/o. Recommend d/c home.     Time Calculation:         Time Calculation- OT       Row Name 02/19/24 1228             Time Calculation- OT    OT Start Time 1114 (P)   -MT      OT Stop Time 1142 (P)   -MT      OT Time Calculation (min) 28 min (P)   -MT      Total Timed Code Minutes- OT 28 minute(s) (P)   -MT      OT Received On 02/19/24 (P)   -MT         Timed Charges    45894 - OT Therapeutic Activity Minutes 18 (P)   -MT      93903 - OT Self Care/Mgmt Minutes 10 (P)   -MT         Total Minutes    Timed Charges Total Minutes 28 (P)   -MT       Total Minutes 28 (P)   -MT                User Key  (r) = Recorded By, (t) = Taken By, (c) = Cosigned By      Initials Name Provider Type    Sammie Turner, OT Student OT Student                  Therapy Charges for Today       Code  Description Service Date Service Provider Modifiers Qty    [id number]  OT THERAPEUTIC ACT EA 15 MIN 2/19/2024 Sammie Dunn, OT Student GO 1    [id number]  OT SELF CARE/MGMT/TRAIN EA 15 MIN 2/19/2024 Sammie Dunn, OT Student GO 1               OT Discharge Summary  Anticipated Discharge Disposition (OT): (P) home    Sammie Dunn OT Student  2/19/2024

## 2024-02-19 NOTE — THERAPY TREATMENT NOTE
Patient Name: Jhon Munoz  : 1965    MRN: [id number]                              Today's Date: 2024       Admit Date: 2024    Visit Dx:     ICD-10-CM ICD-9-CM   1. Acute embolic stroke  I63.9 434.11     Patient Active Problem List   Diagnosis    Essential hypertension    Mixed hyperlipidemia    Low libido    Nonischemic cardiomyopathy    Type 2 diabetes mellitus without complication, without long-term current use of insulin    Renal infarct    Renal calculi    Clinical diagnosis of COVID-19    Diabetes mellitus without complication    Colon polyps    Stage 3a chronic kidney disease    Syncope and collapse    Acute ischemic left middle cerebral artery (MCA) stroke    OCHOA on CPAP     Past Medical History:   Diagnosis Date    Cardiomyopathy     Colon polyp     Cortical senile cataract 2019    Diabetes mellitus     type 2    Disease of thyroid gland     Hyperlipidemia     Hypertension     Renal calculi     Followed by Dr. Chris Carreon urologist    Renal infarct 2020    Left; noted on CT scan-followed by Dr. Chris Carreon urology    Sleep apnea     CPAP nightly     Past Surgical History:   Procedure Laterality Date    CARDIAC CATHETERIZATION N/A 2020    Procedure: Coronary angiography;  Surgeon: Robert Deluna MD;  Location:  [facility];  Service: Cardiovascular;  Laterality: N/A;    CARDIAC CATHETERIZATION N/A 2020    Procedure: Left Heart Cath;  Surgeon: Robert Deluna MD;  Location:  [facility];  Service: Cardiovascular;  Laterality: N/A;    COLONOSCOPY      COLONOSCOPY N/A 10/18/2022    Procedure: COLONOSCOPY TO CECUM WITH COLD BIOPSY POLYPECTOMY;  Surgeon: Robert Evans MD;  Location: [facility];  Service: Gastroenterology;  Laterality: N/A;  PRE: HX COLON POLYPS  POST: POLYP, DIVERTICULOSIS, HEMORRHOIDS    KNEE SURGERY      meniscus       General Information       Row Name 24 0912          Physical Therapy Time and  Intention    Document Type therapy note (daily note)  -EB     Mode of Treatment individual therapy;physical therapy  -EB       Row Name 02/19/24 0912          General Information    Patient Profile Reviewed yes  -EB     Existing Precautions/Restrictions fall  -EB       Row Name 02/19/24 0912          Cognition    Orientation Status (Cognition) oriented x 4  -EB       Row Name 02/19/24 0912          Safety Issues, Functional Mobility    Impairments Affecting Function (Mobility) pain  -EB               User Key  (r) = Recorded By, (t) = Taken By, (c) = Cosigned By      Initials Name Provider Type    Indigo Jessica PTA Physical Therapist Assistant                   Mobility       Row Name 02/19/24 0913          Bed Mobility    Bed Mobility bed mobility (all) activities  -EB     All Activities, [place] (Bed Mobility) independent  -EB       Row Name 02/19/24 0913          Sit-Stand Transfer    Sit-Stand [place] (Transfers) independent  -EB       Row Name 02/19/24 0913          Gait/Stairs (Locomotion)    [place] Level (Gait) supervision  -EB     Assistive Device (Gait) --  no AD  -EB     Distance in Feet (Gait) 400  -EB     Deviations/Abnormal Patterns (Gait) antalgic;gait speed decreased  -EB     Comment, (Gait/Stairs) slight limp 2/2 right knee pain after fall PTA.  -EB               User Key  (r) = Recorded By, (t) = Taken By, (c) = Cosigned By      Initials Name Provider Type    Indigo Jessica PTA Physical Therapist Assistant                   Obj/Interventions    No documentation.                  Goals/Plan    No documentation.                  Clinical Impression       Row Name 02/19/24 0914          Pain    Pain Location - Side/Orientation Right  -EB     Pain Location - knee  -EB       Row Name 02/19/24 0914          Plan of Care Review    Plan of Care Reviewed With patient  -EB     Progress improving  -EB     Outcome Evaluation Pt seen for PT treatment today. Pt just got back from a HEDY and  agreeable to participate with PT. Pt is independent with bed mobility and with stands. Pt able to ambulate 400ft without AD with supervision. Pt has an antalgic gait pattern d/t right knee pain. No  unsteadiness or LOB issues. Pt to d/c home. PT will sign off as pt is back to baseline.  -       Row Name 02/19/24 0914          Therapy Assessment/Plan (PT)    Therapy Frequency (PT) 5 times/wk  -       Row Name 02/19/24 0914          Positioning and Restraints    Pre-Treatment Position in bed  -EB     Post Treatment Position bed  -EB     In Bed sitting EOB;call light within reach;encouraged to call for assist  -               User Key  (r) = Recorded By, (t) = Taken By, (c) = Cosigned By      Initials Name Provider Type    Indigo Jessica PTA Physical Therapist Assistant                   Outcome Measures       Row Name 02/19/24 0917 02/18/24 9711       How much help from another person do you currently need...    Turning from your back to your side while in flat bed without using bedrails? 4  -EB 4  -AH    Moving from lying on back to sitting on the side of a flat bed without bedrails? 4  -EB 4  -AH    Moving to and from a bed to a chair (including a wheelchair)? 4  -EB 4  -AH    Standing up from a chair using your arms (e.g., wheelchair, bedside chair)? 4  -EB 4  -AH    Climbing 3-5 steps with a railing? 3  -EB 4  -AH    To walk in hospital room? 4  -EB 4  -AH    AM-PAC 6 Clicks Score (PT) 23  - 24  -    Highest Level of Mobility Goal 7 --> Walk 25 feet or more  - 8 --> Walked 250 feet or more  -      Row Name 02/19/24 0917          Modified [place]i Scale    Modified [place] Scale 1 - No significant disability despite symptoms.  Able to carry out all usual duties and activities.  -               User Key  (r) = Recorded By, (t) = Taken By, (c) = Cosigned By      Initials Name Provider Type    Alisia Torres, RN Registered Nurse    Indigo Jessica PTA Physical Therapist Assistant                                  Physical Therapy Education       Title: PT OT SLP Therapies (Done)       Topic: Physical Therapy (Done)       Point: Mobility training (Done)       Learning Progress Summary             Patient Acceptance, E, VU by  at 2/19/2024 0917    Acceptance, E,D,TB, VU,DU,NR by  at 2/19/2024 0121                         Point: Home exercise program (Done)       Learning Progress Summary             Patient Acceptance, E,D,TB, VU,DU,NR by  at 2/19/2024 0121                         Point: Body mechanics (Done)       Learning Progress Summary             Patient Acceptance, E,D,TB, VU,DU,NR by  at 2/19/2024 0121                         Point: Precautions (Done)       Learning Progress Summary             Patient Acceptance, E,D,TB, VU,DU,NR by  at 2/19/2024 0121                                         User Key       Initials Effective Dates Name Provider Type Discipline     06/16/21 -  Alisia Garza, RN Registered Nurse Nurse     02/14/23 -  Indigo Ellison PTA Physical Therapist Assistant PT                  PT Recommendation and Plan     Plan of Care Reviewed With: patient  Progress: improving  Outcome Evaluation: Pt seen for PT treatment today. Pt just got back from a HEDY and agreeable to participate with PT. Pt is independent with bed mobility and with stands. Pt able to ambulate 400ft without AD with supervision. Pt has an antalgic gait pattern d/t right knee pain. No  unsteadiness or LOB issues. Pt to d/c home. PT will sign off as pt is back to baseline.     Time Calculation:         PT Charges       Row Name 02/19/24 0912             Time Calculation    Start Time 0858  -      Stop Time 0909  -      Time Calculation (min) 11 min  -      PT Received On 02/19/24  -      PT - Next Appointment 02/20/24  -         Time Calculation- PT    Total Timed Code Minutes- PT 11 minute(s)  -                User Key  (r) = Recorded By, (t) = Taken By, (c) = Cosigned By      Initials Name Provider  Type    EB Indigo Ellison PTA Physical Therapist Assistant                  Therapy Charges for Today       Code Description Service Date Service Provider Modifiers Qty    [id number] HC GAIT TRAINING EA 15 MIN 2/19/2024 Indigo Ellison PTA GP 1            PT G-Codes  Outcome Measure Options: AM-PAC 6 Clicks Daily Activity (OT), Modified Wendi  AM-PAC 6 Clicks Score (PT): 23  AM-PAC 6 Clicks Score (OT): 19  Modified [place] Scale: 1 - No significant disability despite symptoms.  Able to carry out all usual duties and activities.       Indigo Ellison PTA  2/19/2024

## 2024-02-19 NOTE — PROGRESS NOTES
LOS: 3 days   Patient Care Team:  Oneil Decker MD as PCP - General (Family Medicine)  Chris Carreon Jr., MD as Consulting Physician (Urology)  Bernadine Graff MD as Consulting Physician (Cardiology)  Dewayne Castillo MD as Consulting Physician (Nephrology)  Kenneth Zaldivar MD as Consulting Physician (Hematology and Oncology)    Chief Complaint: Follow-up cardiomyopathy, acute CVA.    Interval History: No acute events.  HEDY this morning showed likely left ventricular noncompaction.  Also had a small area in the left atrial appendage which could not be excluded for thrombus.  No chest pain or shortness of breath.    Vital Signs:  Temp:  [97.7 °F (36.5 °C)-98.6 °F (37 °C)] 98 °F (36.7 °C)  Heart Rate:  [60-85] 64  Resp:  [16-20] 18  BP: (104-134)/(69-97) 122/97  No intake or output data in the 24 hours ending 02/19/24 1615    Physical Exam:   General Appearance:    No acute distress, alert and oriented x4   Lungs:     Clear to auscultation bilaterally     Heart:    Regular rhythm and normal rate.  No murmurs, gallops, or    rubs.   Abdomen:     Soft, nontender, nondistended.    Extremities:   No clubbing, cyanosis, or edema.     Results Review:    Results from last 7 days   Lab Units 02/19/24  0521   SODIUM mmol/L 141   POTASSIUM mmol/L 4.5   CHLORIDE mmol/L 109*   CO2 mmol/L 26.0   BUN mg/dL 18   CREATININE mg/dL 1.36*   GLUCOSE mg/dL 109*   CALCIUM mg/dL 9.0     Results from last 7 days   Lab Units 02/16/24  0432   HSTROP T ng/L 13     Results from last 7 days   Lab Units 02/19/24  0521   WBC 10*3/mm3 5.33   HEMOGLOBIN g/dL 12.0*   HEMATOCRIT % 37.7   PLATELETS 10*3/mm3 172     Results from last 7 days   Lab Units 02/16/24  0432   INR  1.03   APTT seconds 25.3     Results from last 7 days   Lab Units 02/17/24  0209   CHOLESTEROL mg/dL 109     Results from last 7 days   Lab Units 02/19/24  0521   MAGNESIUM mg/dL 2.1     Results from last 7 days   Lab Units 02/17/24  0209   CHOLESTEROL mg/dL  109   TRIGLYCERIDES mg/dL 97   HDL CHOL mg/dL 52   LDL CHOL mg/dL 39       I reviewed the patient's new clinical results.        Assessment:  1.  Acute left NIRMAL stroke and left MCA stroke with left M2 occlusion, status post mechanical thrombectomy on 2/16/2024  2.  Nonischemic cardiomyopathy, EF approximately 40% (findings on HEDY consistent with left ventricular noncompaction)  3.  Stage IIIa chronic kidney disease  4.  History of left renal infarct in July 2022  5.  Hypertension  6.  Obstructive sleep apnea, on CPAP  7.  Diabetes    Plan:  -His transesophageal echocardiogram showed heavy trabeculations in the apex and along the lateral wall.  I really felt that the findings were consistent with left ventricular noncompaction syndrome.  This could explain his nonischemic cardiomyopathy.  It is also a potential source for thrombus formation, and in this situation, anticoagulation would be recommended.  He also had a small mobile echodensity in the left atrial appendage, and I could not exclude a small thrombus in this area either.    -Spoke with Dr. Evangelista.  He is okay with starting systemic anticoagulation this evening.  I have ordered Eliquis 5 mg twice daily.  He is also on aspirin 81 mg/day.    -He is normally on Coreg, Entresto, Jardiance, and spironolactone.  I am going to start him back on a lower dose of Entresto and a lower dose of carvedilol for now to start.  We can add back his medications as his blood pressure tolerates.  These were initially held for permissive hypertension in the setting of his stroke.    Isaac Mora MD  02/19/24  16:15 EST

## 2024-02-19 NOTE — PLAN OF CARE
Goal Outcome Evaluation:  Plan of Care Reviewed With: patient        Progress: improving  Outcome Evaluation: Pt seen for PT treatment today. Pt just got back from a HEDY and agreeable to participate with PT. Pt is independent with bed mobility and with stands. Pt able to ambulate 400ft without AD with supervision. Pt has an antalgic gait pattern d/t right knee pain. No  unsteadiness or LOB issues. Pt to d/c home. PT will sign off as pt is back to baseline.

## 2024-02-19 NOTE — PROGRESS NOTES
[facility] Acute Rehab    Following for stroke screening. Noted PT, OT, and ST have all signed off and recommended home with home health. No need for Acute Rehab at this time. Will also sign off. Please re consult if something changes.     Thank You  Micheline Crisostomo RN  Acute Rehab Admission Nurse

## 2024-02-19 NOTE — PROGRESS NOTES
"DOS: 2024  NAME: Jhon Munoz   : 1965  PCP: Oneil Decker MD  Chief Complaint   Patient presents with    Neuro Deficit(s)     Stroke    Patient seen in follow-up today; new to me                Subjective: No acute events overnight.  Patient denies any new complaints or concerns on my exam.     No family at bedside.    Objective:  Vital signs: /83   Pulse 64   Temp 98 °F (36.7 °C)   Resp 20   Ht 177.8 cm (70\")   Wt 100 kg (220 lb 14.4 oz)   SpO2 100%   BMI 31.70 kg/m²       HEENT: Normocephalic, atraumatic   COR: RRR  Resp: Even and unlabored  Extremities: Equal pulses, nondistal embolization    Neurological:   MS: AO. Language normal. No neglect. Higher integrative function normal  CN: II-XII normal  Motor: 5/5, normal tone  Reflexes: Toes downgoing  Sensory: Intact-to light touch  Coordination: Normal-finger-nose    Laboratory results:  Lab Results   Component Value Date    GLUCOSE 109 (H) 2024    CALCIUM 9.0 2024     2024    K 4.5 2024    CO2 26.0 2024     (H) 2024    BUN 18 2024    CREATININE 1.36 (H) 2024    EGFRIFAFRI 65 2022    BCR 13.2 2024    ANIONGAP 6.0 2024     Lab Results   Component Value Date    WBC 5.33 2024    HGB 12.0 (L) 2024    HCT 37.7 2024    MCV 90.8 2024     2024     Lab Results   Component Value Date    CHOL 109 2024    CHOL 140 2023    CHOL 132 06/15/2022     Lab Results   Component Value Date    HDL 52 2024    HDL 56 2023    HDL 57 2023     Lab Results   Component Value Date    LDL 39 2024    LDL 42 2023    LDL 62 2023     Lab Results   Component Value Date    TRIG 97 2024    TRIG 71 2023    TRIG 69 2023         Lab 24  0209   HEMOGLOBIN A1C 5.90*      Review and interpretation of imaging:  Imaging discussed below reviewed independently.      Impression: This is a " 58-year-old man with history of CHF-reduced EF, hypertension, hyperlipidemia, hypothyroidism, obstructive sleep apnea who presented to [facility] and found to have left NIRMAL/MCA infarcts subsequently underwent mechanical thrombectomy achieving TICI 3 flow with 2 passes per Dr. Jimi Dobson.  MRI brain showed very small areas of stroke in the left MCA/NIRMAL territory (pictured above).  CTA showed no evidence of high-grade stenosis/aneurysm and/or dissection.  TTE showed EF 39.3%.  LV moderately decreased.  LA normal.  Saline test negative.  Left atrial volume index 18.6.  Cardiology team consulted for HEDY which revealed LV noncompaction-full anticoagulation felt to be warranted per cardiology we are in agreement. Labs: A1c 5.9%, LDL 39      Neurologically, stable at neurologic baseline.  Recommendations below. No further neurology workup indicated. We will sign off and see again upon request.        Diagnosis:   1.  Acute left NIRMAL stroke with left M2 occlusion status post mechanical thrombectomy achieving TICI 3 flow-etiology of the stroke felt to be embolic-HEDY revealed LV noncompaction felt to warrant full anticoagulation therefore Eliquis 5 mg twice daily has since been initiated along with low-dose aspirin and high-dose statin for secondary stroke prevention  2.  Multiple chronic infarcts noted without significant intracranial stenosis  3.  Hypertension  4.  Hyperlipidemia  5.  Hypothyroidism  6.  Obstructive sleep apnea-encouraged compliance with OCHOA treatment  7.  Caregiver role strain; caring for son at home who had recent hemorrhagic stroke with unilateral plegia      Plan:  Hypercoag/APS labs pending  Eliquis 5mg BID-started this admission  Aspirin 81mg daily   Crestor 40 mg daily - on Crestor 10mg daily PTA  Neurochecks  BP control  Stroke Education  SHILO/SCDs  PT/OT/ST    Case reviewed with attending neurologist Dr. Bennie Ashton and he agrees with treatment plan above.      Elvira Grace,  APRN

## 2024-02-19 NOTE — ANESTHESIA PREPROCEDURE EVALUATION
Anesthesia Evaluation     Patient summary reviewed and Nursing notes reviewed                Airway   Mallampati: III  TM distance: >3 FB  Neck ROM: full  Large neck circumference  Dental - normal exam     Pulmonary    (+) ,sleep apnea  Cardiovascular     ECG reviewed    (+) hypertension, CHF Systolic <55%, hyperlipidemia    ROS comment: ·  Left ventricular systolic function is moderately decreased. Calculated left ventricular EF = 39.3%.  There is global hypokinesis with regional variations.  ·  Left ventricular wall thickness is consistent with borderline concentric hypertrophy. Left ventricular diastolic function is consistent with (grade I) impaired relaxation.  ·  RV grossly normal in size and function.  ·  No significant valvular abnormalities.  ·  Saline test results are negative.  ·  Normal biatrial and IVC size.    Nonischemic CM    Neuro/Psych  (+) CVA, psychiatric history  GI/Hepatic/Renal/Endo    (+) obesity, renal disease- CRI, diabetes mellitus type 2, thyroid problem     Musculoskeletal     Abdominal    Substance History      OB/GYN          Other        ROS/Med Hx Other: 2/16/2024 with diagnosis of acute CVA following presentation with significant right hemineglect and density hemiaplasia  treated successfully with catheter-based mechanical thrombectomy of occluded left A2 segment by Dr. Dobson    R/o cardioembolic source                    Anesthesia Plan    ASA 3     MAC   total IV anesthesia  (I have reviewed the patient's history with the patient and the chart, including all pertinent laboratory results and imaging. I have explained the risks of anesthesia including but not limited to dental damage, corneal abrasion, nerve injury, MI, stroke, and death. Questions asked and answered. Anesthetic plan discussed with patient and team as indicated. Patient expressed understanding of the above.  )    Anesthetic plan, risks, benefits, and alternatives have been provided, discussed and informed consent  has been obtained with: patient.        CODE STATUS:    Code Status (Patient has no pulse and is not breathing): CPR (Attempt to Resuscitate)  Medical Interventions (Patient has pulse or is breathing): Full Support

## 2024-02-19 NOTE — PROGRESS NOTES
Name: Jhon Munoz ADMIT: 2024   : 1965  PCP: Oneil Decker MD    MRN: [id number] LOS: 3 days   AGE/SEX: 58 y.o. male  ROOM: [facility]     Subjective   Subjective   No acute events. Patient denies new complaints. Taking PO. No CP/dyspnea/f/c/n/v/d.  No family at bedside.    Objective   Objective   Vital Signs  Temp:  [97.7 °F (36.5 °C)-98.6 °F (37 °C)] 98.1 °F (36.7 °C)  Heart Rate:  [60-85] 64  Resp:  [16-20] 18  BP: (104-134)/(69-97) 133/95  SpO2:  [98 %-100 %] 100 %  on  Flow (L/min):  [10] 10;   Device (Oxygen Therapy): room air  Body mass index is 31.7 kg/m².  Physical Exam  Vitals and nursing note reviewed.   Constitutional:       General: He is not in acute distress.     Appearance: He is not toxic-appearing.   HENT:      Head: Normocephalic and atraumatic.      Nose: Nose normal.      Mouth/Throat:      Mouth: Mucous membranes are moist.      Pharynx: Oropharynx is clear.   Eyes:      Conjunctiva/sclera: Conjunctivae normal.      Pupils: Pupils are equal, round, and reactive to light.   Cardiovascular:      Rate and Rhythm: Normal rate and regular rhythm.      Pulses: Normal pulses.   Pulmonary:      Effort: Pulmonary effort is normal.   Abdominal:      General: Bowel sounds are normal.      Palpations: Abdomen is soft.      Tenderness: There is no abdominal tenderness.   Musculoskeletal:         General: No swelling or tenderness.      Cervical back: Neck supple.   Skin:     General: Skin is warm and dry.      Capillary Refill: Capillary refill takes less than 2 seconds.   Neurological:      General: No focal deficit present.      Mental Status: He is alert and oriented to person, place, and time.   Psychiatric:         Mood and Affect: Mood normal.         Behavior: Behavior normal.       Results Review     I reviewed the patient's new clinical results.  Results from last 7 days   Lab Units 24  0521 24  0419 24  0209 24  0432   WBC 10*3/mm3 5.33 5.64 6.63 5.26    HEMOGLOBIN g/dL 12.0* 13.6 12.6* 13.2   PLATELETS 10*3/mm3 172 172 183 183     Results from last 7 days   Lab Units 02/19/24  0521 02/18/24 0419 02/17/24 0209 02/16/24 0437 02/16/24  0432   SODIUM mmol/L 141 141 137  --  141   POTASSIUM mmol/L 4.5 4.1 4.1  --  4.2   CHLORIDE mmol/L 109* 105 104  --  108*   CO2 mmol/L 26.0 24.1 25.0  --  19.3*   BUN mg/dL 18 15 15  --  15   CREATININE mg/dL 1.36* 1.26 1.40* 1.40* 1.07   GLUCOSE mg/dL 109* 99 90  --  92   EGFR mL/min/1.73 59.6* 65.3 57.5*  --  79.4     Results from last 7 days   Lab Units 02/16/24  0432   ALBUMIN g/dL 3.9   BILIRUBIN mg/dL 0.3   ALK PHOS U/L 51   AST (SGOT) U/L 23   ALT (SGPT) U/L 18     Results from last 7 days   Lab Units 02/19/24 0521 02/18/24 1958 02/18/24 0419 02/17/24 0209 02/16/24 0432   CALCIUM mg/dL 9.0  --  9.0 8.9 8.4*   ALBUMIN g/dL  --   --   --   --  3.9   MAGNESIUM mg/dL 2.1 2.0 2.3 2.1  --        Hemoglobin A1C   Date/Time Value Ref Range Status   02/17/2024 0209 5.90 (H) 4.80 - 5.60 % Final     Glucose   Date/Time Value Ref Range Status   02/19/2024 1218 103 70 - 130 mg/dL Final   02/18/2024 2006 112 70 - 130 mg/dL Final   02/18/2024 1735 83 70 - 130 mg/dL Final   02/18/2024 1158 87 70 - 130 mg/dL Final   02/18/2024 0758 108 70 - 130 mg/dL Final   02/17/2024 2124 101 70 - 130 mg/dL Final   02/17/2024 1532 85 70 - 130 mg/dL Final       No radiology results for the last day    I have personally reviewed all medications:  Scheduled Medications  apixaban, 5 mg, Oral, Q12H  aspirin, 81 mg, Oral, Daily  carvedilol, 3.125 mg, Oral, BID With Meals  insulin lispro, 2-7 Units, Subcutaneous, 4x Daily AC & at Bedtime  levothyroxine, 25 mcg, Oral, Daily  rosuvastatin, 40 mg, Oral, Nightly  sacubitril-valsartan, 1 tablet, Oral, Q12H  senna-docusate sodium, 2 tablet, Oral, BID  sodium chloride, 10 mL, Intravenous, Q12H    Infusions   Diet  Diet: Cardiac Diets; Healthy Heart (2-3 Na+); Texture: Regular Texture (IDDSI 7); Fluid Consistency:  Thin (IDDSI 0)    I have personally reviewed:  [x]  Laboratory   []  Microbiology   []  Radiology   [x]  EKG/Telemetry  [x]  Cardiology/Vascular   []  Pathology    []  Records    Assessment/Plan     Active Hospital Problems    Diagnosis  POA    **Acute ischemic left middle cerebral artery (MCA) stroke [I63.512]  Yes    OCHOA on CPAP [G47.33]  Yes    Stage 3a chronic kidney disease [N18.31]  Yes    Nonischemic cardiomyopathy [I42.8]  Yes    Type 2 diabetes mellitus without complication, without long-term current use of insulin [E11.9]  Yes    Essential hypertension [I10]  Yes    Mixed hyperlipidemia [E78.2]  Yes      Resolved Hospital Problems   No resolved problems to display.   Acute Left MCA/NIRMAL Stroke   - s/p mechanical thrombectomy 2/16/24 with Dr. 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-eliquis ordered as recommended by cardiology  - hypercoagulable workup sent  - appreciate neurology and cardiology recs    Non-Ischemic Cardiomyopathy/HTN/HLD  - LV EF slightly lower that last echocardiogram no significant change, has known LV wall motion abnormalities  - clinically no evidence of CHF, monitor volume status  - entresto, spironolactone, and coreg held on admission for permissive HTN due to CVA-these have been restarted, will monitor blood pressure  - on lipitor     Type 2 DM  - on ozempic as an outpatient, A1C 5.90%  - BG are acceptable  - cover with low dose ssi/hypoglycemia protocol     Stage 3a CKD  - baseline serum creatinine is around 1.3-1.4  - monitor and avoid nephrotoxins as able    OCHOA  - continue CPAP    Portions of this text have been copied and I have reviewed these. They are accurate as of 2/19/2024      SCDs for DVT  prophylaxis.  Full code.  Discussed with patient and nursing staff.  Anticipate discharge home tomorrow.      Cuba Argueta MD  [facility]  02/19/24  16:40 EST

## 2024-02-19 NOTE — PLAN OF CARE
Pt participated in OT treatment this AM. Upon arrival, pt sitting EOB with family. He stood and ambulated to bathroom with SBA. Pt participated in g/h tasks independently. Pt participated in dynamic balance exercises from standing. He reports generalized soreness in L shoulder and R LE, but denies any functional impairements. Discussed d/c planning, home safety awareness, energy conservation techniques, and proper body mechanics to utilize when caring for his son. Pt demonstrates return to baseline independence with functional mobility and ADL participation. Skilled OT services not required at this time, OT will s/o. Recommend d/c home.

## 2024-02-19 NOTE — PLAN OF CARE
Goal Outcome Evaluation:  Plan of Care Reviewed With: patient        Progress: improving  Outcome Evaluation: A&O x4, VSS, NIH 0, no c/o pain or nausea, up ad mateo, regular CC diet, NPO at MD for HEDY, family at bedside, will CTM

## 2024-02-19 NOTE — ANESTHESIA POSTPROCEDURE EVALUATION
"Patient: Jhon Munoz    Procedure Summary       Date: 02/19/24 Room / Location: [facility] PACU    Anesthesia Start: 0731 Anesthesia Stop: 0757    Procedure: ADULT TRANSESOPHAGEAL ECHO (HEDY) W/ CONT IF NECESSARY PER PROTOCOL Diagnosis: (Cardiac Source of Emboli)    Scheduled Providers: Isaac Mora MD Provider: Mario Alberto Sahni MD    Anesthesia Type: MAC ASA Status: 3            Anesthesia Type: MAC    Vitals  Vitals Value Taken Time   /74 02/19/24 0815   Temp     Pulse 71 02/19/24 0817   Resp 20 02/19/24 0800   SpO2 100 % 02/19/24 0817   Vitals shown include unfiled device data.        Post Anesthesia Care and Evaluation    Patient location during evaluation: bedside  Patient participation: complete - patient participated  Level of consciousness: awake  Pain management: adequate    Airway patency: patent  Anesthetic complications: No anesthetic complications    Cardiovascular status: acceptable  Respiratory status: acceptable  Hydration status: acceptable    Comments: */70   Pulse 76   Temp 36.7 °C (98.1 °F) (Oral)   Resp 20   Ht 177.8 cm (70\")   Wt 100 kg (220 lb 14.4 oz)   SpO2 100%   BMI 31.70 kg/m²       "

## 2024-02-19 NOTE — PAYOR COMM NOTE
"Jhon Munoz (58 y.o. Male)     PLEASE SEE ATTACHED FOR INPT AUTH     REF # [id number]    PLEASE CALL DEBI XIE RN/ DEPT @ [phone]  OR FAX  DEPARTMENT @  [phone]    THANK YOU   DEBI XIE RN  [facility]          Date of Birth   1965    Social Security Number       Address   [address] [place] [address]    Home Phone   [phone]    MRN   [id number]       [employer]   [facility]    Marital Status                               Admission Date   2/16/24    Admission Type   Emergency    Admitting Provider   Erickson Suárez MD    Attending Provider   Cuba Argueta MD    Department, Room/Bed   [address], 92/1       Discharge Date       Discharge Disposition       Discharge Destination                                 Attending Provider: Cuba Argueta MD    Allergies: No Known Allergies    Isolation: None   Infection: None   Code Status: CPR    Ht: 177.8 cm (70\")   Wt: 100 kg (220 lb 14.4 oz)    Admission Cmt: None   Principal Problem: Acute ischemic left middle cerebral artery (MCA) stroke [I63.512]                   Active Insurance as of 2/16/2024       Primary Coverage       Payor Plan Insurance Group Employer/Plan Group    [employer]ERCIAL AETNA [id number]       Payor Plan Address Payor Plan Phone Number Payor Plan Fax Number Effective Dates    [address] [phone]  1/1/2019 - None Entered    [facility] [id number]         Subscriber Name Subscriber Birth Date Member ID       JHON MUNOZ 1965 [id number]                     Emergency Contacts        (Rel.) Home Phone Work Phone Mobile Phone    Jyoti Munoz (Spouse) [phone] -- [phone]              [place]: [facility] [id number]  Tax ID [id number]     History & Physical        Amarilis Avery APRN at 02/16/24 0611       Attestation signed by Juanjose Edgar MD at 02/16/24 7327    I have reviewed this documentation and agree.    Juanjose CORTES " MD Tala, have reviewed the history and plan with TERRI Banuelos. I have independently examined the patient and I personally performed >50% of the management in this split shared patient. My physical exam confirms her documented findings and I agree with the plan as listed, with the following additions/modifications:    The patient presented with significant right hemineglect and dense hemiplegia but has done quite well after catheter-based mechanical thrombectomy of occluded left A2 segment per Dr. Dobson.  He was outside of the window for thrombolytic therapy.  Give aspirin and statin.  Maintain systolic blood pressure below 150 mmHg with IV nicardipine if needed.  Repeat CT of the head in 12 hours.  Continue home medications for his nonischemic cardiomyopathy.  Insulin sliding scale for diabetes management, current GLP-1 agonist on hold.  Okay to use home CPAP for his sleep apnea.                  Group: [facility]        HISTORY AND PHYSICAL    Patient Identification:  Jhon Munoz  58 y.o.  male  1965  [id number]            CC: Right sided neglect    History of Present Illness:  Jhon Munoz is a 58 year old male with a past medical history of nonischemic cardiomyopathy, type 2 diabetes mellitus, hypertension, hyperlipidemia, and sleep apnea, compliant with CPAP.    Patient notes the emergency department on 2/16/2024 from home with strokelike symptoms.  According to EMS reports, patient was last seen normal at 2330, patient got up to use the restroom around 4 AM.  Patient's significant other heard him go to the bathroom and heard some commotion, upon further investigation patient was found sitting on the floor and he was minimally responsive with some right-sided neglect.  Patient's baseline is ambulatory and fully functional.  Team D imaging was initiated upon arrival to the emergency department.  Patient is out of the window for thrombolytics.    NIHSS 20 on arrival.    ED evaluation  revealed high-grade stenosis of the distal left A2 segment with diminished enhancement of the distal vessels concerning for acute thrombolytic disease.    Interventional neurology was called and patient was taken to IR for mechanical thrombectomy.    Review of System:  CONSTITUTIONAL:  Denies fevers or chills  EYE:  No new vision changes  EAR:  No change in hearing  CARDIAC:  No chest pain  PULMONARY:  No productive cough or shortness of breath  GI:  No diarrhea, hematemesis or hematochezia,  RENAL:  No dysuria or urinary frequency  MUSCULOSKELETAL:  No musculoskeletal complaints  ENDOCRINE:  No heat or cold intolerance  INTEGUMENTARY: No skin rashes  NEUROLOGICAL:  No dizziness or confusion.  No seizure activity  PSYCHIATRIC:  No new anxiety or depression  12 system review of systems performed and all else negative     Past Medical History:  Past Medical History:   Diagnosis Date    Cardiomyopathy     Colon polyp     Cortical senile cataract 4/1/2019    Diabetes mellitus     type 2    Hyperlipidemia     Hypertension     Renal calculi     Followed by Dr. Chris Carreon urologist    Renal infarct 07/2020    Left; noted on CT scan-followed by Dr. Chris Carreon urology    Sleep apnea     CPAP nightly       Past Surgical History:  Past Surgical History:   Procedure Laterality Date    CARDIAC CATHETERIZATION N/A 07/24/2020    Procedure: Coronary angiography;  Surgeon: Robert Deluna MD;  Location: [facility] CATH INVASIVE LOCATION;  Service: Cardiovascular;  Laterality: N/A;    CARDIAC CATHETERIZATION N/A 07/24/2020    Procedure: Left Heart Cath;  Surgeon: Robert Deluna MD;  Location: [facility] CATH INVASIVE LOCATION;  Service: Cardiovascular;  Laterality: N/A;    COLONOSCOPY  2020    COLONOSCOPY N/A 10/18/2022    Procedure: COLONOSCOPY TO CECUM WITH COLD BIOPSY POLYPECTOMY;  Surgeon: Robert Evans MD;  Location: [facility] ENDOSCOPY;  Service: Gastroenterology;  Laterality: N/A;  PRE: HX COLON POLYPS  POST: POLYP,  "DIVERTICULOSIS, HEMORRHOIDS    KNEE SURGERY      meniscus         Home Meds:  (Not in a hospital admission)      Allergies:  No Known Allergies    Social History:   Social History     Socioeconomic History    Marital status:      Spouse name: Jyoti   Tobacco Use    Smoking status: Never     Passive exposure: Never    Smokeless tobacco: Never   Vaping Use    Vaping Use: Never used   Substance and Sexual Activity    Alcohol use: Not Currently     Types: 1 Cans of beer, 1 Shots of liquor per week     Comment: Caffeine use: 1 cup daily    Drug use: Never    Sexual activity: Defer       Family History:  Family History   Problem Relation Age of Onset    Hypertension Mother        Physical Exam:  /82   Pulse 79   Temp 97.4 °F (36.3 °C) (Tympanic)   Resp 18   Ht 170.2 cm (67\")   Wt 103 kg (227 lb 11.8 oz)   SpO2 100%   BMI 35.67 kg/m²  Body mass index is 35.67 kg/m². 100% 103 kg (227 lb 11.8 oz)    Constitutional: Middle aged pt in bed, No acute respiratory distress, no accessory muscle use  Head: - NCAT  Eyes: No pallor, Anicteric conjunctiva, EOMI.  ENMT:  Mallampati 3, no oral thrush. Dry MM.   NECK: Trachea midline, No thyromegaly, no palpable cervical lymphadenopathy  Heart: RRR, no murmur. No pedal edema   Lungs: ANGIE +, No wheezes/ crackles heard    Abdomen: Soft. No tenderness, guarding or rigidity. No palpable masses  Extremities: Extremities warm and well perfused. No cyanosis/ clubbing  Neuro: Conscious, answers appropriately, strength 5 out of 5 in bilateral upper and lower extremities, denies sensory deficits, no evidence of ataxia, patient is alert to person, time, place, slightly disoriented about situation  Psych: Mood and affect appropriate    PPE recommended per [facility] infectious disease Isolation protocol for the current clinical scenario(as mentioned below) was followed.      LABS:  COVID19   Date Value Ref Range Status   07/22/2020 Not Detected Not Detected - Ref. Range " Final       Lab Results   Component Value Date    CALCIUM 8.4 (L) 02/16/2024     Results from last 7 days   Lab Units 02/16/24  0432   SODIUM mmol/L 141   POTASSIUM mmol/L 4.2   CHLORIDE mmol/L 108*   CO2 mmol/L 19.3*   BUN mg/dL 15   CREATININE mg/dL 1.07   GLUCOSE mg/dL 92   CALCIUM mg/dL 8.4*   WBC 10*3/mm3 5.26   HEMOGLOBIN g/dL 13.2   PLATELETS 10*3/mm3 183   ALT (SGPT) U/L 18   AST (SGOT) U/L 23     Lab Results   Component Value Date    TROPONINT 13 02/16/2024     Results from last 7 days   Lab Units 02/16/24  0432   HSTROP T ng/L 13                     Results from last 7 days   Lab Units 02/16/24  0432   INR  1.03         Lab Results   Component Value Date    TSH 1.890 12/12/2023     Estimated Creatinine Clearance: 84 mL/min (by C-G formula based on SCr of 1.07 mg/dL).         Imaging: I personally visualized the images of scans/x-rays performed within last 3 days.      Assessment:  Acute stroke  Right-sided neglect  Nonischemic cardiomyopathy, LVEF 44.1%  Type 2 diabetes mellitus  Hypertension  Hyperlipidemia  Hypothyroidism  Sleep apnea, compliant with CPAP    Recommendations:  Acute stroke  Right-sided neglect  Status post mechanical thrombectomy (2/16/2024)  Patient right-sided weakness/neglect has almost completely resolved.  Strength 5 out of 5 in bilateral upper and lower extremities, no ataxia, denies sensory deficits.  Patient outside of the window for thrombolytic therapy.  Aspirin/atorvastatin per ischemic protocol.  Neurology consulted.  PT/OT/SLP consulted.  Goal SBP less than 140-as needed labetalol injection and nicardipine available.  Repeat CT head ordered 2/16/2024 at 1800.    Nonischemic cardiomyopathy, LVEF 44.1%  Denies chest pain.  EKG performed showing sinus rhythm, no evidence of ischemia.  Home medication: Entresto, carvedilol-currently on hold given NPO status.    Type 2 diabetes mellitus  Home medication: Semaglutide-currently on hold.  Glucose has been within normal limits between  , monitor.    Hypertension  Home medication: Carvedilol, spironolactone-hold.    Hyperlipidemia  Home medication: Rosuvastatin, modified to atorvastatin.  Home    Hypothyroidism  Medication: Levothyroxine, restart when able.    Sleep apnea, compliant with CPAP  Okay to use home CPAP if available.    Full code  N.p.o.  SCD    Patient was placed in face mask upon entering room and kept mask on throughout our encounter. I wore full protective equipment throughout this patient encounter including a face mask, gown and gloves. Hand hygiene was performed before donning protective equipment and after removal when leaving the room.    Amarilis Avery, APRN  2/16/2024  06:11 EST      Much of this encounter note is an electronic transcription/translation of spoken language to printed text using Dragon Software.     Electronically signed by Juanjose Edgar MD at 02/16/24 1557          Emergency Department Notes        Bhavesh Danielle RN at 02/16/24 0437          Pt to ed via ems,   Pt LKN was approx 2330 when pt was seen by wife last. Prior to arrival wife found pt non responsive on bathroom floor after she believed she heard him fall. Pt non responsive to questions and commands and demonstrating neglect  of right side per ems.      Electronically signed by Bhavesh Danielle RN at 02/16/24 0441       Carl Overton MD at 02/16/24 0431           EMERGENCY DEPARTMENT ENCOUNTER  Room Number:  GALINA/GALINA  PCP: Oneil Decker MD  Independent Historians: EMS      HPI:  Chief Complaint: had concerns including Neuro Deficit(s).     A complete HPI/ROS/PMH/PSH/SH/FH are unobtainable due to: Altered Mental Status    Chronic or social conditions impacting patient care (Social Determinants of Health): None      Context: Jhon Munoz is a 58 y.o. male with a medical history of diabetes who presents to the ED c/o acute stroke.  EMS reports that he was last seen normal at 2330.  Wife heard him get up to go to the bathroom just prior to  arrival.  She heard some commotion in the bathroom and found him sitting on the floor.  He would look at it but he would not respond.  He had a normal blood sugar in route.  EMS notes right-sided neglect.  They report right arm and right leg severe weakness.  EMS reports that his baseline is ambulatory and fully functional.      Review of prior external notes (non-ED) -and- Review of prior external test results outside of this encounter:  Primary care note dated 1/31/2024 with type 2 diabetes and elevated creatinine.  Creatinine on 12/12/2023 was 1.36    Prescription drug monitoring program review:         PAST MEDICAL HISTORY  Active Ambulatory Problems     Diagnosis Date Noted    Essential hypertension 08/29/2018    Mixed hyperlipidemia 08/29/2018    Low libido 12/31/2018    Nonischemic cardiomyopathy 07/21/2020    Type 2 diabetes mellitus without complication, without long-term current use of insulin 07/21/2020    Renal infarct 09/01/2020    Renal calculi     Clinical diagnosis of COVID-19 09/09/2021    Diabetes mellitus without complication 04/01/2019    Colon polyps 07/08/2022    Stage 3a chronic kidney disease 12/14/2022    Syncope and collapse 12/14/2022     Resolved Ambulatory Problems     Diagnosis Date Noted    Diabetes mellitus 08/29/2018    Class 2 severe obesity with serious comorbidity and body mass index (BMI) of 36.0 to 36.9 in adult 08/31/2020    Acute pain of right shoulder 05/05/2021    Cortical senile cataract 04/01/2019     Past Medical History:   Diagnosis Date    Cardiomyopathy     Colon polyp     Hyperlipidemia     Hypertension     Sleep apnea          PAST SURGICAL HISTORY  Past Surgical History:   Procedure Laterality Date    CARDIAC CATHETERIZATION N/A 07/24/2020    Procedure: Coronary angiography;  Surgeon: Robert Deluna MD;  Location: [facility] CATH INVASIVE LOCATION;  Service: Cardiovascular;  Laterality: N/A;    CARDIAC CATHETERIZATION N/A 07/24/2020    Procedure: Left Heart Cath;   Surgeon: Robert Deluna MD;  Location: [facility] CATH INVASIVE LOCATION;  Service: Cardiovascular;  Laterality: N/A;    COLONOSCOPY  2020    COLONOSCOPY N/A 10/18/2022    Procedure: COLONOSCOPY TO CECUM WITH COLD BIOPSY POLYPECTOMY;  Surgeon: Robert Evans MD;  Location: [facility] ENDOSCOPY;  Service: Gastroenterology;  Laterality: N/A;  PRE: HX COLON POLYPS  POST: POLYP, DIVERTICULOSIS, HEMORRHOIDS    KNEE SURGERY      meniscus          FAMILY HISTORY  Family History   Problem Relation Age of Onset    Hypertension Mother          SOCIAL HISTORY  Social History     Socioeconomic History    Marital status:      Spouse name: Jyoti   Tobacco Use    Smoking status: Never     Passive exposure: Never    Smokeless tobacco: Never   Vaping Use    Vaping Use: Never used   Substance and Sexual Activity    Alcohol use: Not Currently     Types: 1 Cans of beer, 1 Shots of liquor per week     Comment: Caffeine use: 1 cup daily    Drug use: Never    Sexual activity: Defer         ALLERGIES  Patient has no known allergies.      REVIEW OF SYSTEMS  Review of Systems  Included in HPI  All systems reviewed and negative except for those discussed in HPI.      PHYSICAL EXAM    I have reviewed the triage vital signs and nursing notes.    ED Triage Vitals [02/16/24 0427]   Temp Heart Rate Resp BP SpO2   -- 77 18 124/85 98 %      Temp src Heart Rate Source Patient Position BP Location FiO2 (%)   -- -- -- -- --       Physical Exam  GENERAL: Awake, alert, no acute distress  SKIN: Warm, dry  HENT: Normocephalic, atraumatic  EYES: no scleral icterus  CV: regular rhythm, regular rate  RESPIRATORY: normal effort, lungs clear  ABDOMEN: soft, nontender, nondistended  MUSCULOSKELETAL: no deformity  NEURO: alert, right arm and right leg paresis.  Right sided neglect.  Left leg drift.  Nonverbal.            LAB RESULTS  Recent Results (from the past 24 hour(s))   POC Glucose Once    Collection Time: 02/16/24  4:27 AM    Specimen: Blood    Result Value Ref Range    Glucose 82 70 - 130 mg/dL   Comprehensive Metabolic Panel    Collection Time: 02/16/24  4:32 AM    Specimen: Blood   Result Value Ref Range    Glucose 92 65 - 99 mg/dL    BUN 15 8 - 23 mg/dL    Creatinine 1.07 0.76 - 1.27 mg/dL    Sodium 141 136 - 145 mmol/L    Potassium 4.2 3.5 - 5.2 mmol/L    Chloride 108 (H) 98 - 107 mmol/L    CO2 19.3 (L) 22.0 - 29.0 mmol/L    Calcium 8.4 (L) 8.6 - 10.5 mg/dL    Total Protein 6.4 6.0 - 8.5 g/dL    Albumin 3.9 3.5 - 5.2 g/dL    ALT (SGPT) 18 1 - 41 U/L    AST (SGOT) 23 1 - 40 U/L    Alkaline Phosphatase 51 39 - 117 U/L    Total Bilirubin 0.3 0.0 - 1.2 mg/dL    Globulin 2.5 gm/dL    A/G Ratio 1.6 g/dL    BUN/Creatinine Ratio 14.0 7.0 - 25.0    Anion Gap 13.7 5.0 - 15.0 mmol/L    eGFR 79.4 >60.0 mL/min/1.73   Protime-INR    Collection Time: 02/16/24  4:32 AM    Specimen: Blood   Result Value Ref Range    Protime 13.7 11.7 - 14.2 Seconds    INR 1.03 0.90 - 1.10   aPTT    Collection Time: 02/16/24  4:32 AM    Specimen: Blood   Result Value Ref Range    PTT 25.3 22.7 - 35.4 seconds   Single High Sensitivity Troponin T    Collection Time: 02/16/24  4:32 AM    Specimen: Blood   Result Value Ref Range    HS Troponin T 13 <22 ng/L   Type & Screen    Collection Time: 02/16/24  4:32 AM    Specimen: Blood   Result Value Ref Range    ABO Type A     RH type Positive     Antibody Screen Negative     T&S Expiration Date 2/19/2024 11:59:59 PM    Green Top (Gel)    Collection Time: 02/16/24  4:32 AM   Result Value Ref Range    Extra Tube Hold for add-ons.    Lavender Top    Collection Time: 02/16/24  4:32 AM   Result Value Ref Range    Extra Tube hold for add-on    Gold Top - SST    Collection Time: 02/16/24  4:32 AM   Result Value Ref Range    Extra Tube Hold for add-ons.    Light Blue Top    Collection Time: 02/16/24  4:32 AM   Result Value Ref Range    Extra Tube Hold for add-ons.    CBC Auto Differential    Collection Time: 02/16/24  4:32 AM    Specimen: Blood    Result Value Ref Range    WBC 5.26 3.40 - 10.80 10*3/mm3    RBC 4.38 4.14 - 5.80 10*6/mm3    Hemoglobin 13.2 13.0 - 17.7 g/dL    Hematocrit 40.7 37.5 - 51.0 %    MCV 92.9 79.0 - 97.0 fL    MCH 30.1 26.6 - 33.0 pg    MCHC 32.4 31.5 - 35.7 g/dL    RDW 12.9 12.3 - 15.4 %    RDW-SD 43.9 37.0 - 54.0 fl    MPV 11.0 6.0 - 12.0 fL    Platelets 183 140 - 450 10*3/mm3    Neutrophil % 42.9 42.7 - 76.0 %    Lymphocyte % 44.1 19.6 - 45.3 %    Monocyte % 8.6 5.0 - 12.0 %    Eosinophil % 3.4 0.3 - 6.2 %    Basophil % 0.8 0.0 - 1.5 %    Immature Grans % 0.2 0.0 - 0.5 %    Neutrophils, Absolute 2.26 1.70 - 7.00 10*3/mm3    Lymphocytes, Absolute 2.32 0.70 - 3.10 10*3/mm3    Monocytes, Absolute 0.45 0.10 - 0.90 10*3/mm3    Eosinophils, Absolute 0.18 0.00 - 0.40 10*3/mm3    Basophils, Absolute 0.04 0.00 - 0.20 10*3/mm3    Immature Grans, Absolute 0.01 0.00 - 0.05 10*3/mm3    nRBC 0.0 0.0 - 0.2 /100 WBC   ECG 12 Lead Stroke Evaluation    Collection Time: 02/16/24  4:58 AM   Result Value Ref Range    QT Interval 414 ms    QTC Interval 478 ms         RADIOLOGY  CT Angiogram Head w AI Analysis of LVO    Addendum Date: 2/16/2024    ADDENDUM: 02 16 24 05:35 Call Doctor Regarding Stroke, called Verified receipt with  in ER    on 02 16 05:35 (-05:00)     Addendum Date: 2/16/2024    ADDENDUM: 02 16 24 05:35 Call Doctor Regarding Stroke, called Verified receipt with  in ER    on 02 16 05:35 (-05:00)     Result Date: 2/16/2024  CR Patient: ANA, FERNANDO  Time Out: 05:28 Exam(s): CTA HEAD EXAM:   CT Angiography Head Without and With Intravenous Contrast CLINICAL HISTORY:    Reason for exam: Stroke, follow up. Neuro deficit, acute stroke suspected. Acute Stroke. TECHNIQUE: AI analysis of LVO was utilized   Axial computed tomographic angiography images of the head without and with intravenous contrast.  CTDI is 51.22 mGy and DLP is 1959.7 mGy-cm.  This CT exam was performed according to the principle of ALASEBAS (As Low As  Reasonably Achievable) by using one or more of the following dose reduction techniques: automated exposure control, adjustment of the mA and or kV according to patient size, and or use of iterative reconstruction technique.   MIP reconstructed images were created and reviewed. COMPARISON:   No relevant prior studies available. FINDINGS:  VASCULATURE: The dural venous sinuses are patent.   Right internal carotid artery:  No acute findings.  Intracranial segment is patent with no significant stenosis.  No aneurysm.   Right anterior cerebral artery:  Unremarkable.  No occlusion or significant stenosis.  No aneurysm.   Right middle cerebral artery:  Unremarkable.  No occlusion or significant stenosis.  No aneurysm.   Right posterior cerebral artery:  Unremarkable.  No occlusion or significant stenosis.  No aneurysm.   Right vertebral artery:  Unremarkable as visualized.   Left internal carotid artery:  No acute findings.  Intracranial segment is patent with no significant stenosis.  No aneurysm.   Left anterior cerebral artery: There is an abrupt high-grade stenosis of the distal left A2 segment with severely diminished enhancement of the distal vessels.  No aneurysm.   Left middle cerebral artery:  Unremarkable.  No occlusion or significant stenosis.  No aneurysm.   Left posterior cerebral artery:  Unremarkable.  No occlusion or significant stenosis.  No aneurysm.   Left vertebral artery: Terminates in PICA.   Basilar artery:  Unremarkable.  No occlusion or significant stenosis.  No aneurysm.  HEAD:   Brain:  Advanced nonspecific white matter changes.  Remote ischemic injury of the right frontal and parietal lobes with encephalomalacia and gliosis.  Bilateral cerebellar tonsillar ectopia.  No acute findings.  No hemorrhage.  No edema.  Normal enhancement.   Ventricles:  Unremarkable.  No ventriculomegaly.   Bones joints:  No acute fracture.   Soft tissues:  Unremarkable.   Sinuses:  Unremarkable as visualized.  No  acute sinusitis.   Mastoid air cells:  Unremarkable as visualized.  No mastoid effusion. IMPRESSION:     Abrupt high-grade stenosis of the distal left A2 segment with diminished enhancement in the distal vessels concerning for acute thromboembolic disease.     Communications:  Verify Receipt  Call Doctor Stroke Electronically signed by Gail Solorio MD on 02-16-24 at 0528    CT CEREBRAL PERFUSION WITH & WITHOUT CONTRAST    Addendum Date: 2/16/2024    ADDENDUM: 02 16 24 05:26 Call Doctor Regarding Stroke, called Neurologist, STROKE @ [phone] (Pager))   on 02 16 05:26 (-05:00)     Result Date: 2/16/2024  CR Patient: FERNANDO PEREZ  Time Out: 05:21 Exam(s): CT PERFUSION EXAM:   CT Brain Perfusion With Intravenous Contrast CLINICAL HISTORY:    Reason for exam: Neuro deficit, acute, stroke suspected. TECHNIQUE:   Routine CT brain cerebral perfusion study was performed using IV contrast.  Reformats and postprocessing were performed by the technologist.  CTDI is 18.74 mGy and DLP is 221 mGy-cm.  This CT exam was performed according to the principle of ALARA (As Low As Reasonably Achievable) by using one or more of the following dose reduction techniques: automated exposure control, adjustment of the mA and or kV according to patient size, and or use of iterative reconstruction technique. COMPARISON:   None. FINDINGS:   Core infarct: Findings concerning for a small core infarct in the medial left frontal lobe with volume measuring 17 mm.   Reversible ischemia: There is a moderate sized acute perfusion defect in the medial left frontal lobe with area measuring 57 mL.   Brain and extra-axial spaces:  No intra- or extra-axial hemorrhage. IMPRESSION:     Acute perfusion defect in the medial left frontal lobe with smaller core infarct and mismatch pressure of 3.4.     Communications:  Call Doctor Stroke; Neurologist, STROKE @ [phone] (Pager) Electronically signed by Gail Solorio MD on 02-16-24 at  0521    XR Chest 1 View    Result Date: 2/16/2024  Patient: ANA, FERNANDO  Time Out: 05:20 Exam(s): XR CXR 1 VIEW EXAM:   XR Chest, 1 View CLINICAL HISTORY:    Reason for exam: Acute Stroke Protocol (onset < 12 hrs). TECHNIQUE:   Frontal view of the chest. COMPARISON:   No relevant prior studies available. FINDINGS:   Lungs:  Hypoventilation, which limits evaluation.  Probable left basilar atelectasis, although infiltrates cannot be definitively excluded.   Pleural space:  Unremarkable.  No pneumothorax.   Heart:  Cardiomegaly with pulmonary vascular congestion, suboptimally evaluated secondary to hypoventilation.   Mediastinum:  Probable hiatal hernia.   Bones joints:  Unremarkable.  No acute fracture. IMPRESSION:     1.  Hypoventilation, which limits evaluation.  Probable left basilar atelectasis, although infiltrates cannot be definitively excluded.  PA and lateral views of the chest recommended for further evaluation. 2.  Cardiomegaly with pulmonary vascular congestion, suboptimally evaluated secondary to hypoventilation. 3.  Probable hiatal hernia.     Electronically signed by Arturo Reeves MD on 02-16-24 at 0520    CT Angiogram Neck    Addendum Date: 2/16/2024    ADDENDUM: 02 16 24 05:26 Call Doctor Regarding Stroke, called )   on 02 16 05:26 (-05:00)     Addendum Date: 2/16/2024    ADDENDUM: 02 16 24 05:26 Call Doctor Regarding Stroke, called )   on 02 16 05:26 (-05:00)     Result Date: 2/16/2024  CR Patient: ANA, FERNANDO  Time Out: 05:19 Exam(s): CTA NECK EXAM:   CT Angiography Neck With Intravenous Contrast CLINICAL HISTORY:    Reason for exam: Stroke, follow up. Neuro deficit, acute stroke suspected. TECHNIQUE:   Routine carotid CT angiography protocol was performed with intravenous contrast.  NASCET criteria using the distal ICAs for comparison were used for evaluation of stenoses.  CTDI is 51.22 mGy and DLP is 1959.7 mGy-cm.  This CT exam was performed according to the principle  of ALARA (As Low As Reasonably Achievable) by using one or more of the following dose reduction techniques: automated exposure control, adjustment of the mA and or kV according to patient size, and or use of iterative reconstruction technique.   3D and MIP reconstructed images were created and reviewed. COMPARISON:   None. FINDINGS:  VASCULATURE:   Right common carotid artery:  Unremarkable.  No occlusion or significant stenosis.  No dissection.   Right internal carotid artery:  Unremarkable.  Extracranial segment is patent with no occlusion or significant stenosis.  No dissection.   Right external carotid artery:  Unremarkable.  No occlusion.   Right vertebral artery:  Unremarkable.  No occlusion or significant stenosis.  No dissection.   Left common carotid artery:  Unremarkable.  No occlusion or significant stenosis.  No dissection.   Left internal carotid artery:  Unremarkable.  Extracranial segment is patent with no occlusion or significant stenosis.  No dissection.   Left external carotid artery:  Unremarkable.  No occlusion.   Left vertebral artery:  Unremarkable.  No occlusion or significant stenosis.  No dissection.  NECK:   Bones joints:  Unremarkable.  No acute fracture.   Soft tissues: Prominent mediastinal and hilar lymph nodes.  Prominent cervical lymph nodes.   Lung apices: Findings concerning for bronchitis with pneumonitis, which may be of infectious or inflammatory etiologies.  CAROTID STENOSIS REFERENCE USING NASCET CRITERIA:   % ICA stenosis = (1 - narrowest ICA diameter diameter of distal cervical ICA) x 100.   Mild - <50% stenosis.   Moderate - 50-69% stenosis.   Severe - 70-94% stenosis.   Near occlusion - 95-99% stenosis.   Occluded - 100% stenosis. IMPRESSION:       Negative CTA neck.     Communications:  Verify Receipt  Call Doctor Stroke Electronically signed by Gail Solorio MD on 02-16-24 at 0507       MEDICATIONS GIVEN IN ER  Medications   sodium chloride 0.9 % flush 10 mL (has no  administration in time range)   sodium chloride 1,000 mL with heparin (porcine) 2,000 Units mixture ( Intracatheter Given 2/16/24 0606)   iopamidol (ISOVUE-370) 76 % injection 150 mL (150 mL Intravenous Given 2/16/24 0441)         ORDERS PLACED DURING THIS VISIT:  Orders Placed This Encounter   Procedures    CT Angiogram Head w AI Analysis of LVO    CT Angiogram Neck    CT CEREBRAL PERFUSION WITH & WITHOUT CONTRAST    XR Chest 1 View    IR Perc Mech Thromb Prim Nonc Art Ini    [place] Draw    Comprehensive Metabolic Panel    Protime-INR    aPTT    Single High Sensitivity Troponin T    CBC Auto Differential    NPO Diet NPO Type: Strict NPO    Initiate Department's Acute Stroke Process (Team D, Code 19, etc.)    Perform NIH Stroke Scale    Measure Actual Weight    Notify MD for SBP < 80 or > 200    Notify Provider for SBP greater than 140 if hemorrhagic stroke    Head of Bed 30 Degrees or Less    Undress and Gown    Continuous Pulse Oximetry    Vital Signs    Neuro Checks    No Hypotonic Fluids    Nursing Dysphagia Screening (Complete Prior to Giving anything PO)    RN to Place Order SLP Consult (IF swallow screen failed) - Eval & Treat Choosing Reason of RN Dysphagia Screen Failed    Inpatient Neurology Consult Stroke    Inpatient Neurology Consult Stroke    Oxygen Therapy- Nasal Cannula; Titrate 1-6 LPM Per SpO2; 90 - 95%    POC Glucose Once    POC Glucose Once    ECG 12 Lead Stroke Evaluation    Type & Screen    Insert Large-Bore Peripheral IV - RIGHT AC Preferred    CBC & Differential    Green Top (Gel)    Lavender Top    Gold Top - SST    Light Blue Top         OUTPATIENT MEDICATION MANAGEMENT:  Current Facility-Administered Medications Ordered in Epic   Medication Dose Route Frequency Provider Last Rate Last Admin    glycopyrrolate (ROBINUL) injection   Intravenous PRN Arash Hayward CRNA   0.1 mg at 02/16/24 0602    lactated ringers infusion   Intravenous Continuous PRN Arash Hayward CRNA   New Bag at  02/16/24 0543    phenylephrine (HILARIA-SYNEPHRINE) 50 mg in sodium chloride 0.9 % 250 mL infusion   Intravenous Continuous PRN Mary Jane Haywardin, CRNA   Stopped at 02/16/24 0619    phenylephrine (HILARIA-SYNEPHRINE) injection   Intravenous PRN Hocker, Arash, CRNA   100 mcg at 02/16/24 0550    Propofol (DIPRIVAN) injection   Intravenous PRN Hocker, Arash, CRNA   200 mg at 02/16/24 0550    rocuronium (ZEMURON) injection   Intravenous PRN Hocker, Arash, CRNA   40 mg at 02/16/24 0605    sodium chloride 0.9 % flush 10 mL  10 mL Intravenous PRN Carl Overton MD        sodium chloride 1,000 mL with heparin (porcine) 2,000 Units mixture    PRN Andrew Murray MD   Given at 02/16/24 0606    Succinylcholine Chloride (ANECTINE) injection   Intravenous PRN Hocismael, Arash, CRNA   140 mg at 02/16/24 0550     Current Outpatient Medications Ordered in Epic   Medication Sig Dispense Refill    aspirin 81 MG chewable tablet Chew 1 tablet Daily.      carvedilol (COREG) 25 MG tablet Take 1 tablet by mouth 2 (Two) Times a Day. 180 tablet 3    empagliflozin (Jardiance) 25 MG tablet tablet Take 1 tablet by mouth Daily. 90 tablet 1    Entresto 49-51 MG tablet TAKE 1 TABLET BY MOUTH TWICE A  tablet 1    esomeprazole (nexIUM) 40 MG capsule Take 1 capsule by mouth Every Morning Before Breakfast. prn 30 capsule 12    levothyroxine (SYNTHROID, LEVOTHROID) 25 MCG tablet Take 1 tablet by mouth Daily. 90 tablet 2    rosuvastatin (CRESTOR) 10 MG tablet Take 1 tablet by mouth Daily. 90 tablet 3    Semaglutide,0.25 or 0.5MG/DOS, (Ozempic, 0.25 or 0.5 MG/DOSE,) 2 MG/1.5ML solution pen-injector Inject 0.5 mg under the skin into the appropriate area as directed 1 (One) Time Per Week. 6 mL 1    spironolactone (ALDACTONE) 25 MG tablet Take 1 tablet by mouth Daily. 1 in the am and 1/2 in pm           PROCEDURES  Procedures      Critical care provider statement:    Critical care time (minutes): 30-74.   Critical care time was exclusive of:  Separately  billable procedures and treating other patients   Critical care was necessary to treat or prevent imminent or life-threatening deterioration of the following conditions:  CNS Failure   Critical care was time spent personally by me on the following activities:  Development of treatment plan with patient or surrogate, discussions with consultants, evaluation of patient's response to treatment, examination of patient, obtaining history from patient or surrogate, ordering and performing treatments and interventions, ordering and review of laboratory studies, ordering and review of radiographic studies, pulse oximetry, re-evaluation of patient's condition and review of old charts. Critical Care indicators:        PROGRESS, DATA ANALYSIS, CONSULTS, AND MEDICAL DECISION MAKING  All labs have been independently interpreted by me.  All radiology studies have been reviewed by me. All EKG's have been independently viewed and interpreted by me.  Discussion below represents my analysis of pertinent findings related to patient's condition, differential diagnosis, treatment plan and final disposition.    Differential diagnosis includes but is not limited to intracranial hemorrhage, stroke, TIA, seizure, hypoglycemia, intoxication, altered mental status.    Clinical Scores:            Total (NIH Stroke Scale): 19      ED Course as of 02/16/24 0624   Fri Feb 16, 2024   0427 Discussing with Dr. Lorenzana with stroke neurology.  Team D initiated. [TR]   0430 The patient is outside the tPA window since he was last seen normal at 2330 [TR]   0501 Emergent imaging shows left A2 occlusion.  Neurology is working with interventional radiology to arrange for the patient to go to the OR for thrombectomy. [TR]   0502 Discussing with Dr. Lorenzana [TR]   0505 I discussed with the patient's wife by phone.  I updated her on the current status.  She is agreeable to send him to the operating room for thrombectomy. [TR]   0506 EKG          EKG time:  "458  Rhythm/Rate: Normal sinus, rate 80  P waves and WA: Normal P, Normal WA  QRS, axis: Narrow QRS, Normal axis  ST and T waves: No acute changes    Independently Interpreted by me  Not significantly changed compared to prior 2020   [TR]      ED Course User Index  [TR] Carl Overton MD             AS OF 06:24 EST VITALS:    BP - 117/82  HR - 79  TEMP - 97.4 °F (36.3 °C) (Tympanic)  O2 SATS - 100%    COMPLEXITY OF CARE  The patient requires admission.      DIAGNOSIS  Final diagnoses:   Acute embolic stroke         DISPOSITION  ED Disposition       ED Disposition   Send to OR    Condition   --    Comment   --                Please note that portions of this document were completed with a voice recognition program.    Note Disclaimer: At [facility], we believe that sharing information builds trust and better relationships. You are receiving this note because you recently visited [facility]. It is possible you will see health information before a provider has talked with you about it. This kind of information can be easy to misunderstand. To help you fully understand what it means for your health, we urge you to discuss this note with your provider.         Carl Overton MD  24 0506       Carl Overton MD  24 0624      Electronically signed by Carl Overton MD at 24 0624          Physician Progress Notes (last 72 hours)        Elvira Grace APRN at 24 1046          DOS: 2024  NAME: Jhon Munoz   : 1965  PCP: Oneil Decker MD  Chief Complaint   Patient presents with    Neuro Deficit(s)     Stroke    Patient seen in follow-up today; new to me                Subjective: No acute events overnight.  Patient denies any new complaints or concerns on my exam.     No family at bedside.    Objective:  Vital signs: /83   Pulse 64   Temp 98 °F (36.7 °C)   Resp 20   Ht 177.8 cm (70\")   Wt 100 kg (220 lb 14.4 oz)   SpO2 100%   BMI 31.70 kg/m²     "   HEENT: Normocephalic, atraumatic   COR: RRR  Resp: Even and unlabored  Extremities: Equal pulses, nondistal embolization    Neurological:   MS: AO. Language normal. No neglect. Higher integrative function normal  CN: II-XII normal  Motor: 5/5, normal tone  Reflexes: Toes downgoing  Sensory: Intact-to light touch  Coordination: Normal-finger-nose    Laboratory results:  Lab Results   Component Value Date    GLUCOSE 109 (H) 02/19/2024    CALCIUM 9.0 02/19/2024     02/19/2024    K 4.5 02/19/2024    CO2 26.0 02/19/2024     (H) 02/19/2024    BUN 18 02/19/2024    CREATININE 1.36 (H) 02/19/2024    EGFRIFAFRI 65 01/04/2022    BCR 13.2 02/19/2024    ANIONGAP 6.0 02/19/2024     Lab Results   Component Value Date    WBC 5.33 02/19/2024    HGB 12.0 (L) 02/19/2024    HCT 37.7 02/19/2024    MCV 90.8 02/19/2024     02/19/2024     Lab Results   Component Value Date    CHOL 109 02/17/2024    CHOL 140 06/19/2023    CHOL 132 06/15/2022     Lab Results   Component Value Date    HDL 52 02/17/2024    HDL 56 12/12/2023    HDL 57 09/19/2023     Lab Results   Component Value Date    LDL 39 02/17/2024    LDL 42 12/12/2023    LDL 62 09/19/2023     Lab Results   Component Value Date    TRIG 97 02/17/2024    TRIG 71 12/12/2023    TRIG 69 09/19/2023         Lab 02/17/24  0209   HEMOGLOBIN A1C 5.90*      Review and interpretation of imaging:  Imaging discussed below reviewed independently.      Impression: This is a 58-year-old man with history of CHF-reduced EF, hypertension, hyperlipidemia, hypothyroidism, obstructive sleep apnea who presented to [facility] and found to have left NIRMAL/MCA infarcts subsequently underwent mechanical thrombectomy achieving TICI 3 flow with 2 passes per Dr. Jimi Dobson.  MRI brain showed very small areas of stroke in the left MCA/NIRMAL territory (pictured above).  CTA showed no evidence of high-grade stenosis/aneurysm and/or dissection.  TTE showed EF 39.3%.  LV moderately  decreased.  LA normal.  Saline test negative.  Left atrial volume index 18.6.  Cardiology team consulted for HEDY which revealed LV noncompaction-full anticoagulation felt to be warranted per cardiology we are in agreement. Labs: A1c 5.9%, LDL 39      Neurologically, stable at neurologic baseline.  Recommendations below. No further neurology workup indicated. We will sign off and see again upon request.        Diagnosis:   1.  Acute left NIRMAL stroke with left M2 occlusion status post mechanical thrombectomy achieving TICI 3 flow-etiology of the stroke felt to be embolic-HEDY revealed LV noncompaction felt to warrant full anticoagulation therefore Eliquis 5 mg twice daily has since been initiated along with low-dose aspirin and high-dose statin for secondary stroke prevention  2.  Multiple chronic infarcts noted without significant intracranial stenosis  3.  Hypertension  4.  Hyperlipidemia  5.  Hypothyroidism  6.  Obstructive sleep apnea-encouraged compliance with OCHOA treatment  7.  Caregiver role strain; caring for son at home who had recent hemorrhagic stroke with unilateral plegia      Plan:  Hypercoag/APS labs pending  Eliquis 5mg BID-started this admission  Aspirin 81mg daily   Crestor 40 mg daily - on Crestor 10mg daily PTA  Neurochecks  BP control  Stroke Education  SHILO/SCDs  PT/OT/ST    Case reviewed with attending neurologist Dr. Bennie Ashton and he agrees with treatment plan above.      TERRI Liu      Electronically signed by Elvira Grace APRN at 24 1447       [place] Argueta MD at 24 1226              Name: Jhon Munoz ADMIT: 2024   : 1965  PCP: Oneil Decker MD    MRN: [id number] LOS: 2 days   AGE/SEX: 58 y.o. male  ROOM: [facility]     Subjective   Subjective   No acute events. Patient denies new complaints. Taking PO. No CP/dyspnea/f/c/n/v/d.  No family at bedside.    Objective   Objective   Vital Signs  Temp:  [97.7 °F (36.5 °C)-98.4 °F (36.9 °C)] 97.7 °F  (36.5 °C)  Heart Rate:  [58-98] 77  Resp:  [18] 18  BP: (101-124)/(64-87) 112/69  SpO2:  [71 %-100 %] 98 %  on   ;   Device (Oxygen Therapy): room air  Body mass index is 31.7 kg/m².  Physical Exam  Vitals and nursing note reviewed.   Constitutional:       General: He is not in acute distress.     Appearance: He is not toxic-appearing.   HENT:      Head: Normocephalic and atraumatic.      Nose: Nose normal.      Mouth/Throat:      Mouth: Mucous membranes are moist.      Pharynx: Oropharynx is clear.   Eyes:      Conjunctiva/sclera: Conjunctivae normal.      Pupils: Pupils are equal, round, and reactive to light.   Cardiovascular:      Rate and Rhythm: Normal rate and regular rhythm.      Pulses: Normal pulses.   Pulmonary:      Effort: Pulmonary effort is normal.   Abdominal:      General: Bowel sounds are normal.      Palpations: Abdomen is soft.      Tenderness: There is no abdominal tenderness.   Musculoskeletal:         General: No swelling or tenderness.      Cervical back: Neck supple.   Skin:     General: Skin is warm and dry.      Capillary Refill: Capillary refill takes less than 2 seconds.   Neurological:      General: No focal deficit present.      Mental Status: He is alert and oriented to person, place, and time.   Psychiatric:         Mood and Affect: Mood normal.         Behavior: Behavior normal.       Results Review     I reviewed the patient's new clinical results.  Results from last 7 days   Lab Units 02/18/24 0419 02/17/24 0209 02/16/24  0432   WBC 10*3/mm3 5.64 6.63 5.26   HEMOGLOBIN g/dL 13.6 12.6* 13.2   PLATELETS 10*3/mm3 172 183 183     Results from last 7 days   Lab Units 02/18/24 0419 02/17/24  0209 02/16/24  0437 02/16/24  0432   SODIUM mmol/L 141 137  --  141   POTASSIUM mmol/L 4.1 4.1  --  4.2   CHLORIDE mmol/L 105 104  --  108*   CO2 mmol/L 24.1 25.0  --  19.3*   BUN mg/dL 15 15  --  15   CREATININE mg/dL 1.26 1.40* 1.40* 1.07   GLUCOSE mg/dL 99 90  --  92   EGFR mL/min/1.73 65.3  57.5*  --  79.4     Results from last 7 days   Lab Units 02/16/24  0432   ALBUMIN g/dL 3.9   BILIRUBIN mg/dL 0.3   ALK PHOS U/L 51   AST (SGOT) U/L 23   ALT (SGPT) U/L 18     Results from last 7 days   Lab Units 02/18/24  0419 02/17/24  0209 02/16/24  0432   CALCIUM mg/dL 9.0 8.9 8.4*   ALBUMIN g/dL  --   --  3.9   MAGNESIUM mg/dL 2.3 2.1  --        Hemoglobin A1C   Date/Time Value Ref Range Status   02/17/2024 0209 5.90 (H) 4.80 - 5.60 % Final     Glucose   Date/Time Value Ref Range Status   02/18/2024 1158 87 70 - 130 mg/dL Final   02/18/2024 0758 108 70 - 130 mg/dL Final   02/17/2024 2124 101 70 - 130 mg/dL Final   02/17/2024 1532 85 70 - 130 mg/dL Final   02/17/2024 1117 92 70 - 130 mg/dL Final   02/17/2024 0743 115 70 - 130 mg/dL Final   02/17/2024 0346 107 70 - 130 mg/dL Final       CT Head Without Contrast    Result Date: 2/16/2024  FINDINGS AND IMPRESSION: No findings of definite acute intracranial hemorrhage are seen; however, please note the presence of presumed intracranial contrast from recent angiogram limits evaluation. The cerebellar tonsils extend approximately 6 to 7 mm below the foramen magnum. The area of acute infarction within the left anterior cerebral artery territory seen on recent MRI are not well visualized on CT and please refer to MRI 2/16/2024 for further information. Hypodensity within the periventricular and subcortical white matter likely represents moderate to extensive chronic ischemic small vessel degenerative changes. Area of encephalomalacia within the right parietal lobe, as before.  There is no midline shift or hydrocephalus.      This report was finalized on 2/16/2024 7:08 PM by Dr. Ivan Thacker M.D on Workstation: [id number]      MRI Brain Without Contrast    Result Date: 2/16/2024  1.  Small to moderate sized area of acute infarction within the left anterior cerebral artery territory. Moderate to extensive chronic ischemic white matter changes. Area of encephalomalacia  within the right parietal lobe. 2.  Cerebellar tonsils extend approximately 6 to 7 mm below the foramen magnum. 3.  Other findings as above.    This report was finalized on 2/16/2024 6:16 PM by Dr. Ivan Thacker M.D on Workstation: [id number]       I have personally reviewed all medications:  Scheduled Medications  aspirin, 81 mg, Oral, Daily  atorvastatin, 80 mg, Oral, Nightly  insulin lispro, 2-7 Units, Subcutaneous, 4x Daily AC & at Bedtime  levothyroxine, 25 mcg, Oral, Daily  senna-docusate sodium, 2 tablet, Oral, BID  sodium chloride, 10 mL, Intravenous, Q12H    Infusions   Diet  Diet: Cardiac Diets, Diabetic Diets; Healthy Heart (2-3 Na+); Consistent Carbohydrate; Texture: Regular Texture (IDDSI 7); Fluid Consistency: Thin (IDDSI 0)    I have personally reviewed:  [x]  Laboratory   []  Microbiology   []  Radiology   [x]  EKG/Telemetry  []  Cardiology/Vascular   []  Pathology    []  Records    Assessment/Plan     Active Hospital Problems    Diagnosis  POA    **Acute ischemic left middle cerebral artery (MCA) stroke [I63.512]  Yes    OCHOA on CPAP [G47.33]  Yes    Stage 3a chronic kidney disease [N18.31]  Yes    Nonischemic cardiomyopathy [I42.8]  Yes    Type 2 diabetes mellitus without complication, without long-term current use of insulin [E11.9]  Yes    Essential hypertension [I10]  Yes    Mixed hyperlipidemia [E78.2]  Yes      Resolved Hospital Problems   No resolved problems to display.   Acute Left MCA/NIRMAL Stroke   - s/p mechanical thrombectomy 2/16/24 with Dr. Murray, no residual deficits  - continue on ASA and statin  - brain MRI showed acute Left NIRMAL CVA, chronic white matter changes, and area of encephalomalacia in the right parietal lobe  - mechanism suspected to be cardioembolic-TTE noted, cardiology has evaluated, HEDY planned for tomorrow morning  - hypercoagulable workup pending  - appreciate neurology and cardiology recs    Non-Ischemic Cardiomyopathy/HTN/HLD  - LV EF slightly lower that last  "echocardiogram no significant change, has known LV wall motion abnormalities  - clinically no evidence of CHF  - monitor volume status, cardiology to evaluate as above, follows with Dr. Graff as an outpatient  - entresto, spironolactone, and coreg held on admission-will monitor BP and hopefully can restart these soon  - on lipitor     Type 2 DM  - on ozempic as an outpatient, A1C 5.90%  - BG are acceptable  - cover with low dose ssi/hypoglycemia protocol     Stage 3a CKD  - baseline serum creatinine is around 1.3-1.4  - monitor and avoid nephrotoxins as able    Portions of this text have been copied and I have reviewed these. They are accurate as of 2024      SCDs for DVT prophylaxis.  Full code.  Discussed with patient and nursing staff.  Anticipate discharge home when cleared by consultants.      [place] Argueta MD  [facility]  24  12:26 EST      Electronically signed by [place] Argueta MD at 24 1244       Jill Petersen PA at 24 0851       Attestation signed by Bennie Evangelista MD at 24 1342    I have reviewed this documentation and agree.                  DOS: 2024  NAME: Jhon Munoz   : 1965  PCP: Oneil Decker MD  Chief Complaint   Patient presents with    Neuro Deficit(s)       Chief complaint: fall, R leg weakness  Subjective: PT doing well, no neuro symptoms to report.  A couple of weeks before presentation he had what sounds like orthostatic syncope and EMS came to his house.  He and wife been caring for son at home    Objective:  Vital signs: /87 (BP Location: Right arm, Patient Position: Lying)   Pulse 63   Temp 98.4 °F (36.9 °C) (Oral)   Resp 18   Ht 177.8 cm (70\")   Wt 100 kg (220 lb 14.4 oz)   SpO2 98%   BMI 31.70 kg/m²      Gen: NAD, vitals reviewed  MS: oriented x3, recent/remote memory intact, normal attention/concentration, language intact, no neglect.  CN: visual acuity grossly normal, PERRL, " EOMI, no facial droop, no dysarthria  Motor: 5/5 throughout upper and lower extremities, normal tone  Sensory: intact to light touch all 4 ext.    ROS:  No weakness, numbness  No fevers, chills      Laboratory results:  Lab Results   Component Value Date    GLUCOSE 99 02/18/2024    CALCIUM 9.0 02/18/2024     02/18/2024    K 4.1 02/18/2024    CO2 24.1 02/18/2024     02/18/2024    BUN 15 02/18/2024    CREATININE 1.26 02/18/2024    EGFRIFAFRI 65 01/04/2022    BCR 11.9 02/18/2024    ANIONGAP 11.9 02/18/2024     Lab Results   Component Value Date    WBC 5.64 02/18/2024    HGB 13.6 02/18/2024    HCT 41.5 02/18/2024    MCV 93.5 02/18/2024     02/18/2024     Lab Results   Component Value Date    LDL 39 02/17/2024    LDL 42 12/12/2023    LDL 62 09/19/2023         Lab 02/17/24  0209   HEMOGLOBIN A1C 5.90*        Review of labs: Factor V factor II normal,, Antithrombin III normal, protein C and S normal, HILDA and lupus anticoagulant pending.  TSH normal    Review and interpretation of imaging:   HCT, CTA h/n  IMPRESSION:       Abrupt high-grade stenosis of the distal left A2 segment with diminished   enhancement in the distal vessels concerning for acute thromboembolic   disease.    CTP  FINDINGS:    Core infarct: Findings concerning for a small core infarct in the   medial left frontal lobe with volume measuring 17 mm.    Reversible ischemia: There is a moderate sized acute perfusion defect   in the medial left frontal lobe with area measuring 57 mL.    Brain and extra-axial spaces:  No intra- or extra-axial hemorrhage.     IMPRESSION:       Acute perfusion defect in the medial left frontal lobe with smaller core   infarct and mismatch pressure of 3.4.    MRI brain    FINDINGS:     Moderate-sized area of restricted diffusion primarily both in the cortex  of the medial and high left frontal lobe in the left anterior cerebral  artery territory is present. Area of diffusion weighted hyperintensity  within the  right parietal lobe corresponds with encephalomalacia and  hyperintensity on ADC likely represents T2 shine through. Focal ADC  hypointensity within the high left parietal lobe also corresponds with  T2 shine through. The cerebellar tonsils extend approximately 6 to 7 mm  below the foramen magnum.     T2 hyperintensity within the periventricular and subcortical white  matter likely represents moderate to extensive chronic ischemic small  vessel degenerative changes. Partial opacification of the bilateral  paranasal sinuses.     No definite findings of acute intracranial hemorrhage.. No midline shift  or hydrocephalus.     IMPRESSION:  1.  Small to moderate sized area of acute infarction within the left  anterior cerebral artery territory. Moderate to extensive chronic  ischemic white matter changes. Area of encephalomalacia within the right  parietal lobe.  2.  Cerebellar tonsils extend approximately 6 to 7 mm below the foramen  magnum.  3.  Other findings as above.    Workup to date:  TTE    Left ventricular systolic function is moderately decreased. Calculated left ventricular EF = 39.3%.  There is global hypokinesis with regional variations.    Left ventricular wall thickness is consistent with borderline concentric hypertrophy. Left ventricular diastolic function is consistent with (grade I) impaired relaxation.    RV grossly normal in size and function.    No significant valvular abnormalities.    Saline test results are negative.    Normal biatrial and IVC size.    Diagnoses:  1 left NIRMAL MCA stroke  2 status post thrombectomy  3 OCHOA    Impression: 58-year-old male with past medical history of hypertension, hyperlipidemia, nonischemic cardiomyopathy, OCHOA, previous renal infarct who came to the ER on 216 after a fall at home found to have right-sided weakness and CTA showed left A2 occlusion and was taken for thrombectomy with tici score of 3.  MRI showed acute NIRMAL stroke on L and old parietal strokes.  Etiology  "thought cardioembolic.  HEDY is recommended    Plan:  1 cards consult for HEDY  2 continue aspirin and high intensity statin  3 no need for permissive htn, bp looks good at this point without his home meds, avoid hypotension    We will be following along with you      Thank you for this consultation.  Discussed above plan with neuro attending, Dr. Holguin who agrees with above plan.  Neurology team is available for concerns or questions.          Electronically signed by Bennie Evangelista MD at 24 1342       Tony Suárez MD at 24 0714          Patient out of the ICU without any pulmonary critical care needs under general medicine service.  Appreciate their care for this patient.  [facility] will now sign off however any new pulmonary critical care issues arise please feel free to give us a a call.    Electronically signed by Tony Suárez MD, 24, 7:14 AM EST.      Electronically signed by Tony Suárez MD at 24 0714       Bennie Evangelista MD at 24 1135          DOS: 2024  NAME: Jhon B Alexander   : 1965  PCP: Oneil Decker MD  Chief Complaint   Patient presents with    Neuro Deficit(s)       Chief complaint: stroke  Subjective: Patient new to me.  This is a 58-year-old man with a history of CHF who was brought to the hospital with left NIRMAL and MCA infarcts.  He underwent successful thrombectomy with Dr. Murray.  Today he is mostly back to his neurologic baseline without new complaints.  No previous history of stroke.  No prior history of unprovoked DVT or PE.  No history of A-fib or recent palpitations.    Objective:  Vital signs: /66   Pulse 58   Temp 97.9 °F (36.6 °C) (Oral)   Resp 18   Ht 177.8 cm (70\")   Wt 100 kg (220 lb 14.4 oz)   SpO2 96%   BMI 31.70 kg/m²    Gen: NAD, vitals reviewed  MS: oriented x3, recent/remote memory intact, normal attention/concentration, language intact, no neglect.  CN: " visual acuity grossly normal, PERRL, EOMI, no facial droop, no dysarthria  Motor: 5/5 throughout upper and lower extremities, normal tone  Sensory: intact to light touch all 4 ext.    Laboratory results:  Lab Results   Component Value Date    GLUCOSE 90 02/17/2024    CALCIUM 8.9 02/17/2024     02/17/2024    K 4.1 02/17/2024    CO2 25.0 02/17/2024     02/17/2024    BUN 15 02/17/2024    CREATININE 1.40 (H) 02/17/2024    EGFRIFAFRI 65 01/04/2022    BCR 10.7 02/17/2024    ANIONGAP 8.0 02/17/2024     Lab Results   Component Value Date    WBC 6.63 02/17/2024    HGB 12.6 (L) 02/17/2024    HCT 39.1 02/17/2024    MCV 92.2 02/17/2024     02/17/2024     Lab Results   Component Value Date    LDL 39 02/17/2024    LDL 42 12/12/2023    LDL 62 09/19/2023         Lab 02/17/24  0209   HEMOGLOBIN A1C 5.90*        Review of labs: Creatinine 1.4, hemoglobin 12.6, hemoglobin A1c 5.9%, LDL 39    Review and interpretation of imaging: I personally reviewed his postop MRI which shows very small areas of stroke in the left MCA and NIRMAL territories.  Radiology reports reviewed.    2D echo showed EF of 40%    CTA showed no evidence of carotid stenosis on the left.    Diagnoses:  Stroke, left MCA and NIRMAL, embolic  Cryptogenic stroke  Nonischemic cardiomyopathy, EF 40%    Comment: Cryptogenic embolic strokes, excellent outcome after thrombectomy.    Plan:  1.  Aspirin, statin  2.  Cardiology consult for possible HEDY.  May need cardiac monitor as well  3.  Q4 neurochecks, downgrade to the floor    Management discussed with Dr. Suárez      Electronically signed by Bennie Evangelista MD at 02/17/24 6651       Jaz Keane APRN at 02/17/24 0944       Attestation signed by Jean Claude Stewart MD at 02/17/24 9617    I have reviewed this documentation and agree.                    [facility] INTERVENTIONAL [facility]OS[facility]GERY PROGRESS NOTE    PATIENT IDENTIFICATION:   Name:  Jhon Munoz      MRN:  [id number]     58 y.o.   male               CC: Left NIRMAL stroke s/p mechanical thrombectomy      Subjective     Interval History: No acute distress.  Sitting in chair with wife at bedside.  Is not able to identify any deficits.  No pain or numbness around right groin incision site or right leg        Objective     Vital signs in last 24 hours:  Temp:  [97.9 °F (36.6 °C)-98.7 °F (37.1 °C)] 98.3 °F (36.8 °C)  Heart Rate:  [57-94] 77  Resp:  [10-20] 18  BP: (107-140)/(74-97) 117/74      Intake/Output this shift:  I/O this shift:  In: 250 [P.O.:250]  Out: 400 [Urine:400]      Intake/Output last 3 shifts:  I/O last 3 completed shifts:  In: 1360 [P.O.:1300; I.V.:60]  Out: 2125 [Urine:2125]    LABS:  Results from last 7 days   Lab Units 02/17/24  0209 02/16/24  0432   WBC 10*3/mm3 6.63 5.26   HEMOGLOBIN g/dL 12.6* 13.2   HEMATOCRIT % 39.1 40.7   PLATELETS 10*3/mm3 183 183      Results from last 7 days   Lab Units 02/17/24  0209 02/16/24  0432   SODIUM mmol/L 137 141   POTASSIUM mmol/L 4.1 4.2   CHLORIDE mmol/L 104 108*   CO2 mmol/L 25.0 19.3*   BUN mg/dL 15 15   CREATININE mg/dL 1.40* 1.07   GLUCOSE mg/dL 90 92   CALCIUM mg/dL 8.9 8.4*         IMAGING STUDIES:  CT head: No acute intracranial hemorrhage.  Cerebellar tonsils extend approximately 6 to 7 mm below foramen magnum as seen on prior imaging.  Area of acute infarction within the left anterior cerebellar artery territory seen on recent MRI is not well-visualized on CT.  No midline shift or hydrocephalus.    MRI brain: Small to moderate sized area of acute infarction within the left  anterior cerebral artery territory. Moderate to extensive chronic  ischemic white matter changes. Area of encephalomalacia within the right  parietal lobe. Cerebellar tonsils extend approximately 6 to 7 mm below the foramen magnum.    I personally viewed and interpreted the patient's labs, imaging, chart with Dr. Stewart.    Meds reviewed/changed: Yes    Current Facility-Administered Medications:      acetaminophen (TYLENOL) tablet 650 mg, 650 mg, Oral, Q4H PRN **OR** acetaminophen (TYLENOL) suppository 650 mg, 650 mg, Rectal, Q4H PRN, Amarilis Avery APRN    aspirin chewable tablet 81 mg, 81 mg, Oral, Daily, 81 mg at 02/17/24 0822 **OR** [DISCONTINUED] aspirin suppository 300 mg, 300 mg, Rectal, Daily, Amarilis Avery APRN    atorvastatin (LIPITOR) tablet 80 mg, 80 mg, Oral, Nightly, Amarilis Avery APRN, 80 mg at 02/16/24 2014    sennosides-docusate (PERICOLACE) 8.6-50 MG per tablet 2 tablet, 2 tablet, Oral, BID, 2 tablet at 02/17/24 0822 **AND** polyethylene glycol (MIRALAX) packet 17 g, 17 g, Oral, Daily PRN **AND** bisacodyl (DULCOLAX) EC tablet 5 mg, 5 mg, Oral, Daily PRN **AND** bisacodyl (DULCOLAX) suppository 10 mg, 10 mg, Rectal, Daily PRN, Amarilis Avery APRN    Calcium Replacement - Follow Nurse / BPA Driven Protocol, , Does not apply, PRN, Amarilis Avery APRN    influenza vac split quad (FLUZONE,FLUARIX,AFLURIA,FLULAVAL) injection 0.5 mL, 0.5 mL, Intramuscular, During Hospitalization, Amarilis Avery APRN    labetalol (NORMODYNE,TRANDATE) injection 20 mg, 20 mg, Intravenous, Q2H PRN, Amarilis Avery APRN    levothyroxine (SYNTHROID, LEVOTHROID) tablet 25 mcg, 25 mcg, Oral, Daily, Amarilis Avery APRN, 25 mcg at 02/17/24 0822    Magnesium Standard Dose Replacement - Follow Nurse / BPA Driven Protocol, , Does not apply, PRN, Amarilis Avery APRN    nitroglycerin (NITROSTAT) SL tablet 0.4 mg, 0.4 mg, Sublingual, Q5 Min PRN, Amarilis Avery APRN    ondansetron (ZOFRAN) injection 4 mg, 4 mg, Intravenous, Q6H PRN, Bennie, Amarilis J, APRN    Phosphorus Replacement - Follow Nurse / BPA Driven Protocol, , Does not apply, Bennie HERNANDEZ Kelley J, APRN    Potassium Replacement - Follow Nurse / BPA Driven Protocol, , Does not apply, Bennie HERNANDEZ Kelley J, APRN    sodium chloride 0.9 % flush 10 mL, 10 mL, Intravenous, PRN, Amarilis Avery APRN    sodium chloride 0.9 % flush 10 mL, 10 mL, Intravenous, Q12H,  "Amarilis Avery APRN, 10 mL at 02/17/24 0824    sodium chloride 0.9 % flush 10 mL, 10 mL, Intravenous, PRN, Amarilis Avery, TERRI    sodium chloride 0.9 % infusion 40 mL, 40 mL, Intravenous, PRN, Amarilis Avery, TERRI     Physical Exam:    General:   Awake, alert, oriented x3. Speech clear with no aphasia.  No acute distress    CN II:    Visual fields full to confrontation  CN III IV VI:   Extraocular movements are full , PERRL   CN VII:   Facial movements are symmetric. No weakness.  Motor:    No drift. Normal muscle strength, bulk and tone in upper and lower extremities.  No fasciculations, rigidity, spasticity, or abnormal movements.    Sensation:   Normal to light touch; no extinction    Coordination:   Finger-to-nose and heel-to-shin test shows no dysmetria.  Rapid alternating movements are normal.  Extremities:   Wearing SCD. Groin site with CDI dressing. No hematoma or bruising. Distal pulses 2+. Skin brown, warm and dry    Assessment & Plan     ASSESSMENT: Acute NIRMAL stroke s/p mechanical thrombectomy.  Occluded left A2 segment.  TICI 3.  Patient is awake and alert without any identifiable deficits.  Follow-up imaging stable.  No further neurosurgery recommendations.  Feel free to contact us with any questions or concerns.      Ischemic stroke      PLAN:     -Okay to remove right groin pressure dressing 48 hours postop  -No outpatient follow-up  -Neurology following      Defer to neurology for stroke work-up and management      I discussed the patient's findings and my recommendations with patient, family, nursing staff, Dr. Murray, and Dr. Stewart.    During patient visit, I utilized appropriate personal protective equipment including mask and gloves.  Mask used was standard procedure mask. Appropriate PPE was worn during the entire visit.  Hand hygiene was completed before and after.      LOS: 1 day       JazTERRI Gold  2/17/2024  10:04 EST    \"Dictated utilizing Dragon dictation\".   " "    Electronically signed by Jean Claude Stewart MD at 02/17/24 1446       Tony Suárez MD at 02/17/24 0756            Daily Progress Note.   [facility] INTENSIVE CARE  2/17/2024    Patient:  Name:  Jhon Munoz  MRN:  [id number]  1965  58 y.o.  male         CC: Stroke status post mechanical thrombectomy    Interval History:  Afebrile patient on CPAP blood pressure stable 116/80  Heart rate 62    Feels much better without any shortness of breath cough sputum production.  Says swallowing feels great.  Strength has returned.  Coordination great on the right side.  A little bit of soreness in his chest where he fell and hit the sink.  Great cognition.  Memory intact.      Physical Exam:  /80   Pulse 62   Temp 97.9 °F (36.6 °C) (Oral)   Resp 20   Ht 177.8 cm (70\")   Wt 100 kg (220 lb 14.4 oz)   SpO2 98%   BMI 31.70 kg/m²   Body mass index is 31.7 kg/m².    Intake/Output Summary (Last 24 hours) at 2/17/2024 0756  Last data filed at 2/17/2024 0600  Gross per 24 hour   Intake 1360 ml   Output 2125 ml   Net -765 ml     General appearance: Nontoxic, conversant   Eyes: anicteric sclerae, moist conjunctivae; no lidlag;    HENT: Atraumatic; oropharynx clear with moist mucous membranes    Neck: Trachea midline;  supple   Lungs: CTA, with normal respiratory effort and no intercostal retractions  CV: RRR, no rub   Abdomen: Soft, nontender; BS+  Extremities: No peripheral edema or ext lad  Skin: WWP no diffuse visible rash  Psych: Appropriate affect, alert   Neuro: Moves all ext. CN II-XII. Speech intact.  Strength 5 out of 5    Data Review:  Notable Labs:  Results from last 7 days   Lab Units 02/17/24  0209 02/16/24  0432   WBC 10*3/mm3 6.63 5.26   HEMOGLOBIN g/dL 12.6* 13.2   PLATELETS 10*3/mm3 183 183     Results from last 7 days   Lab Units 02/17/24  0209 02/16/24  0432   SODIUM mmol/L 137 141   POTASSIUM mmol/L 4.1 4.2   CHLORIDE mmol/L 104 108*   CO2 mmol/L 25.0 19.3*   BUN mg/dL 15 " 15   CREATININE mg/dL 1.40* 1.07   GLUCOSE mg/dL 90 92   CALCIUM mg/dL 8.9 8.4*   MAGNESIUM mg/dL 2.1  --    Estimated Creatinine Clearance: 66.5 mL/min (A) (by C-G formula based on SCr of 1.4 mg/dL (H)).    Results from last 7 days   Lab Units 02/17/24  0209 02/16/24  1115 02/16/24  0432   AST (SGOT) U/L  --   --  23   ALT (SGPT) U/L  --   --  18   D DIMER QUANT MCGFEU/mL  --  0.89*  --    PLATELETS 10*3/mm3 183  --  183             Imaging:  Reviewed chest images personally from past 3 days    ASSESSMENT  /  PLAN:  Acute stroke status post mechanical thrombectomy on 2/16/2024 with improvement of right-sided weakness  Right-sided neglect  Nonischemic cardiomyopathy, LVEF 44.1%  Type 2 diabetes mellitus  Hypertension  Hyperlipidemia  Hypothyroidism  Sleep apnea, compliant with CPAP  Slight elevation in creatinine continue to monitor    Continue neurological care per neurosurgery and neurology stroke team.  Systolic blood pressure goal further recommendations 140 nicardipine as ordered.  Aspirin atorvastatin  Serial neurological exams  Serial imaging per neurology's recommendations.    Otherwise medically  Sliding scale insulin as needed/glycemic control    Home medications of Coreg and spironolactone added back as patient when swallowing will allow him blood pressure/heart rate dictates    Levothyroxine restart    Home CPAP continue    Entresto Coreg when swallowing allows    Swallow eval today    All issues new to me today.  Prior hospital course, labs and imaging reviewed.    Coordinated care with Dr. Poli Burgess with moving out of ICU.    Will consult internal medicine to help manage diabetes hypertension hyperlipidemia hypothyroidism nonischemic cardiomyopathy out of the ICU.  Patient will need additional hospitalization to help evaluate cause of the stroke.      Electronically signed by Tony Suárez MD, 02/17/24, 7:56 AM [facility].  [facility]        Electronically signed by Tony Suárez  MD Chris at 24 0842          Consult Notes (last 72 hours)        Geovanny Barrera MD at 24 1053        Consult Orders    1. Inpatient Cardiology Consult [[id number]] ordered by Bennie Evangelista MD at 24 1135                 Date of Hospital Visit: 2024  Date of consult: 2024  Encounter Provider: Geovanny Barrera MD  Place of Service: [facility] CARDIOLOGY  Patient Name: Jhon Munoz  :1965  Referral Provider: No ref. provider found    Chief complaint: Acute CVA    Reason for consult: Rule out cardioembolic source    History of Present Illness  Mr. Munoz was admitted on 2024 with diagnosis of acute CVA following presentation with significant right hemineglect and density hemiaplasia  treated successfully with catheter-based mechanical thrombectomy of occluded left A2 segment by Dr. Dobson.  Patient was out of the window for tPA.  Almost back to baseline neurologic status.  He denies any significant prior palpitations or change in his breathing recently.  Takes aspirin faithfully every day.    Past Medical History:   Diagnosis Date    Cardiomyopathy     Colon polyp     Cortical senile cataract 2019    Diabetes mellitus     type 2    Disease of thyroid gland     Hyperlipidemia     Hypertension     Renal calculi     Followed by Dr. Chris Carreon urologist    Renal infarct 2020    Left; noted on CT scan-followed by Dr. Chris Carreon urology    Sleep apnea     CPAP nightly       Past Surgical History:   Procedure Laterality Date    CARDIAC CATHETERIZATION N/A 2020    Procedure: Coronary angiography;  Surgeon: Robert Deluna MD;  Location:  [facility] CATH INVASIVE LOCATION;  Service: Cardiovascular;  Laterality: N/A;    CARDIAC CATHETERIZATION N/A 2020    Procedure: Left Heart Cath;  Surgeon: Robert Deluna MD;  Location:  [facility] CATH INVASIVE LOCATION;  Service: Cardiovascular;  Laterality: N/A;    COLONOSCOPY   2020    COLONOSCOPY N/A 10/18/2022    Procedure: COLONOSCOPY TO CECUM WITH COLD BIOPSY POLYPECTOMY;  Surgeon: Robert Evans MD;  Location: [facility] ENDOSCOPY;  Service: Gastroenterology;  Laterality: N/A;  PRE: HX COLON POLYPS  POST: POLYP, DIVERTICULOSIS, HEMORRHOIDS    KNEE SURGERY      meniscus        Medications Prior to Admission   Medication Sig Dispense Refill Last Dose    aspirin 81 MG chewable tablet Chew 1 tablet Daily.       carvedilol (COREG) 25 MG tablet Take 1 tablet by mouth 2 (Two) Times a Day. 180 tablet 3     empagliflozin (Jardiance) 25 MG tablet tablet Take 1 tablet by mouth Daily. 90 tablet 1     Entresto 49-51 MG tablet TAKE 1 TABLET BY MOUTH TWICE A  tablet 1     esomeprazole (nexIUM) 40 MG capsule Take 1 capsule by mouth Every Morning Before Breakfast. prn 30 capsule 12     levothyroxine (SYNTHROID, LEVOTHROID) 25 MCG tablet Take 1 tablet by mouth Daily. 90 tablet 2     rosuvastatin (CRESTOR) 10 MG tablet Take 1 tablet by mouth Daily. 90 tablet 3     Semaglutide,0.25 or 0.5MG/DOS, (Ozempic, 0.25 or 0.5 MG/DOSE,) 2 MG/1.5ML solution pen-injector Inject 0.5 mg under the skin into the appropriate area as directed 1 (One) Time Per Week. 6 mL 1     spironolactone (ALDACTONE) 25 MG tablet Take 1 tablet by mouth Daily. 1 in the am and 1/2 in pm          Current Meds  Scheduled Meds:aspirin, 81 mg, Oral, Daily  atorvastatin, 80 mg, Oral, Nightly  insulin lispro, 2-7 Units, Subcutaneous, 4x Daily AC & at Bedtime  levothyroxine, 25 mcg, Oral, Daily  senna-docusate sodium, 2 tablet, Oral, BID  sodium chloride, 10 mL, Intravenous, Q12H      Continuous Infusions:   PRN Meds:.  acetaminophen **OR** acetaminophen    senna-docusate sodium **AND** polyethylene glycol **AND** bisacodyl **AND** bisacodyl    Calcium Replacement - Follow Nurse / BPA Driven Protocol    dextrose    dextrose    glucagon (human recombinant)    influenza vaccine    labetalol    Magnesium Standard Dose Replacement - Follow  "Nurse / BPA Driven Protocol    nitroglycerin    ondansetron    Phosphorus Replacement - Follow Nurse / BPA Driven Protocol    Potassium Replacement - Follow Nurse / BPA Driven Protocol    sodium chloride    sodium chloride    sodium chloride    Allergies as of 02/16/2024    (No Known Allergies)       Social History     Socioeconomic History    Marital status:      Spouse name: Jyoti   Tobacco Use    Smoking status: Never     Passive exposure: Never    Smokeless tobacco: Never   Vaping Use    Vaping Use: Never used   Substance and Sexual Activity    Alcohol use: Yes     Types: 1 Cans of beer, 1 Shots of liquor per week     Comment: Caffeine use: 1 cup daily    Drug use: Never    Sexual activity: Defer       Family History   Problem Relation Age of Onset    Hypertension Mother        REVIEW OF SYSTEMS:   All systems reviewed and pertinent positives include in HPI otherwise negative review of systems.       Objective:   Temp:  [97.7 °F (36.5 °C)-98.4 °F (36.9 °C)] 97.7 °F (36.5 °C)  Heart Rate:  [58-98] 77  Resp:  [18] 18  BP: (101-124)/(64-87) 112/69  Body mass index is 31.7 kg/m².  Flowsheet Rows      Flowsheet Row First Filed Value   Admission Height 170.2 cm (67\") Documented at 02/16/2024 0507   Admission Weight 103 kg (227 lb 11.8 oz) Documented at 02/16/2024 0427          Vitals:    02/18/24 1010   BP: 112/69   Pulse: 77   Resp: 18   Temp: 97.7 °F (36.5 °C)   SpO2: 98%       General Appearance:    Alert, cooperative, in no acute distress   Head:    Normocephalic, without obvious abnormality, atraumatic   Eyes:            Lids and lashes normal, conjunctivae and sclerae normal, no   icterus, no pallor, corneas clear, PERRLA   Ears:    Ears appear intact with no abnormalities noted   Throat:   No oral lesions, no thrush, oral mucosa moist   Neck:   No adenopathy, supple, trachea midline, no thyromegaly, no   carotid bruit, no JVD   Back:     No kyphosis present, no scoliosis present, no skin lesions, " erythema or scars, no tenderness to percussion or palpation, range of motion normal   Lungs:     Clear to auscultation,respirations regular, even and unlabored    Heart:    Regular rhythm and normal rate, normal S1 and S2, no murmur, no gallop, no rub, no click   Chest Wall:    No abnormalities observed   Abdomen:     Normal bowel sounds, no masses, no organomegaly, soft nontender, nondistended, no guarding, no rebound  tenderness   Extremities:   Moves all extremities well, no edema, no cyanosis, no redness   Pulses:   Pulses palpable and equal bilaterally. Normal radial, carotid, femoral, dorsalis pedis and posterior tibial pulses bilaterally. Normal abdominal aorta   Skin:  Neurology:   Psychiatric:   No bleeding, bruising or rash   Normal speech and cranial nerve exam, no focal deficit   Alert and oriented x 3, normal mood and affect             Review of Data:      Results from last 7 days   Lab Units 02/18/24 0419 02/16/24 0437 02/16/24 0432   SODIUM mmol/L 141   < > 141   POTASSIUM mmol/L 4.1   < > 4.2   CHLORIDE mmol/L 105   < > 108*   CO2 mmol/L 24.1   < > 19.3*   BUN mg/dL 15   < > 15   CREATININE mg/dL 1.26   < > 1.07   CALCIUM mg/dL 9.0   < > 8.4*   BILIRUBIN mg/dL  --   --  0.3   ALK PHOS U/L  --   --  51   ALT (SGPT) U/L  --   --  18   AST (SGOT) U/L  --   --  23   GLUCOSE mg/dL 99   < > 92    < > = values in this interval not displayed.     Results from last 7 days   Lab Units 02/16/24 0432   HSTROP T ng/L 13     @LABRCNTbnp@  Results from last 7 days   Lab Units 02/18/24 0419 02/17/24  0209 02/16/24 0432   WBC 10*3/mm3 5.64 6.63 5.26   HEMOGLOBIN g/dL 13.6 12.6* 13.2   HEMATOCRIT % 41.5 39.1 40.7   PLATELETS 10*3/mm3 172 183 183     Results from last 7 days   Lab Units 02/16/24 0432   INR  1.03   APTT seconds 25.3     Results from last 7 days   Lab Units 02/18/24 0419   MAGNESIUM mg/dL 2.3     @LABRCNTIP(chol,trig,hdl,ldl)        I personally viewed and interpreted the patient's EKG/Telemetry  data  )   Acute ischemic left middle cerebral artery (MCA) stroke    Essential hypertension    Mixed hyperlipidemia    Nonischemic cardiomyopathy    Type 2 diabetes mellitus without complication, without long-term current use of insulin    Stage 3a chronic kidney disease    OCHOA on CPAP        Assessment and Plan:    Acute CVA status post mechanical thrombectomy with good results.  Suspected cardioembolic source.  Echocardiogram without LV thrombus or obvious valvular masses.  Proceed with HEDY in a.m.  History of nonischemic cardiomyopathy with left ventricular ejection fraction of 41-45%.  No acute change.  Euvolemic on exam.  Essential hypertension that is fairly controlled    HEDY in a.m.  Patient is agreeable with plan.    Geovanny Barrera MD  24  12:16 EST.      Time spent in reviewing chart, discussion and examination:                     Electronically signed by Geovanny Barrera MD at 24 1217       C[place] Argueta MD at 24 1301        Consult Orders    1. Inpatient Internal Medicine Consult [[id number]] ordered by Tony Suárez MD at 24 0843                     Name: Jhon Munoz ADMIT: 2024   : 1965  PCP: Oneil Decker MD    MRN: [id number] LOS: 1 days   AGE/SEX: 58 y.o. male  ROOM: [facility]     Referring Provider: Dr. Tony Suárez  Reason for Consultation: HTN, HLD, Hypothyroidism, Ischemic Cardiomyopathy    Subjective   Subjective   Patient admitted with left MCA and NIRMAL stroke and is s/p mechanical thrombectomy with Dr. Murray without residual deficits. He denies new complaints and is being transferred to step down unit. I was consulted for the ongoing management of his above medical issues and to assume care as attending while workup for the stroke is being conducted.    Objective   Objective   Vital Signs  Temp:  [97.9 °F (36.6 °C)-98.7 °F (37.1 °C)] 97.9 °F (36.6 °C)  Heart Rate:  [57-86] 74  Resp:  [14-20] 18  BP: (104-140)/()  116/80  SpO2:  [68 %-100 %] 91 %  on  Flow (L/min):  [2] 2;   Device (Oxygen Therapy): room air  Body mass index is 31.7 kg/m².  Physical Exam  Vitals and nursing note reviewed.   Constitutional:       General: He is not in acute distress.     Appearance: He is not toxic-appearing or diaphoretic.   HENT:      Head: Normocephalic and atraumatic.      Nose: Nose normal.      Mouth/Throat:      Mouth: Mucous membranes are moist.      Pharynx: Oropharynx is clear.   Eyes:      Conjunctiva/sclera: Conjunctivae normal.      Pupils: Pupils are equal, round, and reactive to light.   Cardiovascular:      Rate and Rhythm: Normal rate and regular rhythm.      Pulses: Normal pulses.   Pulmonary:      Effort: Pulmonary effort is normal.      Breath sounds: Normal breath sounds.   Abdominal:      General: Bowel sounds are normal.      Palpations: Abdomen is soft.      Tenderness: There is no abdominal tenderness.   Musculoskeletal:         General: No swelling or tenderness.      Cervical back: Neck supple.   Skin:     General: Skin is warm and dry.      Capillary Refill: Capillary refill takes less than 2 seconds.   Neurological:      General: No focal deficit present.      Mental Status: He is alert and oriented to person, place, and time.   Psychiatric:         Mood and Affect: Mood normal.         Behavior: Behavior normal.       Results Review     I reviewed the patient's new clinical results.  Results from last 7 days   Lab Units 02/17/24  0209 02/16/24  0432   WBC 10*3/mm3 6.63 5.26   HEMOGLOBIN g/dL 12.6* 13.2   PLATELETS 10*3/mm3 183 183     Results from last 7 days   Lab Units 02/17/24  0209 02/16/24  0432   SODIUM mmol/L 137 141   POTASSIUM mmol/L 4.1 4.2   CHLORIDE mmol/L 104 108*   CO2 mmol/L 25.0 19.3*   BUN mg/dL 15 15   CREATININE mg/dL 1.40* 1.07   GLUCOSE mg/dL 90 92   EGFR mL/min/1.73 57.5* 79.4     Results from last 7 days   Lab Units 02/16/24  0432   ALBUMIN g/dL 3.9   BILIRUBIN mg/dL 0.3   ALK PHOS U/L 51    AST (SGOT) U/L 23   ALT (SGPT) U/L 18     Results from last 7 days   Lab Units 02/17/24  0209 02/16/24  0432   CALCIUM mg/dL 8.9 8.4*   ALBUMIN g/dL  --  3.9   MAGNESIUM mg/dL 2.1  --        Hemoglobin A1C   Date/Time Value Ref Range Status   02/17/2024 0209 5.90 (H) 4.80 - 5.60 % Final     Glucose   Date/Time Value Ref Range Status   02/17/2024 1117 92 70 - 130 mg/dL Final   02/17/2024 0743 115 70 - 130 mg/dL Final   02/17/2024 0346 107 70 - 130 mg/dL Final   02/16/2024 2228 121 70 - 130 mg/dL Final   02/16/2024 1614 72 70 - 130 mg/dL Final   02/16/2024 1108 76 70 - 130 mg/dL Final   02/16/2024 0648 107 70 - 130 mg/dL Final       CT Head Without Contrast    Result Date: 2/16/2024  FINDINGS AND IMPRESSION: No findings of definite acute intracranial hemorrhage are seen; however, please note the presence of presumed intracranial contrast from recent angiogram limits evaluation. The cerebellar tonsils extend approximately 6 to 7 mm below the foramen magnum. The area of acute infarction within the left anterior cerebral artery territory seen on recent MRI are not well visualized on CT and please refer to MRI 2/16/2024 for further information. Hypodensity within the periventricular and subcortical white matter likely represents moderate to extensive chronic ischemic small vessel degenerative changes. Area of encephalomalacia within the right parietal lobe, as before.  There is no midline shift or hydrocephalus.      This report was finalized on 2/16/2024 7:08 PM by Dr. Ivan Thacker M.D on Workstation: [id number]      MRI Brain Without Contrast    Result Date: 2/16/2024  1.  Small to moderate sized area of acute infarction within the left anterior cerebral artery territory. Moderate to extensive chronic ischemic white matter changes. Area of encephalomalacia within the right parietal lobe. 2.  Cerebellar tonsils extend approximately 6 to 7 mm below the foramen magnum. 3.  Other findings as above.    This report was  finalized on 2/16/2024 6:16 PM by Dr. Ivan Thacker M.D on Workstation: [id number]      IR Perc [facility] Thromb Prim Nonc Art Ini    Result Date: 2/16/2024  Acute occlusion of the left A2 segment with subsequent successful mechanical thrombectomy and TICI 3 flow achieved. No underlying stenosis seen in a cardiac source suspected.  All carotid stenosis measurements were made using NASCET criteria.    This report was finalized on 2/16/2024 4:00 PM by Dr. Andrew Murray M.D on Workstation: [id number]      CT Angiogram Head w AI Analysis of LVO    Addendum Date: 2/16/2024    ADDENDUM: 02 16 24 05:35 Call Doctor Regarding Stroke, called Verified receipt with  in ER    on 02 16 05:35 (-05:00)     Addendum Date: 2/16/2024    ADDENDUM: 02 16 24 05:35 Call Doctor Regarding Stroke, called Verified receipt with  in ER    on 02 16 05:35 (-05:00)     Result Date: 2/16/2024  Communications:  Verify Receipt  Call Doctor Stroke Electronically signed by Gail Solorio MD on 02-16-24 at 0528    CT CEREBRAL PERFUSION WITH & WITHOUT CONTRAST    Addendum Date: 2/16/2024    ADDENDUM: 02 16 24 05:26 Call Doctor Regarding Stroke, called Neurologist, STROKE @ [phone] (Pager))   on 02 16 05:26 (-05:00)     Result Date: 2/16/2024  Communications:  Call Doctor Stroke; Neurologist, STROKE @ [phone] (Pager) Electronically signed by Gail Solorio MD on 02-16-24 at 0521    XR Chest 1 View    Result Date: 2/16/2024  Electronically signed by Arturo Reeves MD on 02-16-24 at 0520    CT Angiogram Neck    Addendum Date: 2/16/2024    ADDENDUM: 02 16 24 05:26 Call Doctor Regarding Stroke, called )   on 02 16 05:26 (-05:00)     Addendum Date: 2/16/2024    ADDENDUM: 02 16 24 05:26 Call Doctor Regarding Stroke, called )   on 02 16 05:26 (-05:00)     Result Date: 2/16/2024  Communications:  Verify Receipt  Call Doctor Stroke Electronically signed by Gail Solorio MD on 02-16-24 at 0554      I have personally reviewed all medications:  Scheduled Medications  aspirin, 81 mg, Oral, Daily  atorvastatin, 80 mg, Oral, Nightly  levothyroxine, 25 mcg, Oral, Daily  senna-docusate sodium, 2 tablet, Oral, BID  sodium chloride, 10 mL, Intravenous, Q12H    Infusions   Diet  Diet: Cardiac Diets, Diabetic Diets; Healthy Heart (2-3 Na+); Consistent Carbohydrate; Texture: Regular Texture (IDDSI 7); Fluid Consistency: Thin (IDDSI 0)    I have personally reviewed:  [x]  Laboratory   []  Microbiology   [x]  Radiology   [x]  EKG/Telemetry  [x]  Cardiology/Vascular   []  Pathology    [x]  Records    Assessment/Plan     Active Hospital Problems    Diagnosis  POA    **Acute ischemic left middle cerebral artery (MCA) stroke [I63.512]  Yes    Stage 3a chronic kidney disease [N18.31]  Yes    Nonischemic cardiomyopathy [I42.8]  Yes    Type 2 diabetes mellitus without complication, without long-term current use of insulin [E11.9]  Yes    Essential hypertension [I10]  Yes    Mixed hyperlipidemia [E78.2]  Yes      Resolved Hospital Problems   No resolved problems to display.   Acute Left MCA/NIRMAL Stroke   - s/p mechanical thrombectomy 2/16/24 with Dr. Murray, no residual deficits  - continue on ASA and statin  - brain MRI showing acute Left NIRMAL CVA, chronic white matter changes, and area of encephalomalacia in the right parietal lobe  - mechanism suspected to be cardioembolic-TTE noted, cardiology consulted to evaluate for possible HEDY  - hypercoagulable workup sent  - appreciate neurology recs    Non-Ischemic Cardiomyopathy/HTN/HLD  - LV EF slightly lower that last echocardiogram and has LV wall motion abnormalities  - clinically no evidence of CHF  - monitor volume status, cardiology to evaluate as above, follows with Dr. Graff as an outpatient  - entresto, spironolactone, and coreg held on admission-will monitor BP and hopefully can restart these soon  - on lipitor    Type 2 DM  - on ozempic as an outpatient, A1C 5.90%  -  BG are acceptable  - cover with low dose ssi/hypoglycemia protocol    Stage 3a CKD  - baseline serum creatinine is around 1.3-1.4  - monitor and avoid nephrotoxins as able    SCDs for DVT prophylaxis.  Full code.  Discussed with patient and nursing staff.  Anticipate discharge home in 1-2 days.    Thank you for this consult. I will assume care as attending.     Cuba Argueta MD  [facility]  02/17/24  13:01 EST      Electronically signed by Cuba Argueta MD at 02/17/24 6528

## 2024-02-19 NOTE — SIGNIFICANT NOTE
02/19/24 0918   PT Discharge Summary   Anticipated Discharge Disposition (PT) home;home with assist   Reason for Discharge At baseline function;Independent   Discharge Destination Home;Home with assist

## 2024-02-19 NOTE — PROGRESS NOTES
Continued Stay Note  [facility]     Patient Name: Jhon Munoz  MRN: [id number]  Today's Date: 2/19/2024    Admit Date: 2/16/2024    Plan: Home with family, HH referral pending   Discharge Plan       Row Name 02/19/24 1716       Plan    Plan Home with family, HH referral pending    Patient/Family in Agreement with Plan yes    Plan Comments Pt's plan remains home with his family at d/c, HH referral pending. CCP to follow to assist should additional needs arise. Kathleen Moreno LCSW                   Discharge Codes    No documentation.                 Expected Discharge Date and Time       Expected Discharge Date Expected Discharge Time    Feb 19, 2024               Nohelia Moreno LCSW

## 2024-02-20 ENCOUNTER — TELEPHONE (OUTPATIENT)
Dept: INTERNAL MEDICINE | Facility: CLINIC | Age: 59
End: 2024-02-20
Payer: COMMERCIAL

## 2024-02-20 ENCOUNTER — READMISSION MANAGEMENT (OUTPATIENT)
Dept: CALL CENTER | Facility: HOSPITAL | Age: 59
End: 2024-02-20
Payer: COMMERCIAL

## 2024-02-20 ENCOUNTER — APPOINTMENT (OUTPATIENT)
Dept: GENERAL RADIOLOGY | Facility: HOSPITAL | Age: 59
DRG: 024 | End: 2024-02-20
Payer: COMMERCIAL

## 2024-02-20 VITALS
DIASTOLIC BLOOD PRESSURE: 90 MMHG | OXYGEN SATURATION: 97 % | HEART RATE: 75 BPM | RESPIRATION RATE: 16 BRPM | HEIGHT: 70 IN | BODY MASS INDEX: 31.37 KG/M2 | TEMPERATURE: 97.9 F | SYSTOLIC BLOOD PRESSURE: 124 MMHG | WEIGHT: 219.14 LBS

## 2024-02-20 PROBLEM — I42.8 LEFT VENTRICULAR NON-COMPACTION CARDIOMYOPATHY: Status: ACTIVE | Noted: 2024-02-20

## 2024-02-20 LAB
ANION GAP SERPL CALCULATED.3IONS-SCNC: 7 MMOL/L (ref 5–15)
BUN SERPL-MCNC: 12 MG/DL (ref 8–23)
BUN/CREAT SERPL: 10.7 (ref 7–25)
CALCIUM SPEC-SCNC: 8.7 MG/DL (ref 8.6–10.5)
CHLORIDE SERPL-SCNC: 109 MMOL/L (ref 98–107)
CO2 SERPL-SCNC: 25 MMOL/L (ref 22–29)
CREAT SERPL-MCNC: 1.12 MG/DL (ref 0.76–1.27)
DEPRECATED RDW RBC AUTO: 42.7 FL (ref 37–54)
EGFRCR SERPLBLD CKD-EPI 2021: 75.2 ML/MIN/1.73
ERYTHROCYTE [DISTWIDTH] IN BLOOD BY AUTOMATED COUNT: 12.8 % (ref 12.3–15.4)
GLUCOSE BLDC GLUCOMTR-MCNC: 78 MG/DL (ref 70–130)
GLUCOSE SERPL-MCNC: 88 MG/DL (ref 65–99)
HCT VFR BLD AUTO: 37.5 % (ref 37.5–51)
HGB BLD-MCNC: 12.4 G/DL (ref 13–17.7)
MCH RBC QN AUTO: 30.5 PG (ref 26.6–33)
MCHC RBC AUTO-ENTMCNC: 33.1 G/DL (ref 31.5–35.7)
MCV RBC AUTO: 92.1 FL (ref 79–97)
PLATELET # BLD AUTO: 154 10*3/MM3 (ref 140–450)
PMV BLD AUTO: 10.8 FL (ref 6–12)
POTASSIUM SERPL-SCNC: 4.1 MMOL/L (ref 3.5–5.2)
PS IGA SER-ACNC: 1 APS UNITS (ref 0–19)
PS IGG SER-ACNC: 9 UNITS (ref 0–30)
PS IGM SER-ACNC: 19 UNITS (ref 0–30)
RBC # BLD AUTO: 4.07 10*6/MM3 (ref 4.14–5.8)
SODIUM SERPL-SCNC: 141 MMOL/L (ref 136–145)
WBC NRBC COR # BLD AUTO: 5.04 10*3/MM3 (ref 3.4–10.8)

## 2024-02-20 PROCEDURE — 99232 SBSQ HOSP IP/OBS MODERATE 35: CPT | Performed by: INTERNAL MEDICINE

## 2024-02-20 PROCEDURE — 80048 BASIC METABOLIC PNL TOTAL CA: CPT | Performed by: INTERNAL MEDICINE

## 2024-02-20 PROCEDURE — 85027 COMPLETE CBC AUTOMATED: CPT | Performed by: INTERNAL MEDICINE

## 2024-02-20 PROCEDURE — 73030 X-RAY EXAM OF SHOULDER: CPT

## 2024-02-20 PROCEDURE — 82948 REAGENT STRIP/BLOOD GLUCOSE: CPT

## 2024-02-20 RX ORDER — ROSUVASTATIN CALCIUM 40 MG/1
40 TABLET, COATED ORAL NIGHTLY
Qty: 90 TABLET | Refills: 0 | Status: SHIPPED | OUTPATIENT
Start: 2024-02-20 | End: 2024-02-20 | Stop reason: SDUPTHER

## 2024-02-20 RX ORDER — ROSUVASTATIN CALCIUM 40 MG/1
40 TABLET, COATED ORAL NIGHTLY
Qty: 90 TABLET | Refills: 0 | Status: SHIPPED | OUTPATIENT
Start: 2024-02-20

## 2024-02-20 RX ADMIN — LEVOTHYROXINE SODIUM 25 MCG: 25 TABLET ORAL at 08:44

## 2024-02-20 RX ADMIN — APIXABAN 5 MG: 5 TABLET, FILM COATED ORAL at 08:45

## 2024-02-20 RX ADMIN — ASPIRIN 81 MG: 81 TABLET, CHEWABLE ORAL at 08:44

## 2024-02-20 RX ADMIN — SACUBITRIL AND VALSARTAN 1 TABLET: 24; 26 TABLET, FILM COATED ORAL at 08:44

## 2024-02-20 RX ADMIN — DOCUSATE SODIUM 50MG AND SENNOSIDES 8.6MG 2 TABLET: 8.6; 5 TABLET, FILM COATED ORAL at 08:44

## 2024-02-20 RX ADMIN — Medication 10 ML: at 08:45

## 2024-02-20 RX ADMIN — CARVEDILOL 3.12 MG: 3.12 TABLET, FILM COATED ORAL at 08:44

## 2024-02-20 NOTE — OUTREACH NOTE
Prep Survey      Flowsheet Row Responses   [facility] patient discharged from? [place]   Is LACE score < 7 ? No   Eligibility [facility]   Date of Admission 02/16/24   Date of Discharge 02/20/24   Discharge Disposition Home-Health Care [facility]   Discharge diagnosis Acute ischemic left middle cerebral artery (MCA) stroke   Does the patient have one of the following disease processes/diagnoses(primary or secondary)? Stroke   Does the patient have Home health ordered? Yes   What is the Home health agency?  [facility]   Is there a DME ordered? No   Prep survey completed? Yes            ALAN PERKINS - Registered Nurse

## 2024-02-20 NOTE — PROGRESS NOTES
LOS: 4 days   Patient Care Team:  Oneil Decker MD as PCP - General (Family Medicine)  Chris Carreon Jr., MD as Consulting Physician (Urology)  Bernadine Graff MD as Consulting Physician (Cardiology)  Dewayne Castillo MD as Consulting Physician (Nephrology)  Kenneth Zaldivar MD as Consulting Physician (Hematology and Oncology)    Chief Complaint: Follow-up cardiomyopathy, acute CVA.    Interval History: No acute events.  Feels good today.  No new chest pain or shortness of breath.    Vital Signs:  Temp:  [97.3 °F (36.3 °C)-98.4 °F (36.9 °C)] 97.9 °F (36.6 °C)  Heart Rate:  [67-75] 75  Resp:  [16-18] 16  BP: (110-138)/(81-97) 124/90  No intake or output data in the 24 hours ending 02/20/24 1102    Physical Exam:   General Appearance:    No acute distress, alert and oriented x4   Lungs:     Clear to auscultation bilaterally     Heart:    Regular rhythm and normal rate.  No murmurs, gallops, or    rubs.   Abdomen:     Soft, nontender, nondistended.    Extremities:   No clubbing, cyanosis, or edema.     Results Review:    Results from last 7 days   Lab Units 02/20/24  0603   SODIUM mmol/L 141   POTASSIUM mmol/L 4.1   CHLORIDE mmol/L 109*   CO2 mmol/L 25.0   BUN mg/dL 12   CREATININE mg/dL 1.12   GLUCOSE mg/dL 88   CALCIUM mg/dL 8.7     Results from last 7 days   Lab Units 02/16/24  0432   HSTROP T ng/L 13     Results from last 7 days   Lab Units 02/20/24  0603   WBC 10*3/mm3 5.04   HEMOGLOBIN g/dL 12.4*   HEMATOCRIT % 37.5   PLATELETS 10*3/mm3 154     Results from last 7 days   Lab Units 02/16/24  0432   INR  1.03   APTT seconds 25.3     Results from last 7 days   Lab Units 02/17/24  0209   CHOLESTEROL mg/dL 109     Results from last 7 days   Lab Units 02/19/24  0521   MAGNESIUM mg/dL 2.1     Results from last 7 days   Lab Units 02/17/24  0209   CHOLESTEROL mg/dL 109   TRIGLYCERIDES mg/dL 97   HDL CHOL mg/dL 52   LDL CHOL mg/dL 39       I reviewed the patient's new clinical results.         Assessment:  1.  Acute left NIRMAL stroke and left MCA stroke with left M2 occlusion, status post mechanical thrombectomy on 2/16/2024  2.  Nonischemic cardiomyopathy, EF approximately 40% (findings on HEDY consistent with left ventricular noncompaction)  3.  Stage IIIa chronic kidney disease  4.  History of left renal infarct in July 2022  5.  Hypertension  6.  Obstructive sleep apnea, on CPAP  7.  Diabetes    Plan:  -Continue Eliquis.  Strongly suspect left ventricular noncompaction.  Also, potential very small thrombus in the left atrial appendage at the apex.  Continue aspirin 81 mg/day.    -Continue statin therapy with Crestor 40 mg/day.    Added back Entresto and carvedilol at lower doses yesterday.  He is also on Jardiance and spironolactone as an outpatient.  Once his blood pressure is better, these can likely all be resumed.    -No objection to discharge from a cardiology standpoint.  I will set him up for follow-up in the office.    Isaac Mroa MD  02/20/24  11:02 EST

## 2024-02-20 NOTE — DISCHARGE SUMMARY
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       Presenting Problem/History of Present Illness:  Acute embolic stroke [I63.9]  Ischemic stroke [I63.9]     Hospital Course:  The patient is a 58 y.o. male with a history of HTN, Type 2 DM, Stage 3a CKD, non-ischemic cardiomyopathy, and OCHOA on CPAP who presented with a fall when getting up in the early morning hours of 2/16/24 to go to the bathroom. He was found to have right hemineglect and dense right hemiplegia due to a left MCA/NIRMAL stroke. Please see admission H&P for further details. He underwent a mechanical thrombectomy on 2/16/24 with Dr. Murray. He did quite well after this and does not have residual neurologic deficits. He was evaluated by neurology and cardiology. The stroke mechanism was thought to have been cardioembolic. He underwent a HEDY which showed non-compaction cardiomyopathy and anticoagulation was recommended. The patient was started on eliquis in addition to aspirin which he tolerated well. He is medically stable and will be discharged home. His crestor was increased to 40mg nightly-his dose of 10mg nightly had been continued at discharge and when I noticed this I sent the 40mg nightly prescription and called the patient regarding this and left a voice mail message explaining.    He should keep PCP, cardiology, and  "neurology follow-up.    Exam Today:  Blood pressure 124/90, pulse 75, temperature 97.9 °F (36.6 °C), temperature source Oral, resp. rate 16, height 177.8 cm (70\"), weight 99.4 kg (219 lb 2.2 oz), SpO2 97%.  Vitals and nursing note reviewed.   Constitutional:       General: He is not in acute distress.     Appearance: He is not toxic-appearing.   HENT:      Head: Normocephalic and atraumatic.      Nose: Nose normal.      Mouth/Throat:      Mouth: Mucous membranes are moist.      Pharynx: Oropharynx is clear.   Eyes:      Conjunctiva/sclera: Conjunctivae normal.      Pupils: Pupils are equal, round, and reactive to light.   Cardiovascular:      Rate and Rhythm: Normal rate and regular rhythm.      Pulses: Normal pulses.   Pulmonary:      Effort: Pulmonary effort is normal.   Abdominal:      General: Bowel sounds are normal.      Palpations: Abdomen is soft.      Tenderness: There is no abdominal tenderness.   Musculoskeletal:         General: No swelling or tenderness.      Cervical back: Neck supple.   Skin:     General: Skin is warm and dry.      Capillary Refill: Capillary refill takes less than 2 seconds.   Neurological:      General: No focal deficit present.      Mental Status: He is alert and oriented to person, place, and time.   Psychiatric:         Mood and Affect: Mood normal.         Behavior: Behavior normal.     Consults:   Consults       Date and Time Order Name Status Description    2/17/2024 11:35 AM Inpatient Cardiology Consult Completed     2/17/2024  8:43 AM Inpatient Internal Medicine Consult Completed     2/16/2024  6:55 AM Inpatient Neurology Consult Stroke Completed     2/16/2024  4:28 AM Inpatient Neurology Consult Stroke Completed     2/16/2024  4:28 AM Inpatient Neurology Consult Stroke Completed              Discharge Disposition:  Home or Self Care    Discharge Medications:     Discharge Medications        New Medications        Instructions Start Date   Eliquis 5 MG tablet tablet  Generic " drug: apixaban   5 mg, Oral, Every 12 Hours Scheduled             Changes to Medications        Instructions Start Date   rosuvastatin 40 MG tablet  Commonly known as: CRESTOR  What changed:   medication strength  how much to take  when to take this   40 mg, Oral, Nightly             Continue These Medications        Instructions Start Date   aspirin 81 MG chewable tablet   81 mg, Oral, Daily      carvedilol 25 MG tablet  Commonly known as: COREG   25 mg, Oral, 2 Times Daily      empagliflozin 25 MG tablet tablet  Commonly known as: Jardiance   25 mg, Oral, Daily      Entresto 49-51 MG tablet  Generic drug: sacubitril-valsartan   1 tablet, Oral, 2 Times Daily      esomeprazole 40 MG capsule  Commonly known as: nexIUM   40 mg, Oral, Every Morning Before Breakfast, prn      levothyroxine 25 MCG tablet  Commonly known as: SYNTHROID, LEVOTHROID   25 mcg, Oral, Daily      Ozempic (0.25 or 0.5 MG/DOSE) 2 MG/1.5ML solution pen-injector  Generic drug: Semaglutide(0.25 or 0.5MG/DOS)   0.5 mg, Subcutaneous, Weekly      spironolactone 25 MG tablet  Commonly known as: ALDACTONE   25 mg, Oral, Daily, 1 in the am and 1/2 in pm               Discharge Diet:   Diet Instructions       Diet: Cardiac Diets; Healthy Heart (2-3 Na+); Regular Texture (IDDSI 7); Thin (IDDSI 0)      Discharge Diet: Cardiac Diets    Cardiac Diet: Healthy Heart (2-3 Na+)    Texture: Regular Texture (IDDSI 7)    Fluid Consistency: Thin (IDDSI 0)            Activity at Discharge:   Activity Instructions       Activity as Tolerated              Follow-up Appointments:  Future Appointments   Date Time Provider Department Center   3/13/2024 10:15 AM Oneil Decker MD [facility]   4/24/2024  9:15 AM [facility]   4/24/2024 10:20 AM Bernadine Graff MD [facility]     Additional Instructions for the Follow-ups that You Need to Schedule       Discharge Follow-up with PCP   As directed       Currently Documented PCP:     Oneil Decker MD    PCP Phone Number:    [phone]     Follow Up Details: 1 week        Discharge Follow-up with Specialty: Neurology (Stroke Clinic)   As directed      Specialty: Neurology (Stroke Clinic)   Follow Up Details: 3 months                Test Results Pending at Discharge:  Pending Labs       Order Current Status    Phosphatidylserine Antibodies In process    Protein C Antigen, Total In process             Cuba Argueta MD  02/20/24  14:25 EST    Time Spent on Discharge Activities: Greater than 30 minutes.

## 2024-02-20 NOTE — PLAN OF CARE
Problem: Fall Injury Risk  Goal: Absence of Fall and Fall-Related Injury  Outcome: Ongoing, Progressing  Intervention: Promote Injury-Free Environment  Recent Flowsheet Documentation  Taken 2/20/2024 0167 by Riana Jung RN  Safety Promotion/Fall Prevention:   safety round/check completed   fall prevention program maintained   Goal Outcome Evaluation:

## 2024-02-20 NOTE — PROGRESS NOTES
Case Management Discharge Note      Final Note: Pt discharged home with family, no HH ordered as pt advanced with PT/OT who recommend home. No needs noted. Kathleen Moreno LCSW    Provided Post Acute Provider List?: Yes  Post Acute Provider List: Nursing Home, Home Health  Delivered To: Patient  Method of Delivery: In person    Selected Continued Care - Discharged on 2/20/2024 Admission date: 2/16/2024 - Discharge disposition: Home or Self Care      Destination    No services have been selected for the patient.                Durable Medical Equipment    No services have been selected for the patient.                Dialysis/Infusion    No services have been selected for the patient.                Home Medical Care    No services have been selected for the patient.                Therapy    No services have been selected for the patient.                Community Resources    No services have been selected for the patient.                Community & DME    No services have been selected for the patient.                    Transportation Services  Private: Car    Final Discharge Disposition Code: 01 - home or self-care

## 2024-02-21 ENCOUNTER — TRANSITIONAL CARE MANAGEMENT TELEPHONE ENCOUNTER (OUTPATIENT)
Dept: CALL CENTER | Facility: HOSPITAL | Age: 59
End: 2024-02-21
Payer: COMMERCIAL

## 2024-02-21 NOTE — PAYOR COMM NOTE
"Jhon Munoz (58 y.o. Male)     PLEASE SEE ATTACHED FOR DC NOTICE    REF #  [id number]     THANK YOU  DEBI XIE RN/ DEPT  [facility]   [phone]  [phone]        Date of Birth   1965    Social Security Number       Address   [address] [place] [address]    Home Phone   [phone]    MRN   [id number]       [facility]    Marital Status                               Admission Date   2/16/24    Admission Type   Emergency    Admitting Provider   Erickson Suárez MD    Attending Provider       Department, Room/Bed   [facility] [address], P592/1       Discharge Date   2/20/2024    Discharge Disposition   Home or Self Care    Discharge Destination                                 Attending Provider: (none)   Allergies: No Known Allergies    Isolation: None   Infection: None   Code Status: Prior    Ht: 177.8 cm (70\")   Wt: 99.4 kg (219 lb 2.2 oz)    Admission Cmt: None   Principal Problem: Acute ischemic left middle cerebral artery (MCA) stroke [I63.512]                   Active Insurance as of 2/16/2024       Primary Coverage       Payor Plan Insurance Group Employer/Plan Group    [employer]CIAL A[employer] [id number]       Payor Plan Address Payor Plan Phone Number Payor Plan Fax Number Effective Dates    [address] [phone]  1/1/2019 - None Entered    [facility] [id number]         Subscriber Name Subscriber Birth Date Member ID       JHON MUNOZ 1965 [id number]                     Emergency Contacts        (Rel.) Home Phone Work Phone Mobile Phone    Jyoti Munoz (Spouse) [phone] -- [phone]              [place]: [facility] [id number]  Tax ID [id number]     Discharge Summary        [place] Argueta MD at 02/20/24 0927          Date of Admission: 2/16/2024  Date of Discharge:  2/20/2024  Primary Care Physician: Oneil Decker MD     Discharge Diagnosis:  Active Hospital Problems    Diagnosis  POA    " "**Acute ischemic left middle cerebral artery (MCA) stroke [I63.512]  Yes    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       Presenting Problem/History of Present Illness:  Acute embolic stroke [I63.9]  Ischemic stroke [I63.9]     Hospital Course:  The patient is a 58 y.o. male with a history of HTN, Type 2 DM, Stage 3a CKD, non-ischemic cardiomyopathy, and OCHOA on CPAP who presented with a fall when getting up in the early morning hours of 2/16/24 to go to the bathroom. He was found to have right hemineglect and dense right hemiplegia due to a left MCA/NIRMAL stroke. Please see admission H&P for further details. He underwent a mechanical thrombectomy on 2/16/24 with Dr. Murray. He did quite well after this and does not have residual neurologic deficits. He was evaluated by neurology and cardiology. The stroke mechanism was thought to have been cardioembolic. He underwent a HEDY which showed non-compaction cardiomyopathy and anticoagulation was recommended. The patient was started on eliquis in addition to aspirin which he tolerated well. He is medically stable and will be discharged home. His crestor was increased to 40mg nightly-his dose of 10mg nightly had been continued at discharge and when I noticed this I sent the 40mg nightly prescription and called the patient regarding this and left a voice mail message explaining.    He should keep PCP, cardiology, and neurology follow-up.    Exam Today:  Blood pressure 124/90, pulse 75, temperature 97.9 °F (36.6 °C), temperature source Oral, resp. rate 16, height 177.8 cm (70\"), weight 99.4 kg (219 " lb 2.2 oz), SpO2 97%.  Vitals and nursing note reviewed.   Constitutional:       General: He is not in acute distress.     Appearance: He is not toxic-appearing.   HENT:      Head: Normocephalic and atraumatic.      Nose: Nose normal.      Mouth/Throat:      Mouth: Mucous membranes are moist.      Pharynx: Oropharynx is clear.   Eyes:      Conjunctiva/sclera: Conjunctivae normal.      Pupils: Pupils are equal, round, and reactive to light.   Cardiovascular:      Rate and Rhythm: Normal rate and regular rhythm.      Pulses: Normal pulses.   Pulmonary:      Effort: Pulmonary effort is normal.   Abdominal:      General: Bowel sounds are normal.      Palpations: Abdomen is soft.      Tenderness: There is no abdominal tenderness.   Musculoskeletal:         General: No swelling or tenderness.      Cervical back: Neck supple.   Skin:     General: Skin is warm and dry.      Capillary Refill: Capillary refill takes less than 2 seconds.   Neurological:      General: No focal deficit present.      Mental Status: He is alert and oriented to person, place, and time.   Psychiatric:         Mood and Affect: Mood normal.         Behavior: Behavior normal.     Consults:   Consults       Date and Time Order Name Status Description    2/17/2024 11:35 AM Inpatient Cardiology Consult Completed     2/17/2024  8:43 AM Inpatient Internal Medicine Consult Completed     2/16/2024  6:55 AM Inpatient Neurology Consult Stroke Completed     2/16/2024  4:28 AM Inpatient Neurology Consult Stroke Completed     2/16/2024  4:28 AM Inpatient Neurology Consult Stroke Completed              Discharge Disposition:  Home or Self Care    Discharge Medications:     Discharge Medications        New Medications        Instructions Start Date   Eliquis 5 MG tablet tablet  Generic drug: apixaban   5 mg, Oral, Every 12 Hours Scheduled             Changes to Medications        Instructions Start Date   rosuvastatin 40 MG tablet  Commonly known as: CRESTOR  What  changed:   medication strength  how much to take  when to take this   40 mg, Oral, Nightly             Continue These Medications        Instructions Start Date   aspirin 81 MG chewable tablet   81 mg, Oral, Daily      carvedilol 25 MG tablet  Commonly known as: COREG   25 mg, Oral, 2 Times Daily      empagliflozin 25 MG tablet tablet  Commonly known as: Jardiance   25 mg, Oral, Daily      Entresto 49-51 MG tablet  Generic drug: sacubitril-valsartan   1 tablet, Oral, 2 Times Daily      esomeprazole 40 MG capsule  Commonly known as: nexIUM   40 mg, Oral, Every Morning Before Breakfast, prn      levothyroxine 25 MCG tablet  Commonly known as: SYNTHROID, LEVOTHROID   25 mcg, Oral, Daily      Ozempic (0.25 or 0.5 MG/DOSE) 2 MG/1.5ML solution pen-injector  Generic drug: Semaglutide(0.25 or 0.5MG/DOS)   0.5 mg, Subcutaneous, Weekly      spironolactone 25 MG tablet  Commonly known as: ALDACTONE   25 mg, Oral, Daily, 1 in the am and 1/2 in pm               Discharge Diet:   Diet Instructions       Diet: Cardiac Diets; Healthy Heart (2-3 Na+); Regular Texture (IDDSI 7); Thin (IDDSI 0)      Discharge Diet: Cardiac Diets    Cardiac Diet: Healthy Heart (2-3 Na+)    Texture: Regular Texture (IDDSI 7)    Fluid Consistency: Thin (IDDSI 0)            Activity at Discharge:   Activity Instructions       Activity as Tolerated              Follow-up Appointments:  Future Appointments   Date Time Provider Department Center   3/13/2024 10:15 AM Oneil Decker MD [facility] [place]   4/24/2024  9:15 AM [facility]/VAS [facility] [place]   4/24/2024 10:20 AM Bernadine Graff MD [facility] [place]     Additional Instructions for the Follow-ups that You Need to Schedule       Discharge Follow-up with PCP   As directed       Currently Documented PCP:    Oneil Decker MD    PCP Phone Number:    [phone]     Follow Up Details: 1 week        Discharge Follow-up with Specialty: Neurology (Stroke Clinic)   As directed       Specialty: Neurology (Stroke Clinic)   Follow Up Details: 3 months                Test Results Pending at Discharge:  Pending Labs       Order Current Status    Phosphatidylserine Antibodies In process    Protein C Antigen, Total In process             Maged Argueta MD  02/20/24  14:25 EST    Time Spent on Discharge Activities: Greater than 30 minutes.          Electronically signed by Maged Argueta MD at 02/20/24 1425       Discharge Order (From admission, onward)       Start     Ordered    02/20/24 0847  Discharge patient  Once        Expected Discharge Date: 02/20/24   Expected Discharge Time: Morning   Discharge Disposition: Home or Self Care   Physician of Record for Attribution - Please select from Treatment Team: MAGED ARGUETA [[id number]]   Review needed by CMO to determine Physician of Record: No      Question Answer Comment   Physician of Record for Attribution - Please select from Treatment Team MAGED ARGUETA    Review needed by CMO to determine Physician of Record No        02/20/24 0846    02/19/24 1638  Discharge patient  Once,   Status:  Canceled        Expected Discharge Date: 02/19/24   Expected Discharge Time: Evening   Discharge Disposition: Home or Self Care   Physician of Record for Attribution - Please select from Treatment Team: MAGED ARGUETA [[id number]]   Review needed by CMO to determine Physician of Record: No      Question Answer Comment   Physician of Record for Attribution - Please select from Treatment Team MAGED ARGUETA    Review needed by CMO to determine Physician of Record No        02/19/24 1638

## 2024-02-21 NOTE — OUTREACH NOTE
Call Center TCM Note      Flowsheet Row Responses   [facility] patient discharged from? [place]   Does the patient have one of the following disease processes/diagnoses(primary or secondary)? Stroke   TCM attempt successful? No  [Wife]   Unsuccessful attempts Attempt 1            Breanna Nielsen RN    2/21/2024, 08:50 EST

## 2024-02-21 NOTE — OUTREACH NOTE
Call Center TCM Note      Flowsheet Row Responses   [facility] patient discharged from? [place]   Does the patient have one of the following disease processes/diagnoses(primary or secondary)? Stroke   TCM attempt successful? Yes   Call start time 1545   Discharge diagnosis Acute ischemic left middle cerebral artery (MCA) stroke   Meds reviewed with patient/caregiver? Yes   Is the patient having any side effects they believe may be caused by any medication additions or changes? No   Does the patient have all medications ordered at discharge? Yes   Is the patient taking all medications as directed (includes completed medication regime)? Yes   Comments 3/13/2024 10:15 AM   Does the patient have an appointment with their PCP within 7-14 days of discharge? Yes   What is the Home health agency?  [facility]   Has home health visited the patient within 72 hours of discharge? Yes   Psychosocial issues? No   Did the patient receive a copy of their discharge instructions? Yes   Nursing interventions Reviewed instructions with patient   What is the patient's perception of their health status since discharge? Improving   TCM call completed? Yes            Breanna Nielsen RN    2/21/2024, 15:51 EST

## 2024-02-22 LAB — PROT C AG ACT/NOR PPP IA: 108 % (ref 60–150)

## 2024-02-22 NOTE — PROGRESS NOTES
"Relay     \"That's too far away from hospital follow up. I need to see him within 14 days of discharge.  \"          "
Is this ok?  
That's too far away from hospital follow up. I need to see him within 14 days of discharge.   
Tried to call pt unable to leave vm.  
86

## 2024-02-29 ENCOUNTER — READMISSION MANAGEMENT (OUTPATIENT)
Dept: CALL CENTER | Facility: HOSPITAL | Age: 59
End: 2024-02-29
Payer: COMMERCIAL

## 2024-02-29 NOTE — OUTREACH NOTE
Stroke Week 2 Survey      Flowsheet Row Responses   [facility] patient discharged from? [place]   Does the patient have one of the following disease processes/diagnoses(primary or secondary)? Stroke   Week 2 attempt successful? Yes   Call start time 1050   Call end time 1052   Discharge diagnosis Acute ischemic left middle cerebral artery (MCA) stroke   Meds reviewed with patient/caregiver? Yes   Is the patient taking all medications as directed (includes completed medication regime)? Yes   Does the patient have a primary care provider?  Yes   Does the patient have an appointment with their PCP within 7 days of discharge? Yes   Has the patient kept scheduled appointments due by today? N/A   What is the Home health agency?  [facility]   Has home health visited the patient within 72 hours of discharge? N/A   Psychosocial issues? No   Does the patient require any assistance with activities of daily living such as eating, bathing, dressing, walking, etc.? No   Does the patient have any residual symptoms from stroke/TIA? No   Does the patient understand the diet ordered at discharge? No   Did the patient receive a copy of their discharge instructions? Yes   Nursing interventions Reviewed instructions with patient   What is the patient's perception of their health status since discharge? Improving   Is the patient able to teach back FAST for Stroke? B alance: Watch for sudden loss of balance, E yes: Check for vision loss, F ace: Look for an uneven smile, A rm: Check if one arm is weak, S peech: Listen for slurred speech, T corrine: Call 9-1-1 right away   Is the patient/caregiver able to teach back the hierarchy of who to call/visit for symptoms/problems? PCP, Specialist, Home health nurse, Urgent Care, ED, 911 Yes   Week 2 call completed? Yes   Wrap up additional comments Pt reports he is doing well. He is caring for his son who had a stroke,   Call end time 1052            ORION Espinosa  Nurse

## 2024-03-05 ENCOUNTER — OFFICE VISIT (OUTPATIENT)
Dept: INTERNAL MEDICINE | Facility: CLINIC | Age: 59
End: 2024-03-05
Payer: COMMERCIAL

## 2024-03-05 VITALS
BODY MASS INDEX: 32.07 KG/M2 | TEMPERATURE: 99.1 F | WEIGHT: 223.5 LBS | SYSTOLIC BLOOD PRESSURE: 122 MMHG | DIASTOLIC BLOOD PRESSURE: 76 MMHG | RESPIRATION RATE: 16 BRPM | HEART RATE: 87 BPM | OXYGEN SATURATION: 97 %

## 2024-03-05 DIAGNOSIS — I10 ESSENTIAL HYPERTENSION: ICD-10-CM

## 2024-03-05 DIAGNOSIS — I63.412 CEREBROVASCULAR ACCIDENT (CVA) DUE TO EMBOLISM OF LEFT MIDDLE CEREBRAL ARTERY: Primary | ICD-10-CM

## 2024-03-05 DIAGNOSIS — E11.9 TYPE 2 DIABETES MELLITUS WITHOUT COMPLICATION, WITHOUT LONG-TERM CURRENT USE OF INSULIN: ICD-10-CM

## 2024-03-05 DIAGNOSIS — E03.8 SUBCLINICAL HYPOTHYROIDISM: ICD-10-CM

## 2024-03-05 DIAGNOSIS — E78.5 HYPERLIPIDEMIA, UNSPECIFIED HYPERLIPIDEMIA TYPE: ICD-10-CM

## 2024-03-05 NOTE — PROGRESS NOTES
Transitional Care Follow Up Visit  Subjective     Jhondarlene Munoz is a 59 y.o. male who presents for a transitional care management visit.    Within 48 business hours after discharge our office contacted him via telephone to coordinate his care and needs.      I reviewed and discussed the details of that call along with the discharge summary, hospital problems, inpatient lab results, inpatient diagnostic studies, and consultation reports with Jhon.     Current outpatient and discharge medications have been reconciled for the patient.        2/20/2024     6:01 PM   Date of TCM Phone Call   [facility]   Date of Admission 2/16/2024   Date of Discharge 2/20/2024   Discharge Disposition [facility]     Risk for Readmission (LACE) Score: 13 (2/20/2024  6:00 AM)      History of Present Illness   Course During Hospital Stay:      Presenting Problem/History of Present Illness:  Acute embolic stroke [I63.9]  Ischemic stroke [I63.9]                 Hospital Course:  The patient is a 58 y.o. male with a history of HTN, Type 2 DM, Stage 3a CKD, non-ischemic cardiomyopathy, and OCHOA on CPAP who presented with a fall when getting up in the early morning hours of 2/16/24 to go to the bathroom. He was found to have right hemineglect and dense right hemiplegia due to a left MCA/NIRMAL stroke. Please see admission H&P for further details. He underwent a mechanical thrombectomy on 2/16/24 with Dr. Murray. He did quite well after this and does not have residual neurologic deficits. He was evaluated by neurology and cardiology. The stroke mechanism was thought to have been cardioembolic. He underwent a HEDY which showed non-compaction cardiomyopathy and anticoagulation was recommended. The patient was started on eliquis in addition to aspirin which he tolerated well. He is medically stable and will be discharged home. His crestor was increased to 40mg nightly-his dose of 10mg nightly had been continued at  discharge and when I noticed this I sent the 40mg nightly prescription and called the patient regarding this and left a voice mail message explaining.    He should keep PCP, cardiology, and neurology follow-up.    At today's office visit patient states that he is doing significantly better.  In fact he also states that he is fully resolved from the stroke that he suffered.  He states that he does not have any residual effects.  His strength is present, along with his full function will ability to ambulate.  He is not having any memory issues or any confusion.  He is able to do his normal ADLs and IADLs.    His only new medicine that was added while this took place was the Eliquis.  He seems to be doing fairly well with the Eliquis.  He denies any side effects of the medicine.  Also notes that the Crestor was increased to 40 mg daily.         The following portions of the patient's history were reviewed and updated as appropriate: allergies, current medications, past family history, past medical history, past social history, past surgical history, and problem list.    Current outpatient and discharge medications have been reconciled for the patient.  Reviewed by: Oneil Decker MD      Review of Systems    Objective   Physical Exam  Vitals and nursing note reviewed.   Constitutional:       Appearance: He is well-developed.   HENT:      Head: Normocephalic and atraumatic.   Musculoskeletal:      Cervical back: Normal range of motion and neck supple.   Neurological:      Mental Status: He is alert and oriented to person, place, and time.   Psychiatric:         Behavior: Behavior normal.         Assessment & Plan   Diagnoses and all orders for this visit:    1. Cerebrovascular accident (CVA) due to embolism of left middle cerebral artery (Primary)  -     CBC & Differential  -     Comprehensive Metabolic Panel    2. Type 2 diabetes mellitus without complication, without long-term current use of insulin  -      Hemoglobin A1c  -     Microalbumin / Creatinine Urine Ratio - Urine, Clean Catch    3. Hyperlipidemia, unspecified hyperlipidemia type  -     Lipid Panel With LDL / HDL Ratio    4. Essential hypertension  -     CBC & Differential  -     Comprehensive Metabolic Panel    5. Subclinical hypothyroidism  -     Thyroid Panel With TSH      For his hypothyroidism he is to continue taking levothyroxine 25 mcg daily that is currently taking.  For his type 2 diabetes, continue taking Ozempic along with the Jardiance.  He seems to be doing fairly well with this.  For the hyperlipidemia, continue Crestor 40 mg daily.  This has been increased while he was recently in the hospital and suffered a stroke.  The high-dose Crestor will be beneficial for him which I did continue to reiterate for him.  Essential hypertension will continue to take carvedilol and spironolactone.  Will need to continue Entresto for his heart failure.  For the CVA him being on baby aspirin along with Eliquis as well as a statin as well as a beta-blocker will all be beneficial for him.  He currently does not have any residual side effects physically from this.  I did reiterate to him how fortunate he is.  He will need to continue all these medications.         Dictated utilizing Dragon Voice Recognition Software

## 2024-03-07 ENCOUNTER — TELEPHONE (OUTPATIENT)
Dept: CARDIOLOGY | Facility: CLINIC | Age: 59
End: 2024-03-07
Payer: COMMERCIAL

## 2024-03-07 LAB
ALBUMIN SERPL-MCNC: 4.4 G/DL (ref 3.5–5.2)
ALBUMIN/CREAT UR: 36 MG/G CREAT (ref 0–29)
ALBUMIN/GLOB SERPL: 2 G/DL
ALP SERPL-CCNC: 75 U/L (ref 39–117)
ALT SERPL-CCNC: 43 U/L (ref 1–41)
AST SERPL-CCNC: 27 U/L (ref 1–40)
BASOPHILS # BLD AUTO: 0.04 10*3/MM3 (ref 0–0.2)
BASOPHILS NFR BLD AUTO: 0.9 % (ref 0–1.5)
BILIRUB SERPL-MCNC: 0.5 MG/DL (ref 0–1.2)
BUN SERPL-MCNC: 18 MG/DL (ref 8–23)
BUN/CREAT SERPL: 13.1 (ref 7–25)
CALCIUM SERPL-MCNC: 9.4 MG/DL (ref 8.6–10.5)
CHLORIDE SERPL-SCNC: 105 MMOL/L (ref 98–107)
CHOLEST SERPL-MCNC: 99 MG/DL (ref 0–200)
CO2 SERPL-SCNC: 25.2 MMOL/L (ref 22–29)
CREAT SERPL-MCNC: 1.37 MG/DL (ref 0.76–1.27)
CREAT UR-MCNC: 113.7 MG/DL
EGFRCR SERPLBLD CKD-EPI 2021: 58.7 ML/MIN/1.73
EOSINOPHIL # BLD AUTO: 0.12 10*3/MM3 (ref 0–0.4)
EOSINOPHIL NFR BLD AUTO: 2.8 % (ref 0.3–6.2)
ERYTHROCYTE [DISTWIDTH] IN BLOOD BY AUTOMATED COUNT: 13.2 % (ref 12.3–15.4)
FT4I SERPL CALC-MCNC: 1.7 (ref 1.2–4.9)
GLOBULIN SER CALC-MCNC: 2.2 GM/DL
GLUCOSE SERPL-MCNC: 86 MG/DL (ref 65–99)
HBA1C MFR BLD: 5.8 % (ref 4.8–5.6)
HCT VFR BLD AUTO: 42.3 % (ref 37.5–51)
HDLC SERPL-MCNC: 53 MG/DL (ref 40–60)
HGB BLD-MCNC: 13.7 G/DL (ref 13–17.7)
IMM GRANULOCYTES # BLD AUTO: 0 10*3/MM3 (ref 0–0.05)
IMM GRANULOCYTES NFR BLD AUTO: 0 % (ref 0–0.5)
LDLC SERPL CALC-MCNC: 32 MG/DL (ref 0–100)
LDLC/HDLC SERPL: 0.63 {RATIO}
LYMPHOCYTES # BLD AUTO: 1.82 10*3/MM3 (ref 0.7–3.1)
LYMPHOCYTES NFR BLD AUTO: 41.8 % (ref 19.6–45.3)
MCH RBC QN AUTO: 29.9 PG (ref 26.6–33)
MCHC RBC AUTO-ENTMCNC: 32.4 G/DL (ref 31.5–35.7)
MCV RBC AUTO: 92.4 FL (ref 79–97)
MICROALBUMIN UR-MCNC: 41.4 UG/ML
MONOCYTES # BLD AUTO: 0.42 10*3/MM3 (ref 0.1–0.9)
MONOCYTES NFR BLD AUTO: 9.7 % (ref 5–12)
NEUTROPHILS # BLD AUTO: 1.95 10*3/MM3 (ref 1.7–7)
NEUTROPHILS NFR BLD AUTO: 44.8 % (ref 42.7–76)
NRBC BLD AUTO-RTO: 0 /100 WBC (ref 0–0.2)
PLATELET # BLD AUTO: 218 10*3/MM3 (ref 140–450)
POTASSIUM SERPL-SCNC: 4.9 MMOL/L (ref 3.5–5.2)
PROT SERPL-MCNC: 6.6 G/DL (ref 6–8.5)
RBC # BLD AUTO: 4.58 10*6/MM3 (ref 4.14–5.8)
SODIUM SERPL-SCNC: 143 MMOL/L (ref 136–145)
T3RU NFR SERPL: 30 % (ref 24–39)
T4 SERPL-MCNC: 5.5 UG/DL (ref 4.5–12)
TRIGL SERPL-MCNC: 62 MG/DL (ref 0–150)
TSH SERPL DL<=0.005 MIU/L-ACNC: 1.75 UIU/ML (ref 0.45–4.5)
VLDLC SERPL CALC-MCNC: 14 MG/DL (ref 5–40)
WBC # BLD AUTO: 4.35 10*3/MM3 (ref 3.4–10.8)

## 2024-03-07 NOTE — TELEPHONE ENCOUNTER
Notified patient of results. Patient verbalized understanding.    Mar Khanna RN  Triage [facility]

## 2024-03-12 NOTE — PROGRESS NOTES
Please inform the patient of the following abnormal results. Slightly elevated serum creatnine, will continue to monitor. Continue current meds.

## 2024-03-14 ENCOUNTER — OFFICE VISIT (OUTPATIENT)
Dept: CARDIOLOGY | Facility: CLINIC | Age: 59
End: 2024-03-14
Payer: COMMERCIAL

## 2024-03-14 VITALS
DIASTOLIC BLOOD PRESSURE: 80 MMHG | WEIGHT: 226 LBS | SYSTOLIC BLOOD PRESSURE: 125 MMHG | HEIGHT: 70 IN | BODY MASS INDEX: 32.35 KG/M2 | HEART RATE: 75 BPM

## 2024-03-14 DIAGNOSIS — E78.2 MIXED HYPERLIPIDEMIA: ICD-10-CM

## 2024-03-14 DIAGNOSIS — E11.9 TYPE 2 DIABETES MELLITUS WITHOUT COMPLICATION, WITHOUT LONG-TERM CURRENT USE OF INSULIN: ICD-10-CM

## 2024-03-14 DIAGNOSIS — I10 ESSENTIAL HYPERTENSION: ICD-10-CM

## 2024-03-14 DIAGNOSIS — I63.512 ACUTE ISCHEMIC LEFT MIDDLE CEREBRAL ARTERY (MCA) STROKE: ICD-10-CM

## 2024-03-14 DIAGNOSIS — I42.8 NONISCHEMIC CARDIOMYOPATHY: Primary | ICD-10-CM

## 2024-03-14 DIAGNOSIS — N18.31 STAGE 3A CHRONIC KIDNEY DISEASE: ICD-10-CM

## 2024-03-14 DIAGNOSIS — N28.0 RENAL INFARCT: ICD-10-CM

## 2024-03-14 DIAGNOSIS — I42.8 LEFT VENTRICULAR NON-COMPACTION CARDIOMYOPATHY: ICD-10-CM

## 2024-03-14 RX ORDER — SACUBITRIL AND VALSARTAN 49; 51 MG/1; MG/1
1 TABLET, FILM COATED ORAL 2 TIMES DAILY
Qty: 180 TABLET | Refills: 3 | Status: SHIPPED | OUTPATIENT
Start: 2024-03-14

## 2024-03-14 NOTE — Clinical Note
He had echo and HEDY when hospitalized in February 2024 with acute CVA.  He seen you in April; echo was ordered for that day as well.  He went to cancel this 1?

## 2024-03-14 NOTE — PROGRESS NOTES
"      [facility] CARDIOLOGY  [address]  [facility] [address]  Phone: [phone]  Fax: [phone]  Patient Name: Jhon Munoz  :1965  Age: 59 y.o.  Primary Cardiologist: Bernadine Graff MD  Encounter Provider:  TERRI Sutton    History of Present Illness     Jhon uMnoz is a 59 y.o. Black male whose medical history includes OCHOA/CPAP, hyperlipidemia, hypertension, type 2 diabetes, renal calculi, and GERD.  He is followed in our office by Dr. Graff for nonischemic cardiomyopathy. I have reviewed the past medical records in preparation of today's visit.     Follow-up:  He is here for hospital follow-up.  2024 he was admitted for acute CVA of the left A2 and required mechanical thrombectomy.  His echocardiogram was also consistent with LV noncompaction and he had a small clot in the left atrial appendage.  He is now on 5 mg apixaban twice daily and his rosuvastatin has been increased to 40 mg daily.  His blood pressure medicines were also reduced but he remains on his normal doses.  His blood pressure at home is similar to today's reading and he is not having any lightheadedness or near syncope.  He actually feels really blessed and is doing well.  He denies chest pain, dyspnea with exertion, orthopnea, or leg swelling.  He is taking his medications as prescribed.    Data Review     The following data was reviewed by TERRI Sutton on 03/15/24:    Vital Signs:   /80   Pulse 75   Ht 177.8 cm (70\")   Wt 103 kg (226 lb)   BMI 32.43 kg/m²       Weight:  Wt Readings from Last 3 Encounters:   24 103 kg (226 lb)   24 101 kg (223 lb 8 oz)   24 99.4 kg (219 lb 2.2 oz)     Body mass index is 32.43 kg/m².    Below is a summary of pertinent cardiology findings:  He is , has 3 children, works for [employer].  He uses no cigarettes and has about 6-10 beers per week as well as 1 vodka.  He has 1 cup of coffee " daily.  Family history includes hypertension in his mother who  in  and a son who  in  osteogenic sarcoma.  2020 CT abdomen pelvis for left lower quadrant pain showed left renal infarct and nonobstructing left renal calculus.  He was evaluated by Dr. Carreon with urology who felt no further work-up was needed.  He did have 12-day Holter monitor due to infarct.  2020 bilateral renal artery duplex was normal.  2020 echocardiogram showed grade 1 diastolic dysfunction, moderate left ventricular dilation, EF 27.5%, GLS -12.8, and no significant valvular disease.  2020 cardiac catheterization showed the LAD, first diagonal, left circumflex to have luminal irregularities, and the RCA with a 30% mid segment stenosis.  He was treated medically for nonischemic cardiomyopathy.   cardiac MRI showed dilated left ventricle, global hypokinesis, mild right ventricular hypokinesis, normal perfusion of the myocardium, a focal area of mid myocardial delayed enhancement of the inferior septum, and EF at 30%.  2020 Holter monitor showed multiple episodes of nonsustained VT with the longest lasting 10 seconds, and short bursts of SVT were also noted; no evidence of atrial fibrillation.  2020 echocardiogram showed EF 36 to 40%, severe eccentric LV hypertrophy, mild left ventricular dilation, grade 1 LV diastolic dysfunction, anterior and anteroseptal hypokinesis.  2020 evaluated in the emergency room for syncope; EMS arrived and found his heart rate to be in the 20s and he was treated with atropine.  He is taking medications as directed before bedtime and had eaten a normal meal but he had 4 drinks prior to going to bed at 3 AM.  He was felt to have suffered from vasovagal syncope.  2022 echocardiogram showed EF 44%, grade 1 LV diastolic dysfunction, moderate left atrial dilation, moderate concentric LV hypertrophy, global longitudinal strain of -15.1%, normal  valvular structure and function, and global hypokinesis.  February 2024 he was admitted for acute CVA of the left A2 and required mechanical thrombectomy.  His echocardiogram was also consistent with LV noncompaction and he had a small clot in the left atrial appendage.  He is now on 5 mg apixaban twice daily and his rosuvastatin has been increased to 40 mg daily.  His blood pressure medicines were also reduced but he remains on his normal doses.  February 2024 echocardiogram showed EF 39.3%, global hypokinesis, grade 1 LV diastolic function, grossly normal RV in size and function, no significant valvular abnormalities, negative saline test results, normal biatrial size.  February 2024 HEDY showed EF 41 to 45%, mildly dilated LV cavity, moderate global hypokinesis, prominent and heavy trabeculations throughout the left ventricle consistent with left ventricular noncompaction, a very small mobile echodensity near the apex of the left atrial appendage consistent with thrombus, mild mitral valve regurgitation, RVSP 25 mmHg.    Labs:  10/17/2022:  cr 1.4, K 4.8, otherwise unremarkable CMP, Hgb A1c 6.3,, TSH 1.680, total T4 5.7 Chol 131, HDL 53, LDL 65, Trig 64, Hgb 14.1, Plt 207  09/19/2023:  cr 1.3, K 5.2, otherwise unremarkable CMP, hgb A1c 6.1, Chol 133, HDL 57, LDL 62, Trig 69, hgb 14.3, plt 189  03/06/2024:  cr 1.4, K 4.9, ALT 43, otherwise unremarkable CMP, HgbA1c 5.80, TSH 1.750, Chol 99, HDL 53, LDL 32, trig 62      ECG 12 Lead    Date/Time: 3/14/2024 3:08 PM  Performed by: Sravanthi Guido APRN    Authorized by: Sravanthi Guido APRN  Comparison: compared with previous ECG from 2/16/2024  Similar to previous ECG  Rhythm: sinus rhythm  Rate: normal  BPM: 75  ST Depression: I and aVL  T flattening: V4, V5 and V6          Medications     Allergies as of 03/14/2024    (No Known Allergies)         Current Outpatient Medications:     aspirin 81 MG chewable tablet, Chew 1 tablet Daily., Disp: ,  Rfl:     carvedilol (COREG) 25 MG tablet, Take 1 tablet by mouth 2 (Two) Times a Day., Disp: 180 tablet, Rfl: 3    esomeprazole (nexIUM) 40 MG capsule, Take 1 capsule by mouth Every Morning Before Breakfast. prn, Disp: 30 capsule, Rfl: 12    levothyroxine (SYNTHROID, LEVOTHROID) 25 MCG tablet, Take 1 tablet by mouth Daily., Disp: 90 tablet, Rfl: 2    rosuvastatin (CRESTOR) 40 MG tablet, Take 1 tablet by mouth Every Night., Disp: 90 tablet, Rfl: 0    Semaglutide,0.25 or 0.5MG/DOS, (Ozempic, 0.25 or 0.5 MG/DOSE,) 2 MG/1.5ML solution pen-injector, Inject 0.5 mg under the skin into the appropriate area as directed 1 (One) Time Per Week., Disp: 6 mL, Rfl: 1    spironolactone (ALDACTONE) 25 MG tablet, Take 1 tablet by mouth Daily. 1 in the am and 1/2 in pm, Disp: , Rfl:     apixaban (ELIQUIS) 5 MG tablet tablet, Take 1 tablet by mouth Every 12 (Twelve) Hours. Indications: Other - full anticoagulation, Disp: 180 tablet, Rfl: 3    empagliflozin (Jardiance) 25 MG tablet tablet, Take 1 tablet by mouth Daily., Disp: 90 tablet, Rfl: 3    sacubitril-valsartan (Entresto) 49-51 MG tablet, Take 1 tablet by mouth 2 (Two) Times a Day., Disp: 180 tablet, Rfl: 3     Past History, Review of Systems, Exam     Past Medical History:   Diagnosis Date    Cardiomyopathy     Colon polyp     Cortical senile cataract 04/01/2019    Diabetes mellitus     Disease of thyroid gland     Hyperlipidemia     Hypertension     Renal calculi     Renal infarct 07/2020    Sleep apnea        Past Surgical History:   has a past surgical history that includes Knee surgery; Colonoscopy (2020); Cardiac catheterization (N/A, 07/24/2020); Cardiac catheterization (N/A, 07/24/2020); and Colonoscopy (N/A, 10/18/2022).     Social History     Socioeconomic History    Marital status:      Spouse name: Jyoti   Tobacco Use    Smoking status: Never     Passive exposure: Never    Smokeless tobacco: Never   Vaping Use    Vaping status: Never Used   Substance and Sexual  Activity    Alcohol use: Yes     Types: 1 Cans of beer, 1 Shots of liquor per week     Comment: Caffeine use: 1 cup daily    Drug use: Never    Sexual activity: Defer       Review of Systems   Eyes: Negative.    Cardiovascular: Negative.    Neurological:  Positive for light-headedness.       Vitals reviewed.   Constitutional:       Appearance: Not in distress.   Eyes:      Conjunctiva/sclera: Conjunctivae normal.      Pupils: Pupils are equal, round, and reactive to light.   HENT:      Head: Normocephalic.      Nose: Nose normal.    Mouth/Throat:      Pharynx: Oropharynx is clear.   Neck:      Vascular: JVD normal.   Pulmonary:      Effort: Pulmonary effort is normal.      Breath sounds: Normal breath sounds. No wheezing. No rhonchi. No rales.   Cardiovascular:      Normal rate. Regular rhythm. Normal S1. Normal S2.       Murmurs: There is no murmur.   Pulses:     Intact distal pulses.   Edema:     Peripheral edema absent.   Abdominal:      General: Bowel sounds are normal. There is no distension.      Palpations: Abdomen is soft.      Tenderness: There is no abdominal tenderness.   Musculoskeletal: Normal range of motion.      Cervical back: Normal range of motion and neck supple. Skin:     General: Skin is warm and dry.   Neurological:      Mental Status: Alert and oriented to person, place and time.   Psychiatric:         Attention and Perception: Attention normal.         Mood and Affect: Mood normal.         Speech: Speech normal.         Behavior: Behavior is cooperative.         Assessment and Plan     Assessment:  1. Nonischemic cardiomyopathy    2. Left ventricular non-compaction cardiomyopathy    3. Essential hypertension    4. Mixed hyperlipidemia    5. Type 2 diabetes mellitus without complication, without long-term current use of insulin    6. Renal infarct    7. Stage 3a chronic kidney disease    8. Acute ischemic left middle cerebral artery (MCA) stroke             Nonischemic cardiomyopathy:  Diagnosed with left renal infarct in July 2020 and follow-up echocardiogram showed EF 27.5%.  Cardiac catheterization showed nonobstructive coronary artery disease, and cardiac MRI showed fairly normal cardiac structure.  Echocardiogram was in March 2022 and showed EFhad improved to 44%.  HEDY February 2024 shows EF 41 to 45% and there is evidence of LV noncompaction.  His medical therapy includes 49-51 mg Entresto twice daily, 25 mg Jardiance daily, 12.5 mg carvedilol a.m. and 25 mg nightly, and 25 mg spironolactone daily.  He is compensated by exam.   LV noncompaction: Trabeculations noted o February 2024 HEDY.  Acute CVA: Likely cardioembolic with small thrombus noted in the left atrial appendage.  He is now on 5 mg apixaban twice daily and 40 mg rosuvastatin.  Hypertension: Remains controlled.  Hyperlipidemia: Lipids were at goal when checked in March 2024..  He is treated with 1 40 mg rosuvastatin.  Type 2 diabetes: Hemoglobin A1c at goal when last checked in March 2024..  He is treated with Ozempic and Jardiance.  Renal infarct: This was noted in July 2022 and he continues to follow with  and nephrology.  Dr. Castillo is his nephrologist.  Renal function stable when checked in March 2024.  Stage IIIa CKD: His renal function is stable on labs in March 2024..  He follows with Dr. Castillo.  Renal calculi:  He follows with Dr. Carreon.  No recent issues.  Syncope and collapse: This occurred in November 2022 and was associated with episode of bradycardia.  This was felt to be in response to vasovagal reaction.  He has had no further episodes.    Mr. Munoz is a patient of Dr. Graff's with nonischemic cardiomyopathy diagnosed in July 2022 following renal infarct.  Cardiac catheterization showed nonobstructive coronary artery disease and his cardiac MRI was fairly normal.  His medical therapy includes moderate dose Entresto, carvedilol, Jardiance, spironolactone.      He was admitted with acute CVA in March 2024 and had  mechanical thrombectomy.  He was also found to have left atrial appendage thrombus and is now on 5 mg apixaban twice daily.  His echocardiogram also showed trabeculations consistent with left ventricular noncompaction.    He is doing well.  I will make no medication changes.  I gave him co-pay cards for apixaban, Entresto, and Jardiance.    He will keep his appointment with Dr. Graff in April 2024.    No follow-ups on file.  Orders Placed This Encounter   Procedures    ECG 12 Lead    ECG Scan      No orders of the defined types were placed in this encounter.        Thank you for the opportunity to participate in this patient's care.    TERRI Velazquez    This office note has been dictated.

## 2024-03-18 NOTE — PROGRESS NOTES
Can you cancel his echo in April 2024? He just had one in hospital.     Thanks!  TERRI Velazquez

## 2024-03-28 ENCOUNTER — TREATMENT (OUTPATIENT)
Dept: PHYSICAL THERAPY | Facility: CLINIC | Age: 59
End: 2024-03-28
Payer: COMMERCIAL

## 2024-03-28 DIAGNOSIS — M25.512 CHRONIC LEFT SHOULDER PAIN: Primary | ICD-10-CM

## 2024-03-28 DIAGNOSIS — M25.612 SHOULDER STIFFNESS, LEFT: ICD-10-CM

## 2024-03-28 DIAGNOSIS — G89.29 CHRONIC LEFT SHOULDER PAIN: Primary | ICD-10-CM

## 2024-03-28 PROCEDURE — 97110 THERAPEUTIC EXERCISES: CPT | Performed by: PHYSICAL THERAPIST

## 2024-03-28 PROCEDURE — 97162 PT EVAL MOD COMPLEX 30 MIN: CPT | Performed by: PHYSICAL THERAPIST

## 2024-03-28 NOTE — PROGRESS NOTES
[facility]   [place]  [address], [address] [place] [address]     Initial Evaluation and Plan of Care      Patient: Jhon Munoz   : 1965  Diagnosis/ICD-10 Code:  Chronic left shoulder pain [M25.512, G89.29]  Referring practitioner: Oneil Decker MD  Date of Initial Visit: 3/28/2024  Today's Date: 3/28/2024  Patient seen for 1 session         Visit Diagnoses:    ICD-10-CM ICD-9-CM   1. Chronic left shoulder pain  M25.512 719.41    G89.29 338.29   2. Shoulder stiffness, left  M25.612 719.51         Subjective Questionnaire: QuickDASH: 57%      Subjective Evaluation    History of Present Illness  Mechanism of injury: 59 year old with complicated medical history due to recent L MCA stroke and R hemiparesis on 24. EMS to ED and tPA done in time. Fell in his bathroom onto his L shoulder with R neglect noted. Did well in the hospital and walked with PT on 24 400 feet with no device. X-ray in hospital showed no issue with joint.   Now on eloquis and higher dose of cholesterol med.   BP under control and now taking it at home.   No orthostatic issues either.   Prior L shoulder stiffness and pain after he passed out in January and landed on his L side hitting the front of his left shoulder vs. A chair.   Not sure why he had a syncopal episode at that time but feels it is connected to CVA 24.   2 years ago had an episode of shoulder stiffness and mild frozen shoulder with 4-5 PT sessions. Borderline diabetes.   Cardiac cath in .   S/p partial R nephrectomy in  secondary to a clot. No issue after that. No dialysis needed.   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   Also with significant family issue as his 39 year old son moved home with them after a significant stroke 2023. Pt has done some lifting and transferring of his son 3 times a day with transfer belt and slide board. Son still in OP rehab 3x week. Pt has had some  tough transfers when timing is off.   No ortho or injections.           Patient Occupation: retired [employer]  Quality of life: good    Pain  Current pain ratin  At best pain ratin  At worst pain ratin  Location: top of shoulder  Quality: dull ache, radiating, throbbing, discomfort and sharp  Relieving factors: ice, change in position and rest  Aggravating factors: overhead activity and movement  Progression: worsening    Social Support  Lives with: spouse and adult children    Hand dominance: right    Diagnostic Tests  X-ray: normal    Treatments  Previous treatment: physical therapy  Patient Goals  Patient goals for therapy: decreased pain, return to sport/leisure activities, increased motion and increased strength  Patient goal: sleep pain free. try golfing.           Objective          Static Posture     Shoulders  Rounded.    Tenderness     Left Shoulder   Tenderness in the biceps tendon (proximal) and supraspinatus tendon.     Active Range of Motion   Left Shoulder   External rotation BTH: C5   Internal rotation BTB: sacrum     Right Shoulder   External rotation BTH: T5   Internal rotation BTB: T9     Passive Range of Motion   Left Shoulder   Flexion: 140 degrees   Extension: 50 degrees   Abduction: 85 degrees   External rotation 45°: 20 degrees   Internal rotation 45°: 45 degrees     Right Shoulder   Flexion: 165 degrees   Extension: 75 degrees   Abduction: 140 degrees   External rotation 45°: 65 degrees   Internal rotation 45°: 85 degrees     Additional Passive Range of Motion Details  340 total ROM L vs. 530 R = 64%    Joint Play   Left Shoulder  Hypomobile in the inferior capsule.    Strength/Myotome Testing     Left Shoulder     Planes of Motion   Flexion: 4-   Abduction: 4-     Right Shoulder   Normal muscle strength    Tests     Left Shoulder   Positive empty can, full can and Hawkin's.   Negative drop arm.           Assessment & Plan       Assessment  Impairments: abnormal or restricted ROM,  activity intolerance, impaired physical strength, lacks appropriate home exercise program and pain with function   Functional limitations: carrying objects, lifting, sleeping, pulling, pushing, uncomfortable because of pain, reaching behind back, reaching overhead and unable to perform repetitive tasks   Assessment details: Pt with unstable conditioning due to worsening condition, 57% self reported disability, and only 64% PROM L to R. Also with painful empty and full can testing.   Did well with pulley allowing more ROM OH vs. With PROM testing today.   Pt with complete recovery of R Es post CVA.   Complicating factors of ongoing need to help his 200# son transfer at home 3-4 x/day.   They have had training from Matt on slide board work but he has had some painful episodes when timing has been off. Pulling demands may have resulted in labral injury.   Pt has landed twice (passing out then recent CVA) on his L shoulder in the past 3 months thus possible traumatic cuff strain also possible.   A1c has been under control < 6.0 and initial suspicion of frozen shoulder seemed to subside as he did well with pulleys and wand ex.  Painful abduction, referred pain, and significant trouble sleeping thus may need further ortho workup if symptoms don't subside in the next 2-3 weeks.   Prognosis: fair  Prognosis details: Monitor vitals with cardio ex    Goals  Plan Goals: STGs 4 weeks:  1. Improve total ROM L shoulder from 64% to > 75%  2. Decrease max pain from 7 to < 3/10  3. QDASH score to improve from 57 to < 35%  4. Pt indep with variety of HEP  LTGs 12 weeks:  1. Total PROM L shoulder to > 90% of R sh  2. AROM L shoulder painfree to > 85% and negative empty full can testing  3. QDASH score < 15%  4. Pt to be able to swing golf club and play 9 holes pain free.     Plan  Planned modality interventions: ultrasound and electrical stimulation/Russian stimulation  Planned therapy interventions: manual therapy, strengthening,  stretching, therapeutic activities, functional ROM exercises, joint mobilization and home exercise program  Frequency: 2x week  Duration in weeks: 12  Treatment plan discussed with: patient        History # of Personal Factors and/or Comorbidities: MODERATE (1-2)  Examination of Body System(s): # of elements: LOW (1-2)  Clinical Presentation: UNSTABLE   Clinical Decision Making: MODERATE      Timed:         Manual Therapy:         mins  41576;     Therapeutic Exercise:    15     mins  86982;     Neuromuscular Roberto:        mins  32709;    Therapeutic Activity:          mins  61935;     Gait Training:           mins  90318;     Ultrasound:          mins  63922;    Ionto                                   mins   11666  Self Care                            mins   49347  Canalith Repos         mins 31228  Aquatic Exercise                 mins 37434    Un-Timed:  Electrical Stimulation:         mins  29498 ( );  Dry Needling          mins self-pay  Traction          mins 11692    Low Eval          Mins  45859  Mod Eval     30     Mins  64600  High Eval                            Mins  55433        Timed Treatment:  15    mins   Total Treatment:     50   mins          PT: Zorre Zeno Kimura, PT     [place] License Number: [id number]  [place] License Number:  [id number]  Electronically signed by Zorre Zeno Kimura, PT, 03/28/24, 11:16 AM EDT    Certification Period: 3/28/2024 thru 6/25/2024  I certify that the therapy services are furnished while this patient is under my care.  The services outlined above are required by this patient, and will be reviewed every 90 days.         Physician Signature:__________________________________________________    PHYSICIAN: Oneil Decker MD  NPI: [id number]                                      DATE:      Please sign and return via fax to  [facility]  [address], [address] [place] [address]   Thank you, [facility].

## 2024-04-02 ENCOUNTER — TREATMENT (OUTPATIENT)
Dept: PHYSICAL THERAPY | Facility: CLINIC | Age: 59
End: 2024-04-02
Payer: COMMERCIAL

## 2024-04-02 DIAGNOSIS — M25.512 CHRONIC LEFT SHOULDER PAIN: Primary | ICD-10-CM

## 2024-04-02 DIAGNOSIS — G89.29 CHRONIC LEFT SHOULDER PAIN: Primary | ICD-10-CM

## 2024-04-02 DIAGNOSIS — M25.612 SHOULDER STIFFNESS, LEFT: ICD-10-CM

## 2024-04-02 NOTE — PROGRESS NOTES
Physical Therapy Daily Treatment Note  [facility] [place]   [address], [address]  [place] [address]  P: [phone]  F: [phone]    Patient: Jhon Munoz   : 1965  Referring practitioner: Oneil Decker MD  Date of Initial Visit: Type: THERAPY  Noted: 3/28/2024  Today's Date: 2024  Patient seen for 2 sessions       Visit Diagnoses:    ICD-10-CM ICD-9-CM   1. Chronic left shoulder pain  M25.512 719.41    G89.29 338.29   2. Shoulder stiffness, left  M25.612 719.51         Jhon Munoz reports: Pt reports his shoulder is about the same as it was last visit. Pt notes reaching over head in the garage this weekend and had a sharp pain w/ grabbing a box off a shelf. Pt let his shoulder relax for a min and he felt better after that. Pt notes performing the HEP as instructed.     Subjective     Objective   See Exercise, Manual, and Modality Logs for complete treatment.     BP =120/84  SPO2 = 98      Assessment/Plan  Pt vitals were checked pre PT (see OBJ) which were w/in acceptable range for safe participation in PT session.     New interventions were added to continue w/ PT PoC for fxn strength and neuromuscular control of L UE. Pt was able to perform requested interventions today w/out s/s exasperation. Pt was taught body postioning (Shoulder setting w/ reach/pull) for lifting disabled son w/ transfers.       IFC was utilized to assist w/ decreasing pt reported painful s/s. Pre/post intervention skin checks were performed w/ no noted negative reactions present.       Plan:  Pt will continue with current plan of care to achieve outlined goals. Therapeutic interventions will be progressed where and when appropriate.          Timed:         Manual Therapy:    0     mins  19492;     Therapeutic Exercise:    20     mins  21071;     Neuromuscular Roberto:    8    mins  94730;    Therapeutic Activity:     8     mins  56773;     Gait Trainin     mins  74001;      Ultrasound:     0     mins  24034;    Ionto                               0    mins  30444  Self Care                       0     mins  58233  Traction     0     mins 87397      Un-Timed:  Canalith Repos    0     mins 49796  Electrical Stimulation:    10     mins  87317 ( );  Dry Needling     0     mins self-pay  Traction     0     mins 95517        Timed Treatment:   36   mins   Total Treatment:     46   mins    Arcenio Harp PT  [place] License #: [id number]    Physical Therapist

## 2024-04-04 ENCOUNTER — TREATMENT (OUTPATIENT)
Dept: PHYSICAL THERAPY | Facility: CLINIC | Age: 59
End: 2024-04-04
Payer: COMMERCIAL

## 2024-04-04 DIAGNOSIS — M25.512 CHRONIC LEFT SHOULDER PAIN: Primary | ICD-10-CM

## 2024-04-04 DIAGNOSIS — G89.29 CHRONIC LEFT SHOULDER PAIN: Primary | ICD-10-CM

## 2024-04-04 DIAGNOSIS — M25.612 SHOULDER STIFFNESS, LEFT: ICD-10-CM

## 2024-04-04 NOTE — PROGRESS NOTES
Physical Therapy Daily Treatment Note      [address]  [address]  [facility] [address] [phone]  Patient: Jhon Munoz   : 1965  Referring practitioner: Oneil Decker MD   Date of Initial Visit: Type: THERAPY  Noted: 3/28/2024  Today's Date: 2024  Patient seen for 3 sessions          Visit Diagnoses:    ICD-10-CM ICD-9-CM   1. Chronic left shoulder pain  M25.512 719.41    G89.29 338.29   2. Shoulder stiffness, left  M25.612 719.51       Subjective   Can't tell much difference yet but no N/T since Tuesday. Having to lift my son a lot.  Trying exercises at home.    Objective   See Exercise, Manual, and Modality Logs for complete treatment.   Resting HR - 64 and BP - 119/79    Assessment/Plan  Mild subjective improvement with decreased paresthesias but ROM still limited and painful, especially ER and IR.  FF wall slides improved with repetitions. Responded well to addition of PROM with oscillations.    Continue to progress per POC as tolerated    Timed:         Manual Therapy:    10     mins  30420;     Therapeutic Exercise:    25     mins  49871;     Neuromuscular Roberto:    0    mins  90495;    Therapeutic Activity:     10    mins  50211;     Gait Trainin     mins  68025;     Ultrasound:     0     mins  79773;    Ionto                               0    mins   08778  Self Care                       0     mins   77805      Un-Timed:  Electrical Stimulation:    10     mins  68104 ( );  Dry Needling     0     mins self-pay  Traction     0     mins 02763  Canalith Repos    0     mins 13972    Timed Treatment:   45   mins   Total Treatment:     55   mins    Radha Lin PT  [place] License: [id number]

## 2024-04-09 ENCOUNTER — OFFICE VISIT (OUTPATIENT)
Dept: INTERNAL MEDICINE | Facility: CLINIC | Age: 59
End: 2024-04-09
Payer: COMMERCIAL

## 2024-04-09 ENCOUNTER — TREATMENT (OUTPATIENT)
Dept: PHYSICAL THERAPY | Facility: CLINIC | Age: 59
End: 2024-04-09
Payer: COMMERCIAL

## 2024-04-09 VITALS
HEIGHT: 70 IN | RESPIRATION RATE: 14 BRPM | BODY MASS INDEX: 32.07 KG/M2 | SYSTOLIC BLOOD PRESSURE: 98 MMHG | DIASTOLIC BLOOD PRESSURE: 72 MMHG | HEART RATE: 71 BPM | OXYGEN SATURATION: 98 % | WEIGHT: 224 LBS

## 2024-04-09 DIAGNOSIS — E78.5 HYPERLIPIDEMIA, UNSPECIFIED HYPERLIPIDEMIA TYPE: ICD-10-CM

## 2024-04-09 DIAGNOSIS — M25.512 CHRONIC LEFT SHOULDER PAIN: Primary | ICD-10-CM

## 2024-04-09 DIAGNOSIS — M25.612 SHOULDER STIFFNESS, LEFT: ICD-10-CM

## 2024-04-09 DIAGNOSIS — E11.9 TYPE 2 DIABETES MELLITUS WITHOUT COMPLICATION, WITHOUT LONG-TERM CURRENT USE OF INSULIN: ICD-10-CM

## 2024-04-09 DIAGNOSIS — G89.29 CHRONIC LEFT SHOULDER PAIN: Primary | ICD-10-CM

## 2024-04-09 DIAGNOSIS — I63.412 CEREBROVASCULAR ACCIDENT (CVA) DUE TO EMBOLISM OF LEFT MIDDLE CEREBRAL ARTERY: Primary | ICD-10-CM

## 2024-04-09 PROCEDURE — 99214 OFFICE O/P EST MOD 30 MIN: CPT | Performed by: FAMILY MEDICINE

## 2024-04-09 NOTE — PROGRESS NOTES
Physical Therapy Daily Treatment Note  [facility] [place]   [address]  [place] [address]  P: [phone]  F: [phone]    Patient: Jhon Munoz   : 1965  Referring practitioner: Oneil Decker MD  Date of Initial Visit: Type: THERAPY  Noted: 3/28/2024  Today's Date: 2024  Patient seen for 4 sessions       Visit Diagnoses:    ICD-10-CM ICD-9-CM   1. Chronic left shoulder pain  M25.512 719.41    G89.29 338.29   2. Shoulder stiffness, left  M25.612 719.51         Jhon Munoz reports: Pt reports having a good weekend and being able to mow his lawn w/out any shoulder pain. 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 Pt notes the biomechanics education w/ lifting for his son has been very helpful and he notes an improved ability to perform transfers.      Subjective     Objective   See Exercise, Manual, and Modality Logs for complete treatment.       Assessment/Plan  New interventions were added to continue w/ PT PoC for fxn strength and neuromuscular control of L UE. Pt was able to perform requested interventions today w/out s/s exasperation. Pt required min VC/TC for correct biomechanics w/ therex interventions to avoid compensatory movements. Manual therapy was utilized to improve L UE ROM and decrease pt reported pain s/s.      Plan:  Pt will continue with current plan of care to achieve outlined goals. Therapeutic interventions will be progressed where and when appropriate.        Timed:         Manual Therapy:    8     mins  66546;     Therapeutic Exercise:    12     mins  35060;     Neuromuscular Roberto:    0    mins  28957;    Therapeutic Activity:     10     mins  83272;     Gait Trainin     mins  61934;     Ultrasound:     0     mins  65886;    Ionto                               0    mins  33035  Self Care                       0     mins  68515  Traction     0     mins  09814      Un-Timed:  Canalith Repos    0     mins 89866  Electrical Stimulation:    0     mins  51022 ( );  Dry Needling     0     mins self-pay  Traction     0     mins 95518        Timed Treatment:   30   mins   Total Treatment:     30   mins    Arcenio Harp, PT  [place] License #: [id number]    Physical Therapist

## 2024-04-10 NOTE — PROGRESS NOTES
"Chief Complaint  Cerebrovascular Accident (1 month follow up)    Subjective          Jhon Munoz presents to [facility] PRIMARY [facility]  History of Present Illness  The patient is a 59-year-old male who presents at today's visit. He is following up on him having a cerebrovascular accident a little over a month ago.    The patient is recovering well following a cerebral accident, with no reported deficits. He is currently undergoing physical therapy for his shoulder, which is expected to conclude. His current medication regimen includes Eliquis 5 mg twice daily, Crestor 40 mg daily, Jardiance 25 mg daily, Ozempic 0.5 mg weekly, and Entresto. An echocardiogram is scheduled for xx / 24/2024. His speech and swallowing function remain unimpaired.    Objective   Vital Signs:   BP 98/72 (BP Location: Left arm, Patient Position: Sitting, Cuff Size: Adult)   Pulse 71   Resp 14   Ht 177.8 cm (70\")   Wt 102 kg (224 lb)   SpO2 98%   BMI 32.14 kg/m²     Physical Exam  Vitals and nursing note reviewed.   Constitutional:       Appearance: He is well-developed.   HENT:      Head: Normocephalic and atraumatic.   Musculoskeletal:      Cervical back: Normal range of motion and neck supple.   Neurological:      Mental Status: He is alert and oriented to person, place, and time.   Psychiatric:         Behavior: Behavior normal.         Physical Exam       Result Review :                 Assessment and Plan    Diagnoses and all orders for this visit:    1. Cerebrovascular accident (CVA) due to embolism of left middle cerebral artery (Primary)  -     CBC & Differential; Future  -     Comprehensive Metabolic Panel; Future    2. Hyperlipidemia, unspecified hyperlipidemia type  -     Lipid Panel With LDL / HDL Ratio; Future    3. Type 2 diabetes mellitus without complication, without long-term current use of insulin  -     Hemoglobin A1c; Future  -     Microalbumin / Creatinine Urine Ratio - Urine, Clean Catch; " Future  -     Thyroid Panel With TSH; Future      Assessment & Plan  1. Post-cerebrovascular accident follow-up.  The patient's A1c levels are within the normal range. However, his cholesterol levels have not shown any improvement, indicating a commendable reduction in his LDL level to 32. The patient will maintain his current medication regimen, which includes Eliquis, Ozempic, and Jardiance. A follow-up blood work is scheduled for 03/2024.    Follow-up  The patient is scheduled for a follow-up visit in 07/2024.    Follow Up   No follow-ups on file.  Patient was given instructions and counseling regarding his condition or for health maintenance advice. Please see specific information pulled into the AVS if appropriate.           Patient or patient representative verbalized consent for the use of Ambient Listening during the visit with  Oneil Decker MD for chart documentation. 4/10/2024  09:05 EDT

## 2024-04-16 ENCOUNTER — TREATMENT (OUTPATIENT)
Dept: PHYSICAL THERAPY | Facility: CLINIC | Age: 59
End: 2024-04-16
Payer: COMMERCIAL

## 2024-04-16 DIAGNOSIS — M25.512 CHRONIC LEFT SHOULDER PAIN: Primary | ICD-10-CM

## 2024-04-16 DIAGNOSIS — G89.29 CHRONIC LEFT SHOULDER PAIN: Primary | ICD-10-CM

## 2024-04-16 DIAGNOSIS — M25.612 SHOULDER STIFFNESS, LEFT: ICD-10-CM

## 2024-04-16 PROCEDURE — 97530 THERAPEUTIC ACTIVITIES: CPT | Performed by: PHYSICAL THERAPIST

## 2024-04-16 PROCEDURE — 97110 THERAPEUTIC EXERCISES: CPT | Performed by: PHYSICAL THERAPIST

## 2024-04-16 PROCEDURE — 97140 MANUAL THERAPY 1/> REGIONS: CPT | Performed by: PHYSICAL THERAPIST

## 2024-04-16 NOTE — PROGRESS NOTES
Physical Therapy Daily Treatment Note  [facility] [place]   [address], [address]  [place] [address]  P: [phone]  F: [phone]    Patient: Jhon Munoz   : 1965  Referring practitioner: Oneil Decker MD  Date of Initial Visit: Type: THERAPY  Noted: 3/28/2024  Today's Date: 2024  Patient seen for 5 sessions       Visit Diagnoses:    ICD-10-CM ICD-9-CM   1. Chronic left shoulder pain  M25.512 719.41    G89.29 338.29   2. Shoulder stiffness, left  M25.612 719.51         Jhon Munoz reports: Pt reports no increase in s/s since last visit but notes certain movements (reaching across his body) will aggravate his shoulder. Pt notes ordering a special pillow to help with his sleep but it has not arrived yet. Pt reports performing his HEP 70% of the time as he is busy and this can interfere w/ his schedule.     Subjective     Objective   See Exercise, Manual, and Modality Logs for complete treatment.       Assessment/Plan  Interventions were progressed today w/ increased levels of resistance indicating improved fxn strength gains. Pt was able to perform requested interventions today w/out s/s exasperation. Manual therapy was utilized to improve L UE ROM and decrease pt reported pain s/s.      Plan:  Pt will continue with current plan of care to achieve outlined goals. Therapeutic interventions will be progressed where and when appropriate.        Timed:         Manual Therapy:    8     mins  64192;     Therapeutic Exercise:    20     mins  44834;     Neuromuscular Roberto:    0    mins  44647;    Therapeutic Activity:     8     mins  69366;     Gait Trainin     mins  40528;     Ultrasound:     0     mins  97164;    Ionto                               0    mins  60288  Self Care                       0     mins  13712  Traction     0     mins 96998      Un-Timed:  Canalith Repos    0     mins 20037  Electrical Stimulation:    0     mins  01450 (  );  Dry Needling     0     mins self-pay  Traction     0     mins 55735        Timed Treatment:   36   mins   Total Treatment:     36   mins    Arcenio Harp, PT  [place] License #: [id number]    Physical Therapist

## 2024-04-23 ENCOUNTER — TREATMENT (OUTPATIENT)
Dept: PHYSICAL THERAPY | Facility: CLINIC | Age: 59
End: 2024-04-23
Payer: COMMERCIAL

## 2024-04-23 DIAGNOSIS — M25.612 SHOULDER STIFFNESS, LEFT: ICD-10-CM

## 2024-04-23 DIAGNOSIS — M25.512 CHRONIC LEFT SHOULDER PAIN: Primary | ICD-10-CM

## 2024-04-23 DIAGNOSIS — G89.29 CHRONIC LEFT SHOULDER PAIN: Primary | ICD-10-CM

## 2024-04-23 PROCEDURE — 97110 THERAPEUTIC EXERCISES: CPT | Performed by: PHYSICAL THERAPIST

## 2024-04-23 PROCEDURE — 97140 MANUAL THERAPY 1/> REGIONS: CPT | Performed by: PHYSICAL THERAPIST

## 2024-04-23 PROCEDURE — 97530 THERAPEUTIC ACTIVITIES: CPT | Performed by: PHYSICAL THERAPIST

## 2024-04-23 NOTE — PROGRESS NOTES
Physical Therapy Daily Treatment Note  [facility] [place]   [address], [address]  [place] [address]  P: [phone]  F: [phone]    Patient: Jhon Munoz   : 1965  Referring practitioner: Oneil Decker MD  Date of Initial Visit: Type: THERAPY  Noted: 3/28/2024  Today's Date: 2024  Patient seen for 6 sessions       Visit Diagnoses:    ICD-10-CM ICD-9-CM   1. Chronic left shoulder pain  M25.512 719.41    G89.29 338.29   2. Shoulder stiffness, left  M25.612 719.51         Jhon Munoz reports: Pt reports his shoulder is bothering him today. Pt had a incident w/ his son who slipped while they were performing a sliding board transfer and then the pt strained to catch his son from falling which hurt his shoulder. The pain has lessened since the event over the weekend.     Subjective     Objective   See Exercise, Manual, and Modality Logs for complete treatment.       Assessment/Plan  New interventions were added to continue w/ PT PoC for fxn strength and neuromuscular control of L UE. Pt had difficulty w/ ER, abduction movements w/ both pain and weakness. Pt attempted wall soham exercise but was unable to perform.         Plan:  ASSESS for potential labral tear/pathology NV.  Pt will continue with current plan of care to achieve outlined goals. Therapeutic interventions will be progressed where and when appropriate.          Timed:         Manual Therapy:    10     mins  48086;     Therapeutic Exercise:    12     mins  19785;     Neuromuscular Roberto:    0    mins  72691;    Therapeutic Activity:     10     mins  23413;     Gait Trainin     mins  20263;     Ultrasound:     0     mins  81330;    Ionto                               0    mins  75594  Self Care                       0     mins  39174  Traction     0     mins 07986      Un-Timed:  Canalith Repos    0     mins 83734  Electrical Stimulation:    0     mins  01543 ( );  Dry Needling     0      mins self-pay  Traction     0     mins 32624        Timed Treatment:   32   mins   Total Treatment:     32   mins    Arcenio Harp, PT  [place] License #: [id number]    Physical Therapist

## 2024-04-24 ENCOUNTER — HOSPITAL ENCOUNTER (OUTPATIENT)
Dept: CARDIOLOGY | Facility: HOSPITAL | Age: 59
Discharge: HOME | End: 2024-04-24
Admitting: INTERNAL MEDICINE
Payer: COMMERCIAL

## 2024-04-24 ENCOUNTER — OFFICE VISIT (OUTPATIENT)
Dept: CARDIOLOGY | Facility: CLINIC | Age: 59
End: 2024-04-24
Payer: COMMERCIAL

## 2024-04-24 ENCOUNTER — HOSPITAL ENCOUNTER (OUTPATIENT)
Dept: CARDIOLOGY | Facility: HOSPITAL | Age: 59
Discharge: HOME | End: 2024-04-24

## 2024-04-24 ENCOUNTER — TELEPHONE (OUTPATIENT)
Dept: CARDIOLOGY | Facility: CLINIC | Age: 59
End: 2024-04-24
Payer: COMMERCIAL

## 2024-04-24 VITALS
SYSTOLIC BLOOD PRESSURE: 110 MMHG | DIASTOLIC BLOOD PRESSURE: 70 MMHG | HEART RATE: 59 BPM | WEIGHT: 224 LBS | BODY MASS INDEX: 32.07 KG/M2 | HEIGHT: 70 IN

## 2024-04-24 VITALS
DIASTOLIC BLOOD PRESSURE: 82 MMHG | SYSTOLIC BLOOD PRESSURE: 140 MMHG | WEIGHT: 223 LBS | HEART RATE: 57 BPM | BODY MASS INDEX: 31.92 KG/M2 | HEIGHT: 70 IN

## 2024-04-24 DIAGNOSIS — I42.8 NONISCHEMIC CARDIOMYOPATHY: ICD-10-CM

## 2024-04-24 DIAGNOSIS — E11.9 TYPE 2 DIABETES MELLITUS WITHOUT COMPLICATION, WITHOUT LONG-TERM CURRENT USE OF INSULIN: ICD-10-CM

## 2024-04-24 DIAGNOSIS — I42.8 LEFT VENTRICULAR NON-COMPACTION CARDIOMYOPATHY: Primary | ICD-10-CM

## 2024-04-24 DIAGNOSIS — I10 ESSENTIAL HYPERTENSION: ICD-10-CM

## 2024-04-24 DIAGNOSIS — E78.2 MIXED HYPERLIPIDEMIA: ICD-10-CM

## 2024-04-24 LAB
AORTIC ARCH: 3 CM
AORTIC DIMENSIONLESS INDEX: 0.6 (DI)
ASCENDING AORTA: 2.7 CM
BH CV ECHO LEFT VENTRICLE GLOBAL LONGITUDINAL STRAIN: -15.6 %
BH CV ECHO MEAS - ACS: 1.92 CM
BH CV ECHO MEAS - AO MAX PG: 4.4 MMHG
BH CV ECHO MEAS - AO MEAN PG: 2 MMHG
BH CV ECHO MEAS - AO ROOT DIAM: 3.2 CM
BH CV ECHO MEAS - AO V2 MAX: 105 CM/SEC
BH CV ECHO MEAS - AO V2 VTI: 23.6 CM
BH CV ECHO MEAS - AVA(I,D): 1.88 CM2
BH CV ECHO MEAS - EDV(CUBED): 179.7 ML
BH CV ECHO MEAS - EDV(MOD-SP2): 240 ML
BH CV ECHO MEAS - EDV(MOD-SP4): 236 ML
BH CV ECHO MEAS - EF(MOD-BP): 41.4 %
BH CV ECHO MEAS - EF(MOD-SP2): 43.3 %
BH CV ECHO MEAS - EF(MOD-SP4): 44.9 %
BH CV ECHO MEAS - EF_3D-VOL: 41 %
BH CV ECHO MEAS - ESV(CUBED): 73 ML
BH CV ECHO MEAS - ESV(MOD-SP2): 136 ML
BH CV ECHO MEAS - ESV(MOD-SP4): 130 ML
BH CV ECHO MEAS - FS: 25.9 %
BH CV ECHO MEAS - IVS/LVPW: 0.97 CM
BH CV ECHO MEAS - IVSD: 1.1 CM
BH CV ECHO MEAS - LA 3D VOL INDEX: 37
BH CV ECHO MEAS - LAT PEAK E' VEL: 9 CM/SEC
BH CV ECHO MEAS - LV DIASTOLIC VOL/BSA (35-75): 107.7 CM2
BH CV ECHO MEAS - LV MASS(C)D: 256.9 GRAMS
BH CV ECHO MEAS - LV MAX PG: 1.28 MMHG
BH CV ECHO MEAS - LV MEAN PG: 1 MMHG
BH CV ECHO MEAS - LV SYSTOLIC VOL/BSA (12-30): 59.4 CM2
BH CV ECHO MEAS - LV V1 MAX: 56.6 CM/SEC
BH CV ECHO MEAS - LV V1 VTI: 13.1 CM
BH CV ECHO MEAS - LVIDD: 5.6 CM
BH CV ECHO MEAS - LVIDS: 4.2 CM
BH CV ECHO MEAS - LVOT AREA: 3.4 CM2
BH CV ECHO MEAS - LVOT DIAM: 2.08 CM
BH CV ECHO MEAS - LVPWD: 1.13 CM
BH CV ECHO MEAS - MED PEAK E' VEL: 5.3 CM/SEC
BH CV ECHO MEAS - MR MAX PG: 87.2 MMHG
BH CV ECHO MEAS - MR MAX VEL: 466.9 CM/SEC
BH CV ECHO MEAS - MV A DUR: 0.13 SEC
BH CV ECHO MEAS - MV A MAX VEL: 43.3 CM/SEC
BH CV ECHO MEAS - MV DEC SLOPE: 139.6 CM/SEC2
BH CV ECHO MEAS - MV DEC TIME: 0.25 SEC
BH CV ECHO MEAS - MV E MAX VEL: 53.1 CM/SEC
BH CV ECHO MEAS - MV E/A: 1.23
BH CV ECHO MEAS - MV MAX PG: 1.79 MMHG
BH CV ECHO MEAS - MV MEAN PG: 0.94 MMHG
BH CV ECHO MEAS - MV P1/2T: 136.3 MSEC
BH CV ECHO MEAS - MV V2 VTI: 23.8 CM
BH CV ECHO MEAS - MVA(P1/2T): 1.61 CM2
BH CV ECHO MEAS - MVA(VTI): 1.86 CM2
BH CV ECHO MEAS - PA ACC TIME: 0.12 SEC
BH CV ECHO MEAS - PA V2 MAX: 86 CM/SEC
BH CV ECHO MEAS - PI END-D VEL: 78.2 CM/SEC
BH CV ECHO MEAS - PULM A REVS DUR: 0.13 SEC
BH CV ECHO MEAS - PULM A REVS VEL: 31.2 CM/SEC
BH CV ECHO MEAS - PULM DIAS VEL: 32.2 CM/SEC
BH CV ECHO MEAS - PULM S/D: 1.05
BH CV ECHO MEAS - PULM SYS VEL: 33.9 CM/SEC
BH CV ECHO MEAS - RAP SYSTOLE: 3 MMHG
BH CV ECHO MEAS - RV MAX PG: 1.61 MMHG
BH CV ECHO MEAS - RV V1 MAX: 63.4 CM/SEC
BH CV ECHO MEAS - RV V1 VTI: 14.6 CM
BH CV ECHO MEAS - RVSP: 31 MMHG
BH CV ECHO MEAS - SUP REN AO DIAM: 2.3 CM
BH CV ECHO MEAS - SV(LVOT): 44.4 ML
BH CV ECHO MEAS - SV(MOD-SP2): 104 ML
BH CV ECHO MEAS - SV(MOD-SP4): 106 ML
BH CV ECHO MEAS - SVI(LVOT): 20.3 ML/M2
BH CV ECHO MEAS - SVI(MOD-SP2): 47.5 ML/M2
BH CV ECHO MEAS - SVI(MOD-SP4): 48.4 ML/M2
BH CV ECHO MEAS - TAPSE (>1.6): 2.5 CM
BH CV ECHO MEAS - TR MAX PG: 28.1 MMHG
BH CV ECHO MEAS - TR MAX VEL: 265 CM/SEC
BH CV ECHO MEASUREMENTS AVERAGE E/E' RATIO: 7.43
BH CV XLRA - RV BASE: 3.9 CM
BH CV XLRA - RV LENGTH: 8.1 CM
BH CV XLRA - RV MID: 3 CM
BH CV XLRA - TDI S': 10.3 CM/SEC
LEFT ATRIUM VOLUME INDEX: 34.3 ML/M2
SINUS: 3.1 CM
STJ: 2.5 CM

## 2024-04-24 PROCEDURE — 93356 MYOCRD STRAIN IMG SPCKL TRCK: CPT

## 2024-04-24 PROCEDURE — 93306 TTE W/DOPPLER COMPLETE: CPT

## 2024-04-24 PROCEDURE — 25510000001 PERFLUTREN (DEFINITY) 8.476 MG IN SODIUM CHLORIDE (PF) 0.9 % 10 ML INJECTION: Performed by: INTERNAL MEDICINE

## 2024-04-24 RX ADMIN — PERFLUTREN 2 ML: 6.52 INJECTION, SUSPENSION INTRAVENOUS at 10:26

## 2024-04-24 NOTE — TELEPHONE ENCOUNTER
----- Message from Bernadine Graff MD sent at 4/24/2024 11:44 AM EDT -----  Regarding: Noncompaction  Please call patient and let him know that the MRI images were reviewed again and do not indicate noncompaction.

## 2024-04-24 NOTE — PROGRESS NOTES
Date of Office Visit: 2023  Encounter Provider: Bernadine Graff MD  Place of Service: [facility] CARDIOLOGY  Patient Name: Jhon Munoz  :1965      Patient ID:  Jhon Munoz is a 59 y.o. male is here for  followup for nonischemic cardiomyopathy.        History of Present Illness    He is followed for cardiomyopathy.  He has a history of sleep apnea using CPAP, hyperlipidemia, hypertension, diabetes mellitus type 2 on oral hypoglycemics, erectile dysfunction, renal calculi, GERD.  He saw Dr. Zaldivar because of the left renal infarct and has been maintained on aspirin.     He was having left lower quadrant pain.  CT abdomen pelvis done 2024 left lower quadrant pain to her left renal infarct involving the lower pole and midpole of the anterior cortex and a nonobstructing left renal calculus.  He saw Dr. Carreon from urology yesterday who does not think that a further work-up really needs to be done at this time.  Due to the renal infarct, he had a monitor.  The 12-day Holter monitor that he had 2020 showed multiple episodes of nonsustained ventricular tachycardia, the longest was 10 seconds.  Short bursts of SVT were also noted but no sustained atrial fibrillation.     Echocardiogram done 2020 showed grade 1 diastolic dysfunction with moderate left ventricular dilation and ejection fraction of 27.5%.  Global longitudinal strain was -12.8%.  There was no significant valve disease.     He is , has 3 children works for [employer].  Uses no cigarettes and has alcohol, about 6-10 beers per week as well as 1 vodka.  His 1 cup of coffee per day.  He has not been regularly exercising.  His mother had hypertension  may 2020. He has 2 grown children 29 and 35.  His middle son  in  with osteogenic sarcoma.       On 2020 a cardiac catheterization was completed which showed the LAD, first diagonal, circumflex with luminal irregularities and RCA had  30% mid segment stenosis.  He was felt to have nonischemic cardiomyopathy and medical management was recommended.  Cardiac MRI in 2020 showed left ventricle dilated, global hypokinesis, mild right ventricular hypokinesis, normal perfusion of the myocardium, a focal area of mid myocardial delayed enhancement of the inferior septum, and EF calculated at 30%.     He reports right lower quadrant abdominal pain.  He saw both urology (Dr. Carreon) and nephrology (Dr. Frandy Castillo).  The patient says neither physician thought his pain was from the kidney stones.       He was in the emergency department on 11/14/2020 with an episode of syncope that occurred at 5:30 AM.  He had been asleep for 2 and half hours and got up to say goodbye to his wife when she left for work.  When he got up, he reports that he was lightheaded and became unresponsive for to 30 seconds.  EMS was called and when they arrived, his heart rate was in the 20s and they given atropine.  His heart rate then came up to the 60s and his blood pressure was 100.  .     He had normal bilateral renal artery duplex done 7/2020. Echo done 10/28/2020 showed ejection fraction 36-40% with severe eccentric left ventricular hypertrophy and mild left ventricular dilation, grade 1 diastolic dysfunction, anterior and anteroseptal hypokinesis.  Echo done 3/11/2022 showed eject fraction 44% with grade 1 diastolic dysfunction, moderate left atrial dilation, moderate concentric left ventricle hypertrophy, global longitudinal strain of -15.1%, normal valvular structure and function, global hypokinesis noted.    He was admitted 2/16/2024 with an acute stroke presenting as right hemineglect and dense hemiaplasia.  He was diagnosed with left NIRMAL and left MCA stroke with left M2 occlusion, status post mechanical thrombectomy 2/16/2024 with Dr. Dobson.  He came back to his baseline neurologic state.  He had a HEDY done 2/19/2024 showed ejection action 41-50% with prominent  trabeculations along left ventricle, lateral and apex consistent with left ventricular noncompaction, small mobile echodensity at the apex of the left atrial appendage-thrombus is could not be excluded, normal saline study, mild mitral insufficiency, normal RVSP.  MRI brain done 2/60/24 showed air and cephalomalacia in the right parietal lobe suggesting prior infarct with moderate to extensive chronic ischemic white matter changes and small to moderate-sized area of acute infarct in the left anterior cerebral artery territory.  He saw Salima 3/14/2024 and no medication changes were made.    He has no chest pain or pressure.  He has not feels heart racing or skipping.  He has no dizziness or syncope.  He has no difficulty breathing.  He has no orthopnea or PND.  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    Past Medical History:   Diagnosis Date    Cardiomyopathy     Colon polyp     Cortical senile cataract 04/01/2019    Diabetes mellitus     type 2    Disease of thyroid gland     Hyperlipidemia     Hypertension     Renal calculi     Followed by Dr. Chris Carreon urologist    Renal infarct 07/2020    Left; noted on CT scan-followed by Dr. Chris Carreon urology    Sleep apnea     CPAP nightly         Past Surgical History:   Procedure Laterality Date    CARDIAC CATHETERIZATION N/A 07/24/2020    Procedure: Coronary angiography;  Surgeon: Robert Deluna MD;  Location: [facility] CATH INVASIVE LOCATION;  Service: Cardiovascular;  Laterality: N/A;    CARDIAC CATHETERIZATION N/A 07/24/2020    Procedure: Left Heart Cath;  Surgeon: Robert Deluna MD;  Location: [facility] CATH INVASIVE LOCATION;  Service: Cardiovascular;  Laterality: N/A;    COLONOSCOPY  2020    COLONOSCOPY N/A 10/18/2022    Procedure: COLONOSCOPY TO CECUM WITH COLD BIOPSY POLYPECTOMY;  Surgeon: Robert Evans MD;  Location: [facility] ENDOSCOPY;  Service: Gastroenterology;  Laterality: N/A;  PRE: HX COLON  POLYPS  POST: POLYP, DIVERTICULOSIS, HEMORRHOIDS    KNEE SURGERY      meniscus        Current Outpatient Medications on File Prior to Visit   Medication Sig Dispense Refill    apixaban (ELIQUIS) 5 MG tablet tablet Take 1 tablet by mouth Every 12 (Twelve) Hours. Indications: Other - full anticoagulation 180 tablet 3    aspirin 81 MG chewable tablet Chew 1 tablet Daily.      carvedilol (COREG) 25 MG tablet Take 1 tablet by mouth 2 (Two) Times a Day. 180 tablet 3    empagliflozin (Jardiance) 25 MG tablet tablet Take 1 tablet by mouth Daily. 90 tablet 3    esomeprazole (nexIUM) 40 MG capsule Take 1 capsule by mouth Every Morning Before Breakfast. prn 30 capsule 12    levothyroxine (SYNTHROID, LEVOTHROID) 25 MCG tablet Take 1 tablet by mouth Daily. 90 tablet 2    rosuvastatin (CRESTOR) 40 MG tablet Take 1 tablet by mouth Every Night. 90 tablet 0    sacubitril-valsartan (Entresto) 49-51 MG tablet Take 1 tablet by mouth 2 (Two) Times a Day. 180 tablet 3    Semaglutide,0.25 or 0.5MG/DOS, (Ozempic, 0.25 or 0.5 MG/DOSE,) 2 MG/1.5ML solution pen-injector Inject 0.5 mg under the skin into the appropriate area as directed 1 (One) Time Per Week. 6 mL 1    spironolactone (ALDACTONE) 25 MG tablet Take 1 tablet by mouth Daily. 1 in the am and 1/2 in pm       No current facility-administered medications on file prior to visit.       Social History     Socioeconomic History    Marital status:      Spouse name: Jyoti   Tobacco Use    Smoking status: Never     Passive exposure: Never    Smokeless tobacco: Never   Vaping Use    Vaping status: Never Used   Substance and Sexual Activity    Alcohol use: Yes     Types: 1 Cans of beer, 1 Shots of liquor per week     Comment: Caffeine use: 1 cup daily    Drug use: Never    Sexual activity: Defer           ROS    Procedures    ECG 12 Lead    Date/Time: 4/24/2024 10:53 AM  Performed by: Bernadine Graff MD    Authorized by: Bernadine Graff MD  Comparison: compared with  "previous ECG   Similar to previous ECG  Rhythm: sinus rhythm  T inversion: V3, V4, V5, V6, II, III and aVF    Clinical impression: abnormal EKG              Objective:      Vitals:    04/24/24 1025   BP: 140/82   Pulse: 57   Weight: 101 kg (223 lb)   Height: 177.8 cm (70\")     Body mass index is 32 kg/m².    Vitals reviewed.   Constitutional:       General: Not in acute distress.     Appearance: Well-developed. Not diaphoretic.   Eyes:      General: No scleral icterus.     Conjunctiva/sclera: Conjunctivae normal.   HENT:      Head: Normocephalic and atraumatic.   Neck:      Thyroid: No thyromegaly.      Vascular: No carotid bruit or JVD.      Lymphadenopathy: No cervical adenopathy.   Pulmonary:      Effort: Pulmonary effort is normal. No respiratory distress.      Breath sounds: Normal breath sounds. No wheezing. No rhonchi. No rales.   Chest:      Chest wall: Not tender to palpatation.   Cardiovascular:      Normal rate. Regular rhythm.      Murmurs: There is no murmur.      No gallop.    Pulses:     Intact distal pulses.   Edema:     Peripheral edema absent.   Abdominal:      General: Bowel sounds are normal. There is no distension or abdominal bruit.      Palpations: Abdomen is soft. There is no abdominal mass.      Tenderness: There is no abdominal tenderness.   Musculoskeletal:         General: No deformity.      Extremities: No clubbing present.     Cervical back: Neck supple. Skin:     General: Skin is warm and dry. There is no cyanosis.      Coloration: Skin is not pale.      Findings: No rash.   Neurological:      Mental Status: Alert and oriented to person, place, and time.      Cranial Nerves: No cranial nerve deficit.   Psychiatric:         Judgment: Judgment normal.         Lab Review:       Assessment:      Diagnosis Plan   1. Left ventricular non-compaction cardiomyopathy  Ambulatory Referral to Genetic Counseling/Testing      2. Mixed hyperlipidemia        3. Essential hypertension        4. " Nonischemic cardiomyopathy  Ambulatory Referral to Genetic Counseling/Testing      5. Type 2 diabetes mellitus without complication, without long-term current use of insulin          Cardiomyopathy, nonischemic.  No CHF at this time.  Cardiac MRI done in 2020 showed no significant evidence of infiltrative cardiomyopathy or noncompaction, left ventricle was dilated.  HEDY done 2/19/2024 suggested noncompaction.  Echocardiogram done today shows cardiomyopathy-not really convincing for noncompaction.  I did have the MRI images from 2020 reviewed and these do not indicate noncompaction.  Hypertension, goal less than 110/80.   Hyperlipidemia, on atorvastatin  Diabetes mellitus type 2  Obesity  Left renal infarct, etiology unknown.    Renal insufficiency, likely due to left renal infarct.  PVCs and nonsustained VT on monitor, also nonsustained SVT.    Vasovagal syncope 11/14/2020.  Left NIRMAL and MCA embolic stroke-with thrombus retrieval 2/16/2024.  Is on Eliquis.     Plan:       See APRN in 4 months, no medication changes.  Recommended that he remain on Eliquis for life.  He had a prior hematologic evaluation for clotting disorders and does not have any.  Stop aspirin.  Refer to genetic counseling for possible noncompaction.  Will ask for review images of cardiac MRI from 2020.

## 2024-04-25 ENCOUNTER — TREATMENT (OUTPATIENT)
Dept: PHYSICAL THERAPY | Facility: CLINIC | Age: 59
End: 2024-04-25
Payer: COMMERCIAL

## 2024-04-25 DIAGNOSIS — M25.612 SHOULDER STIFFNESS, LEFT: ICD-10-CM

## 2024-04-25 DIAGNOSIS — M25.512 CHRONIC LEFT SHOULDER PAIN: Primary | ICD-10-CM

## 2024-04-25 DIAGNOSIS — G89.29 CHRONIC LEFT SHOULDER PAIN: Primary | ICD-10-CM

## 2024-04-25 PROCEDURE — 97140 MANUAL THERAPY 1/> REGIONS: CPT | Performed by: PHYSICAL THERAPIST

## 2024-04-25 PROCEDURE — 97530 THERAPEUTIC ACTIVITIES: CPT | Performed by: PHYSICAL THERAPIST

## 2024-04-25 PROCEDURE — 97110 THERAPEUTIC EXERCISES: CPT | Performed by: PHYSICAL THERAPIST

## 2024-04-25 NOTE — PROGRESS NOTES
"Physical Therapy Daily Treatment Note  [facility] [place]   [address], [address]  [place] [address]  P: [phone]  F: [phone]    Patient: Jhon Munoz   : 1965  Referring practitioner: Oneil Decker MD  Date of Initial Visit: Type: THERAPY  Noted: 3/28/2024  Today's Date: 2024  Patient seen for 7 sessions       Visit Diagnoses:    ICD-10-CM ICD-9-CM   1. Chronic left shoulder pain  M25.512 719.41    G89.29 338.29   2. Shoulder stiffness, left  M25.612 719.51         Jhon Munoz reports: Pt reports his shoulder is feeling about the same as last visit. Pt notes having trouble w/ OHA activity and feels his shoulder is \"loose\" and wants to \"pop back in\".     Subjective     Objective   See Exercise, Manual, and Modality Logs for complete treatment.   Pos L Labral testing:  - Crank test  - [place]  - Anterior slide     Assessment/Plan  Pt was assessed for potential labral pathology. Pt was positive for several special test and demo clinical presentation of potential L labral tear. Referring provider will be contacted for potential referral to orthopedic physician for possible imaging if needed per PCP discernment. Pt questions/concerns were answered and addressed.     Due to pt L shoulder s/s exasperation, interventions were regressed to pt tolerance today in order to avoid worsening s/s. Pt was able to perform requested interventions today w/out s/s exasperation. Manual therapy was utilized to improve L UE ROM and decrease pt reported pain s/s.      Plan:  Contact referring provider w/ clinical findings.   Pt will continue with current plan of care to achieve outlined goals. Therapeutic interventions will be progressed where and when appropriate.        Timed:         Manual Therapy:    8     mins  57848;     Therapeutic Exercise:    10     mins  03212;     Neuromuscular Roberto:    0    mins  11424;    Therapeutic Activity:     15     mins  67081;     Gait " Trainin     mins  97937;     Ultrasound:     0     mins  32336;    Ionto                               0    mins  95223  Self Care                       0     mins  31360  Traction     0     mins 74418      Un-Timed:  Canalith Repos    0     mins 52488  Electrical Stimulation:    0     mins  26550 ( );  Dry Needling     0     mins self-pay  Traction     0     mins 18732        Timed Treatment:   33   mins   Total Treatment:     33   mins    Arcenio Harp, PT  [place] License #: [id number]    Physical Therapist

## 2024-04-26 ENCOUNTER — TELEPHONE (OUTPATIENT)
Dept: CARDIOLOGY | Facility: CLINIC | Age: 59
End: 2024-04-26
Payer: COMMERCIAL

## 2024-04-26 NOTE — TELEPHONE ENCOUNTER
I spoke with Jyoti Munoz and gave them message from the provider.  They verbalized understanding & have no further questions at this time.    Thank you,    Indy CERVANTES , RN  [place] [facility] [place]  04/26/24 10:13 EDT

## 2024-04-26 NOTE — TELEPHONE ENCOUNTER
Pt's wife called the office because she's confused about prescription instructions on his spironolactone:        Do you want pt to take 25 mg in the AM and 12.5 mg in the PM?  Or just 25 mg QD?    Thank you,    Indy CERVANTES RN  [place] [facility] [place]  04/26/24 09:39 EDT

## 2024-04-28 DIAGNOSIS — S43.439A GLENOID LABRUM TEAR, UNSPECIFIED LATERALITY, INITIAL ENCOUNTER: Primary | ICD-10-CM

## 2024-04-29 ENCOUNTER — TELEPHONE (OUTPATIENT)
Dept: ORTHOPEDIC SURGERY | Facility: CLINIC | Age: 59
End: 2024-04-29
Payer: COMMERCIAL

## 2024-04-29 NOTE — TELEPHONE ENCOUNTER
SENDING FOR SCHEDULE REVIEW DUE TO DX - NEW PT - Glenoid labrum tear - NO PROG NOTE - XR 2.20.24 - NO KNOWN SX - PT SEEN [facility] FOR R SHOULDER 7.21.21       PLEASE ADVISE DOES THIS NEED WORKED IN OR IS NEXT AVAILABLE OK?

## 2024-04-30 ENCOUNTER — OFFICE VISIT (OUTPATIENT)
Dept: NEUROLOGY | Facility: CLINIC | Age: 59
End: 2024-04-30
Payer: COMMERCIAL

## 2024-04-30 ENCOUNTER — TREATMENT (OUTPATIENT)
Dept: PHYSICAL THERAPY | Facility: CLINIC | Age: 59
End: 2024-04-30
Payer: COMMERCIAL

## 2024-04-30 VITALS
SYSTOLIC BLOOD PRESSURE: 106 MMHG | BODY MASS INDEX: 31.07 KG/M2 | WEIGHT: 217 LBS | RESPIRATION RATE: 20 BRPM | OXYGEN SATURATION: 98 % | DIASTOLIC BLOOD PRESSURE: 64 MMHG | HEIGHT: 70 IN | HEART RATE: 66 BPM

## 2024-04-30 DIAGNOSIS — G47.33 OSA ON CPAP: ICD-10-CM

## 2024-04-30 DIAGNOSIS — I42.8 LEFT VENTRICULAR NON-COMPACTION CARDIOMYOPATHY: ICD-10-CM

## 2024-04-30 DIAGNOSIS — G89.29 CHRONIC LEFT SHOULDER PAIN: Primary | ICD-10-CM

## 2024-04-30 DIAGNOSIS — N28.0 RENAL INFARCT: Primary | ICD-10-CM

## 2024-04-30 DIAGNOSIS — M25.612 SHOULDER STIFFNESS, LEFT: ICD-10-CM

## 2024-04-30 DIAGNOSIS — N18.31 STAGE 3A CHRONIC KIDNEY DISEASE: ICD-10-CM

## 2024-04-30 DIAGNOSIS — I10 ESSENTIAL HYPERTENSION: ICD-10-CM

## 2024-04-30 DIAGNOSIS — M25.512 CHRONIC LEFT SHOULDER PAIN: Primary | ICD-10-CM

## 2024-04-30 DIAGNOSIS — Z86.73 HISTORY OF STROKE: ICD-10-CM

## 2024-04-30 PROCEDURE — 97110 THERAPEUTIC EXERCISES: CPT | Performed by: PHYSICAL THERAPIST

## 2024-04-30 PROCEDURE — 97140 MANUAL THERAPY 1/> REGIONS: CPT | Performed by: PHYSICAL THERAPIST

## 2024-04-30 PROCEDURE — 99215 OFFICE O/P EST HI 40 MIN: CPT | Performed by: PHYSICIAN ASSISTANT

## 2024-04-30 PROCEDURE — 97530 THERAPEUTIC ACTIVITIES: CPT | Performed by: PHYSICAL THERAPIST

## 2024-04-30 NOTE — PROGRESS NOTES
Physical Therapy Daily Treatment Note  [facility] [place]   [address], [address]  [place] [address]  P: [phone]  F: [phone]    Patient: Jhon Munoz   : 1965  Referring practitioner: Oneil Decker MD  Date of Initial Visit: Type: THERAPY  Noted: 3/28/2024  Today's Date: 2024  Patient seen for 8 sessions       Visit Diagnoses:    ICD-10-CM ICD-9-CM   1. Chronic left shoulder pain  M25.512 719.41    G89.29 338.29   2. Shoulder stiffness, left  M25.612 719.51         Jhon Munoz reports: Pt reports feeling a little stiff today but overall he is good. Pt will be seeing Dr. Alejandra  for an IE for his shoulder regarding a potential labral tear. Pt has been performing his HEP as instructed.     Subjective     Objective   See Exercise, Manual, and Modality Logs for complete treatment.       Assessment/Plan  New interventions were added to continue w/ PT PoC for fxn strength and neuromuscular control of UE musculature. Interventions were progressed today w/ increased levels of resistance indicating improved fxn strength gains. Pt required min VC/TC for correct biomechanics w/ therex interventions to avoid compensatory movements. Pt was able to perform requested interventions today w/out s/s exasperation. Manual therapy was utilized to improve L UE ROM and decrease pt reported pain s/s.      Plan:  PROGRESS NOTE NV  Pt will continue with current plan of care to achieve outlined goals. Therapeutic interventions will be progressed where and when appropriate.        Timed:         Manual Therapy:    8     mins  04805;     Therapeutic Exercise:    15     mins  11126;     Neuromuscular Roberto:    0    mins  55830;    Therapeutic Activity:     10     mins  68657;     Gait Trainin     mins  53686;     Ultrasound:     0     mins  58708;    Ionto                               0    mins  45475  Self Care                       0     mins  52097  Traction     0      mins 76192      Un-Timed:  Canalith Repos    0     mins 18354  Electrical Stimulation:    0     mins  09112 ( );  Dry Needling     0     mins self-pay  Traction     0     mins 89775        Timed Treatment:   33   mins   Total Treatment:     33   mins    Arcenio Harp, PT  [place] License #: [id number]    Physical Therapist

## 2024-04-30 NOTE — PROGRESS NOTES
"CC: Hospital follow-up    HPI:  Jhon Munoz is a  59 y.o.  right-handed male with past medical history of diabetes, systolic heart failure with reduced EF, hypertension, hyperlipidemia, hypothyroidism, sleep apnea compliant with CPAP who presented to ER in 2024 for stroke like symptoms.  EMS had reported right-sided neglect and severe right arm and leg weakness.  Patient does not really recall events.  Was found down in the bathroom by wife.  He was out of the window for thrombolytics but CTA head and neck did show a left A2 occlusion, CTP showed core of 17ml and penumbra of 57 mL and he was taken for thrombectomy with a TICI score of 3.  Bedtime of neuroassessment NIH was 0.  TTE showed EF of 39% LVH grade 1 diastolic dysfunction.  MRI brain showed tiny acute infarct of the left frontal lobe he also had HEDY with prominent and heavy trabeculations of the left ventricle consistent with LV noncompaction.  He was started on Eliquis    Since his stroke he has had no further strokelike events.  With consideration there is trouble recalling names but he does not think this is necessarily related to his stroke.  Perhaps his wife thinks he is shorter at times.  No issues with speech or weakness.  BP looks good was a little elevated at cardiology office.  He is not in the habit of checking at home.  In hospital he was placed on Eliquis and was to continue baby aspirin.  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        General: pt well appearing, no distress  HEENT: Normocephalic, " "conjunctiva normal, external canals normal, no nasal discharge, moist mucous membranes  Neck: No lymphadenopathy, thyroid not enlarged, no JVD  CV: Regular rate and rhythm, no murmurs negative no bruits auscultated at neck, equal pulses  Pulmonary: Normal respiratory effort, clear to auscultation bilaterally  Extremities: no edema, bruising, or skin lesions  Pysch: good eye contact, cooperative, full affect, euthymic mood, good attention, good insight  Mental: alert, conversant, AOx3, provides history, 3/3 recall.  Language fluent, names, repeats.  CN: CN II-XII intact, PERRL, EOMi, no gaze palsy or nystagmus, intact facial sensation, no facial assymetry, intact hearing, symmetric palate elevation, tongue midline, good SCM strength  Motor: no abnormal movements, no pronator drift, normal  bulk and tone, 5/5 strength b/l UE & LE  Sensory: normal sensation to crude touch, temperature, and vibration throughout  Reflexes: 2+ throughout, neg babinski  Coordination: no ataxia on finger-nose, heel-shin  Gait: normal gait, no ataxia, intact heel and toe walking        Results:      Lab Results   Component Value Date    GLUCOSE 86 03/06/2024    BUN 18 03/06/2024    CREATININE 1.37 (H) 03/06/2024    EGFRIFAFRI 65 01/04/2022    BCR 13.1 03/06/2024    CO2 25.2 03/06/2024    CALCIUM 9.4 03/06/2024    PROTENTOTREF 6.6 03/06/2024    ALBUMIN 4.4 03/06/2024    LABIL2 2.0 03/06/2024    AST 27 03/06/2024    ALT 43 (H) 03/06/2024       Lab Results   Component Value Date    WBC 4.35 03/06/2024    HGB 13.7 03/06/2024    HCT 42.3 03/06/2024    MCV 92.4 03/06/2024     03/06/2024         .No results found for: \"RPR\"      Lab Results   Component Value Date    TSH 1.750 03/06/2024    P6YHLXW 5.5 03/06/2024         Lab Results   Component Value Date    AWZYTCHN11 372 10/30/2020         Lab Results   Component Value Date    FOLATE 6.78 10/30/2020         Lab Results   Component Value Date    HGBA1C 5.80 (H) 03/06/2024         Lab Results "   Component Value Date    GLUCOSE 86 03/06/2024    BUN 18 03/06/2024    CREATININE 1.37 (H) 03/06/2024    EGFRIFAFRI 65 01/04/2022    BCR 13.1 03/06/2024    K 4.9 03/06/2024    CO2 25.2 03/06/2024    CALCIUM 9.4 03/06/2024    PROTENTOTREF 6.6 03/06/2024    ALBUMIN 4.4 03/06/2024    LABIL2 2.0 03/06/2024    AST 27 03/06/2024    ALT 43 (H) 03/06/2024         Diagnosis:  History of left frontal stroke  History of mechanical thrombectomy  LV noncompaction  Renal infarct  Hypertension  OCHOA    Impression: 59-year-old right-handed male with past medical history as stated above who presented in February with acute stroke symptoms found down by wife.  EMS reported left neglect right hemiparesis.  TMD imaging showed  L A2 LVO and CTP with 17 mL of core infarct and 57 mL of penumbra.  He underwent thrombectomy with tacky score of 3.  There is no significant vasculopathy.  Cardioembolic etiology supected and cardiac workup concerning for noncompaction -genetic testing is being pursued.  He remains on Eliquis.  Baby aspirin has been stopped per cards      Plan:  1 Cont anticoagulation and high intensity statin.  No preference for aspirin from neurologic perspective    Control risk factors to prevent stroke which means keep BP at or below 130/80, take your statin if able and try to have LDL measurements < 70, stop smoking if you smoke, control your blood sugar, and get regular moderate exercise four times weekly, and avoid prolonged sitting.  Adopt a healthy diet such as the Mediterranean diet which includes vegetables, fruits, whole grains, low-fat dairy, poultry, fish, legumes, nontropical fish oils, and nuts.  Limit sweets, sugary drinks, and red meats.  Reduce or eliminate alcohol intake.  BE FAST    F/u in 1 yr    I spent at least 60 minutes interviewing, examining, and counseling patient.  I independently reviewed documentation, laboratory and diagnostic findings, external documentation where applicable, and formulated  treatment plan which was discussed with the patient.

## 2024-05-02 ENCOUNTER — TREATMENT (OUTPATIENT)
Dept: PHYSICAL THERAPY | Facility: CLINIC | Age: 59
End: 2024-05-02
Payer: COMMERCIAL

## 2024-05-02 DIAGNOSIS — M25.612 SHOULDER STIFFNESS, LEFT: ICD-10-CM

## 2024-05-02 DIAGNOSIS — G89.29 CHRONIC LEFT SHOULDER PAIN: Primary | ICD-10-CM

## 2024-05-02 DIAGNOSIS — M25.512 CHRONIC LEFT SHOULDER PAIN: Primary | ICD-10-CM

## 2024-05-02 NOTE — PROGRESS NOTES
Physical Therapy Daily Treatment Note  (Progress Note)  [facility] Physical Therapy [place]   [address], [address]  [place] [address]  P: [phone]  F: [phone]    Patient: Jhon Munoz   : 1965  Referring practitioner: Oneil Decker MD  Date of Initial Visit: Type: THERAPY  Noted: 3/28/2024  Today's Date: 2024  Patient seen for 9 sessions       Visit Diagnoses:    ICD-10-CM ICD-9-CM   1. Chronic left shoulder pain  M25.512 719.41    G89.29 338.29   2. Shoulder stiffness, left  M25.612 719.51         Jhon Munoz reports: Pt reports his shoulder is feeling a bit sore today. Pt notes he is unsure of what may have caused his shoulder soreness. Pt has been performing his HEP as instructed.     Subjective     Objective   See Exercise, Manual, and Modality Logs for complete treatment.     Left Shoulder   Tenderness in the biceps tendon (proximal) and supraspinatus tendon.      Active Range of Motion   Left Shoulder   External rotation BTH: C5   Internal rotation BTB: L4/5       Right Shoulder   External rotation BTH: T5   Internal rotation BTB: T9      Passive Range of Motion   Left Shoulder   Flexion: 140 degrees   Extension: 50 degrees   Abduction: 85 degrees   External rotation 45°: 20 degrees   Internal rotation 45°: 45 degrees      Right Shoulder   Flexion: 165 degrees   Extension: 75 degrees   Abduction: 140 degrees   External rotation 45°: 65 degrees   Internal rotation 45°: 85 degrees      Additional Passive Range of Motion Details  340 total ROM L vs. 530 R = 64%     Joint Play   Left Shoulder  Hypomobile in the inferior capsule.     Strength/Myotome Testing      Left Shoulder      Planes of Motion   Flexion: 4+ (pain 3/10)  Abduction: 4 (pain 3/10)       Assessment/Plan  Pt was re-assessed for progress w/ PT PoC today. Pt has made little progress during PT PoC. Pt has been highly pain sensitive which has impacted treatments. PT discovered a potential labral tear during  course of treatment which referral to orthopedic MD was made. This change in potential pt status likely impacted pt's poor response to txt.     Plan:  PT PoC will be adjusted based upon findings of orthopedic MD visit.   Pt will continue with current plan of care to achieve outlined goals. Therapeutic interventions will be progressed where and when appropriate.        Goals  Plan Goals: STGs 4 weeks:  1. Improve total ROM L shoulder from 64% to > 75% = ONGOING (unchanged, pain 6-7/10)  2. Decrease max pain from 7 to < 3/10 = ONGOING (6-7/10)  3. QDASH score to improve from 57 to < 35% = ONGOING (52.27%)  4. Pt indep with variety of HEP = MET    LTGs 12 weeks: ONGOING   1. Total PROM L shoulder to > 90% of R sh  2. AROM L shoulder painfree to > 85% and negative empty full can testing  3. QDASH score < 15%  4. Pt to be able to swing golf club and play 9 holes pain free.     Timed:         Manual Therapy:    8     mins  08615;     Therapeutic Exercise:    10     mins  93775;     Neuromuscular Roberto:    0    mins  21485;    Therapeutic Activity:     15     mins  74160;     Gait Trainin     mins  76339;     Ultrasound:     0     mins  98928;    Ionto                               0    mins  62866  Self Care                       0     mins  85559  Traction     0     mins 24260      Un-Timed:  Canalith Repos    0     mins 08409  Electrical Stimulation:    0     mins  52020 ( );  Dry Needling     0     mins self-pay  Traction     0     mins 10194        Timed Treatment:   33   mins   Total Treatment:     33   mins    Arcenio Harp PT  [place] License #: [id number]    Physical Therapist

## 2024-05-06 ENCOUNTER — OFFICE VISIT (OUTPATIENT)
Dept: ORTHOPEDIC SURGERY | Facility: CLINIC | Age: 59
End: 2024-05-06
Payer: COMMERCIAL

## 2024-05-06 VITALS — TEMPERATURE: 98.6 F | WEIGHT: 224.8 LBS | BODY MASS INDEX: 32.18 KG/M2 | HEIGHT: 70 IN

## 2024-05-06 DIAGNOSIS — M25.511 RIGHT SHOULDER PAIN, UNSPECIFIED CHRONICITY: Primary | ICD-10-CM

## 2024-05-06 DIAGNOSIS — M75.01 ADHESIVE CAPSULITIS OF RIGHT SHOULDER: ICD-10-CM

## 2024-05-06 PROCEDURE — 99203 OFFICE O/P NEW LOW 30 MIN: CPT | Performed by: ORTHOPAEDIC SURGERY

## 2024-05-08 ENCOUNTER — CALL CENTER PROGRAMS (OUTPATIENT)
Dept: CALL CENTER | Facility: HOSPITAL | Age: 59
End: 2024-05-08
Payer: COMMERCIAL

## 2024-05-10 ENCOUNTER — PATIENT ROUNDING (BHMG ONLY) (OUTPATIENT)
Dept: ORTHOPEDIC SURGERY | Facility: CLINIC | Age: 59
End: 2024-05-10
Payer: COMMERCIAL

## 2024-05-14 ENCOUNTER — CLINICAL SUPPORT (OUTPATIENT)
Dept: GENETICS | Facility: HOSPITAL | Age: 59
End: 2024-05-14
Payer: COMMERCIAL

## 2024-05-14 DIAGNOSIS — Z82.3 FAMILY HISTORY OF STROKE: ICD-10-CM

## 2024-05-14 DIAGNOSIS — I42.8 NONISCHEMIC CARDIOMYOPATHY: ICD-10-CM

## 2024-05-14 DIAGNOSIS — I63.512 ACUTE ISCHEMIC LEFT MIDDLE CEREBRAL ARTERY (MCA) STROKE: ICD-10-CM

## 2024-05-14 DIAGNOSIS — Z82.0 FAMILY HISTORY OF EPILEPSY: ICD-10-CM

## 2024-05-14 DIAGNOSIS — Z13.79 GENETIC TESTING: Primary | ICD-10-CM

## 2024-05-14 PROCEDURE — 96040: CPT

## 2024-05-14 RX ORDER — ROSUVASTATIN CALCIUM 40 MG/1
40 TABLET, COATED ORAL NIGHTLY
Qty: 90 TABLET | Refills: 0 | Status: SHIPPED | OUTPATIENT
Start: 2024-05-14

## 2024-05-14 NOTE — TELEPHONE ENCOUNTER
Caller: Jyoti Munoz    Relationship: Emergency Contact    Best call back number: [phone]     Requested Prescriptions:   Requested Prescriptions     Pending Prescriptions Disp Refills    rosuvastatin (CRESTOR) 40 MG tablet 90 tablet 0     Sig: Take 1 tablet by mouth Every Night.        Pharmacy where request should be sent: [facility] [id number] [place] [facility] [address] [facility] [address] - [phone] PH - [phone] FX     Last office visit with prescribing clinician: 4/9/2024   Last telemedicine visit with prescribing clinician: Visit date not found   Next office visit with prescribing clinician: 7/9/2024     Additional details provided by patient:     Does the patient have less than a 3 day supply:  [x] Yes  [] No    Would you like a call back once the refill request has been completed: [] Yes [x] No    If the office needs to give you a call back, can they leave a voicemail: [] Yes [x] No    Carole Weller Rep   05/14/24 10:25 EDT

## 2024-05-14 NOTE — PROGRESS NOTES
Jhon Munoz, a 59 y.o.-year old male, was referred for genetic counseling due to a personal history of nonischemic cardiomyopathy. He was initially had a cardiac MRI in 2020 which showed no significant evidence of infiltrative cardiomyopathy or noncompaction, but it was noted that the left ventricle was dilated. 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 He more recently had an echocardiogram on April 24th which was not indicative of non-compaction cardiomyopathy. He is taking Eliquis for treatment.. Mr. Munoz is interested in pursuing comprehensive genetic testing through the Arrhythmia and Cardiomyopathy Comprehensive Panel with [facility] which would analyze 157 genes associated with inherited arrhythmia and cardiomyopathy conditions. he has requested we submit a benefits investigation to [employer] prior to ordering his testing. We will contact him in 1-2 days with the estimated out of pocket price.    FAMILY HISTORY: (See attached pedigree)     Son 1:    Hemorraghic stroke, 39  Son 2:   Osteogenic sarcoma  Sister:    Epilepsy  Mat. Half-Sister:  Endometriosis     Fibromyalgia  Mother:   High BP    There is no known family history of heart disease or heart attack. Medical records were not available regarding the family history    GENETIC COUNSELING: Hereditary cardiomyopathies are most frequently inherited in an autosomal dominant manner, meaning a single non-working copy of the gene is enough to cause the condition.  The gene may be passed from either the mother or father to either sons or daughters. Children of an individual who carries a mutation have a 50% chance of inheriting the disease causing gene. Due to reduced penetrance and variable expressivity of the condition, having an identifiable mutation in a gene known to cause cardiomyopathy does not guarantee that symptoms will be apparent at any time during  their life. Testing allows for asymptomatic family members to be tested prior to showing symptoms.    Management strategies may include lifestyle restriction, medications to control symptoms and prevent progression, ICD implantation, or heart transplant. Persons with an identifiable mutation that causes cardiomyopathy or those at risk for having a mutation are recommended to follow high risk screening protocol including yearly physical exam, echocardiogram and ECG.     GENETIC TESTING: The risks, benefits and limitations of genetic testing and implications for clinical management following testing were reviewed.  DNA test results can influence decisions regarding screening management for family members.  Also discussed was the possibility of employment and insurance discrimination based on genetic test results and the laws in place to prevent this ([place]), as well as limitations of these laws.    The implications of a positive or negative test result were discussed. We discussed the possibility that, in some cases, genetic test results may be ambiguous due to the identification of a genetic variant of uncertain significance (VUS). VUSs may or may not impact the function of a gene. VUSs are frequently reported through multigene panel testing, given the number of genes analyzed and the presence of genetic variation in the population.  Given his personal history, a negative test result would not eliminate all risk to family members due to the possibility of causative genes that have yet to be identified, or the possibility of a combination of genetic and environmental factors influencing disease.      PLAN:  We discussed genetic testing with the [facility] Arrhythmia and Cardiomyopathy Comprehensive Panel. We will submit a benefits investigation with [employer] to get his estimated out of pocket cost. We will call him in 1-2 days when this estimate is available. Sample collection will be coordinated at that time. If he has  any questions in the meantime, he is welcome to call me at [phone].     Cely Torres MS, [facility] [place], [facility]  Licensed Certified Genetic Counselor

## 2024-05-15 ENCOUNTER — TELEPHONE (OUTPATIENT)
Dept: GENETICS | Facility: HOSPITAL | Age: 59
End: 2024-05-15
Payer: COMMERCIAL

## 2024-05-16 ENCOUNTER — TELEPHONE (OUTPATIENT)
Dept: GENETICS | Facility: HOSPITAL | Age: 59
End: 2024-05-16
Payer: COMMERCIAL

## 2024-05-16 ENCOUNTER — HOSPITAL ENCOUNTER (OUTPATIENT)
Dept: MRI IMAGING | Facility: HOSPITAL | Age: 59
Discharge: HOME | End: 2024-05-16
Admitting: ORTHOPAEDIC SURGERY
Payer: COMMERCIAL

## 2024-05-16 DIAGNOSIS — M75.01 ADHESIVE CAPSULITIS OF RIGHT SHOULDER: ICD-10-CM

## 2024-05-16 PROCEDURE — 73221 MRI JOINT UPR EXTREM W/O DYE: CPT

## 2024-05-16 NOTE — TELEPHONE ENCOUNTER
Called pt to schedule genetics blood draw. Pt would like to schedule, but is unsure of his availability at this time. Pt has Poornima Rehman's number to CB.

## 2024-05-17 RX ORDER — ROSUVASTATIN CALCIUM 40 MG/1
40 TABLET, COATED ORAL
Qty: 90 TABLET | Refills: 0 | OUTPATIENT
Start: 2024-05-17

## 2024-05-20 ENCOUNTER — PREP FOR SURGERY (OUTPATIENT)
Dept: OTHER | Facility: HOSPITAL | Age: 59
End: 2024-05-20
Payer: COMMERCIAL

## 2024-05-20 ENCOUNTER — TELEPHONE (OUTPATIENT)
Dept: ORTHOPEDIC SURGERY | Facility: CLINIC | Age: 59
End: 2024-05-20
Payer: COMMERCIAL

## 2024-05-20 DIAGNOSIS — M75.02 ADHESIVE CAPSULITIS OF LEFT SHOULDER: Primary | ICD-10-CM

## 2024-05-20 NOTE — TELEPHONE ENCOUNTER
I called and spoke with him and his wife.  We had a long conversation regarding the MRI results and his options.  I told him I do see some wear and tear on his MRI and some partial tears as well as arthritis.  I do not see anything that looks like it would be an absolute indication for surgery at this point.  He does have thickening of the rotator interval and capsule and this is consistent with his clinical diagnosis of adhesive capsulitis.  We discussed the natural history of adhesive capsulitis and his options.  At this point I am willing to offer him a manipulation.  We talked about the risk including fracture, persistent or recurrent symptoms and need for further surgery as well as anesthesia related complications.  He acknowledged understanding.  He wants to move forward.  I will have my  contact him about setting this up.

## 2024-05-22 ENCOUNTER — LAB (OUTPATIENT)
Dept: LAB | Facility: HOSPITAL | Age: 59
End: 2024-05-22
Payer: COMMERCIAL

## 2024-05-22 ENCOUNTER — TELEPHONE (OUTPATIENT)
Dept: GENETICS | Facility: HOSPITAL | Age: 59
End: 2024-05-22
Payer: COMMERCIAL

## 2024-05-22 NOTE — TELEPHONE ENCOUNTER
Called pt to schedule his genetics blood draw. Pt has agreed to have his blood drawn today, 3/22 @ 3:10 at [facility], [address].

## 2024-05-28 ENCOUNTER — TELEPHONE (OUTPATIENT)
Dept: CARDIOLOGY | Facility: CLINIC | Age: 59
End: 2024-05-28
Payer: COMMERCIAL

## 2024-05-28 ENCOUNTER — TELEPHONE (OUTPATIENT)
Dept: ORTHOPEDIC SURGERY | Facility: CLINIC | Age: 59
End: 2024-05-28
Payer: COMMERCIAL

## 2024-05-28 ENCOUNTER — DOCUMENTATION (OUTPATIENT)
Dept: CARDIOLOGY | Facility: CLINIC | Age: 59
End: 2024-05-28
Payer: COMMERCIAL

## 2024-05-28 DIAGNOSIS — M75.02 ADHESIVE CAPSULITIS OF LEFT SHOULDER: Primary | ICD-10-CM

## 2024-05-28 RX ORDER — ENOXAPARIN SODIUM 100 MG/ML
80 INJECTION SUBCUTANEOUS EVERY 12 HOURS SCHEDULED
Qty: 3 ML | Refills: 1 | Status: SHIPPED | OUTPATIENT
Start: 2024-05-28

## 2024-05-28 NOTE — TELEPHONE ENCOUNTER
Dr. Graff, this patient is not on Xarelto he is on eliquis. Just wanted to make you aware in case it changes anything in regards to his treatment.     Mar Khanna RN  Triage [facility] [place]

## 2024-05-28 NOTE — TELEPHONE ENCOUNTER
Notify the patient that Dr. Graff is okay with the patient stopping the Eliquis however because of his recent history of CVA in February of this year she is recommending that he be bridged with Lovenox.  Her office will take care of ordering the Lovenox and contact the patient with instructions.

## 2024-05-28 NOTE — TELEPHONE ENCOUNTER
Stop Xarelto 48 hours prior to surgery, start Lovenox the day prior to surgery -receiving 1 dose in the am on the day prior to surgery and 1 dose on the day of surgery, after surgery.  Restart Xarelto 1 day after surgery.    Lovenox sent in - pls call and let pt know.

## 2024-05-29 NOTE — TELEPHONE ENCOUNTER
So just to clarify, do you want the patient to stop eliquis 48 hours prior to surgery, start Lovenox the day prior to surgery -receiving 1 dose in the am on the day prior to surgery and 1 dose on the day of surgery, after surgery.  Restart eliquis 1 day after surgery?    Mar Khanna RN  Triage [facility]

## 2024-05-29 NOTE — TELEPHONE ENCOUNTER
Notified patient of recommendations. Patient verbalized understanding.    Mar Khanna RN  [place] [facility] [place]

## 2024-06-03 ENCOUNTER — TELEPHONE (OUTPATIENT)
Dept: ORTHOPEDIC SURGERY | Facility: CLINIC | Age: 59
End: 2024-06-03
Payer: COMMERCIAL

## 2024-06-03 ENCOUNTER — DOCUMENTATION (OUTPATIENT)
Dept: GENETICS | Facility: HOSPITAL | Age: 59
End: 2024-06-03
Payer: COMMERCIAL

## 2024-06-03 NOTE — TELEPHONE ENCOUNTER
----- Message from Oneil Decker sent at 6/3/2024 10:37 AM EDT -----  Yes he can dc ozempic till after surgery. Thanks.  ----- Message -----  From: Patty Montero RN  Sent: 5/28/2024   9:08 AM EDT  To: Oneil Decker MD    Patient is scheduled for his shoulder manipulation with Dr. Alejandra on June 18.  Anesthesia requires that he hold his Ozempic 1 week prior to surgery.  Patient is currently taking the Ozempic for his diabetes along with oral medications.  Is he okay to be off the Ozempic or does he need modification in his diabetic medications while off the Ozempic

## 2024-06-03 NOTE — TELEPHONE ENCOUNTER
Notify the patient that his primary care is okay with him being off the Ozempic until after surgery.  Patient did let me know that the cardiologist has called in the Lovenox.  Last dose of Eliquis is scheduled for June 16.  He is to take 1 dose of Lovenox 80 mg at 7 AM the on June 17 and understands that he will need to be off the anticoagulants for at least 24 hours in preparation for surgery the following day.  Cardiology is recommending that he take a dose of Lovenox the night after his surgery and then on postop day 1 the patient may resume his Eliquis.

## 2024-06-03 NOTE — PROGRESS NOTES
Jhon Munoz, a 59 y.o.-year old male, was referred for genetic counseling due to a personal history of nonischemic cardiomyopathy. He was initially had a cardiac MRI in 2020 which showed no significant evidence of infiltrative cardiomyopathy or noncompaction, but it was noted that the left ventricle was dilated. 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 He more recently had an echocardiogram on April 24th which was not indicative of non-compaction cardiomyopathy. He is taking Eliquis for treatment.. Mr. Munoz is interested in pursuing comprehensive genetic testing through the Arrhythmia and Cardiomyopathy Comprehensive Panel with [facility] which would analyze 157 genes associated with inherited arrhythmia and cardiomyopathy conditions. Genetic testing was negative for known deleterious/pathogenic mutations in the 157 genes on the [email or web address] Arrhythmia and Cardiomyopathy Comprehensive panel. While no known pathogenic (harmful) mutations were identified, a benign, reportable variant was identified in the KCNE1 gene. These results were discussed with Mr. Munoz by telephone on 6/3/24.     FAMILY HISTORY: (See attached pedigree)     Son 1:                          Hemorraghic stroke, 39  Son 2:                          Osteogenic sarcoma  Sister:                          Epilepsy  Mat. Half-Sister:          Endometriosis                                      Fibromyalgia  Mother:                        High BP     There is no known family history of heart disease or heart attack. Medical records were not available regarding the family history     GENETIC COUNSELING: Hereditary cardiomyopathies are most frequently inherited in an autosomal dominant manner, meaning a single non-working copy of the gene is enough to cause the condition.  The gene may be passed from either the mother or father to either sons or daughters.  Children of an individual who carries a mutation have a 50% chance of inheriting the disease causing gene. Due to reduced penetrance and variable expressivity of the condition, having an identifiable mutation in a gene known to cause cardiomyopathy does not guarantee that symptoms will be apparent at any time during their life. Testing allows for asymptomatic family members to be tested prior to showing symptoms.     Management strategies may include lifestyle restriction, medications to control symptoms and prevent progression, ICD implantation, or heart transplant. Persons with an identifiable mutation that causes cardiomyopathy or those at risk for having a mutation are recommended to follow high risk screening protocol including yearly physical exam, echocardiogram and ECG.      GENETIC TESTING: The risks, benefits and limitations of genetic testing and implications for clinical management following testing were reviewed.  DNA test results can influence decisions regarding screening management for family members.  Also discussed was the possibility of employment and insurance discrimination based on genetic test results and the laws in place to prevent this ([place]), as well as limitations of these laws.     The implications of a positive or negative test result were discussed. We discussed the possibility that, in some cases, genetic test results may be ambiguous due to the identification of a genetic variant of uncertain significance (VUS). VUSs may or may not impact the function of a gene. VUSs are frequently reported through multigene panel testing, given the number of genes analyzed and the presence of genetic variation in the population.  Given his personal history, a negative test result would not eliminate all risk to family members due to the possibility of causative genes that have yet to be identified, or the possibility of a combination of genetic and environmental factors influencing disease.        TEST RESULTS: Testing was negative for known pathogenic (harmful) mutations through sequencing and deletion/duplication analysis of the 157 genes on the [facility] Arrhythmia and Cardiomyopathy Comprehensive panel.   While no known pathogenic (harmful) mutations were identified, a benign, reportable variant was identified in the KCNE1 gene. Mr. 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 Pathogenic changes in KCNE1 can cause Long QT syndrome (LQTS). While this particular change does NOT cause LQTS, some studies suggest it may increase the risk of a prolonged QTc interval. There are additional risk factors that can also prolong the QTc interval such as, VGx-syhlmgem-polnplgxkq drugs, hypokalemia, drug-drug interactions, advancing age, female sex, genetic predisposition, hypomagnesemia, heart failure, stroke, subarachnoid hemorrhage, and bradycardia. Having multiple risk factors may interplay in a patient's risk to develop a prolonged QTc interval and can increase the risk of torsades de pointes. The evidence is preliminary at this time so this change is considered a benign, reportable variant.     We also discussed the possibility of genes that have yet to be described that contribute to the development of cardiomyopathies.  This assessment is based on the information provided at the time of the consultation.       PLAN: Since a pathogenic mutation was not identified, we are unable at this time to confirm a hereditary cause for Mr. Munoz's clinical diagnosis or offer diagnostic genetic studies to other family members. It would be recommended for first-degree relatives of Mr. Munoz to pursue cardiac evaluation based on his clinical diagnosis. Genetic counseling remains available to Mr. Munoz. If he has any questions or concerns, he is welcome to contact us at [phone].     Cely Torres, MS, [facility] [place], [facility]  Licensed Certified Genetic Counselor    Cc: Jhon Graff,  Bernadine ROYAL MD

## 2024-06-04 ENCOUNTER — PRE-ADMISSION TESTING (OUTPATIENT)
Dept: PREADMISSION TESTING | Facility: HOSPITAL | Age: 59
End: 2024-06-04
Payer: COMMERCIAL

## 2024-06-04 VITALS
TEMPERATURE: 98.9 F | HEART RATE: 62 BPM | OXYGEN SATURATION: 98 % | SYSTOLIC BLOOD PRESSURE: 110 MMHG | HEIGHT: 68 IN | DIASTOLIC BLOOD PRESSURE: 65 MMHG | WEIGHT: 221.5 LBS | BODY MASS INDEX: 33.57 KG/M2 | RESPIRATION RATE: 18 BRPM

## 2024-06-04 LAB
ANION GAP SERPL CALCULATED.3IONS-SCNC: 10.9 MMOL/L (ref 5–15)
BUN SERPL-MCNC: 17 MG/DL (ref 8–23)
BUN/CREAT SERPL: 15.9 (ref 7–25)
CALCIUM SPEC-SCNC: 9.4 MG/DL (ref 8.6–10.5)
CHLORIDE SERPL-SCNC: 108 MMOL/L (ref 98–107)
CO2 SERPL-SCNC: 21.1 MMOL/L (ref 22–29)
CREAT SERPL-MCNC: 1.07 MG/DL (ref 0.76–1.27)
DEPRECATED RDW RBC AUTO: 44.6 FL (ref 37–54)
EGFRCR SERPLBLD CKD-EPI 2021: 79 ML/MIN/1.73
ERYTHROCYTE [DISTWIDTH] IN BLOOD BY AUTOMATED COUNT: 13.2 % (ref 12.3–15.4)
GLUCOSE SERPL-MCNC: 83 MG/DL (ref 65–99)
HCT VFR BLD AUTO: 40.5 % (ref 37.5–51)
HGB BLD-MCNC: 13 G/DL (ref 13–17.7)
INR PPP: 1.21 (ref 0.9–1.1)
MCH RBC QN AUTO: 29.4 PG (ref 26.6–33)
MCHC RBC AUTO-ENTMCNC: 32.1 G/DL (ref 31.5–35.7)
MCV RBC AUTO: 91.6 FL (ref 79–97)
PLATELET # BLD AUTO: 173 10*3/MM3 (ref 140–450)
PMV BLD AUTO: 11.5 FL (ref 6–12)
POTASSIUM SERPL-SCNC: 4.4 MMOL/L (ref 3.5–5.2)
PROTHROMBIN TIME: 15.6 SECONDS (ref 11.7–14.2)
RBC # BLD AUTO: 4.42 10*6/MM3 (ref 4.14–5.8)
SODIUM SERPL-SCNC: 140 MMOL/L (ref 136–145)
WBC NRBC COR # BLD AUTO: 5.05 10*3/MM3 (ref 3.4–10.8)

## 2024-06-04 PROCEDURE — 85027 COMPLETE CBC AUTOMATED: CPT

## 2024-06-04 PROCEDURE — 36415 COLL VENOUS BLD VENIPUNCTURE: CPT

## 2024-06-04 PROCEDURE — 85610 PROTHROMBIN TIME: CPT | Performed by: ORTHOPAEDIC SURGERY

## 2024-06-04 PROCEDURE — 80048 BASIC METABOLIC PNL TOTAL CA: CPT

## 2024-06-04 NOTE — DISCHARGE INSTRUCTIONS
Take the following medications the morning of surgery:  LEVOTHYROXINE, CARVEDILOL, ESOMEPRAZOLE    HOLD ENTRESTO NIGHT BEFORE SURGERY.    HOLD OZEMPIC AT LEAST ONE WEEK PRIOR (LAST DOSE TO BE 6/5/24).    If you are on prescription narcotic pain medication to control your pain you may also take that medication the morning of surgery.    General Instructions:  Do not eat solid food after midnight the night before surgery.  You may drink clear liquids day of surgery but must stop at least one hour before your hospital arrival time.  It is beneficial for you to have a clear drink that contains carbohydrates the day of surgery.  We suggest a 12 to 20 ounce bottle of Gatorade or Powerade for non-diabetic patients or a 12 to 20 ounce bottle of G2 or Powerade Zero for diabetic patients. (Pediatric patients, are not advised to drink a 12 to 20 ounce carbohydrate drink)    Clear liquids are liquids you can see through.  Nothing red in color.     Plain water                               Sports drinks  Sodas                                   Gelatin (Jell-O)  Fruit juices without pulp such as white grape juice and apple juice  Popsicles that contain no fruit or yogurt  Tea or coffee (no cream or milk added)  Gatorade / Powerade  G2 / Powerade Zero    Infants may have breast milk up to four hours before surgery.  Infants drinking formula may drink formula up to six hours before surgery.   Patients who avoid smoking, chewing tobacco and alcohol for 4 weeks prior to surgery have a reduced risk of post-operative complications.  Quit smoking as many days before surgery as you can.  Do not smoke, use chewing tobacco or drink alcohol the day of surgery.   If applicable bring your C-PAP/ BI-PAP machine in with you to preop day of surgery.  Bring any papers given to you in the doctor’s office.  Wear clean comfortable clothes.  Do not wear contact lenses, false eyelashes or make-up.  Bring a case for your glasses.   Bring crutches or  walker if applicable.  Remove all piercings.  Leave jewelry and any other valuables at home.  Hair extensions with metal clips must be removed prior to surgery.  The Pre-Admission Testing nurse will instruct you to bring medications if unable to obtain an accurate list in Pre-Admission Testing.        If you were given a blood bank ID arm band remember to bring it with you the day of surgery.    Preventing a Surgical Site Infection:  For 2 to 3 days before surgery, avoid shaving with a razor because the razor can irritate skin and make it easier to develop an infection.    Any areas of open skin can increase the risk of a post-operative wound infection by allowing bacteria to enter and travel throughout the body.  Notify your surgeon if you have any skin wounds / rashes even if it is not near the expected surgical site.  The area will need assessed to determine if surgery should be delayed until it is healed.  The night prior to surgery shower using a fresh bar of anti-bacterial soap (such as Dial) and clean washcloth.  Sleep in a clean bed with clean clothing.  Do not allow pets to sleep with you.  Shower on the morning of surgery using a fresh bar of anti-bacterial soap (such as Dial) and clean washcloth.  Dry with a clean towel and dress in clean clothing.  Ask your surgeon if you will be receiving antibiotics prior to surgery.  Make sure you, your family, and all healthcare providers clean their hands with soap and water or an alcohol based hand  before caring for you or your wound.    Day of surgery:  Your arrival time is approximately two hours before your scheduled surgery time.  Upon arrival, a Pre-op nurse and Anesthesiologist will review your health history, obtain vital signs, and answer questions you may have.  The only belongings needed at this time will be a list of your home medications and if applicable your C-PAP/BI-PAP machine.  A Pre-op nurse will start an IV and you may receive  medication in preparation for surgery, including something to help you relax.     Please be aware that surgery does come with discomfort.  We want to make every effort to control your discomfort so please discuss any uncontrolled symptoms with your nurse.   Your doctor will most likely have prescribed pain medications.      If you are going home after surgery you will receive individualized written care instructions before being discharged.  A responsible adult must drive you to and from the hospital on the day of your surgery and ideally stay with you through the night.   .  Discharge prescriptions can be filled by the hospital pharmacy during regular pharmacy hours.  If you are having surgery late in the day/evening your prescription may be e-prescribed to your pharmacy.  Please verify your pharmacy hours or chose a 24 hour pharmacy to avoid not having access to your prescription because your pharmacy has closed for the day.    If you are staying overnight following surgery, you will be transported to your hospital room following the recovery period.  [facility] has all private rooms.    If you have any questions please call Pre-Admission Testing at [phone].  Deductibles and co-payments are collected on the day of service. Please be prepared to pay the required co-pay, deductible or deposit on the day of service as defined by your plan.    Call your surgeon immediately if you experience any of the following symptoms:  Sore Throat  Shortness of Breath or difficulty breathing  Cough  Chills  Body soreness or muscle pain  Headache  Fever  New loss of taste or smell  Do not arrive for your surgery ill.  Your procedure will need to be rescheduled to another time.  You will need to call your physician before the day of surgery to avoid any unnecessary exposure to hospital staff as well as other patients.

## 2024-06-04 NOTE — TELEPHONE ENCOUNTER
Have notified the patient that Dr. Alejandra is okay with him taking his Lovenox the night of his surgery

## 2024-06-18 ENCOUNTER — HOSPITAL ENCOUNTER (OUTPATIENT)
Facility: HOSPITAL | Age: 59
Setting detail: HOSPITAL OUTPATIENT SURGERY
Discharge: HOME | End: 2024-06-18
Attending: ORTHOPAEDIC SURGERY | Admitting: ORTHOPAEDIC SURGERY
Payer: COMMERCIAL

## 2024-06-18 ENCOUNTER — ANESTHESIA EVENT (OUTPATIENT)
Dept: PERIOP | Facility: HOSPITAL | Age: 59
End: 2024-06-18
Payer: COMMERCIAL

## 2024-06-18 ENCOUNTER — ANESTHESIA (OUTPATIENT)
Dept: PERIOP | Facility: HOSPITAL | Age: 59
End: 2024-06-18
Payer: COMMERCIAL

## 2024-06-18 VITALS
TEMPERATURE: 98.4 F | RESPIRATION RATE: 16 BRPM | HEART RATE: 66 BPM | DIASTOLIC BLOOD PRESSURE: 77 MMHG | WEIGHT: 220.46 LBS | SYSTOLIC BLOOD PRESSURE: 127 MMHG | HEIGHT: 68 IN | BODY MASS INDEX: 33.41 KG/M2 | OXYGEN SATURATION: 96 %

## 2024-06-18 DIAGNOSIS — M75.02 ADHESIVE CAPSULITIS OF LEFT SHOULDER: ICD-10-CM

## 2024-06-18 LAB — GLUCOSE BLDC GLUCOMTR-MCNC: 89 MG/DL (ref 70–130)

## 2024-06-18 PROCEDURE — 25010000002 DEXAMETHASONE PER 1 MG: Performed by: ANESTHESIOLOGY

## 2024-06-18 PROCEDURE — 25010000002 METHYLPREDNISOLONE PER 80 MG: Performed by: ORTHOPAEDIC SURGERY

## 2024-06-18 PROCEDURE — 25010000002 ROPIVACAINE PER 1 MG: Performed by: ANESTHESIOLOGY

## 2024-06-18 PROCEDURE — 97165 OT EVAL LOW COMPLEX 30 MIN: CPT

## 2024-06-18 PROCEDURE — 82948 REAGENT STRIP/BLOOD GLUCOSE: CPT

## 2024-06-18 PROCEDURE — 25010000002 MIDAZOLAM PER 1 MG: Performed by: ANESTHESIOLOGY

## 2024-06-18 PROCEDURE — 25010000002 PROPOFOL 1000 MG/100ML EMULSION: Performed by: NURSE ANESTHETIST, CERTIFIED REGISTERED

## 2024-06-18 PROCEDURE — 97110 THERAPEUTIC EXERCISES: CPT

## 2024-06-18 PROCEDURE — 25810000003 LACTATED RINGERS PER 1000 ML: Performed by: ANESTHESIOLOGY

## 2024-06-18 PROCEDURE — 23700 MNPJ ANES SHO JT FIXJ APRATS: CPT | Performed by: ORTHOPAEDIC SURGERY

## 2024-06-18 RX ORDER — LIDOCAINE HYDROCHLORIDE 20 MG/ML
INJECTION, SOLUTION INFILTRATION; PERINEURAL AS NEEDED
Status: DISCONTINUED | OUTPATIENT
Start: 2024-06-18 | End: 2024-06-18 | Stop reason: SURG

## 2024-06-18 RX ORDER — FAMOTIDINE 10 MG/ML
20 INJECTION, SOLUTION INTRAVENOUS ONCE
Status: COMPLETED | OUTPATIENT
Start: 2024-06-18 | End: 2024-06-18

## 2024-06-18 RX ORDER — ONDANSETRON 4 MG/1
4 TABLET, FILM COATED ORAL EVERY 8 HOURS PRN
Qty: 30 TABLET | Refills: 0 | Status: SHIPPED | OUTPATIENT
Start: 2024-06-18

## 2024-06-18 RX ORDER — HYDROCODONE BITARTRATE AND ACETAMINOPHEN 7.5; 325 MG/1; MG/1
1 TABLET ORAL EVERY 4 HOURS PRN
Qty: 30 TABLET | Refills: 0 | Status: SHIPPED | OUTPATIENT
Start: 2024-06-18

## 2024-06-18 RX ORDER — DEXAMETHASONE SODIUM PHOSPHATE 4 MG/ML
INJECTION, SOLUTION INTRA-ARTICULAR; INTRALESIONAL; INTRAMUSCULAR; INTRAVENOUS; SOFT TISSUE
Status: COMPLETED | OUTPATIENT
Start: 2024-06-18 | End: 2024-06-18

## 2024-06-18 RX ORDER — MIDAZOLAM HYDROCHLORIDE 1 MG/ML
0.5 INJECTION INTRAMUSCULAR; INTRAVENOUS
Status: COMPLETED | OUTPATIENT
Start: 2024-06-18 | End: 2024-06-18

## 2024-06-18 RX ORDER — ROPIVACAINE HYDROCHLORIDE 5 MG/ML
INJECTION, SOLUTION EPIDURAL; INFILTRATION; PERINEURAL
Status: COMPLETED | OUTPATIENT
Start: 2024-06-18 | End: 2024-06-18

## 2024-06-18 RX ORDER — SODIUM CHLORIDE, SODIUM LACTATE, POTASSIUM CHLORIDE, CALCIUM CHLORIDE 600; 310; 30; 20 MG/100ML; MG/100ML; MG/100ML; MG/100ML
9 INJECTION, SOLUTION INTRAVENOUS CONTINUOUS
Status: DISCONTINUED | OUTPATIENT
Start: 2024-06-18 | End: 2024-06-18 | Stop reason: HOSPADM

## 2024-06-18 RX ORDER — SODIUM CHLORIDE 0.9 % (FLUSH) 0.9 %
3-10 SYRINGE (ML) INJECTION AS NEEDED
Status: DISCONTINUED | OUTPATIENT
Start: 2024-06-18 | End: 2024-06-18 | Stop reason: HOSPADM

## 2024-06-18 RX ORDER — LIDOCAINE HYDROCHLORIDE 10 MG/ML
0.5 INJECTION, SOLUTION INFILTRATION; PERINEURAL ONCE AS NEEDED
Status: DISCONTINUED | OUTPATIENT
Start: 2024-06-18 | End: 2024-06-18 | Stop reason: HOSPADM

## 2024-06-18 RX ORDER — DOCUSATE SODIUM 100 MG/1
100 CAPSULE, LIQUID FILLED ORAL 2 TIMES DAILY
Qty: 60 CAPSULE | Refills: 0 | Status: SHIPPED | OUTPATIENT
Start: 2024-06-18

## 2024-06-18 RX ORDER — PROPOFOL 10 MG/ML
INJECTION, EMULSION INTRAVENOUS AS NEEDED
Status: DISCONTINUED | OUTPATIENT
Start: 2024-06-18 | End: 2024-06-18 | Stop reason: SURG

## 2024-06-18 RX ORDER — SODIUM CHLORIDE 0.9 % (FLUSH) 0.9 %
3 SYRINGE (ML) INJECTION EVERY 12 HOURS SCHEDULED
Status: DISCONTINUED | OUTPATIENT
Start: 2024-06-18 | End: 2024-06-18 | Stop reason: HOSPADM

## 2024-06-18 RX ADMIN — SODIUM CHLORIDE, POTASSIUM CHLORIDE, SODIUM LACTATE AND CALCIUM CHLORIDE 9 ML/HR: 600; 310; 30; 20 INJECTION, SOLUTION INTRAVENOUS at 12:27

## 2024-06-18 RX ADMIN — ROPIVACAINE HYDROCHLORIDE 20 ML: 5 INJECTION EPIDURAL; INFILTRATION; PERINEURAL at 12:57

## 2024-06-18 RX ADMIN — PROPOFOL INJECTABLE EMULSION 70 MG: 10 INJECTION, EMULSION INTRAVENOUS at 14:04

## 2024-06-18 RX ADMIN — MIDAZOLAM 0.5 MG: 1 INJECTION INTRAMUSCULAR; INTRAVENOUS at 12:53

## 2024-06-18 RX ADMIN — DEXAMETHASONE SODIUM PHOSPHATE 4 MG: 4 INJECTION, SOLUTION INTRA-ARTICULAR; INTRALESIONAL; INTRAMUSCULAR; INTRAVENOUS; SOFT TISSUE at 12:57

## 2024-06-18 RX ADMIN — Medication 3 ML: at 12:32

## 2024-06-18 RX ADMIN — MIDAZOLAM 0.5 MG: 1 INJECTION INTRAMUSCULAR; INTRAVENOUS at 13:10

## 2024-06-18 RX ADMIN — LIDOCAINE HYDROCHLORIDE 60 MG: 20 INJECTION, SOLUTION INFILTRATION; PERINEURAL at 14:04

## 2024-06-18 RX ADMIN — FAMOTIDINE 20 MG: 10 INJECTION INTRAVENOUS at 12:32

## 2024-06-18 NOTE — ANESTHESIA PROCEDURE NOTES
Interscalene nerve block      Patient reassessed immediately prior to procedure    Patient location during procedure: pre-op  Start time: 6/18/2024 12:51 PM  Stop time: 6/18/2024 12:57 PM  Reason for block: at surgeon's request and post-op pain management  Performed by  Anesthesiologist: Carla Cherry MD  Preanesthetic Checklist  Completed: patient identified, IV checked, site marked, risks and benefits discussed, surgical consent, monitors and equipment checked, pre-op evaluation and timeout performed  Prep:  Pt Position: sitting  Sterile barriers:cap, gloves and mask  Prep: ChloraPrep  Patient monitoring: blood pressure monitoring, continuous pulse oximetry and EKG  Procedure    Guidance:ultrasound guided    ULTRASOUND INTERPRETATION.  Using ultrasound guidance a 22 G gauge needle was placed in close proximity to the brachial plexus nerve, at which point, under ultrasound guidance anesthetic was injected in the area of the nerve and spread of the anesthesia was seen on ultrasound in close proximity thereto.  There were no abnormalities seen on ultrasound; a digital image was taken; and the patient tolerated the procedure with no complications. Images:still images obtained, printed/placed on chart    Laterality:left  Block Type:interscalene  Injection Technique:single-shot  Needle Type:short-bevel and echogenic  Needle Gauge:22 G  Resistance on Injection: none    Medications Used: ropivacaine (NAROPIN) 0.5 % injection - Injection   20 mL - 6/18/2024 12:57:00 PM  dexamethasone (DECADRON) injection - Injection   4 mg - 6/18/2024 12:57:00 PM      Medications  Comment:Ultrasound Interpretation:  Using ultrasound guidance the needle was placed in close proximity to the target nerve and anesthetic was injected in the area of the target nerve and/or bundles, and spread of the anesthetic was seen on ultrasound in close proximity thereto.  There were no abnormalities seen on ultrasound; a digital image was taken;  and the patient tolerated the procedure with no complications.    Block placed for postoperative pain control per surgeon request.    Post Assessment  Injection Assessment: negative aspiration for heme, no paresthesia on injection and incremental injection  Patient Tolerance:comfortable throughout block  Complications:no  Performed by: Carla Cherry MD

## 2024-06-18 NOTE — ANESTHESIA PREPROCEDURE EVALUATION
" Anesthesia Evaluation     Patient summary reviewed and Nursing notes reviewed   no history of anesthetic complications:   NPO Solid Status: > 8 hours  NPO Liquid Status: > 2 hours           Airway   Mallampati: II  TM distance: >3 FB  Neck ROM: full  No difficulty expected  Dental          Pulmonary - normal exam   (+) ,sleep apnea  Cardiovascular - normal exam  Exercise tolerance: good (4-7 METS)    ECG reviewed  PT is on anticoagulation therapy  Patient on routine beta blocker and Beta blocker given within 24 hours of surgery    (+) hypertension, CHF (41.4% EF, Grade II diastolic dysfunction) Diastolic >=55% and Systolic <55%, hyperlipidemia      Neuro/Psych  (+) CVA (MCA stroke, thrombectomy Dr. Murray 2/24)  GI/Hepatic/Renal/Endo    (+) obesity, GERD (semaglutide), renal disease (stage III)- CRI and stones, diabetes mellitus type 2, thyroid problem hypothyroidism    Musculoskeletal     Abdominal    Substance History      OB/GYN          Other   arthritis,                   Anesthesia Plan    ASA 3     MAC     (With PNB for POPC PSR    I have reviewed the patient's history and chart with the patient, including all pertinent laboratory results and imaging. I have explained the risks of anesthesia including but not limited to dental damage, corneal abrasion, nerve injury, MI, stroke, aspiration, and death. Patient has agreed to proceed.      Risks of peripheral nerve block were discussed with patient including but not limited to: inadequate block, bleeding, infection, persistent numbness or weakness, nerve damage, painful dysasthesia and need to protect limb while numb.      /82 (BP Location: Left arm, Patient Position: Sitting)   Pulse 59   Temp 36.9 °C (98.4 °F) (Oral)   Resp 16   Ht 172.7 cm (67.99\")   Wt 100 kg (220 lb 7.4 oz)   SpO2 100%   BMI 33.53 kg/m²   )  intravenous induction     Anesthetic plan, risks, benefits, and alternatives have been provided, discussed and informed consent has been " obtained with: patient.    CODE STATUS:

## 2024-06-18 NOTE — ANESTHESIA POSTPROCEDURE EVALUATION
Patient: Jhon Munoz    Procedure Summary       Date: 06/18/24 Room / Location:  [facility]  [facility]    Anesthesia Start: 1402 Anesthesia Stop: 1412    Procedure: SHOULDER MANIPULATION and injection (Left) Diagnosis:       Adhesive capsulitis of left shoulder      (Adhesive capsulitis of left shoulder [M75.02])    Surgeons: Erickson Alejandra MD Provider: Anupam Diamond DO    Anesthesia Type: MAC ASA Status: 3            Anesthesia Type: MAC    Vitals  Vitals Value Taken Time   /81 06/18/24 1420   Temp     Pulse 69 06/18/24 1420   Resp     SpO2 98 % 06/18/24 1420   Vitals shown include unfiled device data.        Post Anesthesia Care and Evaluation    Patient location during evaluation: bedside  Patient participation: complete - patient participated  Level of consciousness: awake and alert  Pain management: adequate    Airway patency: patent  Anesthetic complications: No anesthetic complications  PONV Status: controlled  Cardiovascular status: acceptable  Respiratory status: acceptable  Hydration status: acceptable

## 2024-06-18 NOTE — OP NOTE
Orthopaedic Operative Note    Facility: [facility]    Patient: Jhon Munoz    Medical Record Number: [id number]    YOB: 1965    Dictating Surgeon: Erickson Alejandra M.D.*    Primary Care Physician: Oneil Decker MD    Date of Operation: 6/18/2024    Pre-Operative Diagnosis:  Left shoulder adhesive capsulitis    Post-Operative Diagnosis:  Left shoulder adhesive capsulitis     Procedure Performed:  Left shoulder manipulation under anesthesia and intra-articular corticosteroid injection    Surgeon: Erickson Alejandra MD     Assistant: None    Anesthesia: Regional followed by monitored anesthesia care    Complications: None.     Estimated Blood Loss: None    Implants: None    Specimens: * No orders in the log *    Brief Operative Indication:  Mr. Munoz had a long history of recalcitrant left shoulder adhesive capsulitis which had been nonresponsive to conservative treatment.  The risks, benefits, and alternatives to a manipulation under anesthesia and intra-articular injection were carefully discussed with the patient.  Specifically, we discussed the risk of infection related to the injection, fracture from the manipulation, persistent or worsened pain after the procedure, persistent or worsened arthrofibrosis potentially necessitating further surgery.  We also discussed the risk of iatrogenic injury to nearby nerves and blood vessels, particularly the axillary and/or musculocutaneous nerves which could result in permanent weakness, numbness, and dysfunction.  Lastly, we talked about RSD, DVT, PE, and anesthesia related complications.  The patient acknowledged understanding of this information and consented to proceed.    Description of the procedure in detail:  The patient and operative site were identified in the preoperative holding area.  Adequate regional anesthesia of the left shoulder was administered.  Following this, a timeout was taken in which the patient participated.   Adequate monitored anesthesia care was then administered.  An examination under anesthesia was then performed.  The patient demonstrated significant loss of motion in the shoulder compared to the contralateral side.  Over a period of several minutes, gradual pressure was applied to bring the arm up into maximal forward elevation.  I performed this gradually so as to hopefully minimize the risk of iatrogenic fracture.  I could both hear and feel the adhesions break up.  I was able to achieve full fluid forward elevation.  Next, the patient's arm was brought out into horizontal abduction followed by internal rotation.  Again, I could hear and feel the adhesions break up and was able to achieve full fluid passive motion.  Lastly, the patient's arm was brought into cross body adduction and then external rotation with the arm at the side.      Once the manipulation was completed, the anterior aspect of the shoulder was prepped with a ChloraPrep.  I allowed that to dry and then an intra-articular injection of 80 mg Depo-Medrol and approximately 4 cc of half percent bupivacaine with epinephrine was performed.  The patient tolerated the procedure well.  There were no complications.  A sterile dressing was applied to the site of the injection.  The arm was placed in a sling.  He was awakened in good condition.    Erickson Alejandra MD  06/18/24

## 2024-06-18 NOTE — THERAPY EVALUATION
..Jhon Munoz is a 59 y.o. male who presents s/p surgical manipulation of left shoulder. PMHx includes a stroke in February of 2024 resulting in a clot retrieval. He experienced no functional deficits following this procedure and was discharged several days later from the hospital.     Shoulder PROM measurements include flexion 180*, extension 50*, abduction 170*, external rotation 75*, internal rotation 80* . Treatment this date includes Shoulder PROM into all cardinal planes, Caregiver instruction of shoulder PROM , and Review of discharge plan (sling use, AGUSTINA CHAVEZ). Pain level 0.     Goal: Pt/caregiver will be IND with PROM performance to facilitate improved mobility prior to outpatient PT eval.   Goal Met, D[facility]C [facility]    Plan to discharge to home. Pt was provided with written instruction regarding HEP/PROM performance following DC.

## 2024-06-18 NOTE — H&P
History & Physical       Patient: Jhon Munoz    YOB: 1965    Medical Record Number: [id number]    Chief Complaints: Preop    History of Present Illness: 59 y.o. male presents today in anticipation of upcoming surgery.  Denies any changes to medical history.  Denies any changes to his symptomatology.    Allergies: No Known Allergies    Home Medications:  No current facility-administered medications for this encounter.    Past Medical History:   Diagnosis Date    Cardiomyopathy     FOLLOWED BY DR. BHANDARI    Colon polyp     Cortical senile cataract 04/01/2019    CVA (cerebral vascular accident) 02/16/2024    PT DENIES RESIDUAL, HAD THROMBECTOMY PER DR. SEGURA    Diabetes mellitus     type 2    GERD (gastroesophageal reflux disease)     Hyperlipidemia     Hypertension     Hypothyroidism     Left shoulder pain     WITH LIMITED ROM    Renal calculi     Followed by Dr. Chris Carreon urologist    Renal infarct 07/2020    Left; noted on CT scan-followed by Dr. Chris Carreon urology    Sleep apnea     CPAP nightly          Past Surgical History:   Procedure Laterality Date    CARDIAC CATHETERIZATION N/A 07/24/2020    Procedure: Coronary angiography;  Surgeon: Robert Deluna MD;  Location:  [facility];  Service: Cardiovascular;  Laterality: N/A;    CARDIAC CATHETERIZATION N/A 07/24/2020    Procedure: Left Heart Cath;  Surgeon: Robert Deluna MD;  Location:  [facility];  Service: Cardiovascular;  Laterality: N/A;    COLONOSCOPY  2020    COLONOSCOPY N/A 10/18/2022    Procedure: COLONOSCOPY TO CECUM WITH COLD BIOPSY POLYPECTOMY;  Surgeon: Robert Evans MD;  [facility];  Service: Gastroenterology;  Laterality: N/A;  PRE: HX COLON POLYPS  POST: POLYP, DIVERTICULOSIS, HEMORRHOIDS    KNEE SURGERY Right     meniscus           Social History     Occupational History     Employer: [employer]   Tobacco Use    Smoking status: Never      Passive exposure: Never    Smokeless tobacco: Never   Vaping Use    Vaping status: Never Used   Substance and Sexual Activity    Alcohol use: Not Currently     Comment: Caffeine use: 1 cup daily, OCCASIONAL    Drug use: Never    Sexual activity: Defer      Social History     Social History Narrative    Not on file          Family History   Problem Relation Age of Onset    Hypertension Mother     Malig Hyperthermia Neg Hx        Review of Systems:  A 14 point review of systems is reviewed with the patient.  Pertinent positives are listed above.  All others are negative.    Physical Exam: 59 y.o. male    There were no vitals filed for this visit.    General:  Patient is awake and alert.  Appears in no acute distress or discomfort.    Psych:  Affect and demeanor are appropriate.    Eyes:  Conjunctiva and sclera appear grossly normal.  Eyes track well and EOM seem to be intact.    Ears:  No gross abnormalities.  Hearing adequate for the exam.    Cardiovascular:  Regular rate and rhythm.    Lungs:  Good chest expansion.  Breathing unlabored.    Lymph:  No palpable adenopathy about neck or axilla.    Extremity:  Skin benign and intact without evidence for swelling, masses or atrophy.  Exam otherwise deferred at this time.    Diagnostic Tests:  Lab Results   Component Value Date    GLUCOSE 83 06/04/2024    CALCIUM 9.4 06/04/2024     06/04/2024    K 4.4 06/04/2024    CO2 21.1 (L) 06/04/2024     (H) 06/04/2024    BUN 17 06/04/2024    CREATININE 1.07 06/04/2024    EGFRIFAFRI 65 01/04/2022    BCR 15.9 06/04/2024    ANIONGAP 10.9 06/04/2024     Lab Results   Component Value Date    WBC 5.05 06/04/2024    HGB 13.0 06/04/2024    HCT 40.5 06/04/2024    MCV 91.6 06/04/2024     06/04/2024     Lab Results   Component Value Date    INR 1.21 (H) 06/04/2024    INR 1.03 02/16/2024    INR 1.04 07/16/2020    PROTIME 15.6 (H) 06/04/2024    PROTIME 13.7 02/16/2024    PROTIME 13.5 07/16/2020     His MRI of the left  shoulder did confirm rotator cuff tendinopathy and mild partial-thickness tearing.  He does have findings consistent with adhesive capsulitis as well.    Assessment:  Left shoulder adhesive capsulitis    Plan: He has consented to proceed with a manipulation and injection.  I explained all risks including fracture, tendon tear, bleeding, infection, hematoma, chronic pain, worsening of pain, chondrolysis, swelling, persistent loss of motion, weakness, stiffness, instability, DVT, pulmonary embolus, death, stroke, complex regional pain syndrome, and need for additional procedures.  Patient verbalized understanding, and was given the opportunity to ask and have all questions answered today.  No guarantees were given regarding results of the procedure.       Erickson Alejandra MD  06/18/24

## 2024-06-18 NOTE — ANESTHESIA POSTPROCEDURE EVALUATION
"Patient: Jhon Munoz    Procedure Summary       Date: 06/18/24 Room / Location:  [facility]  /  [facility]    Anesthesia Start: 1402 Anesthesia Stop: 1412    Procedure: SHOULDER MANIPULATION and injection (Left) Diagnosis:       Adhesive capsulitis of left shoulder      (Adhesive capsulitis of left shoulder [M75.02])    Surgeons: Erickson Alejandra MD Provider: Anupam Diamond DO    Anesthesia Type: MAC ASA Status: 3            Anesthesia Type: MAC    Vitals  Vitals Value Taken Time   /81 06/18/24 1420   Temp     Pulse 68 06/18/24 1441   Resp     SpO2 99 % 06/18/24 1441   Vitals shown include unfiled device data.        Post Anesthesia Care and Evaluation    Patient location during evaluation: bedside  Patient participation: complete - patient participated  Level of consciousness: awake and alert  Pain score: 0  Pain management: adequate    Airway patency: patent  Anesthetic complications: No anesthetic complications  PONV Status: controlled  Cardiovascular status: acceptable and hemodynamically stable  Respiratory status: acceptable, spontaneous ventilation and nonlabored ventilation  Hydration status: acceptable    Comments: /77   Pulse 66   Temp 36.9 °C (98.4 °F) (Oral)   Resp 16   Ht 172.7 cm (67.99\")   Wt 100 kg (220 lb 7.4 oz)   SpO2 96%   BMI 33.53 kg/m²     POPC supplied by [facility] with continued effect in expected distribution    "

## 2024-06-18 NOTE — BRIEF OP NOTE
MANIPULATION OF  Progress Note    Jhon Munoz  6/18/2024    Pre-op Diagnosis:   Adhesive capsulitis of left shoulder [M75.02]       Post-Op Diagnosis Codes:     * Adhesive capsulitis of left shoulder [M75.02]    Procedure/CPT® Codes:        Procedure(s):  SHOULDER MANIPULATION and injection              Surgeon(s):  Erickson Alejandra MD    Anesthesia: Monitored Anesthesia Care with Regional    Staff:   * No surgical staff found *         Estimated Blood Loss: none    Urine Voided: * No values recorded between 6/18/2024  2:04 PM and 6/18/2024  2:12 PM *    Specimens:                None          Drains:   [REMOVED] External Urinary Catheter (Removed)       Findings: see dictation        Complications: none          Erickson Alejandra MD     Date: 6/18/2024  Time: 14:24 EDT

## 2024-06-19 ENCOUNTER — TREATMENT (OUTPATIENT)
Dept: PHYSICAL THERAPY | Facility: CLINIC | Age: 59
End: 2024-06-19
Payer: COMMERCIAL

## 2024-06-19 DIAGNOSIS — M75.02 ADHESIVE CAPSULITIS OF LEFT SHOULDER: Primary | ICD-10-CM

## 2024-06-19 DIAGNOSIS — R29.898 LEFT ARM WEAKNESS: ICD-10-CM

## 2024-06-19 DIAGNOSIS — M25.612 DECREASED ROM OF LEFT SHOULDER: ICD-10-CM

## 2024-06-19 DIAGNOSIS — R68.89 ACTIVITY INTOLERANCE: ICD-10-CM

## 2024-06-19 NOTE — PROGRESS NOTES
"Physical Therapy Initial Evaluation and Plan of Care  [facility] [place]   [address], [address]  [place] [address]  P: [phone]  F: [phone]    Patient: Jhon Munoz   : 1965  Diagnosis/ICD-10 Code:  Adhesive capsulitis of left shoulder [M75.02]  Referring practitioner: Erickson Alejandra MD  Date of Initial Visit: 2024  Today's Date: 2024  Patient seen for 1 session         Visit Diagnoses:    ICD-10-CM ICD-9-CM   1. Adhesive capsulitis of left shoulder  M75.02 726.0   2. Decreased ROM of left shoulder  M25.612 719.51   3. Left arm weakness  R29.898 729.89   4. Activity intolerance  R68.89 780.99       PMHx Reviewed : 2024      Subjective Evaluation    History of Present Illness  Mechanism of injury: Hx:   Pt reports to clinic for IE of s/p L shoulder manipulation secondary to adhesive capsulitis. Pt had attempted prior bout of formal PT for his L shoulder but was unsuccessful. Pt reports having the manipulation yesterday . Pt notes the nerve block is still affecting him and that his first 3 fingers are numb and the 3-4th have 75% feeling and are sensitive to touch. Pt has difficulty w/ controlling his movement and his arm \"flops\" occasionally.    Pt reports he is required to remove sling every 3 hours and move his shoulder. Pt notes he has been diligent in following these instructions.     PMH includes:  - CVA (24), fell onto L shoulder   - T2DM    Pt reported difficulties:  - self reported fxn status =  30/100 %  - numbness, discomfort, decreased control of UE  - weakness impacting all IADLs     Hobbies (impacted by dysfxn):  -NA      Patient Occupation: retired ([employer]) Quality of life: excellent    Pain  Current pain ratin  At best pain ratin  At worst pain ratin  Quality: dull ache (biggest complaint is numbness/sensitve)  Relieving factors: medications  Aggravating factors: lifting, movement, overhead activity and " outstretched reach  Progression: improved    Social Support  Lives in: multiple-level home  Lives with: spouse and adult children (caretaker for adult son who had a stroke)    Hand dominance: right    Treatments  Previous treatment: physical therapy  Patient Goals  Patient goals for therapy: increased strength, independence with ADLs/IADLs, return to sport/leisure activities, increased motion and decreased pain           Objective          Active Range of Motion   Left Shoulder   Flexion: 170 degrees   Abduction: 160 degrees     Right Shoulder   Normal active range of motion    Additional Active Range of Motion Details  BTB IR AROM  R = T4  L = T12, passive = T4    Unable to control ER secondary to nerve block     Passive Range of Motion   Left Shoulder   External rotation 0°: 70 degrees     Strength/Myotome Testing     Left Shoulder     Planes of Motion   Flexion: 3   Abduction: 3   External rotation at 90°: 2+   Internal rotation at 90°: 3     Right Shoulder   Normal muscle strength          Assessment & Plan       Assessment  Impairments: abnormal coordination, abnormal or restricted ROM, activity intolerance, impaired physical strength, lacks appropriate home exercise program, pain with function and weight-bearing intolerance   Functional limitations: carrying objects, lifting, sleeping, pulling, pushing, uncomfortable because of pain, moving in bed, reaching behind back, reaching overhead and unable to perform repetitive tasks   Assessment details: Pt presents to clinic for IE of s/p L shoulder manipulation secondary to adhesive capsulitis. Pt demonstrates decreased AROM, UE weakness, pain, and decreased activity tolerance impacting QoL and IADLs.   Pt adult son is currently living at home w/ pt and his wife. Pt had a CVA which resulted in disability necessitating him staying w/ his parents. Pt and wife are his primary caregivers and the pt is concerned w/ quickly returning to be able to assist his wife w/  transfers, etc.   Pt will benefit from skilled PT services at this time to address found dysfxn/deficits in order to achieve POC goals for pt return to PLOF and improve QoL.   Prognosis: good    Goals  Plan Goals: STG: (achieve in 4 weeks)  1. Pt will demo HEP compliance and attendance w/ scheduled therapy visits.   2. Pt will demo 4/5 MMT grades or higher w/ dysfxn L UE motions for improved  fxn strength w/ performance of IADLs.   3. Pt will demo improved L GHJ ROM by 10 degrees or more for improved fxn mobility w/ IADLs.   4. Pt will report decreased pain from 7/10 at worst to 4/10 on pain scale for QoL and progression of PoC.     LTG: (achieve in 8 weeks)  1. Pt will demo ability to perform simulated transfers w/ correct biomechanics for improved fxn performance of care giver duties in order to protect pt shoulder and pt/son safety.   2. Pt will demo 4+/5 MMT grades or higher w/ dysfxn L UE motions for improved  fxn strength w/ performance of IADLs.   3. Pt will reduce Quick DASH score from 70% to 35% or less indicating little to no disability of the L UE.   4. Pt will report improved fxn status by 30-40% or more for improved pt QoL and fxn performance of IADLs w/ caregiver duties.     Plan  Therapy options: will be seen for skilled therapy services  Planned modality interventions: cryotherapy, dry needling, iontophoresis and TENS  Planned therapy interventions: ADL retraining, body mechanics training, fine motor coordination training, flexibility, functional ROM exercises, home exercise program, IADL retraining, joint mobilization, manual therapy, neuromuscular re-education, soft tissue mobilization, spinal/joint mobilization, strengthening, stretching and therapeutic activities  Frequency: 2x week  Duration in weeks: 8  Treatment plan discussed with: patient  Plan details: Access Code: [email or web address]              Manual Therapy:     20     mins  94633;  Therapeutic Exercise:     10     mins  58260;     Neuromuscular  Roberto:       0    mins  30001;    Therapeutic Activity:      10     mins  23969;     Gait Trainin     mins  54150;     Ultrasound:      0     mins  46561;    Electrical Stimulation:     0     mins  93804 ( );  Dry Needling      0     mins self-pay  Traction      0     mins 09773  Canalith Repositioning    0     mins 68345      Timed Treatment:   40   mins   Total Treatment:     60   mins      PT: Arcenio Harp PT     License Number: [id number]  Electronically signed by Arcenio Harp PT, 24, 8:34 AM EDT    Certification Period: 2024 thru 2024  I certify that the therapy services are furnished while this patient is under my care.  The services outlined above are required by this patient, and will be reviewed every 90 days.         Physician Signature:__________________________________________________    PHYSICIAN: Erickson Alejandra MD  NPI: [id number]                                      DATE:      Please sign in Epic or return via fax to [email or web address] . Thank you, [facility].

## 2024-06-20 ENCOUNTER — TREATMENT (OUTPATIENT)
Dept: PHYSICAL THERAPY | Facility: CLINIC | Age: 59
End: 2024-06-20
Payer: COMMERCIAL

## 2024-06-20 DIAGNOSIS — M75.02 ADHESIVE CAPSULITIS OF LEFT SHOULDER: Primary | ICD-10-CM

## 2024-06-20 DIAGNOSIS — R29.898 LEFT ARM WEAKNESS: ICD-10-CM

## 2024-06-20 DIAGNOSIS — R68.89 ACTIVITY INTOLERANCE: ICD-10-CM

## 2024-06-20 DIAGNOSIS — M25.612 DECREASED ROM OF LEFT SHOULDER: ICD-10-CM

## 2024-06-21 ENCOUNTER — TREATMENT (OUTPATIENT)
Dept: PHYSICAL THERAPY | Facility: CLINIC | Age: 59
End: 2024-06-21
Payer: COMMERCIAL

## 2024-06-21 DIAGNOSIS — R29.898 LEFT ARM WEAKNESS: ICD-10-CM

## 2024-06-21 DIAGNOSIS — M75.02 ADHESIVE CAPSULITIS OF LEFT SHOULDER: Primary | ICD-10-CM

## 2024-06-21 DIAGNOSIS — M25.612 DECREASED ROM OF LEFT SHOULDER: ICD-10-CM

## 2024-06-21 DIAGNOSIS — R68.89 ACTIVITY INTOLERANCE: ICD-10-CM

## 2024-06-21 NOTE — PROGRESS NOTES
Physical Therapy Daily Treatment Note  [facility] [place]   [address], [address]  [place] [address]  P: [phone]       F: [phone]    Patient: Jhon Munoz   : 1965  Diagnosis/ICD-10 Code:  Adhesive capsulitis of left shoulder [M75.02]  Referring practitioner: Erickson Alejandra MD  Date of Initial Visit: Type: THERAPY  Noted: 2024  Today's Date: 2024  Patient seen for 3 sessions       Jhon Munoz reports: L shoulder sore, but doing better. Not taking pain medicine.     Subjective     Objective   See Exercise, Manual, and Modality Logs for complete treatment.       Assessment/Plan  Subjectively, pt reports no increase of pain or discomfort with interventions performed today. Performed well with continued L shoulder mobility interventions. Continues to demonstrate most limited in rotational planes. Continues to benefit from verbal/tactile cues to ensure proper form and technique for exercise performance.     Progress per Plan of Care           Manual Therapy:    15     mins  15087;  Therapeutic Exercise:    10     mins  03186;     Neuromuscular Roberto:        mins  90764;    Therapeutic Activity:     10     mins  15721;     Gait Training:           mins  00734;     Ultrasound:          mins  11169;    Electrical Stimulation:         mins  50356;  Traction          mins 98477    Timed Treatment:   35   mins   Total Treatment:     35   mins    Madiha Forman PTA  Physical Therapist Assistant [id number]

## 2024-06-24 ENCOUNTER — OFFICE VISIT (OUTPATIENT)
Dept: ORTHOPEDIC SURGERY | Facility: CLINIC | Age: 59
End: 2024-06-24
Payer: COMMERCIAL

## 2024-06-24 VITALS — TEMPERATURE: 98.7 F | BODY MASS INDEX: 32.67 KG/M2 | HEIGHT: 70 IN | WEIGHT: 228.2 LBS

## 2024-06-24 DIAGNOSIS — Z09 SURGERY FOLLOW-UP: Primary | ICD-10-CM

## 2024-06-24 PROCEDURE — 99024 POSTOP FOLLOW-UP VISIT: CPT | Performed by: NURSE PRACTITIONER

## 2024-06-24 NOTE — PROGRESS NOTES
Jhon Munoz : 1965 MRN: [id number] DATE: 2024    CC: 1 week s/p left shoulder manipulation with intra-articular injection    HPI: Patient returns to clinic today for follow up.  Reports pain is well controlled.  Reports compliance with PT.  Reports motion is significantly improved.    Vitals:    24 1024   Temp: 98.7 °F (37.1 °C)       Exam:  Arm and forearm soft.  Shoulder moves fluidly with excellent motion.  Near full motion in all planes.  Good motor and sensory function distally.  Palpable radial pulse with good capillary refill.      Imaging   none    Impression:  1 week s/p left shoulder manipulation and injection    Plan:    1.  Continue PT.  2.  Can follow up with me as needed going forward.    TERRI Delarosa    2024

## 2024-06-25 ENCOUNTER — TREATMENT (OUTPATIENT)
Dept: PHYSICAL THERAPY | Facility: CLINIC | Age: 59
End: 2024-06-25
Payer: COMMERCIAL

## 2024-06-25 DIAGNOSIS — M75.02 ADHESIVE CAPSULITIS OF LEFT SHOULDER: Primary | ICD-10-CM

## 2024-06-25 DIAGNOSIS — R29.898 LEFT ARM WEAKNESS: ICD-10-CM

## 2024-06-25 DIAGNOSIS — R68.89 ACTIVITY INTOLERANCE: ICD-10-CM

## 2024-06-25 DIAGNOSIS — M25.612 DECREASED ROM OF LEFT SHOULDER: ICD-10-CM

## 2024-06-26 ENCOUNTER — TREATMENT (OUTPATIENT)
Dept: PHYSICAL THERAPY | Facility: CLINIC | Age: 59
End: 2024-06-26
Payer: COMMERCIAL

## 2024-06-26 DIAGNOSIS — R29.898 LEFT ARM WEAKNESS: ICD-10-CM

## 2024-06-26 DIAGNOSIS — M75.02 ADHESIVE CAPSULITIS OF LEFT SHOULDER: Primary | ICD-10-CM

## 2024-06-26 DIAGNOSIS — M25.612 DECREASED ROM OF LEFT SHOULDER: ICD-10-CM

## 2024-06-26 DIAGNOSIS — R68.89 ACTIVITY INTOLERANCE: ICD-10-CM

## 2024-06-27 ENCOUNTER — TREATMENT (OUTPATIENT)
Dept: PHYSICAL THERAPY | Facility: CLINIC | Age: 59
End: 2024-06-27
Payer: COMMERCIAL

## 2024-06-27 DIAGNOSIS — R29.898 LEFT ARM WEAKNESS: ICD-10-CM

## 2024-06-27 DIAGNOSIS — M25.612 DECREASED ROM OF LEFT SHOULDER: ICD-10-CM

## 2024-06-27 DIAGNOSIS — M75.02 ADHESIVE CAPSULITIS OF LEFT SHOULDER: Primary | ICD-10-CM

## 2024-06-27 DIAGNOSIS — R68.89 ACTIVITY INTOLERANCE: ICD-10-CM

## 2024-06-27 NOTE — PROGRESS NOTES
Physical Therapy Daily Treatment Note      Patient: Jhon Munoz   : 1965  Referring practitioner: Erickson Alejandra MD  Date of Initial Visit: Type: THERAPY  Noted: 2024  Today's Date: 2024  Patient seen for 5 sessions       Visit Diagnoses:    ICD-10-CM ICD-9-CM   1. Adhesive capsulitis of left shoulder  M75.02 726.0   2. Decreased ROM of left shoulder  M25.612 719.51   3. Left arm weakness  R29.898 729.89   4. Activity intolerance  R68.89 780.99       Subjective   L shoulder mobility improving post manipulation.    Objective   See Exercise, Manual, and Modality Logs for complete treatment.       Assessment/Plan  Continues to have tightness at end range L shoulder PROM in all planes. Reported decreased stiffness post passive stretching and exercise. Progress per POC.    Timed:         Manual Therapy:    25     mins  44068;     Therapeutic Exercise:    10     mins  23553;     Neuromuscular Roberto:    0    mins  11786;    Therapeutic Activity:     0     mins  13606;     Gait Trainin     mins  44969;     Ultrasound:     0     mins  93852;    Ionto                               0    mins   90695  Self Care                       0     mins   44624      Un-Timed:  Electrical Stimulation:    0     mins  31107 ( );  Dry Needling     0     mins self-pay  Traction     0     mins 05174  Canalith Repos    0     mins 30781    Timed Treatment:   35   mins   Total Treatment:     35   mins    Didi Carrasquillo, PT  [place] License: [id number]

## 2024-06-28 ENCOUNTER — TREATMENT (OUTPATIENT)
Dept: PHYSICAL THERAPY | Facility: CLINIC | Age: 59
End: 2024-06-28
Payer: COMMERCIAL

## 2024-06-28 DIAGNOSIS — R29.898 LEFT ARM WEAKNESS: ICD-10-CM

## 2024-06-28 DIAGNOSIS — R68.89 ACTIVITY INTOLERANCE: ICD-10-CM

## 2024-06-28 DIAGNOSIS — M25.612 DECREASED ROM OF LEFT SHOULDER: ICD-10-CM

## 2024-06-28 DIAGNOSIS — M75.02 ADHESIVE CAPSULITIS OF LEFT SHOULDER: Primary | ICD-10-CM

## 2024-06-28 NOTE — PROGRESS NOTES
Physical Therapy Daily Note    Patient: Jhon Munoz   : 1965  Diagnosis/ICD-10 Code:  Adhesive capsulitis of left shoulder [M75.02]  Referring practitioner: Erickson Alejandra MD  Date of Initial Visit: Type: THERAPY  Noted: 2024  Today's Date: 2024  Patient seen for 2 session  Location of Service: [place] [address]       Visit Diagnoses:    ICD-10-CM ICD-9-CM   1. Adhesive capsulitis of left shoulder  M75.02 726.0   2. Decreased ROM of left shoulder  M25.612 719.51   3. Left arm weakness  R29.898 729.89   4. Activity intolerance  R68.89 780.99            Subjective  Jhon Munoz reported today that he feels like things are improving. Still having tightness in the front of the shoulder, but feels like it's getting better    Objective   No functional measures updated at today's visit unless otherwise stated. For functional assessment and documentation of patient progressions referred to the assessment section.    See Exercise, Manual, and Modality Logs for complete treatments.       Assessment/Plan  Treatment today continued with emphasis of end range mobility interventions and functional strengthening. Pt continues to have limitations in functional strength and mobility, and will continue to benefit from ongoing skilled PT to help pt continue to progress towards current LTGs and return to PLOF.    Plan: Continue with current treatment plan and progressions to reach goals established and previous evaluation/assessment         Timed:         Manual Therapy:         mins  17423;     Therapeutic Exercise:    25     mins  09623;     Neuromuscular Roberto:    15    mins  57327;    Therapeutic Activity:     15     mins  38473;     Gait Training:           mins  70555;     Ultrasound:          mins  07113;    Ionto                                   mins   66235  Self Care                            mins   62099  Canalith Repos         mins  22357    Un-Timed:  Electrical Stimulation:         mins  73498 ( );  Dry Needling          mins self-pay  Traction          mins 47927  Low Eval          Mins  58978  Mod Eval          Mins  01362  High Eval                            Mins  14223      Timed Treatment:   55   mins   Total Treatment:     55   mins      PT: Dennis White PT     License Number: [id number]  Electronically signed by Dennis White PT, 06/28/24, 7:11 AM EDT

## 2024-07-02 NOTE — PROGRESS NOTES
Physical Therapy Daily Note    Patient: Jhon Munoz   : 1965  Diagnosis/ICD-10 Code:  Adhesive capsulitis of left shoulder [M75.02]  Referring practitioner: Erickson Alejandra MD  Date of Initial Visit: Type: THERAPY  Noted: 2024  Today's Date: 2024  Patient seen for 5 session  Location of Service: [place] [address]       Visit Diagnoses:    ICD-10-CM ICD-9-CM   1. Adhesive capsulitis of left shoulder  M75.02 726.0   2. Decreased ROM of left shoulder  M25.612 719.51   3. Left arm weakness  R29.898 729.89   4. Activity intolerance  R68.89 780.99            Subjective  Jhon Munoz reported today that he is doing better, feels like things are improving    Objective   No functional measures updated at today's visit unless otherwise stated. For functional assessment and documentation of patient progressions referred to the assessment section.    See Exercise, Manual, and Modality Logs for complete treatments.       Assessment/Plan  Started treatment today with dynamic warm-up to prepare pt for interventions; tx today continued with end range mobility, neurodynamic mobility, and functional strengthening, which pt tolerated well and without complaint. Pt continues to have complaints in the aforementioned area, and continues to display limitations and impairments previously noted; pt will continue to benefit from ongoing skilled PT to help pt continue to progress towards current LTGs and return to PLOF.    Plan: Continue with current treatment plan and progressions to reach goals established and previous evaluation/assessment         Timed:         Manual Therapy:         mins  67034;     Therapeutic Exercise:    20     mins  87196;     Neuromuscular Roberto:    10    mins  87865;    Therapeutic Activity:     10     mins  74004;     Gait Training:           mins  29094;     Ultrasound:          mins  38116;    Ionto                                    mins   22830  Self Care                            mins   51338  Canalith Repos         mins 98324    Un-Timed:  Electrical Stimulation:         mins  04515 ( );  Dry Needling          mins self-pay  Traction          mins 10402  Low Eval          Mins  80347  Mod Eval          Mins  46125  High Eval                            Mins  79028      Timed Treatment:   40   mins   Total Treatment:     40   mins      PT: Dennis White PT     License Number: [id number]  Electronically signed by Dennis White PT, 07/02/24, 4:15 PM EDT

## 2024-07-02 NOTE — PROGRESS NOTES
Physical Therapy Daily Note    Patient: Jhon Munoz   : 1965  Diagnosis/ICD-10 Code:  Adhesive capsulitis of left shoulder [M75.02]  Referring practitioner: Erickson Alejandra MD  Date of Initial Visit: Type: THERAPY  Noted: 2024  Today's Date: 2024   Patient seen for 4 session  Location of Service: [place] [address]       Visit Diagnoses:    ICD-10-CM ICD-9-CM   1. Adhesive capsulitis of left shoulder  M75.02 726.0   2. Decreased ROM of left shoulder  M25.612 719.51   3. Left arm weakness  R29.898 729.89   4. Activity intolerance  R68.89 780.99            Subjective  Jhon Munoz reported today that he's doing well, shoulder feeling looser    Objective   No functional measures updated at today's visit unless otherwise stated. For functional assessment and documentation of patient progressions referred to the assessment section.    See Exercise, Manual, and Modality Logs for complete treatments.       Assessment/Plan  Tx today emphasized progressive functional strengthening and end range mobility to address current limitations and impairments in shoulder function. Pt appears to be progressing well with current treatment plan and tolerated today's treatment well. Pt continues to have limitations in the above mentioned areas and will continue to benefit from skilled PT to help pt regain functional mobility and strength necessary to reduce symptoms and return to PLOF.      Continue with current treatment plan and ongoing assessment; progress interventions to tolerance         Timed:         Manual Therapy:    -     mins  83315;     Therapeutic Exercise:    20     mins  32456;     Neuromuscular Roberto:    10    mins  08895;    Therapeutic Activity:     15     mins  39967;     Gait Training:           mins  29944;     Ultrasound:          mins  90948;    Ionto                                   mins   45528  Self Care                             mins   68229  Canalith Repos         mins 55725    Un-Timed:  Electrical Stimulation:         mins  81743 ( );  Dry Needling     -     mins self-pay  Traction          mins 26938  Low Eval          Mins  72872  Mod Eval          Mins  24575  High Eval                            Mins  15342      Timed Treatment:   45   mins   Total Treatment:     45   mins      PT: Dennis White PT     License Number: [id number]  Electronically signed by Dennis White PT, 07/02/24, 12:16 PM EDT

## 2024-07-03 ENCOUNTER — TREATMENT (OUTPATIENT)
Dept: PHYSICAL THERAPY | Facility: CLINIC | Age: 59
End: 2024-07-03
Payer: COMMERCIAL

## 2024-07-03 DIAGNOSIS — R68.89 ACTIVITY INTOLERANCE: ICD-10-CM

## 2024-07-03 DIAGNOSIS — M25.612 DECREASED ROM OF LEFT SHOULDER: ICD-10-CM

## 2024-07-03 DIAGNOSIS — R29.898 LEFT ARM WEAKNESS: ICD-10-CM

## 2024-07-03 DIAGNOSIS — M75.02 ADHESIVE CAPSULITIS OF LEFT SHOULDER: Primary | ICD-10-CM

## 2024-07-05 ENCOUNTER — TREATMENT (OUTPATIENT)
Dept: PHYSICAL THERAPY | Facility: CLINIC | Age: 59
End: 2024-07-05
Payer: COMMERCIAL

## 2024-07-05 DIAGNOSIS — M25.612 DECREASED ROM OF LEFT SHOULDER: ICD-10-CM

## 2024-07-05 DIAGNOSIS — R68.89 ACTIVITY INTOLERANCE: ICD-10-CM

## 2024-07-05 DIAGNOSIS — M75.02 ADHESIVE CAPSULITIS OF LEFT SHOULDER: Primary | ICD-10-CM

## 2024-07-05 DIAGNOSIS — R29.898 LEFT ARM WEAKNESS: ICD-10-CM

## 2024-07-05 NOTE — PROGRESS NOTES
Physical Therapy Daily Note    Patient: Jhon Munoz   : 1965  Diagnosis/ICD-10 Code:  Adhesive capsulitis of left shoulder [M75.02]  Referring practitioner: Erickson Alejandra MD  Date of Initial Visit: Type: THERAPY  Noted: 2024  Today's Date: 2024   Patient seen for 7 session  Location of Service: [place] [address]       Visit Diagnoses:    ICD-10-CM ICD-9-CM   1. Adhesive capsulitis of left shoulder  M75.02 726.0   2. Decreased ROM of left shoulder  M25.612 719.51   3. Left arm weakness  R29.898 729.89   4. Activity intolerance  R68.89 780.99            Subjective  Jhon Munoz reported today that he's doing well, shoulder feels good, still a little tight and achy in the front    Objective   No functional measures updated at today's visit unless otherwise stated. For functional assessment and documentation of patient progressions referred to the assessment section.    See Exercise, Manual, and Modality Logs for complete treatments.       Assessment/Plan  Treatment today consisted of shoulder mobility and progressive overhead strengthening to address patients ongoing limitations and impairments in shoulder mobility and function. Patient tolerated today's treatment well and without complaint or adverse event. Patient continues to have previous noted areas and will continue to benefit from ongoing skilled physical therapy to reach current goals and return to PLOF.      Continue with current treatment plan and ongoing assessment; progress interventions to tolerance         Timed:         Manual Therapy:    10     mins  30663;     Therapeutic Exercise:    25     mins  78871;     Neuromuscular Roberto:    10    mins  07568;    Therapeutic Activity:     10     mins  06856;     Gait Training:           mins  16169;     Ultrasound:          mins  28588;    Ionto                                   mins   78573  Self Care                             mins   91899  Canalith Repos         mins 20566    Un-Timed:  Electrical Stimulation:         mins  42016 ( );  Dry Needling     -     mins self-pay  Traction          mins 34695  Low Eval          Mins  33552  Mod Eval          Mins  50263  High Eval                            Mins  49802      Timed Treatment:   55   mins   Total Treatment:     55   mins      PT: Dennis White PT     License Number: [id number]  Electronically signed by Dennis White PT, 07/05/24, 7:25 AM EDT

## 2024-07-09 ENCOUNTER — OFFICE VISIT (OUTPATIENT)
Dept: INTERNAL MEDICINE | Facility: CLINIC | Age: 59
End: 2024-07-09
Payer: COMMERCIAL

## 2024-07-09 ENCOUNTER — TREATMENT (OUTPATIENT)
Dept: PHYSICAL THERAPY | Facility: CLINIC | Age: 59
End: 2024-07-09
Payer: COMMERCIAL

## 2024-07-09 ENCOUNTER — TELEPHONE (OUTPATIENT)
Dept: INTERNAL MEDICINE | Facility: CLINIC | Age: 59
End: 2024-07-09

## 2024-07-09 VITALS
WEIGHT: 225 LBS | SYSTOLIC BLOOD PRESSURE: 118 MMHG | RESPIRATION RATE: 14 BRPM | OXYGEN SATURATION: 97 % | DIASTOLIC BLOOD PRESSURE: 82 MMHG | HEART RATE: 96 BPM | HEIGHT: 70 IN | BODY MASS INDEX: 32.21 KG/M2

## 2024-07-09 DIAGNOSIS — E78.5 HYPERLIPIDEMIA, UNSPECIFIED HYPERLIPIDEMIA TYPE: ICD-10-CM

## 2024-07-09 DIAGNOSIS — M25.612 DECREASED ROM OF LEFT SHOULDER: ICD-10-CM

## 2024-07-09 DIAGNOSIS — R29.898 LEFT ARM WEAKNESS: ICD-10-CM

## 2024-07-09 DIAGNOSIS — E11.9 TYPE 2 DIABETES MELLITUS WITHOUT COMPLICATION, WITHOUT LONG-TERM CURRENT USE OF INSULIN: Primary | ICD-10-CM

## 2024-07-09 DIAGNOSIS — M75.02 ADHESIVE CAPSULITIS OF LEFT SHOULDER: Primary | ICD-10-CM

## 2024-07-09 DIAGNOSIS — I10 ESSENTIAL HYPERTENSION: ICD-10-CM

## 2024-07-09 DIAGNOSIS — R68.89 ACTIVITY INTOLERANCE: ICD-10-CM

## 2024-07-09 DIAGNOSIS — I63.412 CEREBROVASCULAR ACCIDENT (CVA) DUE TO EMBOLISM OF LEFT MIDDLE CEREBRAL ARTERY: ICD-10-CM

## 2024-07-09 DIAGNOSIS — E03.8 SUBCLINICAL HYPOTHYROIDISM: ICD-10-CM

## 2024-07-09 PROCEDURE — 99214 OFFICE O/P EST MOD 30 MIN: CPT | Performed by: FAMILY MEDICINE

## 2024-07-09 RX ORDER — ROSUVASTATIN CALCIUM 40 MG/1
40 TABLET, COATED ORAL NIGHTLY
Qty: 90 TABLET | Refills: 3 | Status: SHIPPED | OUTPATIENT
Start: 2024-07-09

## 2024-07-09 NOTE — TELEPHONE ENCOUNTER
NIKKI WITH [employer] CALLED AND STATES DR. REY IN OUT OF NETWORK WITH [employer] AS OF 6/4/24  SHE HAS CORRECT NPI #    PIN # [id number]    PATIENT HAS AN APPOINTMENT TODAY AT 10:15    PLEASE CALL [phone] IF ANY OTHER QUESTIONS  OR CREDENTIALING [phone]  PROVIDER SERVICES [phone]

## 2024-07-09 NOTE — PROGRESS NOTES
"Chief Complaint  Cerebrovascular Accident    Brandi Munoz presents to [facility]  History of Present Illness  The patient is a 58-year-old male who is following up on numerous different issues.    The patient recently underwent a shoulder manipulation procedure on 07/18/2023, resulting in a 10-day course of physical therapy. His range of motion is gradually improving. He has been actively participating in aquatic therapy.    The patient is currently on a regimen of carvedilol 25 mg twice daily, Entresto 49/51 mg, and spironolactone 25 mg daily.    The patient is currently on Crestor 40 mg daily for hyperlipidemia.    For his diabetes, the patient is currently on Jardiance 25 mg.    Objective   Vital Signs:   /82 (BP Location: Left arm, Patient Position: Sitting, Cuff Size: Adult)   Pulse 96   Resp 14   Ht 177.8 cm (70\")   Wt 102 kg (225 lb)   SpO2 97%   BMI 32.28 kg/m²     Physical Exam  Vitals and nursing note reviewed.   Constitutional:       Appearance: He is well-developed.   HENT:      Head: Normocephalic and atraumatic.   Musculoskeletal:      Cervical back: Normal range of motion and neck supple.   Neurological:      Mental Status: He is alert and oriented to person, place, and time.   Psychiatric:         Behavior: Behavior normal.         Physical Exam  Vital Signs  Vitals show a blood pressure of 118/82.     Result Review :                 Assessment and Plan    Diagnoses and all orders for this visit:    1. Type 2 diabetes mellitus without complication, without long-term current use of insulin (Primary)  -     Hemoglobin A1c  -     Microalbumin / Creatinine Urine Ratio - Urine, Clean Catch  -     empagliflozin (Jardiance) 25 MG tablet tablet; Take 1 tablet by mouth Daily.  Dispense: 90 tablet; Refill: 3    2. Hyperlipidemia, unspecified hyperlipidemia type  -     Lipid Panel With LDL / HDL Ratio  -     rosuvastatin (CRESTOR) 40 MG tablet; " Take 1 tablet by mouth Every Night.  Dispense: 90 tablet; Refill: 3    3. Essential hypertension  -     CBC & Differential  -     Comprehensive Metabolic Panel    4. Cerebrovascular accident (CVA) due to embolism of left middle cerebral artery    5. Subclinical hypothyroidism  -     Thyroid Panel With TSH      Assessment & Plan  1. Hypertension  The patient's blood pressure is well-regulated.  Continue current blood pressure medications.    2. Diabetes  Continue Ozempic 0.5 mg weekly along with Jardiance 25 mg daily.    3.  Hyperlipidemia  Continue taking Crestor 40 mg daily.    4.  Hypothyroidism  Continue levothyroxine 25 mcg daily.        Follow Up   No follow-ups on file.  Patient was given instructions and counseling regarding his condition or for health maintenance advice. Please see specific information pulled into the AVS if appropriate.           Patient or patient representative verbalized consent for the use of Ambient Listening during the visit with  Oneil Decker MD for chart documentation. 7/9/2024  15:01 EDT

## 2024-07-09 NOTE — PROGRESS NOTES
Physical Therapy Daily Treatment Note  [facility] [place]   [address], [address]  [place] [address]  P: [phone]  F: [phone]    Patient: Jhon Munoz   : 1965  Referring practitioner: Erickson Alejandra MD  Date of Initial Visit: Type: THERAPY  Noted: 2024  Today's Date: 2024  Patient seen for 8 sessions       Visit Diagnoses:    ICD-10-CM ICD-9-CM   1. Adhesive capsulitis of left shoulder  M75.02 726.0   2. Decreased ROM of left shoulder  M25.612 719.51   3. Left arm weakness  R29.898 729.89   4. Activity intolerance  R68.89 780.99         Jhon Munoz reports: Pt reports he is doing well. Pt note his shoulder feels a bit sore/stiff but nothing too bad. Pt expressed interest in trying dry needling. Pt has been performing his HEP as instructed. No other comments/complaints noted.     Subjective     Objective   See Exercise, Manual, and Modality Logs for complete treatment.       Assessment/Plan  Interventions were performed in line w/ established PT PoC. Pt required min VC/TC for correct biomechanics w/ therex interventions to avoid compensatory movements. Pt was able to perform requested interventions today w/out s/s exasperation.     Plan:  Pt will continue with current plan of care to achieve outlined goals. Therapeutic interventions will be progressed where and when appropriate.        Timed:         Manual Therapy:    0     mins  92094;     Therapeutic Exercise:    13     mins  15124;     Neuromuscular Roberto:    10    mins  13280;    Therapeutic Activity:     10     mins  90562;     Gait Trainin     mins  50547;     Ultrasound:     0     mins  37064;    Ionto                               0    mins  10009  Self Care                       0     mins  56411  Traction     0     mins 36482      Un-Timed:  Canalith Repos    0     mins 84424  Electrical Stimulation:    0     mins  67786 ( );  Dry Needling     0     mins self-pay  Traction      0     mins 68325        Timed Treatment:   33   mins   Total Treatment:     33   mins    Arcenio Harp, PT  [place] License #: [id number]    Physical Therapist

## 2024-07-12 ENCOUNTER — PATIENT ROUNDING (BHMG ONLY) (OUTPATIENT)
Dept: INTERNAL MEDICINE | Facility: CLINIC | Age: 59
End: 2024-07-12
Payer: COMMERCIAL

## 2024-07-12 ENCOUNTER — TREATMENT (OUTPATIENT)
Dept: PHYSICAL THERAPY | Facility: CLINIC | Age: 59
End: 2024-07-12
Payer: COMMERCIAL

## 2024-07-12 DIAGNOSIS — M25.612 DECREASED ROM OF LEFT SHOULDER: ICD-10-CM

## 2024-07-12 DIAGNOSIS — M75.02 ADHESIVE CAPSULITIS OF LEFT SHOULDER: Primary | ICD-10-CM

## 2024-07-12 NOTE — PROGRESS NOTES
July 12, 2024    Hello, may I speak with Jhon Munoz?    My name is jae      I am  with [facility]  [address]  [facility] [address] [phone].    Before we get started may I verify your date of birth? 1965    I am calling to officially welcome you to our practice and ask about your recent visit. Is this a good time to talk? yes    Tell me about your visit with us. What things went well?  yes       We're always looking for ways to make our patients' experiences even better. Do you have recommendations on ways we may improve?  no    Overall were you satisfied with your first visit to our practice? yes       I appreciate you taking the time to speak with me today. Is there anything else I can do for you? no      Thank you, and have a great day.

## 2024-07-16 NOTE — PROGRESS NOTES
Re-Assessment / Re-Certification    Patient: Jhon Munoz   : 1965  Diagnosis/ICD-10 Code:  Adhesive capsulitis of left shoulder [M75.02]  Referring practitioner: Erickson Alejandra MD  Date of Initial Visit: Type: THERAPY  Noted: 2024  Today's Date: 2024   Patient seen for 8 session  Location of Service: [address] [place] [address]       Visit Diagnoses:    ICD-10-CM ICD-9-CM   1. Adhesive capsulitis of left shoulder  M75.02 726.0   2. Decreased ROM of left shoulder  M25.612 719.51   3. Left arm weakness  R29.898 729.89   4. Activity intolerance  R68.89 780.99       Subjective:     Subjective Questionnaire: QuickDASH: 18%  Clinical Progress: improved  Home Program Compliance: Yes  Treatment has included: therapeutic exercise, neuromuscular re-education, manual therapy, and therapeutic activity  NPRS: Worst: 4/10, Average: 1-2/10    Subjective   Jhon Munoz reported today that he's doing better    Objective   Active Range of Motion   Left Shoulder   Flexion: WFL, mild limitations at end range  Abduction: WFL, mild limitations and reported pain at end range     Right Shoulder   Normal active range of motion          Passive Range of Motion   Left Shoulder   External rotation 0°: 78 degrees      Strength/Myotome Testing      Left Shoulder      Planes of Motion   Flexion: 4+   Abduction: 5-   External rotation at 90°: 4+   Internal rotation at 90°: 5-      Right Shoulder   Normal muscle strength        See Exercise, Manual, and Modality Logs for complete treatment.     Assessment/Plan  Patient demonstrated improvements in mobility and strength at today's re-evaluation and is progressing well towards current goals under current treatment plan. Tx today continue with emphasis of progressive functional strengthening, which the patient tolerated well and without complaint. VC and tactile cueing were provided with all interventions as needed during  today's session. Patient's HEP was updated and progressed at the end of today's treatment with patient demonstrating good understanding Patient continues to have some limitations and impairments in the aforementioned areas and will continue to benefit from skilled PT to help patient regain functional mobility and return to PLOF by meeting all LTGs.      Progress toward previous goals: Partially Met    -    New Goals  Short-term goals (STG): -  Long-term goals (LTG): -      Recommendations: Continue as planned following plan implemented at IE, with progressions towards goals est at IE  Timeframe: 1 month  Prognosis to achieve goals: good    PT: Dennis White PT     License Number: [id number]  Electronically signed by Dennis White PT, 07/16/24, 7:16 AM EDT      Based upon review of the patient's progress and continued therapy plan, it is my medical opinion that Jhon Munoz should continue physical therapy treatment at [facility]  [facility]  [address] [place] [id number] [phone].    Signature: __________________________________  Erickson Alejandra MD  PHYSICIAN: Erickson Alejandra MD  NPI: [id number]                                     DATE:     Timed:         Manual Therapy:    -     mins  09434;     Therapeutic Exercise:    25     mins  70270;     Neuromuscular Roberto:    15    mins  76553;    Therapeutic Activity:     15     mins  31398;     Gait Training:           mins  43211;     Ultrasound:          mins  97929;    Ionto                                  mins   48490  Self Care                           mins   38763  Canalith Repos         mins 33873    Un-Timed:  Electrical Stimulation:          mins  87291 ( );  Dry Needling         mins self-pay  Traction         mins 12720  Re-Evaluation    -    Mins  45653      Timed Treatment:   55   mins   Total Treatment:     55   mins      Please sign and return via fax to [email or web address] . Thank you, [facility].

## 2024-07-16 NOTE — PROGRESS NOTES
Physical Therapy Daily Note    Patient: Jhon Munoz   : 1965  Diagnosis/ICD-10 Code:  Adhesive capsulitis of left shoulder [M75.02]  Referring practitioner: Erickson Alejandra MD  Date of Initial Visit: Type: THERAPY  Noted: 2024  Today's Date: 2024  Patient seen for 9 session  Location of Service: [place] [address]       Visit Diagnoses:    ICD-10-CM ICD-9-CM   1. Adhesive capsulitis of left shoulder  M75.02 726.0   2. Decreased ROM of left shoulder  M25.612 719.51   3. Left arm weakness  R29.898 729.89   4. Activity intolerance  R68.89 780.99            Subjective  Jhon Munoz reported today that he feels like things are improving. Shoulder still achy at times, feels like things are improving though    Objective   No functional measures updated at today's visit unless otherwise stated. For functional assessment and documentation of patient progressions referred to the assessment section.    See Exercise, Manual, and Modality Logs for complete treatments.       Assessment/Plan  Treatment today continued with emphasis of end range mobility interventions and functional strengthening to promoted improved functional mobility and independence with ADLs and community activities. Pt continues to report difficulty and limitations with end range abduciton and overhead loading, though tolerance to interventions appears to be improving per gross assessment and patient reports in the clinic. Pt continues to have limitations in functional strength and mobility, and will continue to benefit from ongoing skilled PT to help pt continue to progress towards current LTGs and return to PLOF.    Plan: Continue with current treatment plan and progressions to reach goals established and previous evaluation/assessment         Timed:         Manual Therapy:         mins  48241;     Therapeutic Exercise:    30     mins  87124;     Neuromuscular Roberto:    15     mins  75856;    Therapeutic Activity:     10     mins  02821;     Gait Training:           mins  65362;     Ultrasound:          mins  84952;    Ionto                                   mins   88118  Self Care                            mins   77424  Canalith Repos         mins 76695    Un-Timed:  Electrical Stimulation:         mins  95802 ( );  Dry Needling          mins self-pay  Traction          mins 81495  Low Eval          Mins  59400  Mod Eval          Mins  56990  High Eval                            Mins  01886      Timed Treatment:   55   mins   Total Treatment:     55   mins      PT: Dennis White PT     License Number: [id number]  Electronically signed by Dennis White PT, 07/16/24, 5:31 PM EDT

## 2024-07-19 ENCOUNTER — TREATMENT (OUTPATIENT)
Dept: PHYSICAL THERAPY | Facility: CLINIC | Age: 59
End: 2024-07-19
Payer: COMMERCIAL

## 2024-07-19 DIAGNOSIS — R29.898 LEFT ARM WEAKNESS: ICD-10-CM

## 2024-07-19 DIAGNOSIS — R68.89 ACTIVITY INTOLERANCE: ICD-10-CM

## 2024-07-19 DIAGNOSIS — M75.02 ADHESIVE CAPSULITIS OF LEFT SHOULDER: Primary | ICD-10-CM

## 2024-07-19 DIAGNOSIS — M25.612 DECREASED ROM OF LEFT SHOULDER: ICD-10-CM

## 2024-07-23 NOTE — PROGRESS NOTES
Physical Therapy Daily Note    Patient: Jhon Munoz   : 1965  Diagnosis/ICD-10 Code:  Adhesive capsulitis of left shoulder [M75.02]  Referring practitioner: Erickson Alejandra MD  Date of Initial Visit: Type: THERAPY  Noted: 2024  Today's Date: 2024  Patient seen for 11 session  Location of Service: [place] [address]       Visit Diagnoses:    ICD-10-CM ICD-9-CM   1. Adhesive capsulitis of left shoulder  M75.02 726.0   2. Decreased ROM of left shoulder  M25.612 719.51            Subjective  Jhon Munoz reported today that he is doing better, feels like things are improving    Objective   No functional measures updated at today's visit unless otherwise stated. For functional assessment and documentation of patient progressions referred to the assessment section.    See Exercise, Manual, and Modality Logs for complete treatments.       Assessment/Plan  Started treatment today with dynamic warm-up to prepare pt for interventions; tx today continued with end range mobility, neurodynamic mobility, and functional strengthening, which pt tolerated well and without complaint. Pt continues to have complaints in the aforementioned area, and continues to display limitations and impairments previously noted; pt will continue to benefit from ongoing skilled PT to help pt continue to progress towards current LTGs and return to PLOF.    Plan: Continue with current treatment plan and progressions to reach goals established and previous evaluation/assessment         Timed:         Manual Therapy:         mins  76163;     Therapeutic Exercise:    20     mins  01804;     Neuromuscular Roberto:    10    mins  62736;    Therapeutic Activity:     10     mins  19871;     Gait Training:           mins  22007;     Ultrasound:          mins  63699;    Ionto                                   mins   87128  Self Care                            mins    09750  [facility]         mins 38994    Un-Timed:  Electrical Stimulation:         mins  46667 ( );  Dry Needling          mins self-pay  Traction          mins 58255  Low Eval          Mins  37996  Mod Eval          Mins  94469  High Eval                            Mins  56473      Timed Treatment:   40   mins   Total Treatment:     40   mins      PT: Dennis White PT     License Number: [id number]  Electronically signed by Dennis White PT, 07/23/24, 8:12 AM EDT

## 2024-07-25 ENCOUNTER — TREATMENT (OUTPATIENT)
Dept: PHYSICAL THERAPY | Facility: CLINIC | Age: 59
End: 2024-07-25
Payer: COMMERCIAL

## 2024-07-25 DIAGNOSIS — R68.89 ACTIVITY INTOLERANCE: ICD-10-CM

## 2024-07-25 DIAGNOSIS — R29.898 LEFT ARM WEAKNESS: ICD-10-CM

## 2024-07-25 DIAGNOSIS — M75.02 ADHESIVE CAPSULITIS OF LEFT SHOULDER: Primary | ICD-10-CM

## 2024-07-25 DIAGNOSIS — M25.612 DECREASED ROM OF LEFT SHOULDER: ICD-10-CM

## 2024-07-25 NOTE — PROGRESS NOTES
Physical Therapy Daily Treatment Note      Patient: Jhon Munoz   : 1965  Referring practitioner: Erickson Alejandra MD  Date of Initial Visit: Type: THERAPY  Noted: 2024  Today's Date: 2024  Patient seen for 12 sessions       Visit Diagnoses:    ICD-10-CM ICD-9-CM   1. Adhesive capsulitis of left shoulder  M75.02 726.0   2. Decreased ROM of left shoulder  M25.612 719.51   3. Left arm weakness  R29.898 729.89   4. Activity intolerance  R68.89 780.99       Subjective   Pain L (anterior) shoulder with arm outstretched.     Objective   See Exercise, Manual, and Modality Logs for complete treatment.     Assessment/Plan  Continues to have limited L shoulder AROM/tightness in all directions. Tender L pec major insertion. AROM improved/pain decreased with massage and passive stretching. Reviewed HEP: sleeper stretch, doorway stretch, snow angels. Progress per POC.      Timed:         Manual Therapy:    25     mins  32017;     Therapeutic Exercise:    10     mins  02320;     Neuromuscular Roberto:    0    mins  17837;    Therapeutic Activity:     0     mins  13655;     Gait Trainin     mins  25682;     Ultrasound:     0     mins  53083;    Ionto                               0    mins   96895  Self Care                       0     mins   47886      Un-Timed:  Electrical Stimulation:    0     mins  42525 ( );  Dry Needling     0     mins self-pay  Traction     0     mins 38995  Canalith Repos    0     mins 68625    Timed Treatment:   35   mins   Total Treatment:     35   mins    Didi Carrasquillo, PT  [place] License: [id number]

## 2024-07-25 NOTE — PATIENT INSTRUCTIONS
Access Code: [email or web address]  URL: [email or web address]  Date: 07/25/2024  Prepared by: Didi Carrasquillo    Exercises  - Sleeper Stretch  - 1 x daily - 7 x weekly - 1 sets - 6 reps - 10s hold  - Doorway Pec Stretch at 90 Degrees Abduction  - 1 x daily - 7 x weekly - 1 sets - 3 reps - 20s hold  - Snow [place]  - 1 x daily - 7 x weekly - 1 sets - 10 reps

## 2024-08-02 ENCOUNTER — TREATMENT (OUTPATIENT)
Dept: PHYSICAL THERAPY | Facility: CLINIC | Age: 59
End: 2024-08-02
Payer: COMMERCIAL

## 2024-08-02 DIAGNOSIS — R29.898 LEFT ARM WEAKNESS: ICD-10-CM

## 2024-08-02 DIAGNOSIS — M75.02 ADHESIVE CAPSULITIS OF LEFT SHOULDER: Primary | ICD-10-CM

## 2024-08-02 DIAGNOSIS — E11.9 TYPE 2 DIABETES MELLITUS WITHOUT COMPLICATION, WITHOUT LONG-TERM CURRENT USE OF INSULIN: ICD-10-CM

## 2024-08-02 DIAGNOSIS — M25.612 DECREASED ROM OF LEFT SHOULDER: ICD-10-CM

## 2024-08-02 DIAGNOSIS — R68.89 ACTIVITY INTOLERANCE: ICD-10-CM

## 2024-08-02 RX ORDER — SEMAGLUTIDE 0.68 MG/ML
0.5 INJECTION, SOLUTION SUBCUTANEOUS
Qty: 3 ML | Refills: 3 | Status: SHIPPED | OUTPATIENT
Start: 2024-08-02

## 2024-08-02 NOTE — PROGRESS NOTES
Physical Therapy Daily Treatment Note      Patient: Jhon Munoz   : 1965  Referring practitioner: Erickson Alejandra MD  Date of Initial Visit: Type: THERAPY  Noted: 2024  Today's Date: 2024  Patient seen for 14 sessions       Visit Diagnoses:    ICD-10-CM ICD-9-CM   1. Adhesive capsulitis of left shoulder  M75.02 726.0   2. Decreased ROM of left shoulder  M25.612 719.51   3. Left arm weakness  R29.898 729.89   4. Activity intolerance  R68.89 780.99       Subjective   Shoulder improving. Less painful sleeping on L. Still limited reaching behind back.    Objective   See Exercise, Manual, and Modality Logs for complete treatment.       Assessment/Plan  Tender L anterior shoulder and tight at end range PROM in all directions. Trial of dry needling at L anterior deltoid did not elicit twitch response. Relief with massage and passive stretching. Progress per POC.    Timed:         Manual Therapy:    25     mins  40327;     Therapeutic Exercise:    0     mins  30409;     Neuromuscular Roberto:    0    mins  42683;    Therapeutic Activity:     0     mins  35567;     Gait Trainin     mins  84201;     Ultrasound:     0     mins  36690;    Ionto                               0    mins   32560  Self Care                       0     mins   42735      Un-Timed:  Electrical Stimulation:    0     mins  42704 ( );  Dry Needling     10     mins self-pay  Traction     0     mins 30139  Canalith Repos    0     mins 61511    Timed Treatment:   25   mins   Total Treatment:     45   mins    Didi Carrasquillo, PT  [place] License: [id number]

## 2024-08-02 NOTE — PROGRESS NOTES
Physical Therapy Daily Note    Patient: Jhon Munoz   : 1965  Diagnosis/ICD-10 Code:  Adhesive capsulitis of left shoulder [M75.02]  Referring practitioner: Erickson Alejandra MD  Date of Initial Visit: Type: THERAPY  Noted: 2024  Today's Date: 2024  Patient seen for 12 session  Location of Service: [place] [address]       Visit Diagnoses:    ICD-10-CM ICD-9-CM   1. Adhesive capsulitis of left shoulder  M75.02 726.0   2. Decreased ROM of left shoulder  M25.612 719.51   3. Left arm weakness  R29.898 729.89   4. Activity intolerance  R68.89 780.99            Subjective  Jhon Munoz reported today that he is doing better, feels like things are improving    Objective   No functional measures updated at today's visit unless otherwise stated. For functional assessment and documentation of patient progressions referred to the assessment section.    See Exercise, Manual, and Modality Logs for complete treatments.       Assessment/Plan  Started treatment today with dynamic warm-up to prepare pt for interventions; tx today continued with end range mobility, neurodynamic mobility, and functional strengthening, which pt tolerated well and without complaint. Pt continues to have complaints in the aforementioned area, and continues to display limitations and impairments previously noted; pt will continue to benefit from ongoing skilled PT to help pt continue to progress towards current LTGs and return to PLOF.    Plan: Continue with current treatment plan and progressions to reach goals established and previous evaluation/assessment         Timed:         Manual Therapy:         mins  58973;     Therapeutic Exercise:    20     mins  44004;     Neuromuscular Roberto:    10    mins  58622;    Therapeutic Activity:     10     mins  36249;     Gait Training:           mins  79701;     Ultrasound:          mins  37403;    Ionto                                    mins   30751  Self Care                            mins   34277  Canalith Repos         mins 90454    Un-Timed:  Electrical Stimulation:         mins  23488 ( );  Dry Needling          mins self-pay  Traction          mins 59894  Low Eval          Mins  41047  Mod Eval          Mins  24962  High Eval                            Mins  87295      Timed Treatment:   40   mins   Total Treatment:     40   mins      PT: Dennis White PT     License Number: [id number]  Electronically signed by Dennis White PT, 08/02/24, 6:17 AM EDT

## 2024-08-05 NOTE — TELEPHONE ENCOUNTER
Caller: Jyoti Munoz    Relationship: Emergency Contact    Best call back number: [phone] (Mobile)     Requested Prescriptions:   Requested Prescriptions     Pending Prescriptions Disp Refills    carvedilol (COREG) 25 MG tablet [Pharmacy Med Name: CARVEDILOL 25 MG TABLET] 180 tablet 3     Sig: TAKE 1 TABLET BY MOUTH TWICE A DAY    spironolactone (ALDACTONE) 25 MG tablet [Pharmacy Med Name: SPIRONOLACTONE 25 MG TABLET] 135 tablet 3     Sig: TAKE 1 TABLET BY MOUTH IN THE MORNING AND 1/2 TABLET IN THE EVENING    rosuvastatin (CRESTOR) 40 MG tablet       Pharmacy where request should be sent: [facility] [id number] [facility] [address] [facility] [address] - [phone] [facility] [phone] FX     Last office visit with prescribing clinician: 7/9/2024   Last telemedicine visit with prescribing clinician: Visit date not found   Next office visit with prescribing clinician: 8/19/2024     Additional details provided by patient:  PATIENT CALLED TO REQUEST A MEDICATION REFILL ON MEDICATION. PATIENT HAS A 3 DAY SUPPLY LEFT.      Does the patient have less than a 3 day supply:  [] Yes  [] No    Would you like a call back once the refill request has been completed: [] Yes [] No    If the office needs to give you a call back, can they leave a voicemail: [] Yes [] No    Carole Matamoros Rep   08/05/24 15:35 EDT         THANKS

## 2024-08-06 ENCOUNTER — TREATMENT (OUTPATIENT)
Dept: PHYSICAL THERAPY | Facility: CLINIC | Age: 59
End: 2024-08-06
Payer: COMMERCIAL

## 2024-08-06 DIAGNOSIS — M25.612 DECREASED ROM OF LEFT SHOULDER: ICD-10-CM

## 2024-08-06 DIAGNOSIS — R29.898 LEFT ARM WEAKNESS: ICD-10-CM

## 2024-08-06 DIAGNOSIS — M75.02 ADHESIVE CAPSULITIS OF LEFT SHOULDER: Primary | ICD-10-CM

## 2024-08-06 DIAGNOSIS — R68.89 ACTIVITY INTOLERANCE: ICD-10-CM

## 2024-08-06 RX ORDER — CARVEDILOL 25 MG/1
25 TABLET ORAL 2 TIMES DAILY
Qty: 180 TABLET | Refills: 3 | Status: SHIPPED | OUTPATIENT
Start: 2024-08-06

## 2024-08-06 RX ORDER — SPIRONOLACTONE 25 MG/1
TABLET ORAL
Qty: 135 TABLET | Refills: 3 | Status: SHIPPED | OUTPATIENT
Start: 2024-08-06

## 2024-08-06 NOTE — PROGRESS NOTES
Physical Therapy Daily Treatment Note      Patient: Jhon Munoz   : 1965  Referring practitioner: Erickson Alejandra MD  Date of Initial Visit: Type: THERAPY  Noted: 2024  Today's Date: 2024  Patient seen for 15 sessions       Visit Diagnoses:    ICD-10-CM ICD-9-CM   1. Adhesive capsulitis of left shoulder  M75.02 726.0   2. Decreased ROM of left shoulder  M25.612 719.51   3. Left arm weakness  R29.898 729.89   4. Activity intolerance  R68.89 780.99       Subjective   L shoulder continues to improve.    Objective   See Exercise, Manual, and Modality Logs for complete treatment.     Assessment/Plan  Continues to have anterior shoulder tenderness and tightness at end range in all directions. Responding well to manual interventions. Updated HEP w/ prone horizontal abduction to reduce forward shoulder.    Timed:         Manual Therapy:    23     mins  11787;     Therapeutic Exercise:    8     mins  02902;     Neuromuscular Roberto:    0    mins  26015;    Therapeutic Activity:     0     mins  38909;     Gait Trainin     mins  62665;     Ultrasound:     0     mins  93833;    Ionto                               0    mins   87150  Self Care                       0     mins   25414      Un-Timed:  Electrical Stimulation:    0     mins  24218 ( );  Dry Needling     0     mins self-pay  Traction     0     mins 03130  Canalith Repos    0     mins 86177    Timed Treatment:   31   mins   Total Treatment:     31   mins    Didi Carrasquillo PT  [place] License: [id number]

## 2024-08-09 ENCOUNTER — TREATMENT (OUTPATIENT)
Dept: PHYSICAL THERAPY | Facility: CLINIC | Age: 59
End: 2024-08-09
Payer: COMMERCIAL

## 2024-08-09 DIAGNOSIS — R29.898 LEFT ARM WEAKNESS: ICD-10-CM

## 2024-08-09 DIAGNOSIS — R68.89 ACTIVITY INTOLERANCE: ICD-10-CM

## 2024-08-09 DIAGNOSIS — M75.02 ADHESIVE CAPSULITIS OF LEFT SHOULDER: Primary | ICD-10-CM

## 2024-08-09 DIAGNOSIS — M25.612 DECREASED ROM OF LEFT SHOULDER: ICD-10-CM

## 2024-08-09 NOTE — PROGRESS NOTES
Physical Therapy Daily Treatment Note      Patient: Jhon Munoz   : 1965  Referring practitioner: Erickson Alejandra MD  Date of Initial Visit: Type: THERAPY  Noted: 2024  Today's Date: 2024  Patient seen for 16 sessions       Visit Diagnoses:    ICD-10-CM ICD-9-CM   1. Adhesive capsulitis of left shoulder  M75.02 726.0   2. Decreased ROM of left shoulder  M25.612 719.51   3. Left arm weakness  R29.898 729.89   4. Activity intolerance  R68.89 780.99       Subjective   L shoulder still tight.    Objective   See Exercise, Manual, and Modality Logs for complete treatment.       Assessment/Plan  Continues to have tightness beyond 90deg flex and abd. Improves w/ manual interventions. Progress per POC.    Timed:         Manual Therapy:    23     mins  28650;     Therapeutic Exercise:    10     mins  78876;     Neuromuscular Roberto:    0    mins  70540;    Therapeutic Activity:     0     mins  22471;     Gait Trainin     mins  77689;     Ultrasound:     0     mins  12475;    Ionto                               0    mins   29102  Self Care                       0     mins   05257      Un-Timed:  Electrical Stimulation:    0     mins  87183 ( );  Dry Needling     0     mins self-pay  Traction     0     mins 21817  Canalith Repos    0     mins 87715    Timed Treatment:   33   mins   Total Treatment:     33   mins    Didi Carrasquillo, PT  [place] License: [id number]

## 2024-08-16 ENCOUNTER — TREATMENT (OUTPATIENT)
Dept: PHYSICAL THERAPY | Facility: CLINIC | Age: 59
End: 2024-08-16
Payer: COMMERCIAL

## 2024-08-16 DIAGNOSIS — R68.89 ACTIVITY INTOLERANCE: ICD-10-CM

## 2024-08-16 DIAGNOSIS — M25.612 DECREASED ROM OF LEFT SHOULDER: ICD-10-CM

## 2024-08-16 DIAGNOSIS — M75.02 ADHESIVE CAPSULITIS OF LEFT SHOULDER: Primary | ICD-10-CM

## 2024-08-16 DIAGNOSIS — R29.898 LEFT ARM WEAKNESS: ICD-10-CM

## 2024-08-16 NOTE — PROGRESS NOTES
Physical Therapy Daily Treatment Note      Patient: Jhon Munoz   : 1965  Referring practitioner: Erickson Alejandra MD  Date of Initial Visit: Type: THERAPY  Noted: 2024  Today's Date: 2024  Patient seen for 17 sessions       Visit Diagnoses:    ICD-10-CM ICD-9-CM   1. Adhesive capsulitis of left shoulder  M75.02 726.0   2. Decreased ROM of left shoulder  M25.612 719.51   3. Left arm weakness  R29.898 729.89   4. Activity intolerance  R68.89 780.99       Subjective   L shoulder most tight upon waking. Sleeps on L side.    Objective   See Exercise, Manual, and Modality Logs for complete treatment.       Assessment/Plan  Discussed strategies for alleviating tightness including sleeping on R side and 10 min morning stretch routine. Continues to have tightness at end range PROM in all directions. Responds well to exercise. Plan to DC to [facility] and follow up as needed per pt request.    Timed:         Manual Therapy:    23     mins  24138;     Therapeutic Exercise:    8     mins  86566;     Neuromuscular Roberto:    0    mins  21612;    Therapeutic Activity:     0     mins  54189;     Gait Trainin     mins  23984;     Ultrasound:     0     mins  91696;    Ionto                               0    mins   26352  Self Care                       0     mins   72903      Un-Timed:  Electrical Stimulation:    0     mins  05979 ( );  Dry Needling     0     mins self-pay  Traction     0     mins 25690  Canalith Repos    0     mins 16869    Timed Treatment:   31   mins   Total Treatment:     31   mins    Didi Carrasquillo PT  [place] License: [id number]

## 2024-08-17 LAB
ALBUMIN SERPL-MCNC: 4.2 G/DL (ref 3.9–4.9)
ALBUMIN/CREAT UR: 19 MG/G CREAT (ref 0–29)
ALP SERPL-CCNC: 55 IU/L (ref 44–121)
ALT SERPL-CCNC: 27 IU/L (ref 0–44)
AST SERPL-CCNC: 26 IU/L (ref 0–40)
BASOPHILS # BLD AUTO: 0 X10E3/UL (ref 0–0.2)
BASOPHILS NFR BLD AUTO: 1 %
BILIRUB SERPL-MCNC: 0.5 MG/DL (ref 0–1.2)
BUN SERPL-MCNC: 16 MG/DL (ref 8–27)
BUN/CREAT SERPL: 13 (ref 10–24)
CALCIUM SERPL-MCNC: 9.3 MG/DL (ref 8.6–10.2)
CHLORIDE SERPL-SCNC: 102 MMOL/L (ref 96–106)
CHOLEST SERPL-MCNC: 106 MG/DL (ref 100–199)
CO2 SERPL-SCNC: 23 MMOL/L (ref 20–29)
CREAT SERPL-MCNC: 1.25 MG/DL (ref 0.76–1.27)
CREAT UR-MCNC: 109.9 MG/DL
EGFRCR SERPLBLD CKD-EPI 2021: 66 ML/MIN/1.73
EOSINOPHIL # BLD AUTO: 0.1 X10E3/UL (ref 0–0.4)
EOSINOPHIL NFR BLD AUTO: 3 %
ERYTHROCYTE [DISTWIDTH] IN BLOOD BY AUTOMATED COUNT: 13.1 % (ref 11.6–15.4)
FT4I SERPL CALC-MCNC: 1.6 (ref 1.2–4.9)
GLOBULIN SER CALC-MCNC: 2.4 G/DL (ref 1.5–4.5)
GLUCOSE SERPL-MCNC: 85 MG/DL (ref 70–99)
HBA1C MFR BLD: 6.2 % (ref 4.8–5.6)
HCT VFR BLD AUTO: 42.2 % (ref 37.5–51)
HDLC SERPL-MCNC: 57 MG/DL
HGB BLD-MCNC: 13.3 G/DL (ref 13–17.7)
IMM GRANULOCYTES # BLD AUTO: 0 X10E3/UL (ref 0–0.1)
IMM GRANULOCYTES NFR BLD AUTO: 0 %
LDLC SERPL CALC-MCNC: 36 MG/DL (ref 0–99)
LDLC/HDLC SERPL: 0.6 RATIO (ref 0–3.6)
LYMPHOCYTES # BLD AUTO: 1.9 X10E3/UL (ref 0.7–3.1)
LYMPHOCYTES NFR BLD AUTO: 38 %
MCH RBC QN AUTO: 29.6 PG (ref 26.6–33)
MCHC RBC AUTO-ENTMCNC: 31.5 G/DL (ref 31.5–35.7)
MCV RBC AUTO: 94 FL (ref 79–97)
MICROALBUMIN UR-MCNC: 20.5 UG/ML
MONOCYTES # BLD AUTO: 0.4 X10E3/UL (ref 0.1–0.9)
MONOCYTES NFR BLD AUTO: 9 %
NEUTROPHILS # BLD AUTO: 2.5 X10E3/UL (ref 1.4–7)
NEUTROPHILS NFR BLD AUTO: 49 %
PLATELET # BLD AUTO: 167 X10E3/UL (ref 150–450)
POTASSIUM SERPL-SCNC: 4.7 MMOL/L (ref 3.5–5.2)
PROT SERPL-MCNC: 6.6 G/DL (ref 6–8.5)
RBC # BLD AUTO: 4.49 X10E6/UL (ref 4.14–5.8)
SODIUM SERPL-SCNC: 139 MMOL/L (ref 134–144)
T3RU NFR SERPL: 31 % (ref 24–39)
T4 SERPL-MCNC: 5.1 UG/DL (ref 4.5–12)
TRIGL SERPL-MCNC: 59 MG/DL (ref 0–149)
TSH SERPL DL<=0.005 MIU/L-ACNC: 1.44 UIU/ML (ref 0.45–4.5)
VLDLC SERPL CALC-MCNC: 13 MG/DL (ref 5–40)
WBC # BLD AUTO: 5.1 X10E3/UL (ref 3.4–10.8)

## 2024-08-20 ENCOUNTER — OFFICE VISIT (OUTPATIENT)
Dept: INTERNAL MEDICINE | Facility: CLINIC | Age: 59
End: 2024-08-20
Payer: COMMERCIAL

## 2024-08-20 VITALS
BODY MASS INDEX: 33.5 KG/M2 | RESPIRATION RATE: 19 BRPM | WEIGHT: 234 LBS | OXYGEN SATURATION: 96 % | SYSTOLIC BLOOD PRESSURE: 90 MMHG | DIASTOLIC BLOOD PRESSURE: 50 MMHG | HEIGHT: 70 IN | HEART RATE: 77 BPM

## 2024-08-20 DIAGNOSIS — E11.9 TYPE 2 DIABETES MELLITUS WITHOUT COMPLICATION, WITHOUT LONG-TERM CURRENT USE OF INSULIN: ICD-10-CM

## 2024-08-20 DIAGNOSIS — E03.8 SUBCLINICAL HYPOTHYROIDISM: ICD-10-CM

## 2024-08-20 DIAGNOSIS — E78.5 HYPERLIPIDEMIA, UNSPECIFIED HYPERLIPIDEMIA TYPE: ICD-10-CM

## 2024-08-20 DIAGNOSIS — H61.23 BILATERAL IMPACTED CERUMEN: Primary | ICD-10-CM

## 2024-08-20 DIAGNOSIS — I10 ESSENTIAL HYPERTENSION: ICD-10-CM

## 2024-08-20 DIAGNOSIS — I63.412 CEREBROVASCULAR ACCIDENT (CVA) DUE TO EMBOLISM OF LEFT MIDDLE CEREBRAL ARTERY: ICD-10-CM

## 2024-08-20 PROCEDURE — 99214 OFFICE O/P EST MOD 30 MIN: CPT | Performed by: FAMILY MEDICINE

## 2024-08-24 NOTE — PROGRESS NOTES
"Chief Complaint  Follow-up    Brandi Munoz presents to [facility]  History of Present Illness  The patient is a 59-year-old male who presents for evaluation of multiple medical concerns.    He reports that his wife has been attempting to clean his ears, but he would like a professional examination. He also mentions that she has been applying drops in his ears.    He expresses concern about his blood pressure, which was recorded as 90/50 today. He admits to not having checked it himself. He consumed water earlier in the day and had lunch before his appointment. He is curious about the potential impact of Crestor on his blood pressure.    Objective   Vital Signs:   BP 90/50 (BP Location: Right arm, Patient Position: Sitting, Cuff Size: Adult)   Pulse 77   Resp 19   Ht 177.8 cm (70\")   Wt 106 kg (234 lb)   SpO2 96%   BMI 33.58 kg/m²     Physical Exam    Physical Exam  Cerumen impaction noted in both ears.     Result Review :     Common labs          3/6/2024    09:09 6/4/2024    13:17 8/16/2024    09:28   Common Labs   Glucose 86  83  85    BUN 18  17  16    Creatinine 1.37  1.07  1.25    Sodium 143  140  139    Potassium 4.9  4.4  4.7    Chloride 105  108  102    Calcium 9.4  9.4  9.3    Total Protein 6.6   6.6    Albumin 4.4   4.2    Total Bilirubin 0.5   0.5    Alkaline Phosphatase 75   55    AST (SGOT) 27   26    ALT (SGPT) 43   27    WBC 4.35  5.05  5.1    Hemoglobin 13.7  13.0  13.3    Hematocrit 42.3  40.5  42.2    Platelets 218  173  167    Total Cholesterol 99   106    Triglycerides 62   59    HDL Cholesterol 53   57    LDL Cholesterol  32   36    Hemoglobin A1C 5.80   6.2    Microalbumin, Urine 41.4   20.5                Assessment and Plan    Diagnoses and all orders for this visit:    1. Bilateral impacted cerumen (Primary)  -     Ambulatory Referral to ENT (Otolaryngology)    2. Type 2 diabetes mellitus without complication, without long-term " current use of insulin    3. Hyperlipidemia, unspecified hyperlipidemia type    4. Essential hypertension    5. Subclinical hypothyroidism    6. Cerebrovascular accident (CVA) due to embolism of left middle cerebral artery      Assessment & Plan  1. Cerumen impaction.  Significant cerumen impaction is present in both ears. A referral to an ENT specialist will be made for cerumen removal. He is advised not to attempt further cleaning at home to avoid worsening the condition. Ear drops can be used to soften the cerumen before the ENT visit.    2. Type 2 Diabetes Mellitus.  He is currently taking Jardiance 25 mg daily and Ozempic 0.5 mg weekly. His most recent hemoglobin A1c is 6.2, which is within the target range. He should continue his current medication regimen.    3. Hypothyroidism.  He is taking levothyroxine 25 mcg daily. His recent thyroid panel is normal. He should continue his current medication regimen.    4. Hyperlipidemia.  He is taking Crestor 40 mg daily. His recent lipid panel shows a total cholesterol of 106 and LDL of 36, which are excellent. He should continue his current medication regimen.    5. Essential Hypertension.  His blood pressure was recorded as 90/50. He is advised to monitor his blood pressure at home and report if it remains low. If it stays low, the dosage of carvedilol may be reduced.    6. Congestive Heart Failure.  He is taking spironolactone 25 mg daily, Entresto 49-51 mg daily, and Coreg (carvedilol) 25 mg twice a day. He is advised to monitor for symptoms of low blood pressure and lightheadedness, especially when engaging in activities like lawn mowing. If his blood pressure remains low, the dosage of carvedilol may be reduced.        Follow Up   No follow-ups on file.  Patient was given instructions and counseling regarding his condition or for health maintenance advice. Please see specific information pulled into the AVS if appropriate.           Patient or patient  representative verbalized consent for the use of Ambient Listening during the visit with  Oneil Decker MD for chart documentation. 8/24/2024  15:13 EDT

## 2024-08-27 NOTE — PLAN OF CARE
Problem: Adjustment to Illness (Stroke, Ischemic/Transient Ischemic Attack)  Goal: Optimal Coping  Outcome: Ongoing, Progressing  Intervention: Support Psychosocial Response to Stroke  Recent Flowsheet Documentation  Taken 2/17/2024 0800 by Josephine Johnson RN  Family/Support System Care: self-care encouraged     Problem: Bowel Elimination Impaired (Stroke, Ischemic/Transient Ischemic Attack)  Goal: Effective Bowel Elimination  Outcome: Ongoing, Progressing  Intervention: Promote Effective Bowel Elimination  Recent Flowsheet Documentation  Taken 2/17/2024 0800 by Josephine Johnson RN  Bowel Elimination Management: relaxation techniques promoted  Bowel Program: maintenance program followed     Problem: Cognitive Impairment (Stroke, Ischemic/Transient Ischemic Attack)  Goal: Optimal Cognitive Function  Outcome: Ongoing, Progressing  Intervention: Optimize Cognitive Function  Recent Flowsheet Documentation  Taken 2/17/2024 0800 by Josephine Johnson RN  Sensory Stimulation Regulation:   care clustered   television on  Reorientation Measures: clock in view     Problem: Functional Ability Impaired (Stroke, Ischemic/Transient Ischemic Attack)  Goal: Optimal Functional Ability  Outcome: Ongoing, Progressing  Intervention: Optimize Functional Ability  Recent Flowsheet Documentation  Taken 2/17/2024 1000 by Josephine Johnson RN  Activity Management: up in chair  Taken 2/17/2024 0800 by Josephine Johnson RN  Activity Management: bedrest  Self-Care Promotion: independence encouraged     Problem: Respiratory Compromise (Stroke, Ischemic/Transient Ischemic Attack)  Goal: Effective Oxygenation and Ventilation  Outcome: Ongoing, Progressing  Intervention: Optimize Oxygenation and Ventilation  Recent Flowsheet Documentation  Taken 2/17/2024 0800 by Josephine Johnson, RN  Head of Bed (HOB) Positioning: HOB at 30 degrees  Airway/Ventilation Management: airway patency maintained     Problem: Sensorimotor Impairment (Stroke, Ischemic/Transient  Pt arrives with complaints of right lower quadrant abd pain that started this am around 3 oclock. She denies any n/v/d, no fever or chills. Denies any dysuria.    Ischemic Attack)  Goal: Improved Sensorimotor Function  Outcome: Ongoing, Progressing  Intervention: Optimize Range of Motion, Motor Control and Function  Recent Flowsheet Documentation  Taken 2/17/2024 0800 by Josephine Johnson RN  Positioning/Transfer Devices:   pillows   in use  Range of Motion: active ROM (range of motion) encouraged  Intervention: Optimize Sensory and Perceptual Ability  Recent Flowsheet Documentation  Taken 2/17/2024 0800 by Josephine Johnson RN  Pressure Reduction Techniques: frequent weight shift encouraged  Pressure Reduction Devices: pressure-redistributing mattress utilized     Problem: Urinary Elimination Impaired (Stroke, Ischemic/Transient Ischemic Attack)  Goal: Effective Urinary Elimination  Outcome: Ongoing, Progressing  Intervention: Promote Effective Bladder Elimination  Recent Flowsheet Documentation  Taken 2/17/2024 0800 by Josephine Johnson RN  Urinary Elimination Promotion: absorbent pad/diaper use encouraged   Goal Outcome Evaluation:

## 2024-09-17 DIAGNOSIS — E03.8 SUBCLINICAL HYPOTHYROIDISM: ICD-10-CM

## 2024-09-18 RX ORDER — LEVOTHYROXINE SODIUM 25 UG/1
25 TABLET ORAL DAILY
Qty: 90 TABLET | Refills: 2 | Status: SHIPPED | OUTPATIENT
Start: 2024-09-18

## 2024-09-25 ENCOUNTER — OFFICE VISIT (OUTPATIENT)
Dept: CARDIOLOGY | Facility: CLINIC | Age: 59
End: 2024-09-25
Payer: COMMERCIAL

## 2024-09-25 VITALS
OXYGEN SATURATION: 99 % | HEART RATE: 64 BPM | WEIGHT: 232 LBS | BODY MASS INDEX: 33.29 KG/M2 | SYSTOLIC BLOOD PRESSURE: 110 MMHG | DIASTOLIC BLOOD PRESSURE: 70 MMHG

## 2024-09-25 DIAGNOSIS — I10 ESSENTIAL HYPERTENSION: ICD-10-CM

## 2024-09-25 DIAGNOSIS — E78.2 MIXED HYPERLIPIDEMIA: ICD-10-CM

## 2024-09-25 DIAGNOSIS — I42.8 NONISCHEMIC CARDIOMYOPATHY: Primary | ICD-10-CM

## 2024-09-25 PROCEDURE — 93000 ELECTROCARDIOGRAM COMPLETE: CPT | Performed by: FAMILY MEDICINE

## 2024-09-25 PROCEDURE — 99214 OFFICE O/P EST MOD 30 MIN: CPT | Performed by: FAMILY MEDICINE

## 2024-09-25 RX ORDER — SACUBITRIL AND VALSARTAN 49; 51 MG/1; MG/1
1 TABLET, FILM COATED ORAL 2 TIMES DAILY
Qty: 180 TABLET | Refills: 3 | Status: SHIPPED | OUTPATIENT
Start: 2024-09-25

## 2024-10-14 ENCOUNTER — TELEPHONE (OUTPATIENT)
Dept: INTERNAL MEDICINE | Facility: CLINIC | Age: 59
End: 2024-10-14

## 2024-10-14 NOTE — TELEPHONE ENCOUNTER
Caller: Jyoti Munoz    Relationship: Emergency Contact    Best call back number:     [phone] (Mobile)       What orders are you requesting (i.e. lab or imaging): CPAP     In what timeframe would the patient need to come in: ASAP     Where will you receive your lab/imaging services: [facility]    Additional notes:

## 2024-10-15 NOTE — TELEPHONE ENCOUNTER
Patient wife states patient has had his CPAP now for 25 years and he needs a new machine and supplies. He uses [facility] for his device. I dont see anyhting in the chart previously about a CPAP. Does he need an appointment?

## 2024-10-17 DIAGNOSIS — G47.33 OSA ON CPAP: Primary | ICD-10-CM

## 2024-10-17 NOTE — TELEPHONE ENCOUNTER
Order placed for Sleep medicine and left detailed voicemail informing patient wife they would be calling to schedule patient for eval and new CPAP.    [facility]

## 2024-10-17 NOTE — TELEPHONE ENCOUNTER
He has to be evaluated by sleep medicine for anything related to cpap.  Please place a referral to sleep medicine regarding this and let patient know.   This is not something that I can order without a sleep study so sleep medicine will order and manage it.   Thanks.

## 2024-10-18 ENCOUNTER — TELEPHONE (OUTPATIENT)
Dept: CARDIOLOGY | Facility: CLINIC | Age: 59
End: 2024-10-18
Payer: COMMERCIAL

## 2024-10-18 ENCOUNTER — HOSPITAL ENCOUNTER (OUTPATIENT)
Dept: CARDIOLOGY | Facility: HOSPITAL | Age: 59
Discharge: HOME | End: 2024-10-18
Admitting: FAMILY MEDICINE
Payer: COMMERCIAL

## 2024-10-18 VITALS
HEIGHT: 70 IN | WEIGHT: 232 LBS | BODY MASS INDEX: 33.21 KG/M2 | SYSTOLIC BLOOD PRESSURE: 110 MMHG | DIASTOLIC BLOOD PRESSURE: 62 MMHG | HEART RATE: 70 BPM

## 2024-10-18 DIAGNOSIS — I42.8 NONISCHEMIC CARDIOMYOPATHY: ICD-10-CM

## 2024-10-18 LAB
AORTIC DIMENSIONLESS INDEX: 0.5 (DI)
ASCENDING AORTA: 3.3 CM
BH CV ECHO LEFT VENTRICLE GLOBAL LONGITUDINAL STRAIN: -17.6 %
BH CV ECHO MEAS - ACS: 2.07 CM
BH CV ECHO MEAS - AI P1/2T: 1260 MSEC
BH CV ECHO MEAS - AO MAX PG: 7.8 MMHG
BH CV ECHO MEAS - AO MEAN PG: 4 MMHG
BH CV ECHO MEAS - AO ROOT AREA (BSA CORRECTED): 1.4 CM2
BH CV ECHO MEAS - AO ROOT DIAM: 3.2 CM
BH CV ECHO MEAS - AO V2 MAX: 140 CM/SEC
BH CV ECHO MEAS - AO V2 VTI: 32 CM
BH CV ECHO MEAS - AVA(I,D): 1.62 CM2
BH CV ECHO MEAS - EDV(CUBED): 239.5 ML
BH CV ECHO MEAS - EDV(MOD-SP2): 130 ML
BH CV ECHO MEAS - EDV(MOD-SP4): 168 ML
BH CV ECHO MEAS - EF(MOD-BP): 42.9 %
BH CV ECHO MEAS - EF(MOD-SP2): 43.8 %
BH CV ECHO MEAS - EF(MOD-SP4): 40.5 %
BH CV ECHO MEAS - ESV(CUBED): 168.4 ML
BH CV ECHO MEAS - ESV(MOD-SP2): 73 ML
BH CV ECHO MEAS - ESV(MOD-SP4): 100 ML
BH CV ECHO MEAS - FS: 11.1 %
BH CV ECHO MEAS - IVS/LVPW: 1.1 CM
BH CV ECHO MEAS - IVSD: 0.93 CM
BH CV ECHO MEAS - LAT PEAK E' VEL: 8.9 CM/SEC
BH CV ECHO MEAS - LV DIASTOLIC VOL/BSA (35-75): 75.6 CM2
BH CV ECHO MEAS - LV MASS(C)D: 225.8 GRAMS
BH CV ECHO MEAS - LV MAX PG: 3 MMHG
BH CV ECHO MEAS - LV MEAN PG: 2 MMHG
BH CV ECHO MEAS - LV SYSTOLIC VOL/BSA (12-30): 45 CM2
BH CV ECHO MEAS - LV V1 MAX: 86.3 CM/SEC
BH CV ECHO MEAS - LV V1 VTI: 16.8 CM
BH CV ECHO MEAS - LVIDD: 6.2 CM
BH CV ECHO MEAS - LVIDS: 5.5 CM
BH CV ECHO MEAS - LVOT AREA: 3.1 CM2
BH CV ECHO MEAS - LVOT DIAM: 1.98 CM
BH CV ECHO MEAS - LVPWD: 0.85 CM
BH CV ECHO MEAS - MED PEAK E' VEL: 4.7 CM/SEC
BH CV ECHO MEAS - MR MAX PG: 73.7 MMHG
BH CV ECHO MEAS - MR MAX VEL: 429.4 CM/SEC
BH CV ECHO MEAS - MV A DUR: 0.14 SEC
BH CV ECHO MEAS - MV A MAX VEL: 57.2 CM/SEC
BH CV ECHO MEAS - MV DEC SLOPE: 163.1 CM/SEC2
BH CV ECHO MEAS - MV DEC TIME: 0.13 SEC
BH CV ECHO MEAS - MV E MAX VEL: 37.7 CM/SEC
BH CV ECHO MEAS - MV E/A: 0.66
BH CV ECHO MEAS - MV MAX PG: 2.02 MMHG
BH CV ECHO MEAS - MV MEAN PG: 0.58 MMHG
BH CV ECHO MEAS - MV P1/2T: 79 MSEC
BH CV ECHO MEAS - MV V2 VTI: 18.4 CM
BH CV ECHO MEAS - MVA(P1/2T): 2.8 CM2
BH CV ECHO MEAS - MVA(VTI): 2.8 CM2
BH CV ECHO MEAS - PA V2 MAX: 104.8 CM/SEC
BH CV ECHO MEAS - PULM A REVS DUR: 0.12 SEC
BH CV ECHO MEAS - PULM A REVS VEL: 26.4 CM/SEC
BH CV ECHO MEAS - PULM DIAS VEL: 18.5 CM/SEC
BH CV ECHO MEAS - PULM S/D: 1.44
BH CV ECHO MEAS - PULM SYS VEL: 26.7 CM/SEC
BH CV ECHO MEAS - QP/QS: 1.01
BH CV ECHO MEAS - RAP SYSTOLE: 3 MMHG
BH CV ECHO MEAS - RV MAX PG: 1.63 MMHG
BH CV ECHO MEAS - RV V1 MAX: 63.8 CM/SEC
BH CV ECHO MEAS - RV V1 VTI: 13.7 CM
BH CV ECHO MEAS - RVOT DIAM: 2.21 CM
BH CV ECHO MEAS - SV(LVOT): 51.9 ML
BH CV ECHO MEAS - SV(MOD-SP2): 57 ML
BH CV ECHO MEAS - SV(MOD-SP4): 68 ML
BH CV ECHO MEAS - SV(RVOT): 52.5 ML
BH CV ECHO MEAS - SVI(LVOT): 23.3 ML/M2
BH CV ECHO MEAS - SVI(MOD-SP2): 25.6 ML/M2
BH CV ECHO MEAS - SVI(MOD-SP4): 30.6 ML/M2
BH CV ECHO MEAS - TAPSE (>1.6): 1.94 CM
BH CV ECHO MEASUREMENTS AVERAGE E/E' RATIO: 5.54
BH CV XLRA - RV BASE: 3.9 CM
BH CV XLRA - RV LENGTH: 7.1 CM
BH CV XLRA - RV MID: 2.09 CM
BH CV XLRA - TDI S': 10.1 CM/SEC
LEFT ATRIUM VOLUME INDEX: 27.1 ML/M2
SINUS: 3.2 CM
STJ: 3.1 CM

## 2024-10-18 PROCEDURE — 25510000001 PERFLUTREN 6.52 MG/ML SUSPENSION 2 ML VIAL: Performed by: FAMILY MEDICINE

## 2024-10-18 PROCEDURE — 93356 MYOCRD STRAIN IMG SPCKL TRCK: CPT

## 2024-10-18 PROCEDURE — 93306 TTE W/DOPPLER COMPLETE: CPT

## 2024-10-18 RX ADMIN — PERFLUTREN 1 ML: 6.52 INJECTION, SUSPENSION INTRAVENOUS at 15:19

## 2024-10-21 NOTE — TELEPHONE ENCOUNTER
Notified Jhon Munoz of results and recommendations, patient verbalizes understanding.    [facility] Triage  10/21/24 09:28 EDT

## 2024-11-01 ENCOUNTER — TELEPHONE (OUTPATIENT)
Dept: SLEEP MEDICINE | Facility: HOSPITAL | Age: 59
End: 2024-11-01
Payer: COMMERCIAL

## 2024-11-01 ENCOUNTER — OFFICE VISIT (OUTPATIENT)
Dept: SLEEP MEDICINE | Facility: HOSPITAL | Age: 59
End: 2024-11-01
Payer: COMMERCIAL

## 2024-11-01 VITALS — OXYGEN SATURATION: 96 % | HEIGHT: 70 IN | HEART RATE: 92 BPM | WEIGHT: 231 LBS | BODY MASS INDEX: 33.07 KG/M2

## 2024-11-01 DIAGNOSIS — E66.811 CLASS 1 OBESITY WITH BODY MASS INDEX (BMI) OF 33.0 TO 33.9 IN ADULT, UNSPECIFIED OBESITY TYPE, UNSPECIFIED WHETHER SERIOUS COMORBIDITY PRESENT: ICD-10-CM

## 2024-11-01 DIAGNOSIS — G47.33 MODERATE OBSTRUCTIVE SLEEP APNEA: Primary | ICD-10-CM

## 2024-11-01 PROCEDURE — G0463 HOSPITAL OUTPT CLINIC VISIT: HCPCS

## 2024-11-01 NOTE — PROGRESS NOTES
[facility]  [address]  [place] [address]  Phone   Fax       Jhon Munoz  [id number]   1965  59 y.o.  male      Referring Provider and PCP: Oneil Decker MD    Type of service: Initial Sleep Medicine Consult  Date of service: 11/1/2024          CHIEF COMPLAINT: Moderate to Severe Obstructive sleep apnea      HISTORY OF PRESENT ILLNESS:  Jhon Munoz 59 y.o. was seen today on 11/1/2024 at [facility].    Patient has a history of hypertension, diabetes, stroke, nonischemic cardiomyopathy, hyperlipidemia, renal insufficiency, PVCs for which the patient follows with outside providers.  Patient has no history of tonsillectomy, adenoidectomy, nasal surgery, UPPP.  Patient presents today to establish care for treatment and management of obstructive sleep apnea.  Patient had a 2 night home sleep study in 2019.  The first night was completed 1/30/2019 with evaluation time of 6 hours and 30 minutes showing moderate obstructive sleep apnea with AHI of 29.4/h using 4% desaturation for hypopneas.  The second night was completed 1/31/2019 and had total evaluation time of 6 hours and 15 minutes with AHI of 35.1/h using 4% desaturation for hypopneas.  Lowest SpO2 was 82% with 36 minutes spent with O2 sats below 90%.  Weight noted to be 225lb at that time. He has been maintained on ResMed auto CPAP at 4 to 20 cm H2O.  He has a ResMed air sense 10 which is over 5 years old and he is needing replacement.  90-day download as of 10/31/2024 shows 98% usage greater than/equal to 4 hours for the last 90 days with AHI well-controlled at 2.4/h.  Median pressure is 6.6, 95th percentile pressure is 9.2, and maximum pressure was 10.4 per this download.  He has not been following with the sleep provider recently.  He is wanting to switch from [employer] to [employer].  He is retired.  His son unfortunately suffered a cerebral hemorrhage and he and his wife help  "care for him.  Son has a CPAP as well and he believes he is using [employer].           SLEEP HISTORY:  Sleep schedule:  Bedtime: 11 PM weeknights, 1 AM weekends  Wake time: 6:30 AM weekdays, 7 AM weekends  Time it takes to fall asleep: 10 to 15 minutes  Average hours of sleep: 6- 8  Number of naps per day: 0    Symptoms:   In addition to the above, patient reports the following associated symptoms:  Change in weight:  No   Morning headaches:  No   Awaken with a sore throat or dry mouth: Some dry mouth at times  Leg jerking at night:  No   Creepy crawly feeling in legs/urge to move legs: No   Teeth grinding: No   Have you ever awakened at night with a sour taste or burning sensation in your chest:  No   Do you have muscle weakness with laughing or anger:  No   Have you ever felt paralyzed while going to sleep or waking up:  No   Sleepwalking: No   Nightmares: No   Nocturia (urination at night): 0 times per night  Memory Problem: No     Medical Conditions (PMH):   Hypertension  Diabetes  Stroke  Nonischemic cardiomyopathy  Hyperlipidemia  Renal insufficiency  PVCs and nonsustained VT    Social history:  Do you drive a commercial vehicle:  No   Shift work:  No   Tobacco use:  No   Alcohol use: 1 per day  Caffeinated drinks: 2 per day  Occupation: retired    Family History (parents and siblings) (pertaining to sleep medicine):  Sleep apnea and son    Medications: reviewed    Allergies:  Patient has no known allergies.      REVIEW OF SYSTEMS:  Pertinent positive symptoms are:  [place] Sleepiness Scale of Total score: 2         PHYSICAL EXAM:  CONSTITUTINONAL:   Vitals:    11/01/24 1300   Pulse: 92   SpO2: 96%   Weight: 105 kg (231 lb)   Height: 177.8 cm (70\")    Body mass index is 33.15 kg/m².   HEAD: atraumatic, normocephalic   NECK: Neck Circumference: 15 inches, trachea is midline  RESPIRATORY SYSTEM: Respirations even, unlabored, normal rate  CARDIOVASULAR SYSTEM: Normal rate, no edema   NEUROLOGICAL SYSTEM: Alert and " oriented x 3  PSYCHIATRIC SYSTEM: Mood is normal/ appropriate     Office note(s) from care team reviewed. Office note(s) reviewed: 9/25/2024 cardiology note    Labs/ Test Results Reviewed:  TSH          12/12/2023    10:12 3/6/2024    09:09 8/16/2024    09:28   TSH   TSH 1.890  1.750  1.440       Most Recent A1C          8/16/2024    09:28   HGBA1C Most Recent   Hemoglobin A1C 6.2           DATA REVIEWED:   The 90-day PAP compliance summary downloaded on 10/31/24 has been reviewed independently by me and discussed with the patient.   Compliance: 98%  More than 4 hr use: 98%  Average use of the device: 7 hours 8 minutes per night  Residual AHI: 2.4/hr (goal < 5.0 /hr)  Device: ResMed air sense 10  Mask type: Nasal pillow, P10, size medium  DME: [facility], wants to switch to [employer]            ASSESSMENT AND PLAN:   Moderate to Severe Obstructive Sleep Apnea: Reviewed pathophysiology of obstructive sleep apnea as well as risks of untreated sleep apnea including but not limited to cardiopulmonary and cerebrovascular risks.  Sleep apnea has improved with the device and treatment.  Patient has excellent compliance with the device for treatment of sleep apnea.  I have personally reviewed the smart card download and discussed the download data with the patient and encouarged continued use of the device.  The residual AHI is acceptable. The device is benefiting the patient and the device is medically necessary.  Recommend patient get supplies from the DME company, change them on a regular basis, and clean as directed.  A prescription for new device and supplies has been sent to the [facility].  Also recommend using CPAP a minimum of 4 hours per night to meet insurance criteria, all night every night recommended for optimum health.  Recommend prioritizing sleep to aid in overall health.  Recommend trial of chinstrap with nasal mask.  Current device is on factory settings at 4 to 20 cm H2O with EPR off.  Will keep ramp pressure at  4 but will change device pressures from 5 to 16 cm H2O.  She will let us know if he has any issues with this.  Obesity: Body mass index is 33.15 kg/m².. Patients who are overweight or obese are at increased risk of sleep apnea/ sleep disordered breathing. Weight reduction and healthy lifestyle are encouraged in overweight/ obese patients as part of a comprehensive approach to sleep apnea treatment.      Patient will follow-up after being on new PAP device 30 to 90 days or contact the office sooner for questions or concerns. Patient's questions were answered.          Thank you again for asking me to consult on this patient.  Please do not hesitate to call me if you have additional questions or concerns.       Sherrill Healy DNP, APRN  [facility]

## 2024-11-11 RX ORDER — SACUBITRIL AND VALSARTAN 49; 51 MG/1; MG/1
1 TABLET, FILM COATED ORAL 2 TIMES DAILY
Qty: 180 TABLET | Refills: 3 | Status: SHIPPED | OUTPATIENT
Start: 2024-11-11

## 2024-11-11 RX ORDER — ESOMEPRAZOLE MAGNESIUM 40 MG/1
CAPSULE, DELAYED RELEASE ORAL
Qty: 90 CAPSULE | Refills: 1 | Status: SHIPPED | OUTPATIENT
Start: 2024-11-11

## 2024-11-12 RX ORDER — ESOMEPRAZOLE MAGNESIUM 40 MG/1
CAPSULE, DELAYED RELEASE ORAL
Qty: 90 CAPSULE | Refills: 1 | OUTPATIENT
Start: 2024-11-12

## 2024-11-12 NOTE — TELEPHONE ENCOUNTER
Caller: Jyoti Munoz    Relationship: Emergency Contact    Best call back number: [phone]    Requested Prescriptions:   Requested Prescriptions     Pending Prescriptions Disp Refills    esomeprazole (nexIUM) 40 MG capsule 90 capsule 1      Pharmacy where request should be sent: [facility] [id number] [facility] [address] [facility] [address] - [phone] PH - [phone] FX     Last office visit with prescribing clinician: 8/20/2024   Last telemedicine visit with prescribing clinician: Visit date not found   Next office visit with prescribing clinician: 12/13/2024     Does the patient have less than a 3 day supply:  [x] Yes  [] No      Carole Correa Rep   11/12/24 12:31 EST

## 2024-12-01 DIAGNOSIS — E11.9 TYPE 2 DIABETES MELLITUS WITHOUT COMPLICATION, WITHOUT LONG-TERM CURRENT USE OF INSULIN: ICD-10-CM

## 2024-12-02 RX ORDER — SEMAGLUTIDE 0.68 MG/ML
0.5 INJECTION, SOLUTION SUBCUTANEOUS
Qty: 3 ML | Refills: 3 | Status: SHIPPED | OUTPATIENT
Start: 2024-12-02

## 2024-12-06 NOTE — PROGRESS NOTES
CC: follow-up r/t stroke    HPI:  Jhon Munoz is a  59 y.o.  right-handed male with past medical history of diabetes, systolic heart failure with reduced EF, hypertension, hyperlipidemia, hypothyroidism, sleep apnea compliant with CPAP who presented to ER in 2024 for stroke like symptoms.  EMS had reported right-sided neglect and severe right arm and leg weakness.  Patient does not really recall events.  Was found down in the bathroom by wife.  He was out of the window for thrombolytics but CTA head and neck did show a left A2 occlusion, no other hemodynamic stenosis, CTP showed core of 17ml and penumbra of 57 mL and he was taken for thrombectomy with a TICI score of 3.  Bedtime of neuroassessment NIH was 0.  TTE showed EF of 39% LVH grade 1 diastolic dysfunction.  MRI brain showed tiny acute infarct of the left frontal lobe and old stroke of R parietal lobe.   HEDY showed prominent and heavy trabeculations of the left ventricle consistent with LV noncompaction.  APS unremarkable.  He was started on Eliquis     Since his stroke he has had no further strokelike events.  With consideration, he tells about trouble recalling names but he does not think this is necessarily related to his stroke.  Perhaps his wife thinks he is shorter at times.  No issues with speech or weakness.  BP looks good was a little elevated at cardiology office.  He is not in the habit of checking at home.  In hospital he was placed on Eliquis and was to continue baby aspirin.  He has since had cardiology appointment and baby aspirin stopped    Interval history: Has had left shoulder surgery.  Doing well.  No further strokelike events.  Compliant with CPAP.  BP always in range.  Compliant with meds.  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        General: pt well appearing, no distress  HEENT: Normocephalic, conjunctiva normal, external canals normal, no nasal discharge, moist mucous membranes  Neck: No lymphadenopathy, thyroid not enlarged, no JVD  CV: Regular rate and rhythm, no murmurs negative no bruits auscultated at neck, equal pulses  Pulmonary: Normal respiratory effort, clear to auscultation bilaterally  Extremities: no edema, bruising, or skin lesions  Pysch: good eye contact, cooperative, full affect, euthymic mood, good attention, good insight  Mental: alert, conversant, AOx3, provides history, 3/3 recall.  Language fluent, names, repeats.  CN: CN II-XII intact, PERRL, EOMi, no gaze palsy or nystagmus, intact facial sensation, no facial assymetry, intact hearing, symmetric palate elevation, tongue midline, good SCM strength  Motor: no abnormal movements, no pronator drift, normal  bulk and tone, 5/5 strength b/l UE & LE  Sensory: normal sensation to crude touch, temperature, and vibration throughout  Reflexes: 2+ throughout, neg babinski  Coordination: no ataxia on finger-nose, heel-shin  Gait: normal gait, no ataxia, intact heel and toe walking        Results:      Lab Results   Component Value Date    GLUCOSE 86 03/06/2024    BUN 18 03/06/2024    CREATININE 1.37 (H) 03/06/2024    EGFRIFAFRI 65 01/04/2022    BCR 13.1 03/06/2024    CO2 25.2 03/06/2024    CALCIUM 9.4 03/06/2024    PROTENTOTREF 6.6 03/06/2024    ALBUMIN 4.4 03/06/2024    LABIL2 2.0 03/06/2024    AST 27 03/06/2024    ALT 43 (H) 03/06/2024       Lab Results   Component Value Date    WBC 4.35 03/06/2024    HGB 13.7 03/06/2024    HCT 42.3 03/06/2024    MCV 92.4 03/06/2024     03/06/2024         .No results found for: \"RPR\"      Lab Results "   Component Value Date    TSH 1.750 03/06/2024    I5PODDV 5.5 03/06/2024         Lab Results   Component Value Date    AEYCGFDL18 372 10/30/2020         Lab Results   Component Value Date    FOLATE 6.78 10/30/2020         Lab Results   Component Value Date    HGBA1C 5.80 (H) 03/06/2024         Lab Results   Component Value Date    GLUCOSE 86 03/06/2024    BUN 18 03/06/2024    CREATININE 1.37 (H) 03/06/2024    EGFRIFAFRI 65 01/04/2022    BCR 13.1 03/06/2024    K 4.9 03/06/2024    CO2 25.2 03/06/2024    CALCIUM 9.4 03/06/2024    PROTENTOTREF 6.6 03/06/2024    ALBUMIN 4.4 03/06/2024    LABIL2 2.0 03/06/2024    AST 27 03/06/2024    ALT 43 (H) 03/06/2024         Diagnosis:  History of embolic stroke  History of mechanical thrombectomy  LV noncompaction  Chronic anticoagulation  Renal infarct  Hypertension  OCHOA    Impression: 59-year-old right-handed male with past medical history as stated above who presented in February 2024 with acute stroke symptoms and was found down by wife.  EMS reported left neglect right hemiparesis.  Team D imaging showed  L A2 LVO and CTP with 17 mL of core infarct and 57 mL of penumbra.  He underwent thrombectomy with TICI score of 3.  There is no significant vasculopathy.  Cardioembolic etiology was supected and cardiac workup was concerning for noncompaction -genetic testing is being pursued-looks like went for eval and no mutation identified on 157 gene panel.  He remains on Eliquis.  Baby aspirin has been stopped per cards.  Only needs anticoagulation from a neurologic perspective      Plan:  1 Cont anticoagulation and high intensity statin.        Control risk factors to prevent stroke which means keep BP at or below 130/80, take your statin if able and try to have LDL measurements < 70, stop smoking if you smoke, control your blood sugar, and get regular moderate exercise four times weekly, and avoid prolonged sitting.  Adopt a healthy diet such as the Mediterranean diet which includes  vegetables, fruits, whole grains, low-fat dairy, poultry, fish, legumes, nontropical fish oils, and nuts.  Limit sweets, sugary drinks, and red meats.  Reduce or eliminate alcohol intake.  BE FAST    F/u as needed    I spent at least 60 minutes interviewing, examining, and counseling patient.  I independently reviewed documentation, laboratory and diagnostic findings, external documentation where applicable, and formulated treatment plan which was discussed with the patient.

## 2024-12-10 ENCOUNTER — OFFICE VISIT (OUTPATIENT)
Dept: NEUROLOGY | Facility: CLINIC | Age: 59
End: 2024-12-10
Payer: COMMERCIAL

## 2024-12-10 VITALS
SYSTOLIC BLOOD PRESSURE: 120 MMHG | DIASTOLIC BLOOD PRESSURE: 82 MMHG | HEIGHT: 70 IN | WEIGHT: 233.2 LBS | BODY MASS INDEX: 33.39 KG/M2 | OXYGEN SATURATION: 98 % | HEART RATE: 82 BPM

## 2024-12-10 DIAGNOSIS — Z86.73 HISTORY OF STROKE: ICD-10-CM

## 2024-12-10 DIAGNOSIS — N18.31 STAGE 3A CHRONIC KIDNEY DISEASE: Primary | ICD-10-CM

## 2024-12-10 DIAGNOSIS — I42.8 LEFT VENTRICULAR NON-COMPACTION CARDIOMYOPATHY: ICD-10-CM

## 2024-12-10 DIAGNOSIS — I10 ESSENTIAL HYPERTENSION: ICD-10-CM

## 2024-12-10 DIAGNOSIS — G47.33 OSA ON CPAP: ICD-10-CM

## 2024-12-10 PROCEDURE — 99214 OFFICE O/P EST MOD 30 MIN: CPT | Performed by: PHYSICIAN ASSISTANT

## 2024-12-10 NOTE — PATIENT INSTRUCTIONS
[email or web address]  [facility]  [address] [place] [address] · [address]  [phone]

## 2024-12-10 NOTE — LETTER
December 10, 2024     Oneil Decker MD  [id number] [facility]  [address]  [facility] [address]    Patient: Jhon Munoz   YOB: 1965   Date of Visit: 12/10/2024     Dear Oneil Decker MD:       Thank you for referring Jhon Munoz to me for evaluation. Below are the relevant portions of my assessment and plan of care.    If you have questions, please do not hesitate to call me. I look forward to following Jhon along with you.         Sincerely,        JESUS Hendrickson        CC: No Recipients    Jill Petersen PA  12/10/24 1142  Sign when Signing Visit  CC: follow-up r/t stroke    HPI:  Jhon Munoz is a  59 y.o.  right-handed male with past medical history of diabetes, systolic heart failure with reduced EF, hypertension, hyperlipidemia, hypothyroidism, sleep apnea compliant with CPAP who presented to ER in February 2024 for stroke like symptoms.  EMS had reported right-sided neglect and severe right arm and leg weakness.  Patient does not really recall events.  Was found down in the bathroom by wife.  He was out of the window for thrombolytics but CTA head and neck did show a left A2 occlusion, no other hemodynamic stenosis, CTP showed core of 17ml and penumbra of 57 mL and he was taken for thrombectomy with a TICI score of 3.  Bedtime of neuroassessment NIH was 0.  TTE showed EF of 39% LVH grade 1 diastolic dysfunction.  MRI brain showed tiny acute infarct of the left frontal lobe and old stroke of R parietal lobe.   HEDY showed prominent and heavy trabeculations of the left ventricle consistent with LV noncompaction.  APS unremarkable.  He was started on Eliquis     Since his stroke he has had no further strokelike events.  With consideration, he tells about trouble recalling names but he does not think this is necessarily related to his stroke.  Perhaps his wife thinks he is shorter at times.  No issues with speech or weakness.  BP looks good was a little elevated at cardiology office.  He  "is not in the habit of checking at home.  In hospital he was placed on Eliquis and was to continue baby aspirin.  He has since had cardiology appointment and baby aspirin stopped    Interval history: Has had left shoulder surgery.  Doing well.  No further strokelike events.  Compliant with CPAP.  BP always in range.  Compliant with meds.  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        General: pt well appearing, no distress  HEENT: Normocephalic, conjunctiva normal, external canals normal, no nasal discharge, moist mucous membranes  Neck: No lymphadenopathy, thyroid not enlarged, no JVD  CV: Regular rate and rhythm, no murmurs negative no bruits auscultated at neck, equal pulses  Pulmonary: Normal respiratory effort, clear to auscultation bilaterally  Extremities: no edema, bruising, or skin lesions  Pysch: good eye contact, cooperative, full affect, euthymic mood, good attention, good insight  Mental: alert, conversant, AOx3, provides history, 3/3 recall.  Language fluent, names, repeats.  CN: CN II-XII intact, PERRL, EOMi, no gaze palsy or nystagmus, intact facial sensation, no facial assymetry, intact hearing, symmetric palate elevation, tongue midline, good SCM strength  Motor: no abnormal movements, no pronator drift, normal  bulk and tone, 5/5 strength b/l UE & LE  Sensory: normal sensation to crude touch, temperature, and vibration throughout  Reflexes: 2+ throughout, neg babinski  Coordination: no ataxia on finger-nose, heel-shin  Gait: normal gait, no ataxia, intact heel and toe " "walking        Results:      Lab Results   Component Value Date    GLUCOSE 86 03/06/2024    BUN 18 03/06/2024    CREATININE 1.37 (H) 03/06/2024    EGFRIFAFRI 65 01/04/2022    BCR 13.1 03/06/2024    CO2 25.2 03/06/2024    CALCIUM 9.4 03/06/2024    PROTENTOTREF 6.6 03/06/2024    ALBUMIN 4.4 03/06/2024    LABIL2 2.0 03/06/2024    AST 27 03/06/2024    ALT 43 (H) 03/06/2024       Lab Results   Component Value Date    WBC 4.35 03/06/2024    HGB 13.7 03/06/2024    HCT 42.3 03/06/2024    MCV 92.4 03/06/2024     03/06/2024         .No results found for: \"RPR\"      Lab Results   Component Value Date    TSH 1.750 03/06/2024    U9GBPYM 5.5 03/06/2024         Lab Results   Component Value Date    NRVFDMBI85 372 10/30/2020         Lab Results   Component Value Date    FOLATE 6.78 10/30/2020         Lab Results   Component Value Date    HGBA1C 5.80 (H) 03/06/2024         Lab Results   Component Value Date    GLUCOSE 86 03/06/2024    BUN 18 03/06/2024    CREATININE 1.37 (H) 03/06/2024    EGFRIFAFRI 65 01/04/2022    BCR 13.1 03/06/2024    K 4.9 03/06/2024    CO2 25.2 03/06/2024    CALCIUM 9.4 03/06/2024    PROTENTOTREF 6.6 03/06/2024    ALBUMIN 4.4 03/06/2024    LABIL2 2.0 03/06/2024    AST 27 03/06/2024    ALT 43 (H) 03/06/2024         Diagnosis:  History of embolic stroke  History of mechanical thrombectomy  LV noncompaction  Chronic anticoagulation  Renal infarct  Hypertension  OCHOA    Impression: 59-year-old right-handed male with past medical history as stated above who presented in February 2024 with acute stroke symptoms and was found down by wife.  EMS reported left neglect right hemiparesis.  Team D imaging showed  L A2 LVO and CTP with 17 mL of core infarct and 57 mL of penumbra.  He underwent thrombectomy with TICI score of 3.  There is no significant vasculopathy.  Cardioembolic etiology was supected and cardiac workup was concerning for noncompaction -genetic testing is being pursued-looks like went for eval and " no mutation identified on 157 gene panel.  He remains on Eliquis.  Baby aspirin has been stopped per cards.  Only needs anticoagulation from a neurologic perspective      Plan:  1 Cont anticoagulation and high intensity statin.        Control risk factors to prevent stroke which means keep BP at or below 130/80, take your statin if able and try to have LDL measurements < 70, stop smoking if you smoke, control your blood sugar, and get regular moderate exercise four times weekly, and avoid prolonged sitting.  Adopt a healthy diet such as the Mediterranean diet which includes vegetables, fruits, whole grains, low-fat dairy, poultry, fish, legumes, nontropical fish oils, and nuts.  Limit sweets, sugary drinks, and red meats.  Reduce or eliminate alcohol intake.  BE FAST    F/u as needed    I spent at least 60 minutes interviewing, examining, and counseling patient.  I independently reviewed documentation, laboratory and diagnostic findings, external documentation where applicable, and formulated treatment plan which was discussed with the patient.

## 2024-12-11 ENCOUNTER — TELEPHONE (OUTPATIENT)
Dept: INTERNAL MEDICINE | Facility: CLINIC | Age: 59
End: 2024-12-11

## 2024-12-11 NOTE — TELEPHONE ENCOUNTER
Caller: Jyoti Munoz    Relationship: Emergency Contact    Best call back number: [phone]       What was the call regarding: PATIENT'S WIFE WANTS TO KNOW IF PHYSICAL CAN BE MOVED TO A TUES OR THURSDAY ASAP. PLEASE CALL.

## 2024-12-24 LAB
ALBUMIN SERPL-MCNC: 4.1 G/DL (ref 3.9–4.9)
ALBUMIN/CREAT UR: 52 MG/G CREAT (ref 0–29)
ALP SERPL-CCNC: 56 IU/L (ref 44–121)
ALT SERPL-CCNC: 45 IU/L (ref 0–44)
AST SERPL-CCNC: 47 IU/L (ref 0–40)
BASOPHILS # BLD AUTO: 0 X10E3/UL (ref 0–0.2)
BASOPHILS NFR BLD AUTO: 1 %
BILIRUB SERPL-MCNC: 0.3 MG/DL (ref 0–1.2)
BUN SERPL-MCNC: 18 MG/DL (ref 8–27)
BUN/CREAT SERPL: 11 (ref 10–24)
CALCIUM SERPL-MCNC: 8.8 MG/DL (ref 8.6–10.2)
CHLORIDE SERPL-SCNC: 104 MMOL/L (ref 96–106)
CHOLEST SERPL-MCNC: 96 MG/DL (ref 100–199)
CO2 SERPL-SCNC: 22 MMOL/L (ref 20–29)
CREAT SERPL-MCNC: 1.59 MG/DL (ref 0.76–1.27)
CREAT UR-MCNC: 139.1 MG/DL
EGFRCR SERPLBLD CKD-EPI 2021: 49 ML/MIN/1.73
EOSINOPHIL # BLD AUTO: 0.1 X10E3/UL (ref 0–0.4)
EOSINOPHIL NFR BLD AUTO: 3 %
ERYTHROCYTE [DISTWIDTH] IN BLOOD BY AUTOMATED COUNT: 13 % (ref 11.6–15.4)
FT4I SERPL CALC-MCNC: 1.5 (ref 1.2–4.9)
GLOBULIN SER CALC-MCNC: 2.6 G/DL (ref 1.5–4.5)
GLUCOSE SERPL-MCNC: 96 MG/DL (ref 70–99)
HBA1C MFR BLD: 6.1 % (ref 4.8–5.6)
HCT VFR BLD AUTO: 45.6 % (ref 37.5–51)
HDLC SERPL-MCNC: 45 MG/DL
HGB BLD-MCNC: 14.8 G/DL (ref 13–17.7)
IMM GRANULOCYTES # BLD AUTO: 0 X10E3/UL (ref 0–0.1)
IMM GRANULOCYTES NFR BLD AUTO: 0 %
LDLC SERPL CALC-MCNC: 35 MG/DL (ref 0–99)
LDLC/HDLC SERPL: 0.8 RATIO (ref 0–3.6)
LYMPHOCYTES # BLD AUTO: 1.8 X10E3/UL (ref 0.7–3.1)
LYMPHOCYTES NFR BLD AUTO: 49 %
MCH RBC QN AUTO: 29.8 PG (ref 26.6–33)
MCHC RBC AUTO-ENTMCNC: 32.5 G/DL (ref 31.5–35.7)
MCV RBC AUTO: 92 FL (ref 79–97)
MICROALBUMIN UR-MCNC: 72.9 UG/ML
MONOCYTES # BLD AUTO: 0.6 X10E3/UL (ref 0.1–0.9)
MONOCYTES NFR BLD AUTO: 16 %
NEUTROPHILS # BLD AUTO: 1.1 X10E3/UL (ref 1.4–7)
NEUTROPHILS NFR BLD AUTO: 31 %
PLATELET # BLD AUTO: 135 X10E3/UL (ref 150–450)
POTASSIUM SERPL-SCNC: 4.6 MMOL/L (ref 3.5–5.2)
PROT SERPL-MCNC: 6.7 G/DL (ref 6–8.5)
RBC # BLD AUTO: 4.97 X10E6/UL (ref 4.14–5.8)
SODIUM SERPL-SCNC: 141 MMOL/L (ref 134–144)
T3RU NFR SERPL: 29 % (ref 24–39)
T4 SERPL-MCNC: 5 UG/DL (ref 4.5–12)
TRIGL SERPL-MCNC: 80 MG/DL (ref 0–149)
TSH SERPL DL<=0.005 MIU/L-ACNC: 2.17 UIU/ML (ref 0.45–4.5)
VLDLC SERPL CALC-MCNC: 16 MG/DL (ref 5–40)
WBC # BLD AUTO: 3.7 X10E3/UL (ref 3.4–10.8)

## 2024-12-26 ENCOUNTER — TELEPHONE (OUTPATIENT)
Dept: INTERNAL MEDICINE | Facility: CLINIC | Age: 59
End: 2024-12-26

## 2024-12-26 NOTE — TELEPHONE ENCOUNTER
Hub staff attempted to follow warm transfer process and was unsuccessful     Caller: Jyoti Munoz    Relationship to patient: Emergency Contact    Best call back number: [phone]    Patient is needing: PATIENT'S SPOUSE STATED THAT THE PATIENT HAD APPOINTMENT TODAY FOR ANNUAL PHYSICAL THAT WAS CANCELLED AND THIS NEEDS TO BE RESCHEDULED FOR THE END OF THIS YEAR FOR INSURANCE. PLEASE CONTACT PATIENT'S SPOUSE ASAP TO SET THIS UP.

## 2024-12-31 ENCOUNTER — OFFICE VISIT (OUTPATIENT)
Dept: INTERNAL MEDICINE | Facility: CLINIC | Age: 59
End: 2024-12-31
Payer: COMMERCIAL

## 2024-12-31 VITALS
TEMPERATURE: 98.8 F | SYSTOLIC BLOOD PRESSURE: 110 MMHG | RESPIRATION RATE: 14 BRPM | DIASTOLIC BLOOD PRESSURE: 62 MMHG | OXYGEN SATURATION: 98 % | WEIGHT: 231.6 LBS | HEART RATE: 64 BPM | BODY MASS INDEX: 33.16 KG/M2 | HEIGHT: 70 IN

## 2024-12-31 DIAGNOSIS — Z00.00 HEALTHCARE MAINTENANCE: ICD-10-CM

## 2024-12-31 DIAGNOSIS — Z00.00 VISIT FOR WELL MAN HEALTH CHECK: Primary | ICD-10-CM

## 2024-12-31 DIAGNOSIS — R80.9 MICROALBUMINURIA: ICD-10-CM

## 2024-12-31 DIAGNOSIS — R79.89 ELEVATED SERUM CREATININE: ICD-10-CM

## 2024-12-31 DIAGNOSIS — R74.8 ELEVATED LIVER ENZYMES: ICD-10-CM

## 2024-12-31 PROCEDURE — 99214 OFFICE O/P EST MOD 30 MIN: CPT | Performed by: FAMILY MEDICINE

## 2024-12-31 PROCEDURE — 99396 PREV VISIT EST AGE 40-64: CPT | Performed by: FAMILY MEDICINE

## 2024-12-31 NOTE — PROGRESS NOTES
Brandi Munoz is a 59 y.o. male and is here for a comprehensive physical exam. The patient reports no problems.            Social History:   Social History     Socioeconomic History    Marital status:      Spouse name: Jyoti   Tobacco Use    Smoking status: Never     Passive exposure: Never    Smokeless tobacco: Never   Vaping Use    Vaping status: Never Used   Substance and Sexual Activity    Alcohol use: Not Currently     Comment: Caffeine use: 1 cup daily, OCCASIONAL    Drug use: Never    Sexual activity: Defer       Family History:   Family History   Problem Relation Age of Onset    Hypertension Mother     Malig Hyperthermia Neg Hx        Past Medical History:   Past Medical History:   Diagnosis Date    Cardiomyopathy     FOLLOWED BY DR. BHANDARI    Colon polyp     Cortical senile cataract 04/01/2019    CVA (cerebral vascular accident) 02/16/2024    PT DENIES RESIDUAL, HAD THROMBECTOMY PER DR. SEGURA    Diabetes mellitus     type 2    GERD (gastroesophageal reflux disease)     Hyperlipidemia     Hypertension     Hypothyroidism     Left shoulder pain     WITH LIMITED ROM    Renal calculi     Followed by Dr. Chris Carreon urologist    Renal infarct 07/2020    Left; noted on CT scan-followed by Dr. Chris Carreon urology    Sleep apnea     CPAP nightly       The following portions of the patient's history were reviewed and updated as appropriate: allergies, current medications, past family history, past medical history, past social history, past surgical history and problem list.    Review of Systems    Review of Systems   Constitutional:  Negative for chills and fever.   HENT:  Negative for congestion, rhinorrhea, sinus pain and sore throat.    Eyes:  Negative for photophobia and visual disturbance.   Respiratory:  Negative for cough, chest tightness and shortness of breath.    Cardiovascular:  Negative for chest pain and palpitations.   Gastrointestinal:  Negative for diarrhea, nausea and  vomiting.   Genitourinary:  Negative for dysuria, frequency and urgency.   Skin:  Negative for rash and wound.   Neurological:  Negative for dizziness and syncope.   Psychiatric/Behavioral:  Negative for behavioral problems and confusion.        Objective   Physical Exam  Vitals and nursing note reviewed.   Constitutional:       Appearance: He is well-developed.   HENT:      Head: Normocephalic and atraumatic.      Right Ear: External ear normal.      Left Ear: External ear normal.   Cardiovascular:      Rate and Rhythm: Normal rate and regular rhythm.      Heart sounds: Normal heart sounds.   Pulmonary:      Effort: Pulmonary effort is normal. No respiratory distress.      Breath sounds: Normal breath sounds.   Abdominal:      Palpations: Abdomen is soft.      Tenderness: There is no abdominal tenderness. There is no guarding.   Musculoskeletal:         General: Normal range of motion.      Cervical back: Normal range of motion and neck supple.   Lymphadenopathy:      Cervical: No cervical adenopathy.   Skin:     General: Skin is warm.   Neurological:      Mental Status: He is alert and oriented to person, place, and time.   Psychiatric:         Behavior: Behavior normal.         Vitals:    12/31/24 0742   BP: 110/62   Pulse: 64   Resp: 14   Temp: 98.8 °F (37.1 °C)   SpO2: 98%     Body mass index is 33.23 kg/m².    Medications:   Current Outpatient Medications:     apixaban (ELIQUIS) 5 MG tablet tablet, Take 1 tablet by mouth Every 12 (Twelve) Hours. Indications: Other - full anticoagulation, Disp: 180 tablet, Rfl: 3    carvedilol (COREG) 25 MG tablet, TAKE 1 TABLET BY MOUTH TWICE A DAY, Disp: 180 tablet, Rfl: 3    docusate sodium (COLACE) 100 MG capsule, Take 1 capsule by mouth 2 (Two) Times a Day., Disp: 60 capsule, Rfl: 0    empagliflozin (Jardiance) 25 MG tablet tablet, Take 1 tablet by mouth Daily., Disp: 90 tablet, Rfl: 3    esomeprazole (nexIUM) 40 MG capsule, TAKE 1 CAPSULE BY MOUTH EVERY MORNING BEFORE  BREAKFAST AS NEEDED, Disp: 90 capsule, Rfl: 1    levothyroxine (SYNTHROID, LEVOTHROID) 25 MCG tablet, TAKE 1 TABLET BY MOUTH EVERY DAY, Disp: 90 tablet, Rfl: 2    rosuvastatin (CRESTOR) 40 MG tablet, Take 1 tablet by mouth Every Night., Disp: 90 tablet, Rfl: 3    sacubitril-valsartan (Entresto) 49-51 MG tablet, Take 1 tablet by mouth 2 (Two) Times a Day., Disp: 180 tablet, Rfl: 3    Semaglutide,0.25 or 0.5MG/DOS, (Ozempic, 0.25 or 0.5 MG/DOSE,) 2 MG/3ML solution pen-injector, INJECT 0.5 MG UNDER THE SKIN INTO THE APPROPRIATE AREA AS DIRECTED 1 (ONE) TIME PER WEEK., Disp: 3 mL, Rfl: 3    spironolactone (ALDACTONE) 25 MG tablet, TAKE 1 TABLET BY MOUTH IN THE MORNING AND 1/2 TABLET IN THE EVENING, Disp: 135 tablet, Rfl: 3       Assessment & Plan   Healthy male exam.      1. Healthcare Maintenance:  2. Patient Counseling:  --Nutrition: Stressed importance of moderation in sodium/caffeine intake, saturated fat and cholesterol, caloric balance, sufficient intake of fresh fruits, vegetables, fiber, calcium and vit D  --Exercise: Recommended 30 minutes of exercise daily.   --Immunizations reviewed.  --Discussed benefits of screening colonoscopy.    Diagnoses and all orders for this visit:    Visit for well man health check    Healthcare maintenance    Elevated liver enzymes  -     Comprehensive Metabolic Panel    Elevated serum creatinine  -     Comprehensive Metabolic Panel    Microalbuminuria        No follow-ups on file.           Dictated utilizing Dragon Voice Recognition Software

## 2024-12-31 NOTE — PROGRESS NOTES
"Chief Complaint  Annual Exam and Diabetes    Subjective          Jhon Munoz presents to [facility]  History of Present Illness  The patient is a 59-year-old male who presents for a physical exam.    He has been compliant with hisprescribed regimen of levothyroxine 25 mcg daily for thyroid management.    His diabetes is currently managed with Ozempic 0.5 mg weekly and Jardiance 25 mg daily. She has expressed a desire to discontinue some of his diabetic medications upon achieving further weight loss.    MEDICATIONS  Current: Levothyroxine, Ozempic, Jardiance, Entresto, spironolactone, Eliquis, carvedilol, Crestor    Objective   Vital Signs:   /62 (BP Location: Left arm, Patient Position: Sitting)   Pulse 64   Temp 98.8 °F (37.1 °C) (Oral)   Resp 14   Ht 177.8 cm (70\")   Wt 105 kg (231 lb 9.6 oz)   SpO2 98%   BMI 33.23 kg/m²     Physical Exam  Vitals and nursing note reviewed.   Constitutional:       Appearance: He is well-developed.   HENT:      Head: Normocephalic and atraumatic.   Musculoskeletal:      Cervical back: Normal range of motion and neck supple.   Neurological:      Mental Status: He is alert and oriented to person, place, and time.   Psychiatric:         Behavior: Behavior normal.         Physical Exam       Result Review :                 Assessment and Plan    Diagnoses and all orders for this visit:    1. Visit for well [place] health check (Primary)    2. Healthcare maintenance    3. Elevated liver enzymes  -     Comprehensive Metabolic Panel    4. Elevated serum creatinine  -     Comprehensive Metabolic Panel    5. Microalbuminuria      Assessment & Plan  1. Elevated liver enzymes.  Liver enzymes are running a little high, but the cause is unclear. A recheck of liver enzymes will be done in a week.    2. Elevated serum creatinine.  Serum creatinine levels are high, possibly due to dehydration. She is advised to drink plenty of water for the next week and " then recheck her kidney function.    3. Diabetes mellitus.  Her A1c is stable at 6.1. He will continue her current medications, including Ozempic 0.5 mg weekly and Jardiance 25 mg daily. The importance of maintaining these medications for cardiovascular and kidney protection was discussed.    4. Thyroid management.  Thyroid function is normal. He will continue taking levothyroxine 25 mcg daily.    5. Medication management.  She is on valsartan, spironolactone, Eliquis, carvedilol, and Crestor, which are to be continued indefinitely. The possibility of reducing the dose of some medications was discussed, but certain medications like Eliquis, carvedilol, thyroid medications, Entresto, and Crestor are for life.    Follow Up   No follow-ups on file.  Patient was given instructions and counseling regarding his condition or for health maintenance advice. Please see specific information pulled into the AVS if appropriate.           Patient or patient representative verbalized consent for the use of Ambient Listening during the visit with  Oneil Decker MD for chart documentation. 12/31/2024  09:03 EST

## 2025-01-17 ENCOUNTER — TELEPHONE (OUTPATIENT)
Dept: CARDIOLOGY | Facility: CLINIC | Age: 60
End: 2025-01-17

## 2025-01-17 NOTE — TELEPHONE ENCOUNTER
Jyoti Munoz  Spouse  [phone]      What was the call regarding:  PT'S WIFE CALLED-  PT HAS BEEN EXPERIENCING INTERMITTENT LIGHTHEADEDNESS AND DIZZINESS. SYMPTOMS ARE NOT NEW, HE HAS HAD THEM IN THE PAST  NO OTHER DETAILS WERE PROVIDED.

## 2025-01-17 NOTE — TELEPHONE ENCOUNTER
Patient wife called and stated that she was talking with the patient this morning when he woke up and asked him how he was feeling and he told her that he has been having some dizziness/lightheadedness off and on.  She stated that his BP has been running fine but has not checked it when he had the episodes.  I advised that she talk with him and let him know to let her know when he feels bad so she can check his BP at that time.  She verbalized understanding.    Please let me know if there is anything that we need to do different or does he need to come in to be seen?    Max

## 2025-01-21 LAB
ALBUMIN SERPL-MCNC: 4 G/DL (ref 3.5–5.2)
ALBUMIN/GLOB SERPL: 1.7 G/DL
ALP SERPL-CCNC: 53 U/L (ref 39–117)
ALT SERPL-CCNC: 30 U/L (ref 1–41)
AST SERPL-CCNC: 25 U/L (ref 1–40)
BILIRUB SERPL-MCNC: 0.6 MG/DL (ref 0–1.2)
BUN SERPL-MCNC: 20 MG/DL (ref 8–23)
BUN/CREAT SERPL: 15 (ref 7–25)
CALCIUM SERPL-MCNC: 9.2 MG/DL (ref 8.6–10.5)
CHLORIDE SERPL-SCNC: 103 MMOL/L (ref 98–107)
CO2 SERPL-SCNC: 24 MMOL/L (ref 22–29)
CREAT SERPL-MCNC: 1.33 MG/DL (ref 0.76–1.27)
EGFRCR SERPLBLD CKD-EPI 2021: 60.8 ML/MIN/1.73
GLOBULIN SER CALC-MCNC: 2.4 GM/DL
GLUCOSE SERPL-MCNC: 91 MG/DL (ref 65–99)
POTASSIUM SERPL-SCNC: 4.7 MMOL/L (ref 3.5–5.2)
PROT SERPL-MCNC: 6.4 G/DL (ref 6–8.5)
SODIUM SERPL-SCNC: 140 MMOL/L (ref 136–145)

## 2025-01-22 ENCOUNTER — TELEPHONE (OUTPATIENT)
Dept: CARDIOLOGY | Facility: CLINIC | Age: 60
End: 2025-01-22
Payer: COMMERCIAL

## 2025-01-22 NOTE — TELEPHONE ENCOUNTER
Results called to pt.  Instructed to call with any further questions or concerns.  Verbalized understanding.    Silvia Vega RN  Triage Nurse, [facility]  01/22/25 08:31 EST

## 2025-02-17 ENCOUNTER — OFFICE VISIT (OUTPATIENT)
Facility: HOSPITAL | Age: 60
End: 2025-02-17
Payer: COMMERCIAL

## 2025-02-17 VITALS
DIASTOLIC BLOOD PRESSURE: 76 MMHG | BODY MASS INDEX: 32.93 KG/M2 | HEART RATE: 62 BPM | HEIGHT: 70 IN | WEIGHT: 230 LBS | SYSTOLIC BLOOD PRESSURE: 121 MMHG | OXYGEN SATURATION: 97 %

## 2025-02-17 DIAGNOSIS — G47.33 MODERATE OBSTRUCTIVE SLEEP APNEA: Primary | ICD-10-CM

## 2025-02-17 DIAGNOSIS — E66.811 CLASS 1 OBESITY WITH BODY MASS INDEX (BMI) OF 33.0 TO 33.9 IN ADULT, UNSPECIFIED OBESITY TYPE, UNSPECIFIED WHETHER SERIOUS COMORBIDITY PRESENT: ICD-10-CM

## 2025-02-17 PROCEDURE — G0463 HOSPITAL OUTPT CLINIC VISIT: HCPCS

## 2025-02-17 NOTE — PROGRESS NOTES
"  [facility]  [address]   [address]  [place] [address]  Phone [phone]  Fax [phone]        SLEEP CLINIC FOLLOW-UP PROGRESS NOTE    Jhon Munoz  [id number]   1965  59 y.o.  male      PCP: Oneil Decker MD    DATE OF VISIT: 2/17/2025          CHIEF COMPLAINT: Obstructive sleep apnea    HPI:  This is a 59 y.o. year old patient who presents to the clinic today for the management of obstructive sleep apnea.  Patient had a 2 night home sleep study in 2019.  The first night was completed 1/30/2019 with evaluation time of 6 hours and 30 minutes showing moderate obstructive sleep apnea with AHI of 29.4/h using 4% desaturation for hypopneas.  The second night was completed 1/31/2019 and had total evaluation time of 6 hours and 15 minutes with AHI of 35.1/h using 4% desaturation for hypopneas.  Lowest SpO2 was 82% with 36 minutes spent with O2 sats below 90%.  Weight noted to be 225lb at that time. This patient is using positive airway pressure therapy with auto CPAP at 5 to 16 cm H2O with EPR full-time at 1.   Patient's sleep apnea remains improved with this therapy with residual AHI 3.6/hr.   Average usage is 7 hours 47 minutes per night on nights used.   Patient tolerating device well and improved with treatment.            MEDICATIONS: reviewed     ALLERGIES:  Patient has no known allergies.    SOCIAL HISTORY (habits pertaining to sleep medicine):  Tobacco use: No   Alcohol use: 2-3 per week  Caffeine use: 2     REVIEW OF SYSTEMS:   Pertinent positive symptoms are:  [place] Sleepiness Scale :Total score: 1         PHYSICAL EXAMINATION:  CONSTITUTIONAL:  Vitals:    02/17/25 0900   BP: 121/76   Pulse: 62   SpO2: 97%   Weight: 104 kg (230 lb)   Height: 177.8 cm (70\")    Body mass index is 33 kg/m².   HEAD: atraumatic, normocephalic  RESP SYSTEM: not in respiratory distress, breathing unlabored  CARDIOVASULAR: normal rate, no edema noted   NEURO: Alert and oriented x 3, mood and " affect appeared appropriate      DATA REVIEWED:  The PAP compliance summary downloaded on 2/16/2025 has been reviewed independently by me and discussed with the patient.   Compliance: 94%  More than 4 hr use: 94%  Average use of the device: 7 hours 47 minutes per night  Residual AHI: 3.6/hr (goal < 5.0 /hr)  Device: ResMed air sense 11  Mask type: Nasal pillows, P10  DME: [employer]          ASSESSMENT AND PLAN:  Obstructive Sleep Apnea: sleep apnea has improved with the device and treatment.  Patient has excellent compliance with the device for treatment of sleep apnea.  I have personally reviewed the smart card download and discussed the download data with the patient and encouarged continued use of the device.  The residual AHI is acceptable. The device is benefiting the patient and the device is medically necessary.  Recommend patient get supplies from the DME company, change them on a regular basis, and clean as directed.  A prescription for supplies has been sent to the DME company.  Recommend continued usage of PAP device, most insurances require minimum usage of 4 hours per night at least 70% of the time, would recommend using all night every night if possible for optimum health.  Recommend prioritizing sleep to aid in overall health.  Do not drive or operate heavy machinery or do activities that require high concentration if feeling tired or drowsy.  Obesity: Body mass index is 33 kg/m².. Patients who are overweight or obese are at increased risk of sleep apnea/ sleep disordered breathing. Weight reduction and healthy lifestyle are encouraged in overweight/ obese patients as part of a comprehensive approach to sleep apnea treatment.       Patient will follow-up in 6 months or follow-up sooner for any issues or concerns.  Patient's questions were answered.        Thank you for allowing me to participate in the care of this patient.     Sherrill Healy, BLAYNE, APRN  [facility]

## 2025-02-28 DIAGNOSIS — E11.9 TYPE 2 DIABETES MELLITUS WITHOUT COMPLICATION, WITHOUT LONG-TERM CURRENT USE OF INSULIN: ICD-10-CM

## 2025-02-28 RX ORDER — SEMAGLUTIDE 0.68 MG/ML
0.5 INJECTION, SOLUTION SUBCUTANEOUS
Qty: 3 ML | Refills: 3 | Status: SHIPPED | OUTPATIENT
Start: 2025-02-28

## 2025-02-28 NOTE — TELEPHONE ENCOUNTER
Caller: Jyoti Munoz    Relationship: Emergency Contact    Best call back number: [phone]     Requested Prescriptions:   Requested Prescriptions     Pending Prescriptions Disp Refills    Semaglutide,0.25 or 0.5MG/DOS, (Ozempic, 0.25 or 0.5 MG/DOSE,) 2 MG/3ML solution pen-injector 3 mL 3     Sig: Inject 0.5 mg under the skin into the appropriate area as directed Every 7 (Seven) Days.        Pharmacy where request should be sent: [id number] [id number] [place] [address] [address] - [phone]  - [phone] FX     Last office visit with prescribing clinician: 12/31/2024   Last telemedicine visit with prescribing clinician: Visit date not found   Next office visit with prescribing clinician: 4/2/2025     Additional details provided by patient:     PATIENTS WIFE STATES THAT THE PATIENT WOULD LIKE TO MOVE UP IN DOSAGE.    Does the patient have less than a 3 day supply:  [x] Yes  [] No  Carole Paredes Rep   02/28/25 14:10 EST

## 2025-03-13 ENCOUNTER — TELEPHONE (OUTPATIENT)
Dept: INTERNAL MEDICINE | Facility: CLINIC | Age: 60
End: 2025-03-13

## 2025-03-13 NOTE — TELEPHONE ENCOUNTER
Caller: Jyoti Munoz    Relationship: Emergency Contact    Best call back number: [phone]    What medication are you requesting:     Semaglutide       Have you had these symptoms before:    [x] Yes  [] No    Have you been treated for these symptoms before:   [x] Yes  [] No    If a prescription is needed, what is your preferred pharmacy and phone number: [id number] [place] [facility] [address] [facility] [address] - [phone] [facility] [phone] FX     Additional notes:PATIENTS WIFE CALLED TO LET DR REY KNOW THAT THE MEDICATIONS NEEDS TO BE INCREASED.

## 2025-03-14 DIAGNOSIS — E11.9 TYPE 2 DIABETES MELLITUS WITHOUT COMPLICATION, WITHOUT LONG-TERM CURRENT USE OF INSULIN: Primary | ICD-10-CM

## 2025-03-26 ENCOUNTER — OFFICE VISIT (OUTPATIENT)
Dept: CARDIOLOGY | Age: 60
End: 2025-03-26
Payer: COMMERCIAL

## 2025-03-26 VITALS
BODY MASS INDEX: 33.79 KG/M2 | HEIGHT: 70 IN | DIASTOLIC BLOOD PRESSURE: 80 MMHG | SYSTOLIC BLOOD PRESSURE: 110 MMHG | WEIGHT: 236 LBS | HEART RATE: 74 BPM

## 2025-03-26 DIAGNOSIS — E78.2 MIXED HYPERLIPIDEMIA: ICD-10-CM

## 2025-03-26 DIAGNOSIS — I10 ESSENTIAL HYPERTENSION: ICD-10-CM

## 2025-03-26 DIAGNOSIS — I63.512 ACUTE ISCHEMIC LEFT MIDDLE CEREBRAL ARTERY (MCA) STROKE: ICD-10-CM

## 2025-03-26 DIAGNOSIS — E11.9 TYPE 2 DIABETES MELLITUS WITHOUT COMPLICATION, WITHOUT LONG-TERM CURRENT USE OF INSULIN: ICD-10-CM

## 2025-03-26 DIAGNOSIS — I42.8 NONISCHEMIC CARDIOMYOPATHY: Primary | ICD-10-CM

## 2025-03-26 DIAGNOSIS — N18.31 STAGE 3A CHRONIC KIDNEY DISEASE: ICD-10-CM

## 2025-03-26 RX ORDER — SPIRONOLACTONE 25 MG/1
25 TABLET ORAL DAILY
Qty: 90 TABLET | Refills: 3 | Status: SHIPPED | OUTPATIENT
Start: 2025-03-26

## 2025-03-26 NOTE — PROGRESS NOTES
Date of Office Visit: 2025  Encounter Provider: Bernadine Graff MD  Place of Service: [facility] CARDIOLOGY  Patient Name: Jhon Munoz  :1965      Patient ID:  Jhon Munoz is a 60 y.o. male is here for  followup for         History of Present Illness    He has a history of nonischemic cardiomyopathy, obstructive sleep apnea using CPAP, hyperlipidemia, hypertension, diabetes mellitus type 2 on oral hypoglycemics, erectile dysfunction, renal calculi, GERD, history of stroke 2024-now on chronic Eliquis, left renal infarct-was on aspirin.     He was having left lower quadrant pain.  CT abdomen pelvis done 2024 left lower quadrant pain to her left renal infarct involving the lower pole and midpole of the anterior cortex and a nonobstructing left renal calculus.  He saw Dr. Carreon from urology yesterday who does not think that a further work-up really needs to be done at this time.  Due to the renal infarct, he had a monitor.  The 12-day Holter monitor that he had 2020 showed multiple episodes of nonsustained ventricular tachycardia, the longest was 10 seconds.  Short bursts of SVT were also noted but no sustained atrial fibrillation.     Echocardiogram done 2020 showed grade 1 diastolic dysfunction with moderate left ventricular dilation and ejection fraction of 27.5%.  Global longitudinal strain was -12.8%.  There was no significant valve disease.     He is , has 3 children works for [employer].  Uses no cigarettes and has alcohol, about 6-10 beers per week as well as 1 vodka.  His 1 cup of coffee per day.  He has not been regularly exercising.  His mother had hypertension  may 2020. He has 2 grown children 29 and 35.  His middle son  in  with osteogenic sarcoma.       On 2020 a cardiac catheterization was completed which showed the LAD, first diagonal, circumflex with luminal irregularities and RCA had 30% mid segment stenosis.   He was felt to have nonischemic cardiomyopathy and medical management was recommended.  Cardiac MRI in 2020 showed left ventricle dilated, global hypokinesis, mild right ventricular hypokinesis, normal perfusion of the myocardium, a focal area of mid myocardial delayed enhancement of the inferior septum, and EF calculated at 30%.     He reports right lower quadrant abdominal pain.  He saw both urology (Dr. Carreon) and nephrology (Dr. Frandy Castillo).  The patient says neither physician thought his pain was from the kidney stones.       He was in the emergency department on 11/14/2020 with an episode of syncope that occurred at 5:30 AM.  He had been asleep for 2 and half hours and got up to say goodbye to his wife when she left for work.  When he got up, he reports that he was lightheaded and became unresponsive for to 30 seconds.  EMS was called and when they arrived, his heart rate was in the 20s and they given atropine.  His heart rate then came up to the 60s and his blood pressure was 100.  .     He had normal bilateral renal artery duplex done 7/2020. Echo done 10/28/2020 showed ejection fraction 36-40% with severe eccentric left ventricular hypertrophy and mild left ventricular dilation, grade 1 diastolic dysfunction, anterior and anteroseptal hypokinesis.  Echo done 3/11/2022 showed ejection fraction 44% with grade 1 diastolic dysfunction, moderate left atrial dilation, moderate concentric left ventricle hypertrophy, global longitudinal strain of -15.1%, normal valvular structure and function, global hypokinesis noted.     He was admitted 2/16/2024 with an acute stroke presenting as right hemineglect and dense hemiaplasia.  He was diagnosed with left NIRMAL and left MCA stroke with left M2 occlusion, status post mechanical thrombectomy 2/16/2024 with Dr. Dobson.  He came back to his baseline neurologic state.  He had a HEDY done 2/19/2024 showed ejection action 41-50% with prominent trabeculations along left  ventricle, lateral and apex consistent with left ventricular noncompaction, small mobile echodensity at the apex of the left atrial appendage-thrombus is could not be excluded, normal saline study, mild mitral insufficiency, normal RVSP.  MRI brain done 2/60/24 showed air and cephalomalacia in the right parietal lobe suggesting prior infarct with moderate to extensive chronic ischemic white matter changes and small to moderate-sized area of acute infarct in the left anterior cerebral artery territory.  He saw Salima 3/14/2024 and no medication changes were made.    Echocardiogram done 4/24/2024 showed ejection fraction 41% with mild concentric left ventricular pretrip, mild to moderate left ventricular dilation, grade 2 diastolic dysfunction, mild left atrial enlargement, normal RVSP, global hypokinesis, mild mitral insufficiency.  It was not convincing for noncompaction.  I had one of my partners reviewed his cardiac MRI from 2020 which also did not show noncompaction.  Echocardiogram done 10/18/2024 showed ejection fraction 43% with moderate left ventricular dilation, normal diastolic function, mild mitral insufficiency, global hypokinesis.     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    Labs in 1/21/2025 showed creatinine 1.33, otherwise normal CMP.  Labs done 12/2/2023 showed creatinine 1.59, AST 47, ALT 45, otherwise normal CMP, hemoglobin A1c 6.1%, normal thyroid panel, total cholesterol 86, HDL 45, LDL 35, triglycerides 80, VLDL 16, normal CBC.    He has PVCs today but does not feel them.  He has noticed some slight vision changes, possibly cataracts.  He has an appointment with ophthalmology.  He wonders if any of this is due to his medications.  He has no orthopnea or PND.  He has no chest pain or pressure.  His breathing is stable.  He has had no dizziness, syncope or falls.  He    Past Medical History:   Diagnosis Date    Cardiomyopathy     FOLLOWED BY  DR. BHANDARI    Colon polyp     Cortical senile cataract 04/01/2019    CVA (cerebral vascular accident) 02/16/2024    PT DENIES RESIDUAL, HAD THROMBECTOMY PER DR. SEGURA    Diabetes mellitus     type 2    GERD (gastroesophageal reflux disease)     Hyperlipidemia     Hypertension     Hypothyroidism     Left shoulder pain     WITH LIMITED ROM    Renal calculi     Followed by Dr. Chris Carreon urologist    Renal infarct 07/2020    Left; noted on CT scan-followed by Dr. Chris Carreon urology    Sleep apnea     CPAP nightly         Past Surgical History:   Procedure Laterality Date    CARDIAC CATHETERIZATION N/A 07/24/2020    Procedure: Coronary angiography;  Surgeon: Robert Deluna MD;  Location:  [facility] CATH INVASIVE LOCATION;  Service: Cardiovascular;  Laterality: N/A;    CARDIAC CATHETERIZATION N/A 07/24/2020    Procedure: Left Heart Cath;  Surgeon: Robert Deluna MD;  Location[facility] C[facility];  Service: Cardiovascular;  Laterality: N/A;    COLONOSCOPY  2020    COLONOSCOPY N/A 10/18/2022    Procedure: COLONOSCOPY TO CECUM WITH COLD BIOPSY POLYPECTOMY;  Surgeon: Robert Evans MD;  Location: [facility] ENDOSCOPY;  Service: Gastroenterology;  Laterality: N/A;  PRE: HX COLON POLYPS  POST: POLYP, DIVERTICULOSIS, HEMORRHOIDS    JOINT MANIPULATION Left 6/18/2024    Procedure: SHOULDER MANIPULATION and injection;  Surgeon: Erickson Alejandra MD;  [place]  [facility] [place] [facility]  Service: Orthopedics;  Laterality: Left;    KNEE SURGERY Right     meniscus        Current Outpatient Medications on File Prior to Visit   Medication Sig Dispense Refill    apixaban (ELIQUIS) 5 MG tablet tablet Take 1 tablet by mouth Every 12 (Twelve) Hours. Indications: Other - full anticoagulation 180 tablet 3    carvedilol (COREG) 25 MG tablet TAKE 1 TABLET BY MOUTH TWICE A  tablet 3    empagliflozin (Jardiance) 25 MG tablet tablet Take 1 tablet by mouth Daily. 90 tablet 3    esomeprazole (nexIUM) 40 MG capsule TAKE 1 CAPSULE BY  "MOUTH EVERY MORNING BEFORE BREAKFAST AS NEEDED 90 capsule 1    levothyroxine (SYNTHROID, LEVOTHROID) 25 MCG tablet TAKE 1 TABLET BY MOUTH EVERY DAY 90 tablet 2    rosuvastatin (CRESTOR) 40 MG tablet Take 1 tablet by mouth Every Night. 90 tablet 3    sacubitril-valsartan (Entresto) 49-51 MG tablet Take 1 tablet by mouth 2 (Two) Times a Day. 180 tablet 3    Semaglutide, 1 MG/DOSE, (OZEMPIC) 4 MG/3ML solution pen-injector Inject 1 mg under the skin into the appropriate area as directed 1 (One) Time Per Week. 9 mL 3    spironolactone (ALDACTONE) 25 MG tablet TAKE 1 TABLET BY MOUTH IN THE MORNING AND 1/2 TABLET IN THE EVENING 135 tablet 3    [DISCONTINUED] docusate sodium (COLACE) 100 MG capsule Take 1 capsule by mouth 2 (Two) Times a Day. (Patient not taking: Reported on 3/26/2025) 60 capsule 0     No current facility-administered medications on file prior to visit.       Social History     Socioeconomic History    Marital status:      Spouse name: Jyoti   Tobacco Use    Smoking status: Never     Passive exposure: Never    Smokeless tobacco: Never   Vaping Use    Vaping status: Never Used   Substance and Sexual Activity    Alcohol use: Not Currently     Comment: Caffeine use: 1 cup daily, OCCASIONAL    Drug use: Never    Sexual activity: Defer             Procedures    ECG 12 Lead    Date/Time: 3/26/2025 9:49 AM  Performed by: Bernadine Graff MD    Authorized by: Bernadine Graff MD  Comparison: compared with previous ECG   Comparison to previous ECG: Now trigeminy  Rhythm: sinus rhythm  Ectopy: unifocal PVCs and trigeminy  T inversion: II, III, aVF, V3, V4, V5 and V6    Clinical impression: abnormal EKG            Objective:      Vitals:    03/26/25 0924   BP: 110/80   Pulse: 74   Weight: 107 kg (236 lb)   Height: 177.8 cm (70\")     Body mass index is 33.86 kg/m².    Vitals reviewed.   Constitutional:       General: Not in acute distress.     Appearance: Not diaphoretic.   Neck:      Vascular: No " carotid bruit or JVD.   Pulmonary:      Effort: Pulmonary effort is normal.      Breath sounds: Normal breath sounds.   Cardiovascular:      Normal rate. Regularly irregular rhythm.      Murmurs: There is no murmur.      No gallop.  No rub.   Pulses:     Intact distal pulses.      Carotid: 2+ bilaterally.     Radial: 2+ bilaterally.     Dorsalis pedis: 2+ bilaterally.     Posterior tibial: 2+ bilaterally.  Edema:     Peripheral edema absent.   Neurological:      Cranial Nerves: No cranial nerve deficit.       Lab Review:       Assessment:      Diagnosis Plan   1. Nonischemic cardiomyopathy        2. Essential hypertension        3. Mixed hyperlipidemia        4. Stage 3a chronic kidney disease        5. Acute ischemic left middle cerebral artery (MCA) stroke        6. Type 2 diabetes mellitus without complication, without long-term current use of insulin          Cardiomyopathy, nonischemic.  Cardiac MRI done in 2020 showed no significant evidence of infiltrative cardiomyopathy or noncompaction, left ventricle was dilated.  HEDY done 2/19/2024 suggested noncompaction.  Echocardiogram done 4/2024 shows cardiomyopathy-not really convincing for noncompaction.  I did have the MRI images from 2020 reviewed and these do not indicate noncompaction.  He has no decompensated heart failure.  Hypertension, goal less than 110/80.   Hyperlipidemia, on atorvastatin  Diabetes mellitus type 2  Obesity  Left renal infarct, etiology unknown.  Was seeing Dr. Zaldivar for this and was on chronic aspirin.  Renal insufficiency, likely due to left renal infarct.  PVCs and nonsustained VT on monitor, nonsustained SVT.  PVCs noted today.  Vasovagal syncope 11/14/2020.  Left NIRMAL and MCA embolic stroke-with thrombus retrieval 2/16/2024.  Is now on chronic Eliquis for this.     Plan:       No medication changes, see Madiha in 6 months, no other testing at this time, overall doing well.  He will talk to ophthalmology about his medications to see if  any of them are making his vision worse.

## 2025-04-07 ENCOUNTER — OFFICE VISIT (OUTPATIENT)
Dept: INTERNAL MEDICINE | Facility: CLINIC | Age: 60
End: 2025-04-07
Payer: COMMERCIAL

## 2025-04-07 VITALS
OXYGEN SATURATION: 98 % | DIASTOLIC BLOOD PRESSURE: 64 MMHG | BODY MASS INDEX: 33.86 KG/M2 | SYSTOLIC BLOOD PRESSURE: 102 MMHG | TEMPERATURE: 98 F | HEART RATE: 78 BPM | WEIGHT: 236.5 LBS | HEIGHT: 70 IN

## 2025-04-07 DIAGNOSIS — E03.8 SUBCLINICAL HYPOTHYROIDISM: ICD-10-CM

## 2025-04-07 DIAGNOSIS — E11.9 TYPE 2 DIABETES MELLITUS WITHOUT COMPLICATION, WITHOUT LONG-TERM CURRENT USE OF INSULIN: Primary | ICD-10-CM

## 2025-04-07 DIAGNOSIS — I10 ESSENTIAL HYPERTENSION: ICD-10-CM

## 2025-04-07 DIAGNOSIS — E78.5 HYPERLIPIDEMIA, UNSPECIFIED HYPERLIPIDEMIA TYPE: ICD-10-CM

## 2025-04-07 RX ORDER — LEVOTHYROXINE SODIUM 25 UG/1
25 TABLET ORAL DAILY
Qty: 90 TABLET | Refills: 2 | Status: SHIPPED | OUTPATIENT
Start: 2025-04-07

## 2025-04-07 RX ORDER — ROSUVASTATIN CALCIUM 40 MG/1
40 TABLET, COATED ORAL NIGHTLY
Qty: 90 TABLET | Refills: 3 | Status: SHIPPED | OUTPATIENT
Start: 2025-04-07

## 2025-04-07 RX ORDER — CARVEDILOL 25 MG/1
25 TABLET ORAL 2 TIMES DAILY
Qty: 180 TABLET | Refills: 3 | Status: SHIPPED | OUTPATIENT
Start: 2025-04-07

## 2025-04-07 NOTE — PROGRESS NOTES
"Chief Complaint  3 month  follow up     Subjective          Jhon Munoz presents to [facility] PRIMARY [facility]ARE  History of Present Illness  The patient is a 60-year-old male who came in for a check-up on his diabetes, cholesterol, thyroid, and heartburn.    He says he's feeling generally well and doesn't have any specific complaints right now. He's been sticking to his medication routine, which includes Entresto, spironolactone 25 mg daily, Jardiance 25 mg daily, carvedilol 25 mg twice daily, Ozempic 1 mg weekly, Crestor 40 mg daily, levothyroxine 25 mcg daily, and Eliquis 5 mg twice daily. He saw Dr. Graff about a week ago, who said everything is looking good and advised him to keep taking all his current medications. He also takes Nexium as needed for heartburn.    FAMILY HISTORY  His son had a cerebral brain hemorrhage and stroke about a year and a half ago.    MEDICATIONS  Entresto, spironolactone 25 mg daily, Jardiance 25 mg daily, carvedilol 25 mg twice a day, Ozempic 1 mg weekly, Crestor 40 mg daily, levothyroxine 25 mcg daily, Eliquis 5 mg twice a day, Nexium (as needed).    Objective   Vital Signs:   /64   Pulse 78   Temp 98 °F (36.7 °C)   Ht 177.8 cm (70\")   Wt 107 kg (236 lb 8 oz)   SpO2 98%   BMI 33.93 kg/m²     Physical Exam  Vitals and nursing note reviewed.   Constitutional:       Appearance: He is well-developed.   HENT:      Head: Normocephalic and atraumatic.   Musculoskeletal:      Cervical back: Normal range of motion and neck supple.   Neurological:      Mental Status: He is alert and oriented to person, place, and time.   Psychiatric:         Behavior: Behavior normal.         Physical Exam       Result Review :                 Assessment and Plan    Diagnoses and all orders for this visit:    1. Type 2 diabetes mellitus without complication, without long-term current use of insulin (Primary)  -     empagliflozin (Jardiance) 25 MG tablet tablet; Take 1 tablet by " mouth Daily.  Dispense: 90 tablet; Refill: 3  -     Semaglutide, 1 MG/DOSE, (OZEMPIC) 4 MG/3ML solution pen-injector; Inject 1 mg under the skin into the appropriate area as directed 1 (One) Time Per Week.  Dispense: 9 mL; Refill: 3  -     Hemoglobin A1c  -     Microalbumin / Creatinine Urine Ratio - Urine, Clean Catch    2. Hyperlipidemia, unspecified hyperlipidemia type  -     rosuvastatin (CRESTOR) 40 MG tablet; Take 1 tablet by mouth Every Night.  Dispense: 90 tablet; Refill: 3  -     Lipid Panel With LDL / HDL Ratio    3. Essential hypertension  -     carvedilol (COREG) 25 MG tablet; Take 1 tablet by mouth 2 (Two) Times a Day.  Dispense: 180 tablet; Refill: 3  -     CBC & Differential  -     Comprehensive Metabolic Panel    4. Subclinical hypothyroidism  -     levothyroxine (SYNTHROID, LEVOTHROID) 25 MCG tablet; Take 1 tablet by mouth Daily.  Dispense: 90 tablet; Refill: 2  -     Thyroid Panel With TSH      Assessment & Plan  1. Diabetes Mellitus.  He is currently on Jardiance 25 mg daily and Ozempic 1 mg weekly. His blood work from January showed elevated kidney function, though it had improved. Liver enzymes were normal. Blood work will be repeated today to monitor his condition. He is tolerating his medications well, and no changes are anticipated unless the new results indicate a need for adjustment.    2. Cholesterol Management.  He is taking Crestor (rosuvastatin) 40 mg daily. No changes to his medication regimen are planned at this time.    3. Thyroid Management.  He is on levothyroxine 25 mcg daily. No changes to his medication regimen are planned at this time.    4. Heartburn.  He is taking Nexium as needed for heartburn. He has the option to discontinue Nexium if he chooses.    Follow Up   No follow-ups on file.  Patient was given instructions and counseling regarding his condition or for health maintenance advice. Please see specific information pulled into the AVS if appropriate.           Patient  or patient representative verbalized consent for the use of Ambient Listening during the visit with  Oneil Decker MD for chart documentation. 4/7/2025  12:30 EDT

## 2025-04-08 ENCOUNTER — RESULTS FOLLOW-UP (OUTPATIENT)
Dept: INTERNAL MEDICINE | Facility: CLINIC | Age: 60
End: 2025-04-08
Payer: COMMERCIAL

## 2025-04-08 LAB
ALBUMIN SERPL-MCNC: 4.2 G/DL (ref 3.9–4.9)
ALBUMIN/CREAT UR: 19 MG/G CREAT (ref 0–29)
ALP SERPL-CCNC: 50 IU/L (ref 44–121)
ALT SERPL-CCNC: 25 IU/L (ref 0–44)
AST SERPL-CCNC: 21 IU/L (ref 0–40)
BASOPHILS # BLD AUTO: 0 X10E3/UL (ref 0–0.2)
BASOPHILS NFR BLD AUTO: 1 %
BILIRUB SERPL-MCNC: 0.5 MG/DL (ref 0–1.2)
BUN SERPL-MCNC: 18 MG/DL (ref 8–27)
BUN/CREAT SERPL: 15 (ref 10–24)
CALCIUM SERPL-MCNC: 9.4 MG/DL (ref 8.6–10.2)
CHLORIDE SERPL-SCNC: 105 MMOL/L (ref 96–106)
CHOLEST SERPL-MCNC: 106 MG/DL (ref 100–199)
CO2 SERPL-SCNC: 21 MMOL/L (ref 20–29)
CREAT SERPL-MCNC: 1.22 MG/DL (ref 0.76–1.27)
CREAT UR-MCNC: 91.7 MG/DL
EGFRCR SERPLBLD CKD-EPI 2021: 67 ML/MIN/1.73
EOSINOPHIL # BLD AUTO: 0.1 X10E3/UL (ref 0–0.4)
EOSINOPHIL NFR BLD AUTO: 2 %
ERYTHROCYTE [DISTWIDTH] IN BLOOD BY AUTOMATED COUNT: 13.1 % (ref 11.6–15.4)
FT4I SERPL CALC-MCNC: 1.5 (ref 1.2–4.9)
GLOBULIN SER CALC-MCNC: 2.2 G/DL (ref 1.5–4.5)
GLUCOSE SERPL-MCNC: 81 MG/DL (ref 70–99)
HBA1C MFR BLD: 6.2 % (ref 4.8–5.6)
HCT VFR BLD AUTO: 44.4 % (ref 37.5–51)
HDLC SERPL-MCNC: 49 MG/DL
HGB BLD-MCNC: 14.3 G/DL (ref 13–17.7)
IMM GRANULOCYTES # BLD AUTO: 0 X10E3/UL (ref 0–0.1)
IMM GRANULOCYTES NFR BLD AUTO: 0 %
LDLC SERPL CALC-MCNC: 41 MG/DL (ref 0–99)
LDLC/HDLC SERPL: 0.8 RATIO (ref 0–3.6)
LYMPHOCYTES # BLD AUTO: 1.9 X10E3/UL (ref 0.7–3.1)
LYMPHOCYTES NFR BLD AUTO: 40 %
MCH RBC QN AUTO: 29.9 PG (ref 26.6–33)
MCHC RBC AUTO-ENTMCNC: 32.2 G/DL (ref 31.5–35.7)
MCV RBC AUTO: 93 FL (ref 79–97)
MICROALBUMIN UR-MCNC: 17.7 UG/ML
MONOCYTES # BLD AUTO: 0.5 X10E3/UL (ref 0.1–0.9)
MONOCYTES NFR BLD AUTO: 10 %
NEUTROPHILS # BLD AUTO: 2.2 X10E3/UL (ref 1.4–7)
NEUTROPHILS NFR BLD AUTO: 47 %
PLATELET # BLD AUTO: 158 X10E3/UL (ref 150–450)
POTASSIUM SERPL-SCNC: 4.6 MMOL/L (ref 3.5–5.2)
PROT SERPL-MCNC: 6.4 G/DL (ref 6–8.5)
RBC # BLD AUTO: 4.78 X10E6/UL (ref 4.14–5.8)
SODIUM SERPL-SCNC: 138 MMOL/L (ref 134–144)
T3RU NFR SERPL: 28 % (ref 24–39)
T4 SERPL-MCNC: 5.5 UG/DL (ref 4.5–12)
TRIGL SERPL-MCNC: 81 MG/DL (ref 0–149)
TSH SERPL DL<=0.005 MIU/L-ACNC: 1.14 UIU/ML (ref 0.45–4.5)
VLDLC SERPL CALC-MCNC: 16 MG/DL (ref 5–40)
WBC # BLD AUTO: 4.8 X10E3/UL (ref 3.4–10.8)

## 2025-04-12 NOTE — PROGRESS NOTES
The labs were reviewed. Please inform patient that labs were normal.  Hemoglobin A1c is slowly rising, but we will continue to monitor.  All of the other labs look good.

## 2025-08-13 ENCOUNTER — TELEMEDICINE (OUTPATIENT)
Dept: SLEEP MEDICINE | Facility: HOSPITAL | Age: 60
End: 2025-08-13
Payer: COMMERCIAL

## 2025-08-13 DIAGNOSIS — G47.33 MODERATE OBSTRUCTIVE SLEEP APNEA: Primary | ICD-10-CM

## 2025-08-13 DIAGNOSIS — E66.811 CLASS 1 OBESITY WITH BODY MASS INDEX (BMI) OF 32.0 TO 32.9 IN ADULT, UNSPECIFIED OBESITY TYPE, UNSPECIFIED WHETHER SERIOUS COMORBIDITY PRESENT: ICD-10-CM

## 2025-08-13 PROCEDURE — 99213 OFFICE O/P EST LOW 20 MIN: CPT | Performed by: NURSE PRACTITIONER

## (undated) DEVICE — NDL HYPO PRECISIONGLIDE REG 25G 1 1/2

## (undated) DEVICE — BNDG ADHS PLSTC 1X3IN LF

## (undated) DEVICE — SYR LL TP 10ML STRL

## (undated) DEVICE — PATIENT RETURN ELECTRODE, SINGLE-USE, CONTACT QUALITY MONITORING, ADULT, WITH 9FT CORD, FOR PATIENTS WEIGING OVER 33LBS. (15KG): Brand: MEGADYNE

## (undated) DEVICE — ADAPT CLN BIOGUARD AIR/H2O DISP

## (undated) DEVICE — KT MANIFLD CARDIAC

## (undated) DEVICE — ARM SLING: Brand: DEROYAL

## (undated) DEVICE — SOL ISO/ALC 70PCT 4OZ

## (undated) DEVICE — TUBING, SUCTION, 1/4" X 10', STRAIGHT: Brand: MEDLINE

## (undated) DEVICE — CATH VENT MIV RADL PIG ST TIP 5F 110CM

## (undated) DEVICE — NEEDLE,HYPODERM,SAFETY,18GX1.5: Brand: MEDLINE

## (undated) DEVICE — GLIDESHEATH SLENDER STAINLESS STEEL KIT: Brand: GLIDESHEATH SLENDER

## (undated) DEVICE — GW EMR FIX EXCHG J STD .035 3MM 260CM

## (undated) DEVICE — CANN O2 ETCO2 FITS ALL CONN CO2 SMPL A/ 7IN DISP LF

## (undated) DEVICE — SENSR O2 OXIMAX FNGR A/ 18IN NONSTR

## (undated) DEVICE — KT ORCA ORCAPOD DISP STRL

## (undated) DEVICE — SINGLE-USE BIOPSY FORCEPS: Brand: RADIAL JAW 4

## (undated) DEVICE — PK CATH CARD 40

## (undated) DEVICE — TR BAND RADIAL ARTERY COMPRESSION DEVICE: Brand: TR BAND

## (undated) DEVICE — LN SMPL CO2 SHTRM SD STREAM W/M LUER

## (undated) DEVICE — RADIFOCUS OPTITORQUE ANGIOGRAPHIC CATHETER: Brand: OPTITORQUE